# Patient Record
Sex: FEMALE | Race: WHITE | HISPANIC OR LATINO | Employment: FULL TIME | URBAN - METROPOLITAN AREA
[De-identification: names, ages, dates, MRNs, and addresses within clinical notes are randomized per-mention and may not be internally consistent; named-entity substitution may affect disease eponyms.]

---

## 2017-01-05 DIAGNOSIS — I10 HTN (HYPERTENSION): ICD-10-CM

## 2017-01-06 RX ORDER — ATENOLOL 50 MG/1
TABLET ORAL
Qty: 60 TABLET | Refills: 0 | Status: SHIPPED | OUTPATIENT
Start: 2017-01-06 | End: 2017-03-07 | Stop reason: SDUPTHER

## 2017-01-11 RX ORDER — AZITHROMYCIN 250 MG/1
TABLET, FILM COATED ORAL
Qty: 6 TABLET | Refills: 0 | Status: SHIPPED | OUTPATIENT
Start: 2017-01-11 | End: 2017-01-16

## 2017-01-19 ENCOUNTER — HOSPITAL ENCOUNTER (OUTPATIENT)
Dept: RADIOLOGY | Facility: HOSPITAL | Age: 42
Discharge: HOME | End: 2017-01-19
Attending: INTERNAL MEDICINE | Admitting: INTERNAL MEDICINE
Payer: COMMERCIAL

## 2017-01-19 VITALS
WEIGHT: 230 LBS | SYSTOLIC BLOOD PRESSURE: 134 MMHG | HEIGHT: 65 IN | DIASTOLIC BLOOD PRESSURE: 76 MMHG | HEART RATE: 92 BPM | RESPIRATION RATE: 96 BRPM | BODY MASS INDEX: 38.32 KG/M2

## 2017-01-19 DIAGNOSIS — I87.2 VENOUS INSUFFICIENCY OF LOWER EXTREMITY, UNSPECIFIED LATERALITY: ICD-10-CM

## 2017-01-19 PROCEDURE — 76942 ECHO GUIDE FOR BIOPSY: CPT | Mod: TC

## 2017-01-19 PROCEDURE — 76942 ECHO GUIDE FOR BIOPSY: CPT | Mod: 26,XE,, | Performed by: RADIOLOGY

## 2017-01-19 PROCEDURE — 36479 ENDOVENOUS LASER VEIN ADDON: CPT

## 2017-01-19 PROCEDURE — 25000003 PHARM REV CODE 250: Performed by: INTERNAL MEDICINE

## 2017-01-19 PROCEDURE — 36478 ENDOVENOUS LASER 1ST VEIN: CPT | Mod: ,,, | Performed by: RADIOLOGY

## 2017-01-19 PROCEDURE — 36475 ENDOVENOUS RF 1ST VEIN: CPT

## 2017-01-19 RX ORDER — LIDOCAINE HYDROCHLORIDE AND EPINEPHRINE 10; 10 MG/ML; UG/ML
40 INJECTION, SOLUTION INFILTRATION; PERINEURAL ONCE
Status: COMPLETED | OUTPATIENT
Start: 2017-01-19 | End: 2017-01-19

## 2017-01-19 RX ORDER — LIDOCAINE HYDROCHLORIDE 10 MG/ML
5 INJECTION INFILTRATION; PERINEURAL ONCE
Status: COMPLETED | OUTPATIENT
Start: 2017-01-19 | End: 2017-01-19

## 2017-01-19 RX ORDER — INDOMETHACIN 25 MG/1
30 CAPSULE ORAL ONCE
Status: COMPLETED | OUTPATIENT
Start: 2017-01-19 | End: 2017-01-19

## 2017-01-19 RX ORDER — DIAZEPAM 5 MG/1
10 TABLET ORAL ONCE
Status: COMPLETED | OUTPATIENT
Start: 2017-01-19 | End: 2017-01-19

## 2017-01-19 RX ADMIN — DIAZEPAM 10 MG: 5 TABLET ORAL at 01:01

## 2017-01-19 RX ADMIN — LIDOCAINE HYDROCHLORIDE,EPINEPHRINE BITARTRATE 40 ML: 10; .01 INJECTION, SOLUTION INFILTRATION; PERINEURAL at 01:01

## 2017-01-19 RX ADMIN — SODIUM BICARBONATE 30 MEQ: 84 INJECTION, SOLUTION INTRAVENOUS at 01:01

## 2017-01-19 RX ADMIN — LIDOCAINE HYDROCHLORIDE,EPINEPHRINE BITARTRATE: 10; .01 INJECTION, SOLUTION INFILTRATION; PERINEURAL at 01:01

## 2017-01-19 RX ADMIN — LIDOCAINE HYDROCHLORIDE 5 ML: 10 INJECTION, SOLUTION INFILTRATION; PERINEURAL at 01:01

## 2017-01-19 NOTE — IP AVS SNAPSHOT
[facility]  [address]  [place] [address]  Phone: [phone]           Patient Discharge Instructions     Our goal is to set you up for success. This packet includes information on your condition, medications, and your home care. It will help you to care for yourself so you don't get sicker and need to go back to the hospital.     Please ask your nurse if you have any questions.        There are many details to remember when preparing to leave the hospital. Here is what you will need to do:    1. Take your medicine. If you are prescribed medications, review your Medication List in the following pages. You may have new medications to  at the pharmacy and others that you'll need to stop taking. Review the instructions for how and when to take your medications. Talk with your doctor or nurses if you are unsure of what to do.     2. Go to your follow-up appointments. Specific follow-up information is listed in the following pages. Your may be contacted by a transition nurse or clinical provider about future appointments. Be sure we have all of the phone numbers to reach you, if needed. Please contact your provider's office if you are unable to make an appointment.     3. Watch for warning signs. Your doctor or nurse will give you detailed warning signs to watch for and when to call for assistance. These instructions may also include educational information about your condition. If you experience any of warning signs to your health, call your doctor.               [facility]  Unless otherwise directed by your provider, please contact [facility], our nurse care line that is available for 24/7 assistance.     [phone] (toll-free)    Registered nurses in the [facility] provide clinical advisement, health education, appointment booking, and other advisory services.                    ** Verify the list of medication(s) below is accurate and up to date. Carry this  with you in case of emergency. If your medications have changed, please notify your healthcare provider.             Medication List      Notice     Cannot display patient medications because the patient has not yet been checked in.               Please bring to all follow up appointments:    1. A copy of your discharge instructions.  2. All medicines you are currently taking in their original bottles.  3. Identification and insurance card.    Please arrive 15 minutes ahead of scheduled appointment time.    Please call 24 hours in advance if you must reschedule your appointment and/or time.        Your Scheduled Appointments     Jan 19, 2017 11:00 AM CST   Us Biopsy with [facility] US2, [facility] RADIOLOGIST1   [facility]    [address]  [place] [id number] [phone]            Feb 10, 2017  8:30 AM CST   Follow Up/Office Visit with Kelsey Hung OD   [facility] ([facility] )    [address]  [place] [address] [phone]            Feb 21, 2017 10:00 AM CST   Annual Checkup/Physical with Ivana Cotton MD   [facility] ([facility])    [address]  [place] [id number] [phone]            Mar 13, 2017 10:00 AM CDT   Follow Up/Office Visit with Aishwarya Elizondo MD   [place] [facility]    [address]  [place] [address] [phone]            Mar 14, 2017  8:40 AM CDT   Annual Checkup/Physical with Ivana Cotton MD   [facility] ([facility])    [address]  [place] [id number] [phone]              Your Future Surgeries/Procedures     Jan 19, 2017   Surgery with Neri Molina MD   [facility] [place]    [address]  [place] [address]   [phone]                  Admission Information     Date & Time Provider Department CSN    1/19/2017  11:00 AM Neri Molina MD [facility] [id number]      Care Providers     Provider Role Specialty Primary office phone    Neri Molina MD Attending Provider Cardiology [phone]      Recent Lab Values        5/13/2015                           9:00 AM           A1C 5.8                       Allergies as of 1/19/2017        Reactions    Lortab [Hydrocodone-acetaminophen] Itching, Rash    Other Anaphylaxis    mushrooms    Diflucan  [Fluconazole]     Other reaction(s): Hives    Iodine And Iodide Containing Products Hives    Iv iodine only    Mushroom Combination No.1     Other reaction(s): Bronchial Constriction      Advance Directives     An advance directive is a document which, in the event you are no longer able to make decisions for yourself, tells your healthcare team what kind of treatment you do or do not want to receive, or who you would like to make those decisions for you.  If you do not currently have an advance directive, [facility] encourages you to create one.  For more information call:  [phone]), [phone] ([phone]),  or log on to [email or web address].        Language Assistance Services     ATTENTION: Language assistance services are available, free of charge. Please call [phone].      ATENCIÓN: Si habla español, tiene a cohn disposición servicios gratuitos de asistencia lingüística. Llame al [phone].     CHÚ Ý: N?u b?n nói Ti?ng Vi?t, có các d?ch v? h? tr? ngôn ng? mi?n phí dành cho b?n. G?i s? [phone].         [facility] complies with applicable Federal civil rights laws and does not discriminate on the basis of race, color, national origin, age, disability, or sex.

## 2017-01-19 NOTE — IP AVS SNAPSHOT
[facility]  [address]  [place] [address]  Phone: [phone]           I have received a copy of my After Visit Summary and discharge instructions from [facility].    INSTRUCTIONS RECEIVED AND UNDERSTOOD BY:                     Patient/Patient Representative: ________________________________________________________________     Date/Time: ________________________________________________________________                     Instructions Given By: ________________________________________________________________     Date/Time: ________________________________________________________________

## 2017-02-08 ENCOUNTER — TELEPHONE (OUTPATIENT)
Dept: OPHTHALMOLOGY | Facility: CLINIC | Age: 42
End: 2017-02-08

## 2017-02-08 NOTE — TELEPHONE ENCOUNTER
Called pt regarding appt scheduled on 2/10/17 for annual exam, left message annual exam is not until April 2017 around the 7th.  Give office a call or R/S on [facility].

## 2017-02-17 DIAGNOSIS — I10 HTN (HYPERTENSION): ICD-10-CM

## 2017-02-17 RX ORDER — TRIAMTERENE AND HYDROCHLOROTHIAZIDE 37.5; 25 MG/1; MG/1
CAPSULE ORAL
Qty: 90 CAPSULE | Refills: 0 | Status: SHIPPED | OUTPATIENT
Start: 2017-02-17 | End: 2017-05-26 | Stop reason: SDUPTHER

## 2017-02-18 ENCOUNTER — PATIENT MESSAGE (OUTPATIENT)
Dept: GYNECOLOGIC ONCOLOGY | Facility: CLINIC | Age: 42
End: 2017-02-18

## 2017-02-20 ENCOUNTER — TELEPHONE (OUTPATIENT)
Dept: OPHTHALMOLOGY | Facility: CLINIC | Age: 42
End: 2017-02-20

## 2017-02-20 RX ORDER — ATENOLOL 50 MG/1
TABLET ORAL
Qty: 60 TABLET | Refills: 0 | OUTPATIENT
Start: 2017-02-20

## 2017-02-20 NOTE — TELEPHONE ENCOUNTER
Called pt regarding message sent to come in earlier than 4/3/17.  If she need to come in earlier give office a call @ [phone] understand it will be medical copay this is before annual exam. P

## 2017-02-27 ENCOUNTER — DOCUMENTATION ONLY (OUTPATIENT)
Dept: PSYCHIATRY | Facility: CLINIC | Age: 42
End: 2017-02-27

## 2017-03-06 ENCOUNTER — TELEPHONE (OUTPATIENT)
Dept: INTERNAL MEDICINE | Facility: CLINIC | Age: 42
End: 2017-03-06

## 2017-03-06 ENCOUNTER — DOCUMENTATION ONLY (OUTPATIENT)
Dept: INTERNAL MEDICINE | Facility: CLINIC | Age: 42
End: 2017-03-06

## 2017-03-06 DIAGNOSIS — R73.03 PRE-DIABETES: ICD-10-CM

## 2017-03-06 DIAGNOSIS — E55.9 MILD VITAMIN D DEFICIENCY: ICD-10-CM

## 2017-03-06 DIAGNOSIS — I10 ESSENTIAL HYPERTENSION: ICD-10-CM

## 2017-03-06 DIAGNOSIS — Z85.41 HISTORY OF CERVICAL CANCER: ICD-10-CM

## 2017-03-06 DIAGNOSIS — E89.40 SURGICAL MENOPAUSE: ICD-10-CM

## 2017-03-06 DIAGNOSIS — E03.9 ACQUIRED HYPOTHYROIDISM: Primary | ICD-10-CM

## 2017-03-06 DIAGNOSIS — G43.C0 PERIODIC HEADACHE SYNDROME WITHOUT STATUS MIGRAINOSUS, NOT INTRACTABLE: ICD-10-CM

## 2017-03-06 NOTE — PROGRESS NOTES
Pre-Visit Review & Summary:    43 y/o female with history of HTN, Obesity, Migraine H/As, Hypothyroidism, GERD, Allergic Rhinitis, Anxiety and Deprerssion, S/P EVLT for Chronic Venous Insufficiency, and S/P Hysterectomy for Stage 1B Cervical Cancer 5/2013 has a scheduled appt 3/14/17 for a physical exam.    She was last seen by me 5/13/15. Since that visit, she saw Dr. Matias (GYN Oncology) 7/15/16 for follow up. CA-125 was normal.     She had Left EVLT 1/191/7 for venous claudication without complications.      PMH:  1. HTN           Atenolol 50 mg BID           Dyazide 37.5-25 mg/day    2. Migraine H/A           Atenolol 50 mg BID    3. Hypothyroidism           Synthroid 50 mcg/day    4. Vit D Deficiency            Cholecalciferol 1,000 units daily    5. Anxiety & Depression.              Follow[facility] by Ms. Abreu, [facility]             Xanax 0.25 mg prn             Cymbalta 30 mg/day    6. Obesity             (BMI-36)    7. Hx of GERD    8. Chronic Venous Insufficiency and Varicose Veins              Followed in Vascular Surgery               S/P Right EVLT 5/2014              S/P Left EVLT 1/19/17    9. Stage 1B Cervical CA             S/P Total Hysterectomy 5/2013             Followed by Dr. Matias - last visit - 7/2016    10. Surgical Menopause              Estrace 0.5     11. Allergic Rhinitis & Food Allergies             Followed by Dr. Finley (Allergy)             Astelin Nasal Spray, Xyzal 5 mg, Epipen    12. Pre-DM        MEDS:        Albuterol Inhaler prn        Astelin Nasal Spray        Atenolol 50 mg BID        Benadryl prn        Cholecalciferol 1,000 units daily        Cymbalta 30 mg/day        Dyazide 37.5/25 mg/day        EPIPEN prn        Estrace 0.5 mg/day        Imitrex        Levothyroxine 50 mcg/day        Xanax 0.25 mg prn        Xyzal 5 mg/day      ALLERGIES:         IV Contrast         Diflucan         Amlodipine         Hydrocodone- Acetaminophen         Mushrooms    Surgical Hx:        Robotic  Radical Hysterectomy, BSO, Pelvic LN 5/2013        Tonsillectomy        Turbinectomy        Laparotomy Exploration for Endometriosis        Lipoma Excision        Right and Left EVLT      Social Hx:      Smoking Hx: Non-Smoker      ETOH: 1-2/week      Exercise:      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      Home Environment:       Family Hx:  (+) HTN - Mother  (+) DM - Father  (+) CVA - GM  (+) Cervical CA - Mother  (+) Hypothyroidism - Mother  (+) Migraine H/As - Mother      Preventive Health Screening & Recent Lab Results:   CBC (5/2015): normal   CMP (5/2015): normal   TSH (3/2014): 1.609   Lipids (5/2015): Chol-211, TG-79, HDL-74, LDL-121   Hgb A1c (5/2015): 5.8              Vit D (5/2015): 21   GYN Exam (7/2016)      Eye Exam - 4/2016   Mammogram (9/2015): negative   Immunizations     Influenza - Fall 2016    Tdp -                           Pneumovax (3/2014)   Other:                      Hepatitis Panel (3/2014): negative                      CA-125 (7/2016) - 9                      MRI Right foot (4/2015): Stress Fx R-2nd Metatarsal    IMP:  1. Physical Exam  2. HTN  3. Obesity  4. Pre-DM  5. Hypothyroidism  6. Migraine H/As  7. Vit D Deficiency  8. Hx of Stage 1B Cervical CA - S/P Robotic Radical Hysterectomy, BSO, Pelvic LN 2013  9. Surgical Menopause  10. Chronic Venous Insufficiency and Varicose Veins  11. Depression & Anxiety    Plan:  1. Mammogram, Tdp, Follow up with Dr. Matias

## 2017-03-07 DIAGNOSIS — I10 HTN (HYPERTENSION): ICD-10-CM

## 2017-03-07 DIAGNOSIS — F41.9 ANXIETY: ICD-10-CM

## 2017-03-07 DIAGNOSIS — F32.A DEPRESSION: ICD-10-CM

## 2017-03-07 RX ORDER — ATENOLOL 50 MG/1
50 TABLET ORAL 2 TIMES DAILY
Qty: 60 TABLET | Refills: 0 | Status: SHIPPED | OUTPATIENT
Start: 2017-03-07 | End: 2017-04-03 | Stop reason: SDUPTHER

## 2017-03-07 RX ORDER — DULOXETIN HYDROCHLORIDE 30 MG/1
30 CAPSULE, DELAYED RELEASE ORAL DAILY
Qty: 90 CAPSULE | Refills: 0 | Status: SHIPPED | OUTPATIENT
Start: 2017-03-07 | End: 2017-05-31 | Stop reason: SDUPTHER

## 2017-03-14 ENCOUNTER — OFFICE VISIT (OUTPATIENT)
Dept: INTERNAL MEDICINE | Facility: CLINIC | Age: 42
End: 2017-03-14
Payer: COMMERCIAL

## 2017-03-14 VITALS
DIASTOLIC BLOOD PRESSURE: 90 MMHG | BODY MASS INDEX: 37.57 KG/M2 | HEIGHT: 65 IN | SYSTOLIC BLOOD PRESSURE: 110 MMHG | WEIGHT: 225.5 LBS | HEART RATE: 73 BPM | OXYGEN SATURATION: 97 % | TEMPERATURE: 99 F

## 2017-03-14 DIAGNOSIS — A08.4 VIRAL GASTROENTERITIS: ICD-10-CM

## 2017-03-14 DIAGNOSIS — J06.9 VIRAL UPPER RESPIRATORY TRACT INFECTION: Primary | ICD-10-CM

## 2017-03-14 LAB
FLUAV AG SPEC QL IA: NEGATIVE
FLUBV AG SPEC QL IA: NEGATIVE
SPECIMEN SOURCE: NORMAL

## 2017-03-14 PROCEDURE — 99999 PR PBB SHADOW E&M-EST. PATIENT-LVL III: CPT | Mod: PBBFAC,,, | Performed by: INTERNAL MEDICINE

## 2017-03-14 PROCEDURE — 87400 INFLUENZA A/B EACH AG IA: CPT | Mod: 59

## 2017-03-14 PROCEDURE — 99214 OFFICE O/P EST MOD 30 MIN: CPT | Mod: S$GLB,,, | Performed by: INTERNAL MEDICINE

## 2017-03-14 RX ORDER — ONDANSETRON 8 MG/1
8 TABLET, ORALLY DISINTEGRATING ORAL EVERY 12 HOURS PRN
Qty: 6 TABLET | Refills: 0 | Status: SHIPPED | OUTPATIENT
Start: 2017-03-14 | End: 2017-05-31 | Stop reason: SDUPTHER

## 2017-03-14 NOTE — MR AVS SNAPSHOT
[facility]  [address]  [place] [address]  Phone: [phone]  Fax: [phone]                  Emily Max   3/14/2017 8:40 AM   Office Visit    Description:  Female : 1975   Provider:  Ivana Cotton MD   Department:  [facility]           Reason for Visit     Fever     Generalized Body Aches     Nausea     Emesis     Hoarse           Diagnoses this Visit        Comments    Viral upper respiratory tract infection    -  Primary     Viral gastroenteritis                To Do List           Future Appointments        Provider Department Dept Phone    4/3/2017 7:30 AM Kory Wakefield OD [facility] [phone]    4/3/2017 8:00 AM JOSLYN Perry On license of [facility] [phone]    2017 7:30 AM LAB, APPOINTMENT [facility] [phone]    2017 8:40 AM Ivana Cotton MD [facility] [phone]      Goals (5 Years of Data)     None      Follow-Up and Disposition     Return in about 2 months (around 2017) for annual physical with lab.    Follow-up and Disposition History       These Medications        Disp Refills Start End    ondansetron (ZOFRAN-ODT) 8 MG TbDL 6 tablet 0 3/14/2017     Take 1 tablet (8 mg total) by mouth every 12 (twelve) hours as needed. - Oral    Pharmacy: [facility]/pharmacy #[id number] - [place] - [address] Ph #: [phone]         [facility]     [facility] Nurse Care Line -  Assistance  Registered nurses in the [facility] provide clinical advisement, health education, appointment booking, and other advisory services.  Call for this free service at [phone].             Medications           Message regarding Medications     Verify the changes and/or additions to your medication regime listed below are the same as discussed with your clinician today.  If any of these changes or additions are  incorrect, please notify your healthcare provider.        STOP taking these medications     diphenhydrAMINE (BENADRYL) 25 mg capsule Take 2 tablets 1 hour before ct scan.    neomycin-polymyxin-hydrocortisone (CORTISPORIN) 3.5-10,000-1 mg/mL-unit/mL-% otic suspension PLACE 4 DROPS IN AFFECTED EAR(S) 3 TIMES A DAY FOR 7 DAYS    sulfamethoxazole-trimethoprim 800-160mg (BACTRIM DS) 800-160 mg Tab TAKE 1 TABLET BY MOUTH EVERY 12 HOURS FOR 10 DAYS           Verify that the below list of medications is an accurate representation of the medications you are currently taking.  If none reported, the list may be blank. If incorrect, please contact your healthcare provider. Carry this list with you in case of emergency.           Current Medications     atenolol (TENORMIN) 50 MG tablet Take 1 tablet (50 mg total) by mouth 2 (two) times daily.    duloxetine (CYMBALTA) 30 MG capsule Take 1 capsule (30 mg total) by mouth once daily.    epinephrine (EPIPEN) 0.3 mg/0.3 mL (1:1,000) AtIn Inject 0.3 mLs (0.3 mg total) into the muscle once.    estradiol (ESTRACE) 0.5 MG tablet TAKE 1 TABLET (0.5 MG TOTAL) BY MOUTH ONCE DAILY.    levocetirizine (XYZAL) 5 MG tablet Take 1 tablet (5 mg total) by mouth once daily.    levothyroxine (SYNTHROID) 50 MCG tablet Take 1 tablet (50 mcg total) by mouth once daily.    triamterene-hydrochlorothiazide 37.5-25 mg (DYAZIDE) 37.5-25 mg per capsule TAKE 1 CAPSULE BY MOUTH ONCE DAILY.    vitamin D (VITAMIN D3) 1000 units Tab Take 1 tablet (1,000 Units total) by mouth once daily.    albuterol 90 mcg/actuation inhaler Inhale 2 puffs into the lungs every 4 (four) hours as needed for Wheezing.    alprazolam (XANAX) 0.25 MG tablet Take 1 tablet (0.25 mg total) by mouth nightly as needed for Anxiety.    azelastine (ASTELIN) 137 mcg nasal spray 1-2 sprays (137-274 mcg total) by Nasal route 2 (two) times daily as needed for Rhinitis.    ondansetron (ZOFRAN-ODT) 8 MG TbDL Take 1 tablet (8 mg total) by mouth every 12  "(twelve) hours as needed.    promethazine-dextromethorphan (PROMETHAZINE-DM) 6.25-15 mg/5 mL Syrp TAKE 1 TEASPOON BY MOUTH EVERY 4 TO 6 HOURS AS NEEDED FOR COUGH ,NAUSEA OR DIZZINESS    valacyclovir (VALTREX) 1000 MG tablet Take 1 tablet (1,000 mg total) by mouth every 12 (twelve) hours.           Clinical Reference Information           Your Vitals Were     BP Pulse Temp Height Weight SpO2    110/90 (BP Location: Left arm, Patient Position: Sitting, BP Method: Manual) 73 98.6 °F (37 °C) (Oral) 5' 5" (1.651 m) 102.3 kg (225 lb 8.5 oz) 97%    BMI                37.53 kg/m2          Blood Pressure          Most Recent Value    BP  (!)  110/90      Allergies as of 3/14/2017     Lortab [Hydrocodone-acetaminophen]    Other    Diflucan  [Fluconazole]    Iodine And Iodide Containing Products    Mushroom Combination No.1      Immunizations Administered on Date of Encounter - 3/14/2017     None      Orders Placed During Today's Visit      Normal Orders This Visit    Influenza antigen Nasopharyngeal Swab       Language Assistance Services     ATTENTION: Language assistance services are available, free of charge. Please call [phone].      ATENCIÓN: Si habla español, tiene a cohn disposición servicios gratuitos de asistencia lingüística. Llame al [phone].     BUCKY Ý: N?u b?n nói Ti?ng Vi?t, có các d?ch v? h? tr? ngôn ng? mi?n phí dành cho b?n. G?i s? [phone].         [facility] complies with applicable Federal civil rights laws and does not discriminate on the basis of race, color, national origin, age, disability, or sex.        "

## 2017-03-14 NOTE — PROGRESS NOTES
Subjective:       Patient ID: Emily Max is a 42 y.o. female.    Chief Complaint: Fever; Generalized Body Aches; Nausea; Emesis; and Hoarse    HPI Comments: 41 y/o female with history of HTN, Obesity, Migraine H/As, Hypothyroidism, GERD, Allergic Rhinitis, Anxiety and Deprerssion, S/P EVLT for Chronic Venous Insufficiency, and S/P Hysterectomy for Stage 1B Cervical Cancer 5/2013 comes in for an urgent care visit complaining of 24 hour hx of myalgia, arthralgia, low grade fever to 99 degrees, chills, nausea, vomiting, rhinorrhea, nasal congestion, dry non-productive cough, and post nasal drip. Nausea only yesterday and ate some soup. Awoke 2 AM with vomiting of soup she ad eaten. 2 more episodes of vomiting of clear liquid this morning. She denies epigastric pain or chest pain but has noted diffuse lower abdominal cramping. No diarrhea or blood in stool. Last BM was 36 hours ago and normal. She has been taking Advil and Tylenol. She notes her son was sick last week with a viral gastroenteritis lasting 24 hours.    She was last seen by me 5/13/15. Since that visit, she saw Dr. Matias (GYN Oncology) 7/15/16 for follow up. CA-125 was normal.     She had Left EVLT 1/191/7 for venous claudication without complications.      PMH:  1. HTN           Atenolol 50 mg BID           Dyazide 37.5-25 mg/day    2. Migraine H/A           Atenolol 50 mg BID    3. Hypothyroidism           Synthroid 50 mcg/day    4. Vit D Deficiency            Cholecalciferol 1,000 units daily    5. Anxiety & Depression.              Followed by Ms. Lois LCSW             Xanax 0.25 mg prn             Cymbalta 30 mg/day    6. Obesity             (BMI-36)    7. Hx of GERD    8. Chronic Venous Insufficiency and Varicose Veins              Followed in Vascular Surgery               S/P Right EVLT 5/2014              S/P Left EVLT 1/19/17    9. Stage 1B Cervical CA             S/P Total Hysterectomy 5/2013             Followed by   Florencio - last visit - 7/2016    10. Surgical Menopause              Estrace 0.5     11. Allergic Rhinitis & Food Allergies             Followed by Dr. Finley (Allergy)             Astelin Nasal Spray, Xyzal 5 mg, Epipen    12. Pre-DM        MEDS:        Atenolol 50 mg BID        Cholecalciferol 1,000 units daily        Cymbalta 30 mg/day        Dyazide 37.5/25 mg/day        EPIPEN prn        Estrace 0.5 mg/day        Imitrex prn        Levothyroxine 50 mcg/day        Xyzal 5 mg/day      ALLERGIES:         IV Contrast         Diflucan         Amlodipine         Hydrocodone- Acetaminophen         Mushrooms    Surgical Hx:        Robotic Radical Hysterectomy, BSO, Pelvic LN 5/2013        Tonsillectomy        Turbinectomy        Laparotomy Exploration for Endometriosis        Lipoma Excision        Right and Left EVLT      Social Hx:      Smoking Hx: Non-Smoker      ETOH: 1-2/week      Exercise:      Work Hx: [facility] [employer] [facility]      Home Environment:       Family Hx:  (+) HTN - Mother  (+) DM - Father  (+) CVA - GM  (+) Cervical CA - Mother  (+) Hypothyroidism - Mother  (+) Migraine H/As - Mother      Preventive Health Screening & Recent Lab Results:   CBC (5/2015): normal   CMP (5/2015): normal   TSH (3/2014): 1.609   Lipids (5/2015): Chol-211, TG-79, HDL-74, LDL-121   Hgb A1c (5/2015): 5.8              Vit D (5/2015): 21   GYN Exam (7/2016)      Eye Exam - 4/2016   Mammogram (9/2015): negative   Immunizations     Influenza - Fall 2016    Tdp -                           Pneumovax (3/2014)   Other:                      Hepatitis Panel (3/2014): negative                      CA-125 (7/2016) - 9                      MRI Right foot (4/2015): Stress Fx R-2nd Metatarsal        Fever    Associated symptoms include abdominal pain (diffuse abdominal cramping), congestion, coughing (dry non-productive cough), ear pain (left ear discomfort), nausea, a sore throat and vomiting. Pertinent negatives include no chest pain,  diarrhea, headaches, rash, urinary pain or wheezing.   Nausea   Associated symptoms include abdominal pain (diffuse abdominal cramping), arthralgias, chills, congestion, coughing (dry non-productive cough), a fever, myalgias, nausea, a sore throat and vomiting. Pertinent negatives include no chest pain, headaches or rash.   Emesis    Associated symptoms include abdominal pain (diffuse abdominal cramping), arthralgias, chills, coughing (dry non-productive cough), a fever and myalgias. Pertinent negatives include no chest pain, diarrhea or headaches.     Review of Systems   Constitutional: Positive for chills and fever.   HENT: Positive for congestion, ear pain (left ear discomfort), postnasal drip, rhinorrhea, sinus pressure, sneezing and sore throat. Negative for trouble swallowing.    Respiratory: Positive for cough (dry non-productive cough). Negative for chest tightness, shortness of breath and wheezing.    Cardiovascular: Negative for chest pain.   Gastrointestinal: Positive for abdominal pain (diffuse abdominal cramping), nausea and vomiting. Negative for blood in stool, constipation and diarrhea.        Last BM 36hours ago and normal   Genitourinary: Negative for dysuria and hematuria.   Musculoskeletal: Positive for arthralgias and myalgias.   Skin: Negative for rash.   Neurological: Negative for headaches.   Hematological: Negative for adenopathy.       Objective:      Physical Exam   Constitutional: She is oriented to person, place, and time. She appears well-developed and well-nourished. No distress.   HENT:   Head: Normocephalic.   Right Ear: Tympanic membrane and external ear normal.   Left Ear: Tympanic membrane and external ear normal.   Nose: Mucosal edema present. Right sinus exhibits no maxillary sinus tenderness and no frontal sinus tenderness. Left sinus exhibits no maxillary sinus tenderness and no frontal sinus tenderness.   Eyes: Conjunctivae are normal. No scleral icterus.   Neck: Neck supple.  No JVD present.   Cardiovascular: Normal rate, regular rhythm and normal heart sounds.  Exam reveals no gallop.    No murmur heard.  Pulmonary/Chest: Effort normal. No respiratory distress. She has no wheezes. She has no rales.   Abdominal: Soft. Bowel sounds are normal. She exhibits no distension and no mass. There is no tenderness.   Musculoskeletal: She exhibits no edema.   Lymphadenopathy:     She has no cervical adenopathy.   Neurological: She is alert and oriented to person, place, and time.   Skin: Skin is warm and dry. She is not diaphoretic.   Psychiatric: She has a normal mood and affect.       Assessment:   1. Viral Illness with Gastroenteritis and URI  2. HTN  3. Obesity  4. Pre-DM  5. Hypothyroidism  6. Migraine H/As  7. Vit D Deficiency  8. Hx of Stage 1B Cervical CA - S/P Robotic Radical Hysterectomy, BSO, Pelvic LN 2013  9. Surgical Menopause  10. Chronic Venous Insufficiency and Varicose Veins  11. Depression & Anxiety    Plan:   1. Zofran, Ryan Diet, NP Swab sent for Influenza  2. RTC prn or if sx worsen. Reschedule for AAnnual PE with Lab.

## 2017-03-15 ENCOUNTER — TELEPHONE (OUTPATIENT)
Dept: INTERNAL MEDICINE | Facility: CLINIC | Age: 42
End: 2017-03-15

## 2017-03-15 ENCOUNTER — PATIENT MESSAGE (OUTPATIENT)
Dept: INTERNAL MEDICINE | Facility: CLINIC | Age: 42
End: 2017-03-15

## 2017-03-15 NOTE — TELEPHONE ENCOUNTER
----- Message from Leana Collins sent at 3/15/2017  3:57 PM CDT -----  Contact: self/[phone]  Pt called in regards to still not feeling better.        Please advise

## 2017-03-15 NOTE — TELEPHONE ENCOUNTER
Attempted to call patient. No answer.  Please call patient and tell her the following:     If sx are improving, (which she noted in her e-mail that she is eating, no more vomiting and having BMs), then she should continue to treat symptomatically.     However, if she is continuing with abdominal complaints then she needs to return for blood work (CBC, CMP, Lipase) and possible abdominal US to check gallbladder.    If only sx remaining are just upper respiratory, then she can continue to treat symptomatically for now. If she is not improved in 10 days, then she should come in. If respiratory sx worsen or she develops recurrent fever, chest pain, then she should also come in.

## 2017-03-16 ENCOUNTER — TELEPHONE (OUTPATIENT)
Dept: INTERNAL MEDICINE | Facility: CLINIC | Age: 42
End: 2017-03-16

## 2017-03-16 NOTE — TELEPHONE ENCOUNTER
----- Message from Zulema Bradshaw sent at 3/16/2017 10:41 AM CDT -----  Contact: self: [phone]  Pt called and would like an updated work excuse note that includes today.    Pt can be reached at [phone]    Thank you

## 2017-03-16 NOTE — TELEPHONE ENCOUNTER
Spoke to patient and states that she saw Dr. Cotton this week----she was given a note to return to work for today but the patient is still not feeling better and would like another note to extend until today and she will go to work tomorrow.    Pls sign return to work note for patient and fax to [phone]----thanks

## 2017-04-03 ENCOUNTER — OFFICE VISIT (OUTPATIENT)
Dept: PSYCHIATRY | Facility: CLINIC | Age: 42
End: 2017-04-03
Payer: COMMERCIAL

## 2017-04-03 ENCOUNTER — OFFICE VISIT (OUTPATIENT)
Dept: OPTOMETRY | Facility: CLINIC | Age: 42
End: 2017-04-03
Payer: COMMERCIAL

## 2017-04-03 DIAGNOSIS — H40.033 ANATOMICAL NARROW ANGLE, BILATERAL: ICD-10-CM

## 2017-04-03 DIAGNOSIS — E03.9 HYPOTHYROID: ICD-10-CM

## 2017-04-03 DIAGNOSIS — H52.4 PRESBYOPIA: Primary | ICD-10-CM

## 2017-04-03 DIAGNOSIS — F41.9 ANXIETY: Primary | ICD-10-CM

## 2017-04-03 DIAGNOSIS — I10 HTN (HYPERTENSION): ICD-10-CM

## 2017-04-03 PROCEDURE — 99999 PR PBB SHADOW E&M-EST. PATIENT-LVL II: CPT | Mod: PBBFAC,,, | Performed by: OPTOMETRIST

## 2017-04-03 PROCEDURE — 92015 DETERMINE REFRACTIVE STATE: CPT | Mod: S$GLB,,, | Performed by: OPTOMETRIST

## 2017-04-03 PROCEDURE — 90834 PSYTX W PT 45 MINUTES: CPT | Mod: S$GLB,,, | Performed by: SOCIAL WORKER

## 2017-04-03 PROCEDURE — 99999 PR PBB SHADOW E&M-EST. PATIENT-LVL I: CPT | Mod: PBBFAC,,, | Performed by: SOCIAL WORKER

## 2017-04-03 PROCEDURE — 92020 GONIOSCOPY: CPT | Mod: S$GLB,,, | Performed by: OPTOMETRIST

## 2017-04-03 PROCEDURE — 92014 COMPRE OPH EXAM EST PT 1/>: CPT | Mod: S$GLB,,, | Performed by: OPTOMETRIST

## 2017-04-03 RX ORDER — ATENOLOL 50 MG/1
TABLET ORAL
Qty: 60 TABLET | Refills: 5 | Status: SHIPPED | OUTPATIENT
Start: 2017-04-03 | End: 2017-05-31 | Stop reason: SDUPTHER

## 2017-04-03 RX ORDER — LEVOTHYROXINE SODIUM 50 UG/1
50 TABLET ORAL DAILY
Qty: 90 TABLET | Refills: 0 | Status: SHIPPED | OUTPATIENT
Start: 2017-04-03 | End: 2017-07-08 | Stop reason: SDUPTHER

## 2017-04-03 NOTE — PROGRESS NOTES
HPI     Blur ou at near x mos, no assoc pain or red, constant, no relief over   time.  Has contacts never seemed right, doesn't wear often. Has glasses for   reading. Distance and near blurred.Didn't bring contacts.Headaches   occasionally history of migraines.       Last edited by Francois Hussein, OD on 4/3/2017  9:28 AM.     ROS     Negative for: Constitutional, Gastrointestinal, Neurological, Skin,   Genitourinary, Musculoskeletal, HENT, Endocrine, Cardiovascular, Eyes,   Respiratory, Psychiatric, Allergic/Imm, Heme/Lymph    Last edited by Francois Hussein, OD on 4/3/2017  9:05 AM. (History)        Assessment /Plan     For exam results, see Encounter Report.    Presbyopia    Anatomical narrow angle, bilateral      1. Spec Rx given. Different lens options discussed with patient. RTC 1 year full exam.  2. IOP fine, open with gonio, strong fam history of glaucoma, Cont with yearly eye exams.

## 2017-04-03 NOTE — PROGRESS NOTES
"Individual Psychotherapy (PhD/LCSW)    4/3/2017    Site:  [facility]         Therapeutic Intervention: Met with patient.  Outpatient - Insight oriented psychotherapy 45 min - CPT code 38156    Chief complaint/reason for encounter: depression and anxiety     Interval history and content of current session: Pt comes in to explore possible ADD diagnosis, is aware of high level of distractibility, some impulsivity, racing thought. She is on Cymbalta 30 mg. , has SE at higher dose, finds this more beneficial than several SSRIs and Wellbutrin which she has tried in the past. Agree we will do evaluation for ADD at next session. Pt has developed some coping skills for managing distractibility, is aware she tends to "overload her plate". She has made progress at work in dealing with personal issues which distracted her, and others have noticed her improvement.     Treatment plan:  · Target symptoms: depression, anxiety   · Why chosen therapy is appropriate versus another modality: relevant to diagnosis  · Outcome monitoring methods: self-report  · Therapeutic intervention type: insight oriented psychotherapy    Risk parameters:  Patient reports no suicidal ideation  Patient reports no homicidal ideation  Patient reports no self-injurious behavior  Patient reports no violent behavior    Verbal deficits: None    Patient's response to intervention:  The patient's response to intervention is motivated.    Progress toward goals and other mental status changes:  The patient's progress toward goals is fair .    Diagnosis:   Anxiety, r/o ADD    Plan:  individual psychotherapy    Return to clinic: as needed  "

## 2017-04-10 ENCOUNTER — OFFICE VISIT (OUTPATIENT)
Dept: SPORTS MEDICINE | Facility: CLINIC | Age: 42
End: 2017-04-10
Payer: COMMERCIAL

## 2017-04-10 VITALS — HEIGHT: 66 IN | WEIGHT: 220 LBS | BODY MASS INDEX: 35.36 KG/M2 | TEMPERATURE: 99 F

## 2017-04-10 DIAGNOSIS — G89.29 CHRONIC PAIN OF RIGHT KNEE: Primary | ICD-10-CM

## 2017-04-10 DIAGNOSIS — M25.561 CHRONIC PAIN OF RIGHT KNEE: Primary | ICD-10-CM

## 2017-04-10 PROCEDURE — 99214 OFFICE O/P EST MOD 30 MIN: CPT | Mod: 25,S$GLB,, | Performed by: FAMILY MEDICINE

## 2017-04-10 PROCEDURE — 99999 PR PBB SHADOW E&M-EST. PATIENT-LVL III: CPT | Mod: PBBFAC,,, | Performed by: FAMILY MEDICINE

## 2017-04-10 PROCEDURE — 20611 DRAIN/INJ JOINT/BURSA W/US: CPT | Mod: RT,S$GLB,, | Performed by: FAMILY MEDICINE

## 2017-04-10 RX ORDER — TRIAMCINOLONE ACETONIDE 40 MG/ML
40 INJECTION, SUSPENSION INTRA-ARTICULAR; INTRAMUSCULAR
Status: DISCONTINUED | OUTPATIENT
Start: 2017-04-10 | End: 2017-04-10 | Stop reason: HOSPADM

## 2017-04-10 RX ADMIN — TRIAMCINOLONE ACETONIDE 40 MG: 40 INJECTION, SUSPENSION INTRA-ARTICULAR; INTRAMUSCULAR at 05:04

## 2017-04-10 NOTE — PROCEDURES
Large Joint Aspiration/Injection  Date/Time: 4/10/2017 5:31 PM  Performed by: LILIANA PRAJAPATI  Authorized by: LILIANA PRAJAPATI     Consent Done?:  Yes (Verbal)  Indications:  Pain  Procedure site marked: Yes    Timeout: Prior to procedure the correct patient, procedure, and site was verified      Location:  Knee  Site:  R knee  Prep: Patient was prepped and draped in usual sterile fashion    Ultrasonic Guidance for needle placement: Yes  Images are saved and documented.  Needle size:  18 G  Approach:  Lateral  Medications:  40 mg triamcinolone acetonide 40 mg/mL  Patient tolerance:  Patient tolerated the procedure well with no immediate complications    Additional Comments: Description of ultrasound utilization for needle guidance:   Ultrasound guidance used for needle localization.  Images saved and stored for documentation.  The knee joint was visualized.  Dynamic visualization of the 20g x 1.5  needle was continuous throughout the procedure.

## 2017-04-10 NOTE — MR AVS SNAPSHOT
[facility]  [address]  [facility] [address]  Phone: [phone]                  Emily Max   4/10/2017 5:00 PM   Appointment    Description:  Female : 1975   Provider:  Jesus Reid MD   Department:  [facility]                To Do List           Future Appointments        Provider Department Dept Phone    4/10/2017 5:00 PM Jesus Reid MD [facility] [phone]    2017 7:30 AM APPOINTMENT LAB, [facility] [phone]    2017 8:40 AM Ivana Cotton MD [facility] - Internal Medicine [phone]    2017 8:30 AM Dylan Matias MD [facility] - GYN Oncology [phone]      Goals (5 Years of Data)     None      [facility]     [facility] Nurse Care Line -  Assistance  Unless otherwise directed by your provider, please contact [facility], our nurse care line that is available for  assistance.     Registered nurses in the [facility] provide: appointment scheduling, clinical advisement, health education, and other advisory services.  Call: [phone] (toll free)               Medications           Message regarding Medications     Verify the changes and/or additions to your medication regime listed below are the same as discussed with your clinician today.  If any of these changes or additions are incorrect, please notify your healthcare provider.             Verify that the below list of medications is an accurate representation of the medications you are currently taking.  If none reported, the list may be blank. If incorrect, please contact your healthcare provider. Carry this list with you in case of emergency.           Current Medications     albuterol 90 mcg/actuation inhaler Inhale 2 puffs into the lungs every 4 (four) hours as needed for Wheezing.    alprazolam (XANAX) 0.25 MG tablet Take 1 tablet (0.25 mg total) by  mouth nightly as needed for Anxiety.    atenolol (TENORMIN) 50 MG tablet TAKE 1 TABLET (50 MG TOTAL) BY MOUTH 2 (TWO) TIMES DAILY.    azelastine (ASTELIN) 137 mcg nasal spray 1-2 sprays (137-274 mcg total) by Nasal route 2 (two) times daily as needed for Rhinitis.    duloxetine (CYMBALTA) 30 MG capsule Take 1 capsule (30 mg total) by mouth once daily.    epinephrine (EPIPEN) 0.3 mg/0.3 mL (1:1,000) AtIn Inject 0.3 mLs (0.3 mg total) into the muscle once.    estradiol (ESTRACE) 0.5 MG tablet TAKE 1 TABLET (0.5 MG TOTAL) BY MOUTH ONCE DAILY.    levocetirizine (XYZAL) 5 MG tablet Take 1 tablet (5 mg total) by mouth once daily.    levothyroxine (SYNTHROID) 50 MCG tablet Take 1 tablet (50 mcg total) by mouth once daily.    ondansetron (ZOFRAN-ODT) 8 MG TbDL Take 1 tablet (8 mg total) by mouth every 12 (twelve) hours as needed.    promethazine-dextromethorphan (PROMETHAZINE-DM) 6.25-15 mg/5 mL Syrp TAKE 1 TEASPOON BY MOUTH EVERY 4 TO 6 HOURS AS NEEDED FOR COUGH ,NAUSEA OR DIZZINESS    triamterene-hydrochlorothiazide 37.5-25 mg (DYAZIDE) 37.5-25 mg per capsule TAKE 1 CAPSULE BY MOUTH ONCE DAILY.    valacyclovir (VALTREX) 1000 MG tablet Take 1 tablet (1,000 mg total) by mouth every 12 (twelve) hours.    vitamin D (VITAMIN D3) 1000 units Tab Take 1 tablet (1,000 Units total) by mouth once daily.           Clinical Reference Information           Allergies as of 4/10/2017     Lortab [Hydrocodone-acetaminophen]    Other    Diflucan  [Fluconazole]    Iodine And Iodide Containing Products    Mushroom Combination No.1      Immunizations Administered on Date of Encounter - 4/10/2017     None      Language Assistance Services     ATTENTION: Language assistance services are available, free of charge. Please call [phone].      ATENCIÓN: Si habla helder, tiene a cohn disposición servicios gratuitos de asistencia lingüística. Llame al [phone].     BUCKY Ý: N?u b?n nói Ti?ng Vi?t, có các d?ch v? h? tr? ngôn ng? mi?n mark boyd  brittany alexandra?nTim WARE?i s? [phone].         [facility] complies with applicable Federal civil rights laws and does not discriminate on the basis of race, color, national origin, age, disability, or sex.

## 2017-04-10 NOTE — PROGRESS NOTES
Emily Max, a 42 y.o. female, presents today for evaluation of her RIGHT knee.      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 Live ([facility])   Progression: worsening discomfort since late March 2017  Quality:    Mild to moderate pain    Sharp pain at times    Ache   Radiation: none  Severity: per nursing documentation  Timing: intermittent w/ use  Trauma: none known    Review of systems (ROS):  A 10+ review of systems was performed with pertinent positives and negatives noted above in the history of present illness. Other systems were negative unless otherwise specified.    PHYSICAL EXAMINATION  General:  The patient is alert and oriented x 3. Mood is pleasant. Observation of ears, eyes and nose reveal no gross abnormalities. HEENT: NCAT, sclera anicteric.   Lungs: Respirations are equal and unlabored.  Gait is coordinated. Patient can toe walk and heel walk without difficulty.    RIGHT KNEE EXAMINATION    Observation/Inspection  Gait:   Antalgic   Alignment:  Neutral   Scars:   None   Muscle atrophy: Mild  Effusion:  None   Warmth:  None   Discoloration:   none     Tenderness / Crepitus (T / C):         T / C      T / C  Patella   - / -   Lateral joint line   - / -     Peripatellar medial  -  Medial joint line    + / -  Peripatellar lateral -  Medial plica   - / -  Patellar tendon -   Popliteal fossa   - / -  Quad tendon   -   Gastrocnemius   -  Prepatellar Bursa - / -   Quadricep   -  Tibial tubercle  -  Thigh/hamstring  -  Pes anserine/HS -  Fibula    -  ITB   - / -  Tibia     -  Tib/fib joint  - / -  LCL    -    MFC   - / -    MCL: Proximal  -    LFC   - / -   Distal    -          ROM: (* = pain)  PASSIVE   ACTIVE    Left :   5 / 0 / 145*   5 / 0 / 145*     Right :    5 / 0 / 145*   5 / 0 / 145*    Patellofemoral examination:  See above noted areas of tenderness.   Patella position    Subluxation / dislocation: Centered        Sup. / Inf;   Normal   Crepitus (PF):    Absent   Patellar Mobility:       Medial-lateral:   Normal    Superior-inferior:  Normal    Inferior tilt   Normal    Patellar tendon:  Normal   Lateral tilt:    Normal   J-sign:     None   Patellofemoral grind:   No pain       Meniscal Signs:     Pain on terminal extension:  +*  Pain on terminal flexion:  +*  Hipolitos maneuver:  +*  Squat     NT    Ligament Examination:  ACL / Lachman:  WNL  PCL-Post.  drawer: normal 0 to 2mm  MCL- Valgus:  normal 0 to 2mm  LCL- Varus:    normal 0 to 2mm  Pivot shift:  guarding   Dial Test:   difference c/w other side   At 30° flexion: normal (< 5°)    At 90° flexion: normal (< 5°)   Reverse Pivot Shift:   normal (Equal)     Strength: (* = with pain) Painful Side  Quadriceps   5/5  Hamstrin/5    Extremity Neuro-vascular Examination:   Sensation:  Grossly intact to light touch all dermatomal regions.   Motor Function:  Fully intact motor function at hip, knee, foot and ankle    DTRs;  quadriceps and  achilles 2+.  No clonus and downgoing Babinski.    Vascular status:  DP and PT pulses 2+, brisk capillary refill, symmetric.     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, catie ant compartment, right      No evidence of neurologic pathology  No evidence of vascular pathology     Imaging studies reviewed:   X-ray knee, bilateral 16.11     Plan:   We discussed the importance of appropriate diet, weight, and regular exercise including quadriceps strengthening      We discussed options including:  #1 watchful waiting  #2 re start physical therapy aimed at:   quadriceps  strengthening   gait training   #3 injection therapy:   CSI iaknee     Right, effective 80%, repeat     Left    VSI    Orthobiologics   #4 MRI for further evaluation      The patient chooses #3 csi iaknee right     Pain management: handout given  Bracing:   Physical therapy: hgPT, handout given, prior as above  Activity (e.g. 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

## 2017-04-21 ENCOUNTER — TELEPHONE (OUTPATIENT)
Dept: CARDIOLOGY | Facility: HOSPITAL | Age: 42
End: 2017-04-21

## 2017-04-21 DIAGNOSIS — I87.2 VENOUS INSUFFICIENCY OF RIGHT LOWER EXTREMITY: Primary | ICD-10-CM

## 2017-05-02 ENCOUNTER — TELEPHONE (OUTPATIENT)
Dept: GYNECOLOGIC ONCOLOGY | Facility: CLINIC | Age: 42
End: 2017-05-02

## 2017-05-02 ENCOUNTER — PATIENT MESSAGE (OUTPATIENT)
Dept: GYNECOLOGIC ONCOLOGY | Facility: CLINIC | Age: 42
End: 2017-05-02

## 2017-05-02 DIAGNOSIS — C53.9 MALIGNANT NEOPLASM OF CERVIX, UNSPECIFIED SITE: ICD-10-CM

## 2017-05-02 DIAGNOSIS — Z12.31 VISIT FOR SCREENING MAMMOGRAM: Primary | ICD-10-CM

## 2017-05-04 ENCOUNTER — PATIENT MESSAGE (OUTPATIENT)
Dept: PSYCHIATRY | Facility: CLINIC | Age: 42
End: 2017-05-04

## 2017-05-10 ENCOUNTER — OFFICE VISIT (OUTPATIENT)
Dept: PSYCHIATRY | Facility: CLINIC | Age: 42
End: 2017-05-10
Payer: COMMERCIAL

## 2017-05-10 ENCOUNTER — TELEPHONE (OUTPATIENT)
Dept: INTERNAL MEDICINE | Facility: CLINIC | Age: 42
End: 2017-05-10

## 2017-05-10 ENCOUNTER — PATIENT MESSAGE (OUTPATIENT)
Dept: INTERNAL MEDICINE | Facility: CLINIC | Age: 42
End: 2017-05-10

## 2017-05-10 DIAGNOSIS — F41.9 ANXIETY: Primary | ICD-10-CM

## 2017-05-10 DIAGNOSIS — F98.8 ADD (ATTENTION DEFICIT DISORDER): ICD-10-CM

## 2017-05-10 DIAGNOSIS — F33.42 MAJOR DEPRESSION, RECURRENT, FULL REMISSION: ICD-10-CM

## 2017-05-10 PROCEDURE — 90834 PSYTX W PT 45 MINUTES: CPT | Mod: S$GLB,,, | Performed by: SOCIAL WORKER

## 2017-05-10 NOTE — TELEPHONE ENCOUNTER
----- Message from Caty Ramos sent at 5/10/2017  9:02 AM CDT -----  Contact: Self/[phone]  Pt called in regards to needing to talk to the nurse needing to know why her appointment was change without speaking with her. Please call and advise.             Thank you

## 2017-05-10 NOTE — PROGRESS NOTES
"Individual Psychotherapy (PhD/LCSW)    5/10/2017    Site:  [facility]         Therapeutic Intervention: Met with patient.  Outpatient - Insight oriented psychotherapy 45 min - CPT code 36712    Chief complaint/reason for encounter: depression, anxiety and distractibility     Interval history and content of current session: Pt stable, trying to differentiate Sx of anxiety vs ADD. Do DIVA questionnaire with pt and she clearly qualifies for diagnosis, with score of 6/9 for ADD, Sx present currently and also in childhood. She scores 5/9 for hyperactivity, just below diagnostic criterion of 6/9, and does not see herself as hyperactive. Her positives in the hyperactive area relate to her thoughts, interrupting others, talking excessively, rather than motor Sx. Pt reports rapid racing thoughts she is unable to control, making it hard for her to express what she knows, to pay attention to someone speaking, or to focus on what she is reading. Pt is able to focus when interest level is high, which helps her at work, and which is typical of ADD. This pattern goes back to childhood, where she did well in things that interested her but was highly distractible. Pt also notes high level of anxiety that leads to "freeze". Mother and mother's side of family have strong hx of anxiety, depression, and some alcoholism.    Pt reports that Wellbutrin helped notably with her anxiety but did not help her depression. Cymbalta has done very well for her with depression but does not help the anxiety/racing thoughts. SSRIs were less helpful.   She will make an appointment to see a psychiatrist to consider best medication regimen for her to treat depression, anxiety and distractibility. She will also discuss with Dr. Cotton at next appointment.   Treatment plan:  · Target symptoms: depression, anxiety   · Why chosen therapy is appropriate versus another modality: relevant to diagnosis  · Outcome monitoring methods: self-report  · Therapeutic " intervention type: insight oriented psychotherapy    Risk parameters:  Patient reports no suicidal ideation  Patient reports no homicidal ideation  Patient reports no self-injurious behavior  Patient reports no violent behavior    Verbal deficits: None    Patient's response to intervention:  The patient's response to intervention is motivated.    Progress toward goals and other mental status changes:  The patient's progress toward goals is fair .    Diagnosis:   Anxiety, major depression, recurrent, full remission,  ADD    Plan:  individual psychotherapy    Return to clinic: as needed

## 2017-05-10 NOTE — TELEPHONE ENCOUNTER
----- Message from Zoila Torres sent at 5/10/2017  3:35 PM CDT -----  Contact: Patient, phone [phone]  As per Dr. Cotton's email the patient is calling back to reschedule her appointment to next week.  Please give her a call.      Thanks!

## 2017-05-11 ENCOUNTER — PATIENT MESSAGE (OUTPATIENT)
Dept: INTERNAL MEDICINE | Facility: CLINIC | Age: 42
End: 2017-05-11

## 2017-05-13 ENCOUNTER — HOSPITAL ENCOUNTER (OUTPATIENT)
Dept: RADIOLOGY | Facility: HOSPITAL | Age: 42
Discharge: HOME | End: 2017-05-13
Attending: OBSTETRICS & GYNECOLOGY
Payer: COMMERCIAL

## 2017-05-13 DIAGNOSIS — Z12.31 VISIT FOR SCREENING MAMMOGRAM: ICD-10-CM

## 2017-05-13 PROCEDURE — 77063 BREAST TOMOSYNTHESIS BI: CPT | Mod: 26,,, | Performed by: RADIOLOGY

## 2017-05-13 PROCEDURE — 77067 SCR MAMMO BI INCL CAD: CPT | Mod: TC

## 2017-05-13 PROCEDURE — 77067 SCR MAMMO BI INCL CAD: CPT | Mod: 26,,, | Performed by: RADIOLOGY

## 2017-05-15 ENCOUNTER — DOCUMENTATION ONLY (OUTPATIENT)
Dept: INTERNAL MEDICINE | Facility: CLINIC | Age: 42
End: 2017-05-15

## 2017-05-15 NOTE — PROGRESS NOTES
Pre-Visit Review & Summary:    43 y/o female with history of HTN, Obesity, Migraine H/As, Hypothyroidism, GERD, Allergic Rhinitis, Anxiety, Deprerssion, S/P EVLT for Chronic Venous Insufficiency, and S/P Hysterectomy for Stage 1B Cervical Cancer 5/2013 has a scheduled appt 5/18/17 for her Annual PE.    Since her last visit, 3/14/17, she was seen by MsTim Mabel in Psychology for follow up of her Anxiety and Depression and for evaluation of possible ADD. She notes Cymbalta helps with Depression but not anxiety. In past, Wellbutrin helped with Anxiety. She has not responded well with SSRIs. Ms. Monroe has referred her to Psychiatry and she has an appt scheduled for 9/26/17, earliest available. Psychologist and patient are  requesting she begin Wellbutrin.       PMH:  1. HTN           Atenolol 50 mg BID           Dyazide 37.5-25 mg/day    2. Migraine H/A           Atenolol 50 mg BID    3. Hypothyroidism           Synthroid 50 mcg/day    4. Vit D Deficiency            Cholecalciferol 1,000 units daily    5. Anxiety & Depression.              [facility]             Xanax 0.25 mg prn             Cymbalta 30 mg/day    6. Obesity             (BMI-37)    7. Hx of GERD    8. Chronic Venous Insufficiency and Varicose Veins              Followed in Vascular Surgery               S/P Right EVLT 5/2014              S/P Left EVLT 1/19/17    9. Stage 1B Cervical CA             S/P Total Hysterectomy 5/2013             Followed by Dr. Matias - last visit - 7/2016    10. Surgical Menopause              Estrace 0.5     11. Allergic Rhinitis & Food Allergies             Followed by Dr. Finley (Allergy)             Astelin Nasal Spray, Xyzal 5 mg, Epipen    12. Pre-DM        MEDS:        Atenolol 50 mg BID        Cholecalciferol 1,000 units daily        Cymbalta 30 mg/day        Dyazide 37.5/25 mg/day        EPIPEN prn        Estrace 0.5 mg/day        Imitrex prn        Levothyroxine 50 mcg/day        Xyzal 5  mg/day      ALLERGIES:         IV Contrast         Diflucan         Amlodipine         Hydrocodone- Acetaminophen         Mushrooms    Surgical Hx:        Robotic Radical Hysterectomy, BSO, Pelvic LN 5/2013        Tonsillectomy        Turbinectomy        Laparotomy Exploration for Endometriosis        Lipoma Excision        Right and Left EVLT      Social Hx:      Smoking Hx: Non-Smoker      ETOH: 1-2/week      Exercise:      Work Hx: [employer] [facility]      Home Environment:       Family Hx:  (+) HTN - Mother  (+) DM - Father  (+) CVA - GM  (+) Cervical CA - Mother  (+) Hypothyroidism - Mother  (+) Migraine H/As - Mother      Preventive Health Screening & Recent Lab Results:              Annual PE:   CBC (5/2015): normal   CMP (5/2015): normal   TSH (5/2015): 2.560   Lipids (5/2015): Chol-211, TG-79, HDL-74, LDL-121   Hgb A1c (5/2015): 5.8              Vit D (5/2015): 21   GYN Exam (7/2016) - Dr. Matais      Eye Exam - 4/2016   Mammogram (9/2015): negative   Immunizations     Influenza - Fall 2016    Tdp -                           Pneumovax (3/2014)   Other:                      Hepatitis Panel (3/2014): negative                      CA-125 (7/2016) - 9                      MRI Right foot (4/2015): Stress Fx R-2nd Metatarsal    IMP:  1. Anxiety & Depression  2. HTN  3. Obesity  4. Pre-DM  5. Hypothyroidism  6. Migraine H/As  7. Vit D Deficiency  8. Hx of Stage 1B Cervical CA - S/P Robotic Radical Hysterectomy, BSO, Pelvic LN 2013  9. Surgical Menopause  10. Chronic Venous Insufficiency and Varicose Veins

## 2017-05-17 ENCOUNTER — PATIENT MESSAGE (OUTPATIENT)
Dept: GYNECOLOGIC ONCOLOGY | Facility: CLINIC | Age: 42
End: 2017-05-17

## 2017-05-17 ENCOUNTER — LAB VISIT (OUTPATIENT)
Dept: LAB | Facility: HOSPITAL | Age: 42
End: 2017-05-17
Attending: INTERNAL MEDICINE
Payer: COMMERCIAL

## 2017-05-17 DIAGNOSIS — I10 ESSENTIAL HYPERTENSION: ICD-10-CM

## 2017-05-17 DIAGNOSIS — R73.03 PRE-DIABETES: ICD-10-CM

## 2017-05-17 DIAGNOSIS — E03.9 ACQUIRED HYPOTHYROIDISM: ICD-10-CM

## 2017-05-17 DIAGNOSIS — E55.9 MILD VITAMIN D DEFICIENCY: ICD-10-CM

## 2017-05-17 DIAGNOSIS — C53.9 MALIGNANT NEOPLASM OF CERVIX, UNSPECIFIED SITE: ICD-10-CM

## 2017-05-17 LAB
25(OH)D3+25(OH)D2 SERPL-MCNC: 50 NG/ML
ALBUMIN SERPL BCP-MCNC: 3.7 G/DL
ALP SERPL-CCNC: 70 U/L
ALT SERPL W/O P-5'-P-CCNC: 18 U/L
ANION GAP SERPL CALC-SCNC: 10 MMOL/L
AST SERPL-CCNC: 15 U/L
BASOPHILS # BLD AUTO: 0.04 K/UL
BASOPHILS NFR BLD: 0.7 %
BILIRUB SERPL-MCNC: 0.7 MG/DL
BUN SERPL-MCNC: 18 MG/DL
CALCIUM SERPL-MCNC: 9.9 MG/DL
CANCER AG125 SERPL-ACNC: 9 U/ML
CHLORIDE SERPL-SCNC: 102 MMOL/L
CHOLEST/HDLC SERPL: 3 {RATIO}
CO2 SERPL-SCNC: 27 MMOL/L
CREAT SERPL-MCNC: 1 MG/DL
DIFFERENTIAL METHOD: ABNORMAL
EOSINOPHIL # BLD AUTO: 0.2 K/UL
EOSINOPHIL NFR BLD: 3.4 %
ERYTHROCYTE [DISTWIDTH] IN BLOOD BY AUTOMATED COUNT: 12.9 %
EST. GFR  (AFRICAN AMERICAN): >60 ML/MIN/1.73 M^2
EST. GFR  (NON AFRICAN AMERICAN): >60 ML/MIN/1.73 M^2
ESTIMATED AVG GLUCOSE: 123 MG/DL
GLUCOSE SERPL-MCNC: 109 MG/DL
HBA1C MFR BLD HPLC: 5.9 %
HCT VFR BLD AUTO: 42.3 %
HDL/CHOLESTEROL RATIO: 33.2 %
HDLC SERPL-MCNC: 214 MG/DL
HDLC SERPL-MCNC: 71 MG/DL
HGB BLD-MCNC: 14.9 G/DL
LDLC SERPL CALC-MCNC: 127.4 MG/DL
LYMPHOCYTES # BLD AUTO: 1.9 K/UL
LYMPHOCYTES NFR BLD: 34.1 %
MCH RBC QN AUTO: 30.3 PG
MCHC RBC AUTO-ENTMCNC: 35.2 %
MCV RBC AUTO: 86 FL
MONOCYTES # BLD AUTO: 1 K/UL
MONOCYTES NFR BLD: 17.9 %
NEUTROPHILS # BLD AUTO: 2.4 K/UL
NEUTROPHILS NFR BLD: 43.7 %
NONHDLC SERPL-MCNC: 143 MG/DL
PLATELET # BLD AUTO: 244 K/UL
PMV BLD AUTO: 8.8 FL
POTASSIUM SERPL-SCNC: 4 MMOL/L
PROT SERPL-MCNC: 7.8 G/DL
RBC # BLD AUTO: 4.91 M/UL
SODIUM SERPL-SCNC: 139 MMOL/L
TRIGL SERPL-MCNC: 78 MG/DL
TSH SERPL DL<=0.005 MIU/L-ACNC: 1.9 UIU/ML
WBC # BLD AUTO: 5.58 K/UL

## 2017-05-17 PROCEDURE — 86304 IMMUNOASSAY TUMOR CA 125: CPT

## 2017-05-17 PROCEDURE — 84443 ASSAY THYROID STIM HORMONE: CPT

## 2017-05-17 PROCEDURE — 85025 COMPLETE CBC W/AUTO DIFF WBC: CPT

## 2017-05-17 PROCEDURE — 83036 HEMOGLOBIN GLYCOSYLATED A1C: CPT

## 2017-05-17 PROCEDURE — 80061 LIPID PANEL: CPT

## 2017-05-17 PROCEDURE — 82306 VITAMIN D 25 HYDROXY: CPT

## 2017-05-17 PROCEDURE — 80053 COMPREHEN METABOLIC PANEL: CPT

## 2017-05-17 PROCEDURE — 36415 COLL VENOUS BLD VENIPUNCTURE: CPT

## 2017-05-22 ENCOUNTER — PATIENT MESSAGE (OUTPATIENT)
Dept: SPORTS MEDICINE | Facility: CLINIC | Age: 42
End: 2017-05-22

## 2017-05-24 ENCOUNTER — OFFICE VISIT (OUTPATIENT)
Dept: SPORTS MEDICINE | Facility: CLINIC | Age: 42
End: 2017-05-24
Payer: OTHER MISCELLANEOUS

## 2017-05-24 VITALS — BODY MASS INDEX: 35.36 KG/M2 | HEIGHT: 66 IN | WEIGHT: 220 LBS

## 2017-05-24 DIAGNOSIS — M17.11 PRIMARY OSTEOARTHRITIS OF RIGHT KNEE: Primary | ICD-10-CM

## 2017-05-24 PROCEDURE — 99214 OFFICE O/P EST MOD 30 MIN: CPT | Mod: 25,S$GLB,, | Performed by: FAMILY MEDICINE

## 2017-05-24 PROCEDURE — 20611 DRAIN/INJ JOINT/BURSA W/US: CPT | Mod: RT,S$GLB,, | Performed by: FAMILY MEDICINE

## 2017-05-24 PROCEDURE — 99999 PR PBB SHADOW E&M-EST. PATIENT-LVL III: CPT | Mod: PBBFAC,,, | Performed by: FAMILY MEDICINE

## 2017-05-24 RX ORDER — TRIAMCINOLONE ACETONIDE 40 MG/ML
40 INJECTION, SUSPENSION INTRA-ARTICULAR; INTRAMUSCULAR
Status: DISCONTINUED | OUTPATIENT
Start: 2017-05-24 | End: 2017-05-24 | Stop reason: HOSPADM

## 2017-05-24 RX ADMIN — TRIAMCINOLONE ACETONIDE 40 MG: 40 INJECTION, SUSPENSION INTRA-ARTICULAR; INTRAMUSCULAR at 04:05

## 2017-05-24 NOTE — PROGRESS NOTES
Emily Max, a 42 y.o. female, presents today for evaluation of her RIGHT knee.      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 Live ([facility])   Progression: worsening discomfort   Quality:    Mild to moderate pain    Sharp pain at times    Ache   Radiation: none  Severity: per nursing documentation  Timing: intermittent w/ use  Trauma: none known    Review of systems (ROS):  A 10+ review of systems was performed with pertinent positives and negatives noted above in the history of present illness. Other systems were negative unless otherwise specified.    PHYSICAL EXAMINATION  General:  The patient is alert and oriented x 3. Mood is pleasant. Observation of ears, eyes and nose reveal no gross abnormalities. HEENT: NCAT, sclera anicteric.   Lungs: Respirations are equal and unlabored.  Gait is coordinated. Patient can toe walk and heel walk without difficulty.    RIGHT KNEE EXAMINATION    Observation/Inspection  Gait:   Antalgic   Alignment:  Neutral   Scars:   None   Muscle atrophy: Mild  Effusion:  None   Warmth:  None   Discoloration:   none     Tenderness / Crepitus (T / C):         T / C      T / C  Patella   - / -   Lateral joint line   - / -     Peripatellar medial  -  Medial joint line    + / -  Peripatellar lateral -  Medial plica   - / -  Patellar tendon -   Popliteal fossa   - / -  Quad tendon   -   Gastrocnemius   -  Prepatellar Bursa - / -   Quadricep   -  Tibial tubercle  -  Thigh/hamstring  -  Pes anserine/HS -  Fibula    -  ITB   - / -  Tibia     -  Tib/fib joint  - / -  LCL    -    MFC   - / -    MCL: Proximal  -    LFC   - / -   Distal    -          ROM: (* = pain)  PASSIVE   ACTIVE    Left :   5 / 0 / 145*   5 / 0 / 145*     Right :    5 / 0 / 145*   5 / 0 / 145*    Patellofemoral examination:  See above noted areas of tenderness.   Patella position    Subluxation / dislocation: Centered        Sup. / Inf;   Normal   Crepitus (PF):    Absent   Patellar Mobility:       Medial-lateral:   Normal    Superior-inferior:  Normal    Inferior tilt   Normal    Patellar tendon:  Normal   Lateral tilt:    Normal   J-sign:     None   Patellofemoral grind:   No pain       Meniscal Signs:     Pain on terminal extension:  +*  Pain on terminal flexion:  +*  Hipolitos maneuver:  +*  Squat     NT    Ligament Examination:  ACL / Lachman:  WNL  PCL-Post.  drawer: normal 0 to 2mm  MCL- Valgus:  normal 0 to 2mm  LCL- Varus:    normal 0 to 2mm  Pivot shift:  guarding   Dial Test:   difference c/w other side   At 30° flexion: normal (< 5°)    At 90° flexion: normal (< 5°)   Reverse Pivot Shift:   normal (Equal)     Strength: (* = with pain) Painful Side  Quadriceps   5/5  Hamstrin/5    Extremity Neuro-vascular Examination:   Sensation:  Grossly intact to light touch all dermatomal regions.   Motor Function:  Fully intact motor function at hip, knee, foot and ankle    DTRs;  quadriceps and  achilles 2+.  No clonus and downgoing Babinski.    Vascular status:  DP and PT pulses 2+, brisk capillary refill, symmetric.     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    [We  discussed the importance of appropriate diet, weight, and regular exercise including quadriceps strengthening      We discussed options including:  #1 watchful waiting  #2 re start physical therapy aimed at:   Core stability   RoM knee   Strengthening quadriceps   gait training   #3 injection therapy:   CSI iaknee     Right, effective 80%, repeat    VSI iaknee    Right,    Orthobiologics   #4 MRI for further evaluation      The patient chooses #3 csi iaknee right]     Pain management: handout given  Bracing:   Physical therapy: hgPT, handout given, prior as above  Activity (e.g. 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  Review MRI knee right  A/e csi iaknee  Ineffective-->MRI knee   -->vsi iaknee right  Should symptoms worsen or fail to resolve, consider:  Revisiting the above options

## 2017-05-24 NOTE — PROCEDURES
"Large Joint Aspiration/Injection  Date/Time: 5/24/2017 4:34 PM  Performed by: LILIANA PRAJAPATI  Authorized by: LILIANA PRAJAPATI     Consent Done?:  Yes (Verbal)  Indications:  Pain  Procedure site marked: Yes    Timeout: Prior to procedure the correct patient, procedure, and site was verified      Location:  Knee  Site:  R knee  Prep: Patient was prepped and draped in usual sterile fashion    Ultrasonic Guidance for needle placement: Yes  Images are saved and documented.  Needle size:  18 G  Approach:  Lateral  Medications:  40 mg triamcinolone acetonide 40 mg/mL  Patient tolerance:  Patient tolerated the procedure well with no immediate complications    Additional Comments: Description of ultrasound utilization for needle guidance:   Ultrasound guidance used for needle localization.  Images saved and stored for documentation.  The knee joint was visualized.  Dynamic visualization of the 20g x 1.5"  needle was continuous throughout the procedure.      "

## 2017-05-26 ENCOUNTER — TELEPHONE (OUTPATIENT)
Dept: CARDIOLOGY | Facility: CLINIC | Age: 42
End: 2017-05-26

## 2017-05-26 DIAGNOSIS — I87.2 VENOUS INSUFFICIENCY OF RIGHT LOWER EXTREMITY: Primary | ICD-10-CM

## 2017-05-26 DIAGNOSIS — I87.8 VENOUS INTERMITTENT CLAUDICATION: ICD-10-CM

## 2017-05-29 RX ORDER — TRIAMTERENE AND HYDROCHLOROTHIAZIDE 37.5; 25 MG/1; MG/1
CAPSULE ORAL
Qty: 90 CAPSULE | Refills: 0 | Status: SHIPPED | OUTPATIENT
Start: 2017-05-29 | End: 2017-08-25 | Stop reason: SDUPTHER

## 2017-05-31 ENCOUNTER — TELEPHONE (OUTPATIENT)
Dept: CARDIOLOGY | Facility: HOSPITAL | Age: 42
End: 2017-05-31

## 2017-05-31 DIAGNOSIS — F32.A DEPRESSION: ICD-10-CM

## 2017-05-31 DIAGNOSIS — A08.4 VIRAL GASTROENTERITIS: ICD-10-CM

## 2017-05-31 DIAGNOSIS — F41.9 ANXIETY: ICD-10-CM

## 2017-05-31 DIAGNOSIS — I10 HTN (HYPERTENSION): ICD-10-CM

## 2017-05-31 RX ORDER — ONDANSETRON 8 MG/1
8 TABLET, ORALLY DISINTEGRATING ORAL EVERY 12 HOURS PRN
Qty: 6 TABLET | Refills: 0 | Status: ON HOLD | OUTPATIENT
Start: 2017-05-31 | End: 2017-08-24 | Stop reason: HOSPADM

## 2017-05-31 RX ORDER — ATENOLOL 50 MG/1
TABLET ORAL
Qty: 180 TABLET | Refills: 0 | Status: SHIPPED | OUTPATIENT
Start: 2017-05-31 | End: 2017-09-05 | Stop reason: SDUPTHER

## 2017-05-31 RX ORDER — DULOXETIN HYDROCHLORIDE 30 MG/1
30 CAPSULE, DELAYED RELEASE ORAL DAILY
Qty: 90 CAPSULE | Refills: 0 | Status: SHIPPED | OUTPATIENT
Start: 2017-05-31 | End: 2017-08-25 | Stop reason: SDUPTHER

## 2017-05-31 NOTE — ADDENDUM NOTE
Encounter addended by: Tea Goldsmith MA on: 5/31/2017 11:57 AM<BR>    Actions taken:  activity accessed

## 2017-05-31 NOTE — TELEPHONE ENCOUNTER
----- Message from Tea Goldsmith MA sent at 5/3/2017  2:19 PM CDT -----  I pended phlebectomy orders ......im not sure if we could use these orders since its under cardiology?

## 2017-06-01 ENCOUNTER — OFFICE VISIT (OUTPATIENT)
Dept: SPORTS MEDICINE | Facility: CLINIC | Age: 42
End: 2017-06-01
Payer: COMMERCIAL

## 2017-06-01 VITALS — TEMPERATURE: 99 F | HEIGHT: 66 IN | WEIGHT: 220 LBS | BODY MASS INDEX: 35.36 KG/M2

## 2017-06-01 DIAGNOSIS — M17.11 PRIMARY OSTEOARTHRITIS OF RIGHT KNEE: Primary | ICD-10-CM

## 2017-06-01 PROCEDURE — 99999 PR PBB SHADOW E&M-EST. PATIENT-LVL III: CPT | Mod: PBBFAC,,, | Performed by: FAMILY MEDICINE

## 2017-06-01 PROCEDURE — 20611 DRAIN/INJ JOINT/BURSA W/US: CPT | Mod: RT,S$GLB,, | Performed by: FAMILY MEDICINE

## 2017-06-01 PROCEDURE — 99214 OFFICE O/P EST MOD 30 MIN: CPT | Mod: 25,S$GLB,, | Performed by: FAMILY MEDICINE

## 2017-06-01 RX ORDER — MELOXICAM 15 MG/1
15 TABLET ORAL DAILY
Qty: 15 TABLET | Refills: 0 | Status: SHIPPED | OUTPATIENT
Start: 2017-06-01 | End: 2017-08-01

## 2017-06-01 RX ORDER — TRIAMCINOLONE ACETONIDE 40 MG/ML
40 INJECTION, SUSPENSION INTRA-ARTICULAR; INTRAMUSCULAR
Status: DISCONTINUED | OUTPATIENT
Start: 2017-06-01 | End: 2017-06-01 | Stop reason: HOSPADM

## 2017-06-01 RX ADMIN — TRIAMCINOLONE ACETONIDE 40 MG: 40 INJECTION, SUSPENSION INTRA-ARTICULAR; INTRAMUSCULAR at 07:06

## 2017-06-01 NOTE — PROGRESS NOTES
Emily Max, a 42 y.o. female, presents today for evaluation of her RIGHT knee.      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 Live ([facility])   Progression: worsening discomfort   Quality:    Mild to moderate pain    Sharp pain at times    Ache   Radiation: none  Severity: per nursing documentation  Timing: intermittent w/ use  Trauma: none known    Review of systems (ROS):  A 10+ review of systems was performed with pertinent positives and negatives noted above in the history of present illness. Other systems were negative unless otherwise specified.    PHYSICAL EXAMINATION  General:  The patient is alert and oriented x 3. Mood is pleasant. Observation of ears, eyes and nose reveal no gross abnormalities. HEENT: NCAT, sclera anicteric.   Lungs: Respirations are equal and unlabored.  Gait is coordinated. Patient can toe walk and heel walk without difficulty.    RIGHT KNEE EXAMINATION    Observation/Inspection  Gait:   Antalgic   Alignment:  Neutral   Scars:   None   Muscle atrophy: Mild  Effusion:  None   Warmth:  None   Discoloration:   none     Tenderness / Crepitus (T / C):         T / C      T / C  Patella   - / -   Lateral joint line   - / -     Peripatellar medial  -  Medial joint line    + / -  Peripatellar lateral -  Medial plica   - / -  Patellar tendon -   Popliteal fossa   - / -  Quad tendon   -   Gastrocnemius   -  Prepatellar Bursa - / -   Quadricep   -  Tibial tubercle  -  Thigh/hamstring  -  Pes anserine/HS -  Fibula    -  ITB   - / -  Tibia      -  Tib/fib joint  - / -  LCL    -    MFC   - / -   MCL: Proximal  -    LFC   - / -   Distal    -          ROM: (* = pain)  PASSIVE   ACTIVE    Left :   5 / 0 / 145*   5 / 0 / 145*     Right :    5 / 0 / 145*   5 / 0 / 145*    Patellofemoral examination:  See above noted areas of tenderness.   Patella position    Subluxation / dislocation: Centered        Sup. / Inf;   Normal   Crepitus (PF):    Absent   Patellar Mobility:       Medial-lateral:   Normal    Superior-inferior:  Normal    Inferior tilt   Normal    Patellar tendon:  Normal   Lateral tilt:    Normal   J-sign:     None   Patellofemoral grind:   No pain       Meniscal Signs:     Pain on terminal extension:  +*  Pain on terminal flexion:  +*  Hipolitos maneuver:  +*  Squat     NT    Ligament Examination:  ACL / Lachman:  WNL  PCL-Post.  drawer: normal 0 to 2mm  MCL- Valgus:  normal 0 to 2mm  LCL- Varus:    normal 0 to 2mm  Pivot shift:  guarding   Dial Test:   difference c/w other side   At 30° flexion: normal (< 5°)    At 90° flexion: normal (< 5°)   Reverse Pivot Shift:   normal (Equal)     Strength: (* = with pain) Painful Side  Quadriceps   5/5  Hamstrin/5    Extremity Neuro-vascular Examination:   Sensation:  Grossly intact to light touch all dermatomal regions.   Motor Function:  Fully intact motor function at hip, knee, foot and ankle    DTRs;  quadriceps and  achilles 2+.  No clonus and downgoing Babinski.    Vascular status:  DP and PT pulses 2+, brisk capillary refill, symmetric.     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, catie ant compartment, right    W/ mechanical knee pain    No evidence of neurologic pathology  No evidence of vascular pathology     Imaging studies reviewed:   X-ray knee, bilateral 16.11     Plan:   We discussed the importance of appropriate diet, weight, and regular exercise including quadriceps strengthening      We discussed options  including:  #1 watchful waiting  #2 re start physical therapy aimed at:   Core stability   RoM knee   Strengthening quadriceps   gait training   #3 injection therapy:   CSI iaknee     Right, effective 0%, repeat    VSI iaknee    Right,    Orthobiologics   #4 MRI for further evaluation       The patient chooses #3 csi iaknee right      Pain management: handout given, #3 = meloxicam x 14 d  Bracing:   Physical therapy: hgPT, handout given, prior as above  Activity (e.g. 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    Yes-->orthopedic surgeon for knee surgery   No-->begin VSI series (order placed)    PA questions-->ask ETaylor  Should symptoms worsen or fail to resolve, consider:  Revisiting the above options

## 2017-06-01 NOTE — PROCEDURES
"Large Joint Aspiration/Injection  Date/Time: 6/1/2017 7:53 AM  Performed by: LILIANA PRAJAPATI  Authorized by: LILIANA PRAJAPATI     Consent Done?:  Yes (Verbal)  Indications:  Pain  Procedure site marked: Yes    Timeout: Prior to procedure the correct patient, procedure, and site was verified      Location:  Knee  Site:  R knee  Prep: Patient was prepped and draped in usual sterile fashion    Ultrasonic Guidance for needle placement: Yes  Images are saved and documented.  Needle size:  18 G  Approach:  Lateral  Medications:  40 mg triamcinolone acetonide 40 mg/mL  Patient tolerance:  Patient tolerated the procedure well with no immediate complications    Additional Comments: Description of ultrasound utilization for needle guidance:   Ultrasound guidance used for needle localization.  Images saved and stored for documentation.  The knee joint was visualized.  Dynamic visualization of the 20g x 1.5"  needle was continuous throughout the procedure.        "

## 2017-06-06 ENCOUNTER — PATIENT MESSAGE (OUTPATIENT)
Dept: INTERNAL MEDICINE | Facility: CLINIC | Age: 42
End: 2017-06-06

## 2017-06-07 ENCOUNTER — PATIENT MESSAGE (OUTPATIENT)
Dept: INTERNAL MEDICINE | Facility: CLINIC | Age: 42
End: 2017-06-07

## 2017-06-08 ENCOUNTER — PATIENT MESSAGE (OUTPATIENT)
Dept: SPORTS MEDICINE | Facility: CLINIC | Age: 42
End: 2017-06-08

## 2017-06-08 ENCOUNTER — TELEPHONE (OUTPATIENT)
Dept: CARDIOLOGY | Facility: CLINIC | Age: 42
End: 2017-06-08

## 2017-06-14 ENCOUNTER — PATIENT MESSAGE (OUTPATIENT)
Dept: SPORTS MEDICINE | Facility: CLINIC | Age: 42
End: 2017-06-14

## 2017-06-19 ENCOUNTER — DOCUMENTATION ONLY (OUTPATIENT)
Dept: INTERNAL MEDICINE | Facility: CLINIC | Age: 42
End: 2017-06-19

## 2017-06-19 NOTE — PROGRESS NOTES
Pre-Visit Review & Summary:    41 y/o female with history of HTN, Obesity, Migraine H/As, Hypothyroidism, GERD, Allergic Rhinitis, Anxiety, Deprerssion, S/P EVLT for Chronic Venous Insufficiency, and S/P Hysterectomy for Stage 1B Cervical Cancer 5/2013 has a scheduled appt 7/5/17 for her Annual PE.    Since her last visit, 3/14/17, she was seen by Ms. Janessamita in Psychology for follow up of her Anxiety and Depression and for evaluation of possible ADD. She notes Cymbalta helps with Depression but not anxiety. In past, Wellbutrin helped with Anxiety. She has not responded well with SSRIs. Ms. Monroe has referred her to Psychiatry and she has an appt scheduled for 9/26/17, earliest available. Psychologist and patient are  requesting she begin Wellbutrin.     She has been followed by Sports Medicine for right knee pain. She had steroid injection right knee 6/1/17 with no improvement. She is scheduled for a MRI Knee 6/21/17.      PMH:  1. HTN           Atenolol 50 mg BID           Dyazide 37.5-25 mg/day    2. Migraine H/A           Atenolol 50 mg BID    3. Hypothyroidism           Synthroid 50 mcg/day    4. Vit D Deficiency            Cholecalciferol 1,000 units daily    5. Anxiety & Depression.              Followed by Ms. Abreu, MARKW             Xanax 0.25 mg prn             Cymbalta 30 mg/day    6. Obesity             (BMI-37)    7. Hx of GERD    8. Chronic Venous Insufficiency and Varicose Veins              Followed in Vascular Surgery               S/P Right EVLT 5/2014              S/P Left EVLT 1/19/17    9. Stage 1B Cervical CA             S/P Total Hysterectomy 5/2013             Followed by Dr. Matias - last visit - 7/2016    10. Surgical Menopause              Estrace 0.5     11. Allergic Rhinitis & Food Allergies             Followed by Dr. Finley (Allergy)             Astelin Nasal Spray, Xyzal 5 mg, Epipen    12. Pre-DM        MEDS:        Atenolol 50 mg BID        Cholecalciferol 1,000 units daily         Cymbalta 30 mg/day        Dyazide 37.5/25 mg/day        EPIPEN prn        Estrace 0.5 mg/day        Imitrex prn        Levothyroxine 50 mcg/day        Xyzal 5 mg/day      ALLERGIES:         IV Contrast         Diflucan         Amlodipine         Hydrocodone- Acetaminophen         Mushrooms    Surgical Hx:        Robotic Radical Hysterectomy, BSO, Pelvic LN 5/2013        Tonsillectomy        Turbinectomy        Laparotomy Exploration for Endometriosis        Lipoma Excision        Right and Left EVLT      Social Hx:      Smoking Hx: Non-Smoker      ETOH: 1-2/week      Exercise:      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 Matias      Eye Exam - 4/2016   Mammogram (9/2015): negative   Immunizations     Influenza - Fall 2016    Tdp -                           Pneumovax (3/2014)   Other:                      Hepatitis Panel (3/2014): negative                      CA-125 (5/2017) - 9                      MRI Right foot (4/2015): Stress Fx R-2nd Metatarsal                      MRI Right Knee (6/21/17):    IMP:  1. Anxiety & Depression  2. HTN  3. Obesity  4. Pre-DM  5. Hypothyroidism  6. Migraine H/As  7. Vit D Deficiency  8. Hx of Stage 1B Cervical CA - S/P Robotic Radical Hysterectomy, BSO, Pelvic LN 2013  9. Surgical Menopause  10. Chronic Venous Insufficiency and Varicose Veins  11. Annual PE

## 2017-06-21 ENCOUNTER — HOSPITAL ENCOUNTER (OUTPATIENT)
Dept: RADIOLOGY | Facility: OTHER | Age: 42
Discharge: HOME | End: 2017-06-21
Attending: FAMILY MEDICINE
Payer: COMMERCIAL

## 2017-06-21 DIAGNOSIS — M17.11 PRIMARY OSTEOARTHRITIS OF RIGHT KNEE: ICD-10-CM

## 2017-06-21 PROCEDURE — 73721 MRI JNT OF LWR EXTRE W/O DYE: CPT | Mod: TC,RT

## 2017-06-21 PROCEDURE — 73721 MRI JNT OF LWR EXTRE W/O DYE: CPT | Mod: 26,RT,, | Performed by: RADIOLOGY

## 2017-07-05 ENCOUNTER — PATIENT MESSAGE (OUTPATIENT)
Dept: INTERNAL MEDICINE | Facility: CLINIC | Age: 42
End: 2017-07-05

## 2017-07-05 RX ORDER — MELOXICAM 15 MG/1
15 TABLET ORAL DAILY
Qty: 15 TABLET | Refills: 0 | OUTPATIENT
Start: 2017-07-05

## 2017-07-08 DIAGNOSIS — E03.9 HYPOTHYROID: ICD-10-CM

## 2017-07-10 ENCOUNTER — OFFICE VISIT (OUTPATIENT)
Dept: SPORTS MEDICINE | Facility: CLINIC | Age: 42
End: 2017-07-10
Payer: COMMERCIAL

## 2017-07-10 VITALS — BODY MASS INDEX: 35.36 KG/M2 | TEMPERATURE: 99 F | WEIGHT: 220 LBS | HEIGHT: 66 IN

## 2017-07-10 DIAGNOSIS — S83.241D OTHER TEAR OF MEDIAL MENISCUS OF RIGHT KNEE AS CURRENT INJURY, SUBSEQUENT ENCOUNTER: Primary | ICD-10-CM

## 2017-07-10 PROCEDURE — 99214 OFFICE O/P EST MOD 30 MIN: CPT | Mod: S$GLB,,, | Performed by: FAMILY MEDICINE

## 2017-07-10 PROCEDURE — 99999 PR PBB SHADOW E&M-EST. PATIENT-LVL III: CPT | Mod: PBBFAC,,, | Performed by: FAMILY MEDICINE

## 2017-07-10 RX ORDER — LEVOTHYROXINE SODIUM 50 UG/1
50 TABLET ORAL DAILY
Qty: 90 TABLET | Refills: 0 | Status: SHIPPED | OUTPATIENT
Start: 2017-07-10 | End: 2017-09-05 | Stop reason: SDUPTHER

## 2017-07-10 NOTE — PROGRESS NOTES
Emily Max, a 42 y.o. female, presents today for evaluation of her RIGHT knee.      HISTORY OF PRESENT ILLNESS   Location: anterior knee, right  Onset: insidious, September 2016  Palliative:    Relative rest   Oral analgesics (NSAIDs, tramadol)   Ice/heat    Weight loss   CSI, [place], 11/08/16, 80% improvement    HgPT, moderate compliance    CSI, iaKnee, 04/10/17, good%   CSI, iaKnee, 05/24/17, 0% improvement    CSI, iaKnee 06/01/17, modest% improvement   Provocative:     ADLs    Rotational/torquing movements    Wearing 30-40lb lead vests (in cath lab)   Prolonged ambulation  Prior:    R foot injury - followed by Dr. Live ([facility])   Progression: worsening discomfort   Quality:    Mild to moderate pain    Sharp pain at times    Ache   Radiation: none  Severity: per nursing documentation  Timing: intermittent w/ use  Trauma: none known    Review of systems (ROS):  A 10+ review of systems was performed with pertinent positives and negatives noted above in the history of present illness. Other systems were negative unless otherwise specified.    PHYSICAL EXAMINATION  General:  The patient is alert and oriented x 3. Mood is pleasant. Observation of ears, eyes and nose reveal no gross abnormalities. HEENT: NCAT, sclera anicteric.   Lungs: Respirations are equal and unlabored.  Gait is coordinated. Patient can toe walk and heel walk without difficulty.    RIGHT KNEE EXAMINATION    Observation/Inspection  Gait:   Antalgic   Alignment:  Neutral   Scars:   None   Muscle atrophy: Mild  Effusion:  None   Warmth:  None   Discoloration:   none     Tenderness / Crepitus (T / C):         T / C      T / C  Patella   - / -   Lateral joint line   - / -     Peripatellar medial  -  Medial joint line    + / -  Peripatellar lateral -  Medial plica   - / -  Patellar tendon -   Popliteal fossa   - / -  Quad tendon   -   Gastrocnemius   -  Prepatellar Bursa - / -   Quadricep   -  Tibial tubercle  -  Thigh/hamstring  -  Pes  anserine/HS -  Fibula    -  ITB   - / -  Tibia     -  Tib/fib joint  - / -  LCL    -    MFC   - / -   MCL: Proximal  -    LFC   - / -   Distal    -          ROM: (* = pain)  PASSIVE   ACTIVE    Left :   5 / 0 / 145*   5 / 0 / 145*     Right :    5 / 0 / 145*   5 / 0 / 145*    Patellofemoral examination:  See above noted areas of tenderness.   Patella position    Subluxation / dislocation: Centered        Sup. / Inf;   Normal   Crepitus (PF):    Absent   Patellar Mobility:       Medial-lateral:   Normal    Superior-inferior:  Normal    Inferior tilt   Normal    Patellar tendon:  Normal   Lateral tilt:    Normal   J-sign:     None   Patellofemoral grind:   No pain       Meniscal Signs:     Pain on terminal extension:  +*  Pain on terminal flexion:  +*  Hipolitos maneuver:  +*  Squat     NT    Ligament Examination:  ACL / Lachman:  WNL  PCL-Post.  drawer: normal 0 to 2mm  MCL- Valgus:  normal 0 to 2mm  LCL- Varus:    normal 0 to 2mm  Pivot shift:  guarding   Dial Test:   difference c/w other side   At 30° flexion: normal (< 5°)    At 90° flexion: normal (< 5°)   Reverse Pivot Shift:   normal (Equal)     Strength: (* = with pain) Painful Side  Quadriceps   5/5  Hamstrin/5    Extremity Neuro-vascular Examination:   Sensation:  Grossly intact to light touch all dermatomal regions.   Motor Function:  Fully intact motor function at hip, knee, foot and ankle    DTRs;  quadriceps and  achilles 2+.  No clonus and downgoing Babinski.    Vascular status:  DP and PT pulses 2+, brisk capillary refill, symmetric.     Other Findings:      ASSESSMENT & PLAN  Assessment:   #1 radial tear of body of medial meniscus, right     No evidence of neurologic pathology  No evidence of vascular pathology     Imaging studies reviewed:   X-ray knee, bilateral 16.11  MRI knee, right 17.06     Plan:   We discussed the importance of appropriate diet, weight, and regular exercise including quadriceps strengthening      We discussed  options including:  #1 watchful waiting  #2 re start physical therapy aimed at:   Core stability   RoM knee   Strengthening quadriceps   gait training   #3 injection therapy:   CSI iaknee     Right, effective modest%, repeat    VSI iaknee    Right,    Orthobiologics   #4 consultation re: knee arthroscopy       The patient chooses #4     Pain management: handout given, #3 = meloxicam (prior)  Bracing:   Physical therapy: hgPT, handout given, prior as above  Activity (e.g. 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

## 2017-07-11 ENCOUNTER — TELEPHONE (OUTPATIENT)
Dept: CARDIOLOGY | Facility: CLINIC | Age: 42
End: 2017-07-11

## 2017-07-11 DIAGNOSIS — I87.2 VENOUS INSUFFICIENCY OF RIGHT LOWER EXTREMITY: Primary | ICD-10-CM

## 2017-07-11 NOTE — TELEPHONE ENCOUNTER
"----- Message from Cathie Burns sent at 7/11/2017 12:42 PM CDT -----  Contact: Outmasood Adler- Stella  Good afternoon,    Stella would like a call back regarding an order for the above pt. Stella stated the order currently reads "phlebectomy" and in order for the pt to be schedule it needs to be changed to an IR order. The way its currently put in the pt cannot be scheduled.    Stella can be reached at [phone].    Thank you!  "

## 2017-07-17 ENCOUNTER — OFFICE VISIT (OUTPATIENT)
Dept: SPORTS MEDICINE | Facility: CLINIC | Age: 42
End: 2017-07-17
Payer: COMMERCIAL

## 2017-07-17 VITALS
DIASTOLIC BLOOD PRESSURE: 84 MMHG | HEIGHT: 65 IN | HEART RATE: 98 BPM | BODY MASS INDEX: 36.65 KG/M2 | WEIGHT: 220 LBS | SYSTOLIC BLOOD PRESSURE: 132 MMHG

## 2017-07-17 DIAGNOSIS — S83.231A COMPLEX TEAR OF MEDIAL MENISCUS OF RIGHT KNEE AS CURRENT INJURY, INITIAL ENCOUNTER: Primary | ICD-10-CM

## 2017-07-17 PROCEDURE — 99999 PR PBB SHADOW E&M-EST. PATIENT-LVL IV: CPT | Mod: PBBFAC,,, | Performed by: ORTHOPAEDIC SURGERY

## 2017-07-17 PROCEDURE — 99203 OFFICE O/P NEW LOW 30 MIN: CPT | Mod: S$GLB,,, | Performed by: ORTHOPAEDIC SURGERY

## 2017-07-17 NOTE — PROGRESS NOTES
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  Pt reports 1 year of right knee pain.  No specific traumatic event but may have twister her knee while wearing heavy lead around 1 year ago.  Pain is located medially.  Has had injections and NSAIDS with diminishing effects.      She reports that the pain is worse with deep squats.  It also bothers her at night.    Is affecting ADLs.     + mechanical symptoms, no instability    Review of Systems   Constitution: Negative. Negative for chills, fever and night sweats.   HENT: Negative for congestion and headaches.    Eyes: Negative for blurred vision, left vision loss and right vision loss.   Cardiovascular: Negative for chest pain and syncope.   Respiratory: Negative for cough and shortness of breath.    Endocrine: Negative for polydipsia, polyphagia and polyuria.   Hematologic/Lymphatic: Negative for bleeding problem. Does not bruise/bleed easily.   Skin: Negative for dry skin, itching and rash.   Musculoskeletal: Negative for falls and muscle weakness.   Gastrointestinal: Negative for abdominal pain and bowel incontinence.   Genitourinary: Negative for bladder incontinence and nocturia.   Neurological: Negative for disturbances in coordination, loss of balance and seizures.   Psychiatric/Behavioral: Negative for depression. The patient does not have insomnia.    Allergic/Immunologic: Negative for hives and persistent infections.     PAST MEDICAL HISTORY:   Past Medical History:   Diagnosis Date    Abnormal Pap smear     Cervical cancer March 2013    FIGO IB1 AdenoCA Robotic radical hsyt,bso and bplnd    Depressive disorder, not elsewhere classified     Endometriosis of uterus     Foot fracture, right     Headache(784.0)     Hypertension     Hypothyroidism     Migraine headache     Urinary tract infection     Visit for screening mammogram 12/16/2015     Well woman exam with routine gynecological exam 12/16/2015     PAST SURGICAL HISTORY:   Past Surgical History:   Procedure Laterality Date    HYSTERECTOMY  May 2013    Robotic radical hyst, bso, blplnd    LIPOMA RESECTION      OOPHORECTOMY      PELVIC AND PARA-AORTIC LYMPH NODE DISSECTION      PELVIC LAPAROSCOPY      endometriosis    TX REMOVAL OF OVARY/TUBE(S)      SINUS SURGERY      x2    TONSILLECTOMY       FAMILY HISTORY:   Family History   Problem Relation Age of Onset    Colon cancer Other     Migraines Mother     Cancer Mother      endometrial cancer     Hypertension Mother     Hypothyroidism Mother     Heart disease Mother     Hyperlipidemia Mother     Migraines Maternal Grandmother     Aneurysm Maternal Grandmother      intracranial aneurysm    Stroke Maternal Grandmother     Cancer Maternal Aunt     Cancer Paternal Grandfather      lymphoma non hodgkins     Diabetes Father     Skin cancer      Ovarian cancer Neg Hx     Uterine cancer Neg Hx     Breast cancer Neg Hx      SOCIAL HISTORY:   Social History     Social History    Marital status: Single     Spouse name: N/A    Number of children: 1    Years of education: 23     Occupational History    RN [employer]     Social History Main Topics    Smoking status: Never Smoker    Smokeless tobacco: Not on file    Alcohol use Not on file    Drug use: Unknown    Sexual activity: Not on file     Other Topics Concern    Are You Pregnant Or Think You May Be? No    Breast-Feeding No     Social History Narrative    No narrative on file       MEDICATIONS:   Current Outpatient Prescriptions:     alprazolam (XANAX) 0.25 MG tablet, Take 1 tablet (0.25 mg total) by mouth nightly as needed for Anxiety., Disp: 30 tablet, Rfl: 0    atenolol (TENORMIN) 50 MG tablet, TAKE 1 TABLET (50 MG TOTAL) BY MOUTH 2 (TWO) TIMES DAILY., Disp: 180 tablet, Rfl: 0    azelastine (ASTELIN) 137 mcg nasal spray, 1-2 sprays (137-274 mcg total)  by Nasal route 2 (two) times daily as needed for Rhinitis., Disp: 30 mL, Rfl: 5    duloxetine (CYMBALTA) 30 MG capsule, TAKE 1 CAPSULE (30 MG TOTAL) BY MOUTH ONCE DAILY., Disp: 90 capsule, Rfl: 0    estradiol (ESTRACE) 0.5 MG tablet, TAKE 1 TABLET (0.5 MG TOTAL) BY MOUTH ONCE DAILY., Disp: 30 tablet, Rfl: 11    levocetirizine (XYZAL) 5 MG tablet, Take 1 tablet (5 mg total) by mouth once daily., Disp: 90 tablet, Rfl: 4    levothyroxine (SYNTHROID) 50 MCG tablet, TAKE 1 TABLET (50 MCG TOTAL) BY MOUTH ONCE DAILY., Disp: 90 tablet, Rfl: 0    meloxicam (MOBIC) 15 MG tablet, Take 1 tablet (15 mg total) by mouth once daily., Disp: 15 tablet, Rfl: 0    ondansetron (ZOFRAN-ODT) 8 MG TbDL, TAKE 1 TABLET (8 MG TOTAL) BY MOUTH EVERY 12 (TWELVE) HOURS AS NEEDED., Disp: 6 tablet, Rfl: 0    promethazine-dextromethorphan (PROMETHAZINE-DM) 6.25-15 mg/5 mL Syrp, TAKE 1 TEASPOON BY MOUTH EVERY 4 TO 6 HOURS AS NEEDED FOR COUGH ,NAUSEA OR DIZZINESS, Disp: , Rfl: 0    triamterene-hydrochlorothiazide 37.5-25 mg (DYAZIDE) 37.5-25 mg per capsule, TAKE 1 CAPSULE BY MOUTH ONCE DAILY., Disp: 90 capsule, Rfl: 0    valacyclovir (VALTREX) 1000 MG tablet, Take 1 tablet (1,000 mg total) by mouth every 12 (twelve) hours., Disp: 4 tablet, Rfl: 0    vitamin D (VITAMIN D3) 1000 units Tab, Take 1 tablet (1,000 Units total) by mouth once daily., Disp: 30 tablet, Rfl: prn    albuterol 90 mcg/actuation inhaler, Inhale 2 puffs into the lungs every 4 (four) hours as needed for Wheezing., Disp: 6.7 g, Rfl: 3    epinephrine (EPIPEN) 0.3 mg/0.3 mL (1:1,000) AtIn, Inject 0.3 mLs (0.3 mg total) into the muscle once., Disp: 2 each, Rfl: 2    Current Facility-Administered Medications:     lidocaine-EPINEPHrine 1%-1:100,000 30 mL, lidocaine HCL 10 mg/ml (1%) 20 mL, sodium bicarbonate 8.4 % 10 mL in sodium chloride 0.9% 500 mL solution, , MISCELLANEOUS, Once, Kristen Edge MD  ALLERGIES:   Review of patient's allergies indicates:   Allergen Reactions  "   Lortab [hydrocodone-acetaminophen] Itching and Rash    Other Anaphylaxis     mushrooms    Diflucan  [fluconazole]      Other reaction(s): Hives    Iodine and iodide containing products Hives     Iv iodine only    Mushroom combination no.1      Other reaction(s): Bronchial Constriction       VITAL SIGNS: /84   Pulse 98   Ht 5' 5" (1.651 m)   Wt 99.8 kg (220 lb)   BMI 36.61 kg/m²        PHYSICAL EXAMINATION    General:  The patient is alert and oriented x 3.  Mood is pleasant.  Observation of ears, eyes and nose reveal no gross abnormalities.  HEENT: NCAT, sclera nonicteric  Lungs: Respirations are equal and unlabored.      Right KNEE EXAMINATION     OBSERVATION / INSPECTION   Gait:   Nonantalgic   Alignment:  Neutral   Scars:   None   Muscle atrophy: Mild  Effusion:  None   Warmth:  None   Discoloration:   none     TENDERNESS / CREPITUS (T / C):          T / C      T / C   Patella   - / -   Lateral joint line   - / -   Peripatellar medial  -  Medial joint line    + / -   Peripatellar lateral -  Medial plica   - / -   Patellar tendon -   Popliteal fossa  - / -   Quad tendon   -   Gastrocnemius   -   Prepatellar Bursa - / -   Quadricep   -   Tibial tubercle  -  Thigh/hamstring  -   Pes anserine/HS -  Fibula    -   ITB   - / -  Tibia     -   Tib/fib joint  - / -  LCL    -     MFC   - / -   MCL: Proximal  -    LFC   - / -    Distal   -          ROM: (* = pain)  PASSIVE   ACTIVE    Left :   5 / 0 / 145   5 / 0 / 145     Right :    5 / 0 / 145   5 / 0 / 145    Patellofemoral examination:  See above noted areas of tenderness.   Patella position    Subluxation / dislocation: Centered           Sup. / Inf;   Normal   Crepitus (PF):    Absent   Patellar Mobility:       Medial-lateral:   Normal    Superior-inferior:  Normal    Inferior tilt   Normal    Patellar tendon:  Normal   Lateral tilt:    Normal   J-sign:     None   Patellofemoral grind:   No pain       MENISCAL SIGNS:     Pain on terminal " extension:  +  Pain on terminal flexion:  +  Hipolitos maneuver:  + for pain, medial  Squat     NP    LIGAMENT EXAMINATION:  ACL / Lachman:  normal (-1 to 2mm)    PCL-Post.  drawer: normal 0 to 2mm  MCL- Valgus:  normal 0 to 2mm  LCL- Varus:  normal 0 to 2mm  Pivot shift: normal (Equal)   Dial Test: difference c/w other side   At 30° flexion: normal (< 5°)    At 90° flexion: normal (< 5°)   Reverse Pivot Shift:   normal (Equal)     STRENGTH: (* = with pain) PAINFUL SIDE   Quadricep   5/5   Hamstrin/5    EXTREMITY NEURO-VASCULAR EXAMINATION:   Sensation:  Grossly intact to light touch all dermatomal regions.   Motor Function:  Fully intact motor function at hip, knee, foot and ankle    DTRs;  quadriceps and  achilles 2+.  No clonus and downgoing Babinski.    Vascular status:  DP and PT pulses 2+, brisk capillary refill, symmetric.     Other Findings:          Xrays:  reviewed personally by me (standing AP/flexion, lateral, sunrise) show: no evidence of arthritis or fracture or dislocation     MRI reviewed personally by me:  Shows Right knee medial meniscal tear, chondromalacia. Small anterior MFC cartilage defect     ASSESSMENT:    Right Knee Meniscus tear.      she would benefit from knee arthroscopy, possible plica excision, possible chondroplasty with possible meniscectomy given the above.     PLAN: We have discussed the surgery and recovery of arthroscopic knee surgery. she understands that there may be limited weightbearing up to several weeks after surgery depending on procedures that are performed at the time of surgery.    The spectrum of treatment options were discussed with the patient, including nonoperative and operative options.  After thorough discussion, the patient has elected to undergo surgical treatment to include:  right   a. Knee arthroscopic medial meniscectomy  possible meniscus repair   b. Knee arthroscopic possible plica excision   c. Knee arthroscopic possible chondroplasty   d.  Knee arthroscopic-assisted Bio-D arthrocentesis    The details of the surgical procedure were explained, including the location of probable incisions and a description of likely hardware and/or grafts to be used.  The patient understands the likely convalescence after surgery.  Also, we have thoroughly discussed the risks, benefits and alternatives to surgery, including, but not limited to, the risk of infection, joint stiffness, blood clot (including DVT and/or pulmonary embolus), neurologic and vascular injury.  It was explained that, if tissue has been repaired or reconstructed, there is a chance of failure, which may require further management.            She will have right superficial vein overlying knee taken care of first and will contact us to schedule surgery afterwards.        Working on weight loss, has lost 0 lbs so far with a goal of 20lbs more by the end of 2017.  Goal of 200lbs by end of the year

## 2017-07-19 ENCOUNTER — TELEPHONE (OUTPATIENT)
Dept: ORTHOPEDICS | Facility: OTHER | Age: 42
End: 2017-07-19

## 2017-07-19 NOTE — TELEPHONE ENCOUNTER
----- Message from Anne Ireland sent at 7/19/2017 11:34 AM CDT -----  Contact: [email or web address]  Patient called regarding her appointment

## 2017-07-23 ENCOUNTER — PATIENT MESSAGE (OUTPATIENT)
Dept: SPORTS MEDICINE | Facility: CLINIC | Age: 42
End: 2017-07-23

## 2017-07-24 ENCOUNTER — OFFICE VISIT (OUTPATIENT)
Dept: SPORTS MEDICINE | Facility: CLINIC | Age: 42
End: 2017-07-24
Payer: COMMERCIAL

## 2017-07-24 VITALS
WEIGHT: 220 LBS | BODY MASS INDEX: 36.65 KG/M2 | SYSTOLIC BLOOD PRESSURE: 121 MMHG | DIASTOLIC BLOOD PRESSURE: 81 MMHG | HEART RATE: 84 BPM | HEIGHT: 65 IN

## 2017-07-24 DIAGNOSIS — M25.561 ACUTE PAIN OF RIGHT KNEE: Primary | ICD-10-CM

## 2017-07-24 PROCEDURE — 99214 OFFICE O/P EST MOD 30 MIN: CPT | Mod: S$GLB,,, | Performed by: ORTHOPAEDIC SURGERY

## 2017-07-24 PROCEDURE — 99999 PR PBB SHADOW E&M-EST. PATIENT-LVL IV: CPT | Mod: PBBFAC,,, | Performed by: ORTHOPAEDIC SURGERY

## 2017-07-24 NOTE — LETTER
Patient: Emily Max   YOB: 1975   Clinic Number: [id number]   Today's Date: July 24, 2017        Certificate to Return to Work     Emily Streeter was seen by Kelly Zaman MD on 7/24/2017.    Emily Streeter can return to work intermittently until surgery date of 8/24/17 on R knee with light duty. Intermittent FMLA paperwork will follow.    Specific restrictions: light duty    If you have any questions or concerns, please feel free to contact the office at [phone].    Thank you.    MD Leora Osuna          Signature: __________________________________________________

## 2017-07-24 NOTE — PROGRESS NOTES
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  Pt reports 1 year of right knee pain.  No specific traumatic event but may have twister her knee while wearing heavy lead around 1 year ago.  Pain is located medially.  Has had injections and NSAIDS with diminishing effects.      She reports that the pain is worse with deep squats.  It also bothers her at night.    Is affecting ADLs.     + mechanical symptoms, no instability      Seen for follow-up today.  Increased pain after working on Friday.  Scheduled for vein stripping on thursday    Review of Systems   Constitution: Negative. Negative for chills, fever and night sweats.   HENT: Negative for congestion and headaches.    Eyes: Negative for blurred vision, left vision loss and right vision loss.   Cardiovascular: Negative for chest pain and syncope.   Respiratory: Negative for cough and shortness of breath.    Endocrine: Negative for polydipsia, polyphagia and polyuria.   Hematologic/Lymphatic: Negative for bleeding problem. Does not bruise/bleed easily.   Skin: Negative for dry skin, itching and rash.   Musculoskeletal: Negative for falls and muscle weakness.   Gastrointestinal: Negative for abdominal pain and bowel incontinence.   Genitourinary: Negative for bladder incontinence and nocturia.   Neurological: Negative for disturbances in coordination, loss of balance and seizures.   Psychiatric/Behavioral: Negative for depression. The patient does not have insomnia.    Allergic/Immunologic: Negative for hives and persistent infections.     PAST MEDICAL HISTORY:   Past Medical History:   Diagnosis Date    Abnormal Pap smear     Cervical cancer March 2013    FIGO IB1 AdenoCA Robotic radical hsyt,bso and bplnd    Depressive disorder, not elsewhere classified     Endometriosis of uterus     Foot fracture, right     Headache(784.0)     Hypertension      Hypothyroidism     Migraine headache     Urinary tract infection     Visit for screening mammogram 12/16/2015    Well woman exam with routine gynecological exam 12/16/2015     PAST SURGICAL HISTORY:   Past Surgical History:   Procedure Laterality Date    HYSTERECTOMY  May 2013    Robotic radical hyst, bso, blplnd    LIPOMA RESECTION      OOPHORECTOMY      PELVIC AND PARA-AORTIC LYMPH NODE DISSECTION      PELVIC LAPAROSCOPY      endometriosis    SD REMOVAL OF OVARY/TUBE(S)      SINUS SURGERY      x2    TONSILLECTOMY       FAMILY HISTORY:   Family History   Problem Relation Age of Onset    Colon cancer Other     Migraines Mother     Cancer Mother      endometrial cancer     Hypertension Mother     Hypothyroidism Mother     Heart disease Mother     Hyperlipidemia Mother     Migraines Maternal Grandmother     Aneurysm Maternal Grandmother      intracranial aneurysm    Stroke Maternal Grandmother     Cancer Maternal Aunt     Cancer Paternal Grandfather      lymphoma non hodgkins     Diabetes Father     Skin cancer      Ovarian cancer Neg Hx     Uterine cancer Neg Hx     Breast cancer Neg Hx      SOCIAL HISTORY:   Social History     Social History    Marital status: Single     Spouse name: N/A    Number of children: 1    Years of education: 23     Occupational History    RN [employer]     Social History Main Topics    Smoking status: Never Smoker    Smokeless tobacco: Not on file    Alcohol use Not on file    Drug use: Unknown    Sexual activity: Not on file     Other Topics Concern    Are You Pregnant Or Think You May Be? No    Breast-Feeding No     Social History Narrative    No narrative on file       MEDICATIONS:   Current Outpatient Prescriptions:     alprazolam (XANAX) 0.25 MG tablet, Take 1 tablet (0.25 mg total) by mouth nightly as needed for Anxiety., Disp: 30 tablet, Rfl: 0    atenolol (TENORMIN) 50 MG tablet, TAKE 1 TABLET (50 MG TOTAL) BY MOUTH 2 (TWO)  TIMES DAILY., Disp: 180 tablet, Rfl: 0    azelastine (ASTELIN) 137 mcg nasal spray, 1-2 sprays (137-274 mcg total) by Nasal route 2 (two) times daily as needed for Rhinitis., Disp: 30 mL, Rfl: 5    duloxetine (CYMBALTA) 30 MG capsule, TAKE 1 CAPSULE (30 MG TOTAL) BY MOUTH ONCE DAILY., Disp: 90 capsule, Rfl: 0    estradiol (ESTRACE) 0.5 MG tablet, TAKE 1 TABLET (0.5 MG TOTAL) BY MOUTH ONCE DAILY., Disp: 30 tablet, Rfl: 11    levocetirizine (XYZAL) 5 MG tablet, Take 1 tablet (5 mg total) by mouth once daily., Disp: 90 tablet, Rfl: 4    levothyroxine (SYNTHROID) 50 MCG tablet, TAKE 1 TABLET (50 MCG TOTAL) BY MOUTH ONCE DAILY., Disp: 90 tablet, Rfl: 0    meloxicam (MOBIC) 15 MG tablet, Take 1 tablet (15 mg total) by mouth once daily., Disp: 15 tablet, Rfl: 0    ondansetron (ZOFRAN-ODT) 8 MG TbDL, TAKE 1 TABLET (8 MG TOTAL) BY MOUTH EVERY 12 (TWELVE) HOURS AS NEEDED., Disp: 6 tablet, Rfl: 0    promethazine-dextromethorphan (PROMETHAZINE-DM) 6.25-15 mg/5 mL Syrp, TAKE 1 TEASPOON BY MOUTH EVERY 4 TO 6 HOURS AS NEEDED FOR COUGH ,NAUSEA OR DIZZINESS, Disp: , Rfl: 0    triamterene-hydrochlorothiazide 37.5-25 mg (DYAZIDE) 37.5-25 mg per capsule, TAKE 1 CAPSULE BY MOUTH ONCE DAILY., Disp: 90 capsule, Rfl: 0    valacyclovir (VALTREX) 1000 MG tablet, Take 1 tablet (1,000 mg total) by mouth every 12 (twelve) hours., Disp: 4 tablet, Rfl: 0    vitamin D (VITAMIN D3) 1000 units Tab, Take 1 tablet (1,000 Units total) by mouth once daily., Disp: 30 tablet, Rfl: prn    albuterol 90 mcg/actuation inhaler, Inhale 2 puffs into the lungs every 4 (four) hours as needed for Wheezing., Disp: 6.7 g, Rfl: 3    epinephrine (EPIPEN) 0.3 mg/0.3 mL (1:1,000) AtIn, Inject 0.3 mLs (0.3 mg total) into the muscle once., Disp: 2 each, Rfl: 2    Current Facility-Administered Medications:     lidocaine-EPINEPHrine 1%-1:100,000 30 mL, lidocaine HCL 10 mg/ml (1%) 20 mL, sodium bicarbonate 8.4 % 10 mL in sodium chloride 0.9% 500 mL solution, ,  "MISCELLANEOUS, Once, Kristen Edge MD  ALLERGIES:   Review of patient's allergies indicates:   Allergen Reactions    Lortab [hydrocodone-acetaminophen] Itching and Rash    Other Anaphylaxis     mushrooms    Diflucan  [fluconazole]      Other reaction(s): Hives    Iodine and iodide containing products Hives     Iv iodine only    Mushroom combination no.1      Other reaction(s): Bronchial Constriction       VITAL SIGNS: /81   Pulse 84   Ht 5' 5" (1.651 m)   Wt 99.8 kg (220 lb)   BMI 36.61 kg/m²        PHYSICAL EXAMINATION    General:  The patient is alert and oriented x 3.  Mood is pleasant.  Observation of ears, eyes and nose reveal no gross abnormalities.  HEENT: NCAT, sclera nonicteric  Lungs: Respirations are equal and unlabored.      Right KNEE EXAMINATION     OBSERVATION / INSPECTION   Gait:   Nonantalgic   Alignment:  Neutral   Scars:   None   Muscle atrophy: Mild  Effusion:  None   Warmth:  None   Discoloration:   none     TENDERNESS / CREPITUS (T / C):          T / C      T / C   Patella   - / -   Lateral joint line   - / -   Peripatellar medial  -  Medial joint line    + / -   Peripatellar lateral -  Medial plica   - / -   Patellar tendon -   Popliteal fossa  - / -   Quad tendon   -   Gastrocnemius   -   Prepatellar Bursa - / -   Quadricep   -   Tibial tubercle  -  Thigh/hamstring  -   Pes anserine/HS -  Fibula    -   ITB   - / -  Tibia     -   Tib/fib joint  - / -  LCL    -     MFC   - / -   MCL: Proximal  -    LFC   - / -    Distal   -          ROM: (* = pain)  PASSIVE   ACTIVE    Left :   5 / 0 / 145   5 / 0 / 145     Right :    5 / 0 / 145   5 / 0 / 145    Patellofemoral examination:  See above noted areas of tenderness.   Patella position    Subluxation / dislocation: Centered           Sup. / Inf;   Normal   Crepitus (PF):    Absent   Patellar Mobility:       Medial-lateral:   Normal    Superior-inferior:  Normal    Inferior tilt   Normal    Patellar tendon:  Normal   Lateral " tilt:    Normal   J-sign:     None   Patellofemoral grind:   No pain       MENISCAL SIGNS:     Pain on terminal extension:  +  Pain on terminal flexion:  +  Hipolitos maneuver:  + for pain, medial  Squat     NP    LIGAMENT EXAMINATION:  ACL / Lachman:  normal (-1 to 2mm)    PCL-Post.  drawer: normal 0 to 2mm  MCL- Valgus:  normal 0 to 2mm  LCL- Varus:  normal 0 to 2mm  Pivot shift: normal (Equal)   Dial Test: difference c/w other side   At 30° flexion: normal (< 5°)    At 90° flexion: normal (< 5°)   Reverse Pivot Shift:   normal (Equal)     STRENGTH: (* = with pain) PAINFUL SIDE   Quadricep   5/5   Hamstrin/5    EXTREMITY NEURO-VASCULAR EXAMINATION:   Sensation:  Grossly intact to light touch all dermatomal regions.   Motor Function:  Fully intact motor function at hip, knee, foot and ankle    DTRs;  quadriceps and  achilles 2+.  No clonus and downgoing Babinski.    Vascular status:  DP and PT pulses 2+, brisk capillary refill, symmetric.     Other Findings:          Xrays:  reviewed personally by me (standing AP/flexion, lateral, sunrise) show: no evidence of arthritis or fracture or dislocation     MRI reviewed personally by me:  Shows Right knee medial meniscal tear, chondromalacia. Small anterior MFC cartilage defect     ASSESSMENT:    Right Knee Meniscus tear.      she would benefit from knee arthroscopy, possible plica excision, possible chondroplasty with possible meniscectomy given the above.     PLAN: We have discussed the surgery and recovery of arthroscopic knee surgery. she understands that there may be limited weightbearing up to several weeks after surgery depending on procedures that are performed at the time of surgery.    The spectrum of treatment options were discussed with the patient, including nonoperative and operative options.  After thorough discussion, the patient has elected to undergo surgical treatment to include:  right   a. Knee arthroscopic medial meniscectomy  possible  meniscus repair   b. Knee arthroscopic possible plica excision   c. Knee arthroscopic possible chondroplasty   d. Knee arthroscopic-assisted Bio-D arthrocentesis    The details of the surgical procedure were explained, including the location of probable incisions and a description of likely hardware and/or grafts to be used.  The patient understands the likely convalescence after surgery.  Also, we have thoroughly discussed the risks, benefits and alternatives to surgery, including, but not limited to, the risk of infection, joint stiffness, blood clot (including DVT and/or pulmonary embolus), neurologic and vascular injury.  It was explained that, if tissue has been repaired or reconstructed, there is a chance of failure, which may require further management.          Working on weight loss, has lost 0 lbs so far with a goal of 20lbs more by the end of 2017.  Goal of 200lbs by end of the year        Will have right superficial vein stripped on Friday with arthroscopy scheduled 2-3 weeks afterwards.    For acute pain; recommend aleve BID.  No brace necessary.

## 2017-07-25 DIAGNOSIS — M22.41 CHONDROMALACIA OF PATELLA, RIGHT: ICD-10-CM

## 2017-07-25 DIAGNOSIS — S83.241A TEAR OF MEDIAL MENISCUS OF RIGHT KNEE, CURRENT, UNSPECIFIED TEAR TYPE, INITIAL ENCOUNTER: Primary | ICD-10-CM

## 2017-07-25 DIAGNOSIS — M67.51 PLICA SYNDROME, RIGHT: ICD-10-CM

## 2017-07-27 ENCOUNTER — PATIENT MESSAGE (OUTPATIENT)
Dept: INTERNAL MEDICINE | Facility: CLINIC | Age: 42
End: 2017-07-27

## 2017-07-27 ENCOUNTER — HOSPITAL ENCOUNTER (OUTPATIENT)
Dept: INTERVENTIONAL RADIOLOGY/VASCULAR | Facility: HOSPITAL | Age: 42
Discharge: HOME | End: 2017-07-27
Attending: INTERNAL MEDICINE | Admitting: INTERNAL MEDICINE
Payer: COMMERCIAL

## 2017-07-27 ENCOUNTER — HOSPITAL ENCOUNTER (OUTPATIENT)
Facility: HOSPITAL | Age: 42
Discharge: HOME | End: 2017-07-27
Attending: INTERNAL MEDICINE | Admitting: INTERNAL MEDICINE
Payer: COMMERCIAL

## 2017-07-27 VITALS — DIASTOLIC BLOOD PRESSURE: 93 MMHG | SYSTOLIC BLOOD PRESSURE: 168 MMHG | OXYGEN SATURATION: 96 % | HEART RATE: 79 BPM

## 2017-07-27 DIAGNOSIS — I87.2 VENOUS INSUFFICIENCY OF RIGHT LOWER EXTREMITY: ICD-10-CM

## 2017-07-27 PROCEDURE — 25000003 PHARM REV CODE 250: Performed by: INTERNAL MEDICINE

## 2017-07-27 PROCEDURE — 37765 STAB PHLEB VEINS XTR 10-20: CPT | Mod: RT

## 2017-07-27 PROCEDURE — 37765 STAB PHLEB VEINS XTR 10-20: CPT | Mod: ,,, | Performed by: INTERNAL MEDICINE

## 2017-07-27 RX ORDER — LIDOCAINE HYDROCHLORIDE 10 MG/ML
20 INJECTION INFILTRATION; PERINEURAL ONCE
Status: DISCONTINUED | OUTPATIENT
Start: 2017-07-27 | End: 2017-07-27

## 2017-07-27 RX ORDER — CEPHALEXIN 500 MG/1
500 CAPSULE ORAL EVERY 12 HOURS
Qty: 10 CAPSULE | Refills: 0 | Status: SHIPPED | OUTPATIENT
Start: 2017-07-27 | End: 2017-07-27

## 2017-07-27 RX ORDER — INDOMETHACIN 25 MG/1
30 CAPSULE ORAL ONCE
Status: COMPLETED | OUTPATIENT
Start: 2017-07-27 | End: 2017-07-27

## 2017-07-27 RX ORDER — DIAZEPAM 5 MG/1
10 TABLET ORAL ONCE
Status: COMPLETED | OUTPATIENT
Start: 2017-07-27 | End: 2017-07-27

## 2017-07-27 RX ORDER — LIDOCAINE HYDROCHLORIDE AND EPINEPHRINE 10; 10 MG/ML; UG/ML
60 INJECTION, SOLUTION INFILTRATION; PERINEURAL ONCE
Status: COMPLETED | OUTPATIENT
Start: 2017-07-27 | End: 2017-07-27

## 2017-07-27 RX ORDER — TRAMADOL HYDROCHLORIDE 50 MG/1
50 TABLET ORAL EVERY 6 HOURS PRN
Qty: 24 TABLET | Refills: 0 | Status: SHIPPED | OUTPATIENT
Start: 2017-07-27 | End: 2017-08-06

## 2017-07-27 RX ORDER — CEPHALEXIN 500 MG/1
500 CAPSULE ORAL EVERY 12 HOURS
Qty: 10 CAPSULE | Refills: 0 | Status: SHIPPED | OUTPATIENT
Start: 2017-07-27 | End: 2017-08-14 | Stop reason: CLARIF

## 2017-07-27 RX ORDER — LIDOCAINE HYDROCHLORIDE 10 MG/ML
10 INJECTION INFILTRATION; PERINEURAL ONCE
Status: COMPLETED | OUTPATIENT
Start: 2017-07-27 | End: 2017-07-27

## 2017-07-27 RX ADMIN — LIDOCAINE HYDROCHLORIDE,EPINEPHRINE BITARTRATE 60 ML: 10; .01 INJECTION, SOLUTION INFILTRATION; PERINEURAL at 09:07

## 2017-07-27 RX ADMIN — LIDOCAINE HYDROCHLORIDE 10 ML: 10 INJECTION, SOLUTION INFILTRATION; PERINEURAL at 09:07

## 2017-07-27 RX ADMIN — DIAZEPAM 10 MG: 5 TABLET ORAL at 09:07

## 2017-07-27 RX ADMIN — SODIUM BICARBONATE 30 MEQ: 84 INJECTION, SOLUTION INTRAVENOUS at 09:07

## 2017-07-27 NOTE — PROCEDURES
: Neri Molina MD         Procedure: phlebectomy with more than 10 stabs incisions for venous insufficiency refractory to conservative measures including 20-30 mmHg compression stockings, exercise, and leg elevation.        Consent:consent was signed and placed in the chart after extensively discussing the risks and benefits of the procedure. Patient agreed to proceed.            Procedure:        Patient was brought back in the ultrasound suite. The leg was draped and prepped in a sterile fashion. Ultrasound was used to map the accessory veins. 200 cc of Tumescent ( 1% lidocaine with epinephrine and NaHCO3) was injected in the subcutaneous tissue surrounding the accessory veins. Multiple stabs, 16, were performed with removal of incompetently dilated vein segments.         Patient tolerated the procedure well.        The treated leg was wrapped with an ace wraps and gauze        Removal of dressing in 24 hours        Antibiotics given for 5 days        Neri Molina MD

## 2017-07-27 NOTE — OP NOTE
Pre-procedure Diagnoses:   Venous insufficiency of right lower extremity [I87.2]   Venous intermittent claudication [I87.8]   Edema leg [R60.0]   Post-procedure Diagnoses:   Venous insufficiency of right lower extremity [I87.2]   Venous intermittent claudication [I87.8]   Edema leg [R60.0]   Procedures:    ASC PHLEBECTOMY OF VARICOSE VEINS [[id number]]         []Hide copied text        : Neri Molina MD         Procedure: phlebectomy with more than 10 stabs incisions for venous insufficiency refractory to conservative measures including 20-30 mmHg compression stockings, exercise, and leg elevation.        Consent:consent was signed and placed in the chart after extensively discussing the risks and benefits of the procedure. Patient agreed to proceed.            Procedure:        Patient was brought back in the ultrasound suite. The leg was draped and prepped in a sterile fashion. Ultrasound was used to map the accessory veins. 200 cc of Tumescent ( 1% lidocaine with epinephrine and NaHCO3) was injected in the subcutaneous tissue surrounding the accessory veins. Multiple stabs, 16, were performed with removal of incompetently dilated vein segments.         Patient tolerated the procedure well.        The treated leg was wrapped with an ace wraps and gauze        Removal of dressing in 24 hours        Antibiotics given for 5 days        Neri Molina MD

## 2017-07-27 NOTE — NURSING
Patient on table AAOx3 instructions and post instructions reviewed and consent obtained by MD and all questions answered. Vitals stable and continuing to monitor. Rep present for procedure as well.

## 2017-07-27 NOTE — PROGRESS NOTES
Procedure completed. Pt tolerated well. C/o of mild discomfort to site but denies need for pain med. Site wrapped. D/c instructions reviewed w pt. Paper copy of instructions provided to patient. Denies questions. Pt walked to Radiology waiting area, where she left w father. NAD

## 2017-07-27 NOTE — H&P
Subjective:   Patient ID:  Emily Max is a 41 y.o. female who presents for evauation of Varicose Veins; Chronic Venous Insufficiency; s/p R GSV EVLT; and venous claudication        HPI:         She is here to day for evaluation and treatment of venous insufficiency complicated with venous claudication, edema, pain, and skin discoloration despite stockings, leg elevation, and R GSV EVLT in 5/2014. No previous history of DVTs.               Patient Active Problem List     Diagnosis Date Noted    Venous insufficiency of both lower extremities 12/14/2016          S/p R GSV EVLT-Dr. 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       Mild vitamin D deficiency 02/28/2013    Anxiety 02/28/2013    Rhinitis, chronic 08/15/2012    Hypothyroidism 08/15/2012    Hypertension 08/15/2012               Right Arm BP - Sitting: (P) 128/88  Left Arm BP - Sitting: (P) 126/80           LABS           Lab Results   Component Value Date     HGBA1C 5.8 05/13/2015         Lipid panel        Lab Results   Component Value Date     CHOL 211 (H) 05/13/2015     CHOL 177 03/21/2014     CHOL 174 05/04/2012            Lab Results   Component Value Date     HDL 74 05/13/2015     HDL 61 03/21/2014     HDL 65 05/04/2012            Lab Results   Component Value Date     LDLCALC 121.2 05/13/2015     LDLCALC 101.4 03/21/2014     LDLCALC 91.0 05/04/2012            Lab Results   Component Value Date     TRIG 79 05/13/2015     TRIG 73 03/21/2014     TRIG 90 05/04/2012            Lab Results   Component Value Date     CHOLHDL 35.1 05/13/2015      [facility]     CHOLHDL 37.4 05/04/2012               Review of Systems   Constitution: Negative for diaphoresis, weakness, night sweats, weight gain and weight loss.   HENT: Negative for congestion.    Eyes: Positive for vision loss in right eye. Negative for blurred vision, discharge and double vision.   Cardiovascular: Positive for claudication (venous claudication) and leg swelling. Negative for chest pain, cyanosis, dyspnea on exertion, irregular heartbeat, near-syncope, orthopnea, palpitations, paroxysmal nocturnal dyspnea and syncope.   Respiratory: Negative for cough, shortness of breath and wheezing.    Endocrine: Negative for cold intolerance, heat intolerance and polyphagia.   Hematologic/Lymphatic: Negative for adenopathy and bleeding problem. Does not bruise/bleed easily.   Skin: Negative for dry skin and nail changes.   Musculoskeletal: Negative for arthritis, back pain, falls, joint pain, myalgias and neck pain.   Gastrointestinal: Negative for bloating, abdominal pain, change in bowel habit and constipation.   Genitourinary: Negative for bladder incontinence, dysuria, flank pain, genital sores and missed menses.   Neurological: Negative for aphonia, brief paralysis, difficulty with concentration and dizziness.   Psychiatric/Behavioral: Negative for altered mental status and memory loss. The patient does not have insomnia.    Allergic/Immunologic: Negative for environmental allergies.         Objective:   Physical Exam   Constitutional: She is oriented to person, place, and time. She appears well-developed and well-nourished. She is not intubated.   HENT:   Head: Normocephalic and atraumatic.   Right Ear: External ear normal.   Left Ear: External ear normal.   Mouth/Throat: Oropharynx is clear and moist.   Eyes: Conjunctivae and EOM are normal. Pupils are equal, round, and reactive to light. Right eye exhibits no discharge. Left eye exhibits no discharge. No scleral icterus.   Neck:  Normal range of motion. Neck supple. Normal carotid pulses, no hepatojugular reflux and no JVD present. Carotid bruit is not present. No tracheal deviation present. No thyromegaly present.   Cardiovascular: Normal rate, regular rhythm, S1 normal and S2 normal.   No extrasystoles are present. PMI is not displaced.  Exam reveals no gallop, no S3, no distant heart sounds, no friction rub and no midsystolic click.    No murmur heard.  Pulses:       Carotid pulses are 2+ on the right side, and 2+ on the left side.       Radial pulses are 2+ on the right side, and 2+ on the left side.        Femoral pulses are 2+ on the right side, and 2+ on the left side.       Popliteal pulses are 2+ on the right side, and 2+ on the left side.        Dorsalis pedis pulses are 2+ on the right side, and 2+ on the left side.        Posterior tibial pulses are 2+ on the right side, and 2+ on the left side.   Pulmonary/Chest: Effort normal and breath sounds normal. No accessory muscle usage or stridor. No apnea, no tachypnea and no bradypnea. She is not intubated. No respiratory distress. She has no decreased breath sounds. She has no wheezes. She has no rales. She exhibits no tenderness and no bony tenderness.   Abdominal: She exhibits no distension, no pulsatile liver, no abdominal bruit, no ascites, no pulsatile midline mass and no mass. There is no tenderness. There is no rebound and no guarding.   Musculoskeletal: Normal range of motion. She exhibits no edema or tenderness.   Lymphadenopathy:     She has no cervical adenopathy.   Neurological: She is alert and oriented to person, place, and time. She has normal reflexes. No cranial nerve deficit. Coordination normal.   Skin: Skin is warm. No rash noted. No erythema. No pallor.   Varicosities noted on LE    Prominent vein on R knee and thigh area-tender and indurated   Psychiatric: She has a normal mood and affect. Her behavior is normal. Judgment and thought content normal.          Assessment:      1. Venous insufficiency    2. Varicose vein of leg    3. Morbid obesity due to excess calories    4. Venous insufficiency of both lower extremities          Plan:         Venous ultrasound to assess for reflux  Stockings 20-30 mmHg  Weight loss  Exercise           EVLT for GSV and LSV reflux  Sclerotherapy for accessory reflux disease              Continue with current medical plan and lifestyle changes.  Return sooner for concerns or questions. If symptoms persist go to the ED  I have reviewed all pertinent data on this patient                  I have reviewed the patient's medical history in detail and updated the computerized patient record.           Orders Placed This Encounter   Procedures    COMPRESSION STOCKINGS       Knee high       Order Specific Question:   Pressure amount:       Answer:   20-30 mmHg    COMPRESSION STOCKINGS       Thigh knee       Order Specific Question:   Pressure amount:       Answer:   20-30 mmHg    US Lower Extremity Veins Bilateral Insufficiency       Standing Status:   Future       Number of Occurrences:   1       Standing Expiration Date:   11/14/2017       Order Specific Question:   May the Radiologist modify the order per protocol to meet the clinical needs of the patient?       Answer:   Yes         Follow up as scheduled. Return sooner for concerns or questions  Phone review after ultrasound        EVLT on 1/19/2016              She expressed verbal understanding and agreed with the plan                Patient's Medications   New Prescriptions     No medications on file   Previous Medications     ALBUTEROL 90 MCG/ACTUATION INHALER    Inhale 2 puffs into the lungs every 4 (four) hours as needed for Wheezing.     ALPRAZOLAM (XANAX) 0.25 MG TABLET    Take 1 tablet (0.25 mg total) by mouth nightly as needed for Anxiety.     ATENOLOL (TENORMIN) 50 MG TABLET    Take 1 tablet (50 mg total) by mouth 2 (two) times daily.     AZELASTINE (ASTELIN) 137 MCG  NASAL SPRAY    1-2 sprays (137-274 mcg total) by Nasal route 2 (two) times daily as needed for Rhinitis.     DIPHENHYDRAMINE (BENADRYL) 25 MG CAPSULE    Take 2 tablets 1 hour before ct scan.     DULOXETINE (CYMBALTA) 30 MG CAPSULE    Take 1 capsule (30 mg total) by mouth once daily.     EPINEPHRINE (EPIPEN) 0.3 MG/0.3 ML (1:1,000) ATIN    Inject 0.3 mLs (0.3 mg total) into the muscle once.     ESTRADIOL (ESTRACE) 0.5 MG TABLET    TAKE 1 TABLET (0.5 MG TOTAL) BY MOUTH ONCE DAILY.     LEVOCETIRIZINE (XYZAL) 5 MG TABLET    Take 1 tablet (5 mg total) by mouth once daily.     LEVOTHYROXINE (SYNTHROID) 50 MCG TABLET    Take 1 tablet (50 mcg total) by mouth once daily.     NEOMYCIN-POLYMYXIN-HYDROCORTISONE (CORTISPORIN) 3.5-10,000-1 MG/ML-UNIT/ML-% OTIC SUSPENSION    PLACE 4 DROPS IN AFFECTED EAR(S) 3 TIMES A DAY FOR 7 DAYS     ONDANSETRON (ZOFRAN-ODT) 8 MG TBDL    Take 1 tablet (8 mg total) by mouth every 12 (twelve) hours as needed.     PROMETHAZINE-DEXTROMETHORPHAN (PROMETHAZINE-DM) 6.25-15 MG/5 ML SYRP    TAKE 1 TEASPOON BY MOUTH EVERY 4 TO 6 HOURS AS NEEDED FOR COUGH ,NAUSEA OR DIZZINESS     SULFAMETHOXAZOLE-TRIMETHOPRIM 800-160MG (BACTRIM DS) 800-160 MG TAB    TAKE 1 TABLET BY MOUTH EVERY 12 HOURS FOR 10 DAYS     TRIAMTERENE-HYDROCHLOROTHIAZIDE 37.5-25 MG (DYAZIDE) 37.5-25 MG PER CAPSULE    Take 1 capsule by mouth once daily.     VALACYCLOVIR (VALTREX) 1000 MG TABLET    Take 1 tablet (1,000 mg total) by mouth every 12 (twelve) hours.     VITAMIN D (VITAMIN D3) 1000 UNITS TAB    Take 1 tablet (1,000 Units total) by mouth once daily.   Modified Medications     No medications on file   Discontinued Medications     No medications on file             Addendum:        She is s/p L GSV with excellent result        She is here today of right leg accessory stabbing with phlebectomy   Consent was signed and placed in the chart        Sherry MILES

## 2017-08-01 ENCOUNTER — OFFICE VISIT (OUTPATIENT)
Dept: GYNECOLOGIC ONCOLOGY | Facility: CLINIC | Age: 42
End: 2017-08-01
Payer: COMMERCIAL

## 2017-08-01 VITALS
SYSTOLIC BLOOD PRESSURE: 133 MMHG | WEIGHT: 225.31 LBS | BODY MASS INDEX: 37.54 KG/M2 | HEART RATE: 93 BPM | DIASTOLIC BLOOD PRESSURE: 86 MMHG | HEIGHT: 65 IN

## 2017-08-01 DIAGNOSIS — Z85.41 HISTORY OF CERVICAL CANCER: Primary | ICD-10-CM

## 2017-08-01 DIAGNOSIS — Z12.31 SCREENING MAMMOGRAM, ENCOUNTER FOR: ICD-10-CM

## 2017-08-01 DIAGNOSIS — N95.1 MENOPAUSAL HOT FLUSHES: ICD-10-CM

## 2017-08-01 DIAGNOSIS — Z01.419 WELL WOMAN EXAM WITH ROUTINE GYNECOLOGICAL EXAM: ICD-10-CM

## 2017-08-01 PROCEDURE — 99999 PR PBB SHADOW E&M-EST. PATIENT-LVL III: CPT | Mod: PBBFAC,,, | Performed by: OBSTETRICS & GYNECOLOGY

## 2017-08-01 PROCEDURE — 88175 CYTOPATH C/V AUTO FLUID REDO: CPT

## 2017-08-01 PROCEDURE — 99396 PREV VISIT EST AGE 40-64: CPT | Mod: S$GLB,,, | Performed by: OBSTETRICS & GYNECOLOGY

## 2017-08-01 RX ORDER — ESTRADIOL 0.5 MG/1
TABLET ORAL
Qty: 30 TABLET | Refills: 11 | Status: SHIPPED | OUTPATIENT
Start: 2017-08-01 | End: 2017-09-05 | Stop reason: SDUPTHER

## 2017-08-09 ENCOUNTER — TELEPHONE (OUTPATIENT)
Dept: GYNECOLOGIC ONCOLOGY | Facility: CLINIC | Age: 42
End: 2017-08-09

## 2017-08-09 NOTE — TELEPHONE ENCOUNTER
----- Message from Dylan Matias MD sent at 8/8/2017  2:37 PM CDT -----  Please send normal pap letter. Repeat in 1 year.

## 2017-08-14 ENCOUNTER — ANESTHESIA EVENT (OUTPATIENT)
Dept: SURGERY | Facility: OTHER | Age: 42
End: 2017-08-14
Payer: COMMERCIAL

## 2017-08-14 ENCOUNTER — OFFICE VISIT (OUTPATIENT)
Dept: SPORTS MEDICINE | Facility: CLINIC | Age: 42
End: 2017-08-14
Payer: COMMERCIAL

## 2017-08-14 ENCOUNTER — HOSPITAL ENCOUNTER (OUTPATIENT)
Dept: PREADMISSION TESTING | Facility: OTHER | Age: 42
Discharge: HOME | End: 2017-08-14
Attending: ORTHOPAEDIC SURGERY
Payer: COMMERCIAL

## 2017-08-14 VITALS
HEART RATE: 95 BPM | SYSTOLIC BLOOD PRESSURE: 127 MMHG | WEIGHT: 225 LBS | DIASTOLIC BLOOD PRESSURE: 92 MMHG | HEIGHT: 65 IN | BODY MASS INDEX: 37.49 KG/M2

## 2017-08-14 VITALS
HEART RATE: 82 BPM | WEIGHT: 220 LBS | TEMPERATURE: 98 F | BODY MASS INDEX: 36.65 KG/M2 | RESPIRATION RATE: 16 BRPM | HEIGHT: 65 IN | OXYGEN SATURATION: 97 % | DIASTOLIC BLOOD PRESSURE: 89 MMHG | SYSTOLIC BLOOD PRESSURE: 139 MMHG

## 2017-08-14 DIAGNOSIS — M23.91 KNEE INTERNAL DERANGEMENT, RIGHT: Primary | ICD-10-CM

## 2017-08-14 PROCEDURE — 99999 PR PBB SHADOW E&M-EST. PATIENT-LVL V: CPT | Mod: PBBFAC,,, | Performed by: PHYSICIAN ASSISTANT

## 2017-08-14 PROCEDURE — 99499 UNLISTED E&M SERVICE: CPT | Mod: S$GLB,,, | Performed by: PHYSICIAN ASSISTANT

## 2017-08-14 RX ORDER — MIDAZOLAM HYDROCHLORIDE 5 MG/ML
5 INJECTION INTRAMUSCULAR; INTRAVENOUS
Status: ACTIVE | OUTPATIENT
Start: 2017-08-14 | End: 2017-08-15

## 2017-08-14 RX ORDER — ALBUTEROL SULFATE 0.83 MG/ML
2.5 SOLUTION RESPIRATORY (INHALATION)
Status: CANCELLED | OUTPATIENT
Start: 2017-08-14 | End: 2017-08-14

## 2017-08-14 RX ORDER — HYDROCODONE BITARTRATE AND ACETAMINOPHEN 5; 325 MG/1; MG/1
1 TABLET ORAL
Qty: 45 TABLET | Refills: 0 | Status: SHIPPED | OUTPATIENT
Start: 2017-08-14 | End: 2017-09-26

## 2017-08-14 RX ORDER — SODIUM CHLORIDE, SODIUM LACTATE, POTASSIUM CHLORIDE, CALCIUM CHLORIDE 600; 310; 30; 20 MG/100ML; MG/100ML; MG/100ML; MG/100ML
INJECTION, SOLUTION INTRAVENOUS CONTINUOUS
Status: CANCELLED | OUTPATIENT
Start: 2017-08-14

## 2017-08-14 RX ORDER — FLUTICASONE PROPIONATE 50 MCG
1 SPRAY, SUSPENSION (ML) NASAL DAILY PRN
COMMUNITY

## 2017-08-14 RX ORDER — PROMETHAZINE HYDROCHLORIDE 25 MG/1
25 TABLET ORAL EVERY 6 HOURS PRN
Qty: 16 TABLET | Refills: 0 | Status: SHIPPED | OUTPATIENT
Start: 2017-08-14 | End: 2021-06-07

## 2017-08-14 RX ORDER — FAMOTIDINE 20 MG/1
20 TABLET, FILM COATED ORAL
Status: CANCELLED | OUTPATIENT
Start: 2017-08-14 | End: 2017-08-14

## 2017-08-14 RX ORDER — TRAMADOL HYDROCHLORIDE 50 MG/1
50-100 TABLET ORAL EVERY 6 HOURS PRN
Qty: 60 TABLET | Refills: 0 | Status: SHIPPED | OUTPATIENT
Start: 2017-08-14 | End: 2018-05-15 | Stop reason: SDUPTHER

## 2017-08-14 NOTE — PRE-PROCEDURE INSTRUCTIONS
Reviewed 's knee arthroscopy discharge instructions and postoperative equipment  (Polar Ice and Crutch teaching), with verbalized understanding and returned demonstration per patient.

## 2017-08-14 NOTE — ANESTHESIA PREPROCEDURE EVALUATION
08/14/2017  Emily Max is a 42 y.o., female.    Anesthesia Evaluation    I have reviewed the Patient Summary Reports.    I have reviewed the Nursing Notes.   I have reviewed the Medications.     Review of Systems  Anesthesia Hx:  PONV Denies Family Hx of Anesthesia complications.  Personal Hx of Anesthesia complications, Post-Operative Nausea/Vomiting, with every anesthetic, despite treatment   Social:  Non-Smoker    Hematology/Oncology:  Hematology Normal      Oncology Comments: Hysterectomy for cervical cancer    Cardiovascular:   Hypertension, well controlled Phlebitis/varicosities both legs   Pulmonary:  Pulmonary Normal    Renal/:  Renal/ Normal     Hepatic/GI:  Hepatic/GI Normal    Musculoskeletal:  Musculoskeletal Normal    Endocrine:   Hypothyroidism        Physical Exam  General:  Well nourished    Airway/Jaw/Neck:  Airway Findings: Mouth Opening: Normal Tongue: Normal  General Airway Assessment: Adult  Mallampati: I  TM Distance: Normal, at least 6 cm         Dental:  Dental Findings: In tact, molar caps             Anesthesia Plan  Type of Anesthesia, risks & benefits discussed:  Anesthesia Type:  general  Patient's Preference:   Intra-op Monitoring Plan: standard ASA monitors  Intra-op Monitoring Plan Comments:   Post Op Pain Control Plan:   Post Op Pain Control Plan Comments:   Induction:   IV  Beta Blocker:         Informed Consent: Patient understands risks and agrees with Anesthesia plan.  Questions answered. Anesthesia consent signed with patient.  ASA Score: 2     Day of Surgery Review of History & Physical:    H&P update referred to the surgeon.         Ready For Surgery From Anesthesia Perspective.

## 2017-08-14 NOTE — DISCHARGE INSTRUCTIONS
PRE-ADMIT TESTING -  [phone]    [address]  [facility]        OUTPATIENT SURGERY UNIT - [phone]    Your surgery has been scheduled at [facility]. We are pleased to have the opportunity to serve you. For Further Information please call [phone].    On the day of surgery please report to the Information Desk on the 1st floor.    · CONTACT YOUR PHYSICIAN'S OFFICE THE DAY PRIOR TO YOUR SURGERY TO OBTAIN YOUR ARRIVAL TIME.     · The evening before surgery do not eat anything after 9 p.m. ( this includes hard candy, chewing gum and mints).  You may only have GATORADE, POWERADE AND WATER  from 9 p.m. until you leave your home.   DO NOT DRINK ANY LIQUIDS ON THE WAY TO THE HOSPITAL.      SPECIAL MEDICATION INSTRUCTIONS: TAKE medications checked off by the Anesthesiologist on your Medication List.    Angiogram Patients: Take medications as instructed by your physician, including aspirin.     Surgery Patients:    If you take ASPIRIN - Your PHYSICIAN/SURGEON will need to inform you IF/OR when you need to stop taking aspirin prior to your surgery.     Do Not take any medications containing IBUPROFEN.  Do Not Wear any make-up or dark nail polish   (especially eye make-up) to surgery. If you come to surgery with makeup on you will be required to remove the makeup or nail polish.    Do not shave your surgical area at least 5 days prior to your surgery. The surgical prep will be performed at the hospital according to Infection Control regulations.    Leave all valuables at home.   Do Not wear any jewelry or watches, including any metal in body piercings.  Contact Lens must be removed before surgery. Either do not wear the contact lens or bring a case and solution for storage.  Please bring a container for eyeglasses or dentures as required.  Bring any paperwork your physician has provided, such as consent forms,  history and physicals, doctor's orders, etc.   Bring comfortable clothes  that are loose fitting to wear upon discharge. Take into consideration the type of surgery being performed.  Maintain your diet as advised per your physician the day prior to surgery.      Adequate rest the night before surgery is advised.   Park in the Parking lot behind the hospital or in the [facility] across the street from the parking lot. Parking is complimentary.  If you will be discharged the same day as your procedure, please arrange for a responsible adult to drive you home or to accompany you if traveling by taxi.   YOU WILL NOT BE PERMITTED TO DRIVE OR TO LEAVE THE HOSPITAL ALONE AFTER SURGERY.   It is strongly recommended that you arrange for someone to remain with you for the first 24 hrs following your surgery.       Thank you for your cooperation.  The Staff of [facility].        Bathing Instructions                                                                 Please shower the evening before and morning of your procedure with    ANTIBACTERIAL SOAP. ( DIAL, etc )  Concentrate on the surgical area   for at least 3 minutes and rinse completely. Dry off as usual.   Do not use any deodorant, powder, body lotions, perfume, after shave or    cologne.

## 2017-08-14 NOTE — H&P
Emily Max  is here for a completion of her perioperative paperwork. she  Is scheduled to undergo     right                        a. Knee arthroscopic medial meniscectomy  possible meniscus repair                        b. Knee arthroscopic possible plica excision                        c. Knee arthroscopic possible chondroplasty                        d. Knee arthroscopic-assisted Bio-D arthrocentesis on 8/24/17.      She is a healthy individual and does not need clearance for this procedure.     Risks, indications and benefits of the surgical procedure were discussed with the patient. All questions with regard to surgery, rehab, expected return to functional activities, activities of daily living and recreational endeavors were answered to her satisfaction.    The patient verbalized that he/she does not have any additional clotting, bleeding, or blood disorders, other than what is list in her chart on today's review.     Then a brief history and physical exam were performed.      PHYSICAL EXAM:  GEN: A&Ox3, WD WN NAD  HEENT: WNL  CHEST: CTAB, no W/R/R  HEART: RRR, no M/R/G  ABD: Soft, NT ND, BS x4 QUADS  MS; See Epic  NEURO: CN II-XII intact       The surgical consent was then reviewed with the patient, who agreed with all the contents of the consent form and it was signed. she was then given the [facility] surgery packet to bring with her to [facility] for the anesthesia portion of her perioperative paperwork.   For all physicians except for Dr. Keen, we will email and possibly fax the consent forms and booking sheets to [facility] pre-admit.    The patient was given the opportunity to ask questions about the surgical plan and consent form, and once no other questions were asked, I proceeded with the pre-op appointment.    PHYSICAL THERAPY:  She was also instructed regarding physical therapy and will begin on POD1. She was given a copy of the original prescription to schedule. Another copy  of this prescription was also faxed to [facility].    POST OP CARE:instructions were reviewed including care of the wound and dressing after surgery and when she can shower.     PAIN MANAGEMENT: Emily Max was also given her pain management regime, which includes the TENS unit given to her by hector along with the education required for its use. She was also instructed regarding the Polar ice unit that will be in place after surgery and her postoperative pain medications.     PAIN MEDICATION:  Norco 5/325mg 1 po q 4-6 hours prn pain  Ultram 50 mg Take 1-2 p.o. q.6 hours p.r.n. breakthrough pain,   Phenergan 25 mg one p.o. q.6 hours p.r.n. nausea and vomiting.    DVT prophylaxis was discussed with the patient today including risk factors for developing DVTs and history of DVTs. The patient was asked if any specific recommendations were given from the doctor/s that did pre-operative surgical clearance. The patient was then given an education sheet about DVTs and PE with warning signs and symptoms of both and steps to take if they suspect either of these.    This along with the Modified Caprini risk assessment model for VTE in general surgical patients was used to determine the patient's DVT risk.     From: Ashwini MK, Gregorio DA, Felicia SM, et al. Prevention of VTE in nonorthopedic surgical patients: antithrombotic therapy and prevention of thrombosis, 9th ed: American College of Chest Physicians evidence-based clinical practical guidelines. Chest 2012; 141:e227S. Copyright © 2012. Reproduced with permission from the American College of Chest Physicians.    The below listed DVT prophylaxis regimen along with bilateral ALBERTO compression stockings will be used post-op. Length of treatment has been determined to be 10-42 days post-op by the above noted Caprini assessment model.     The patient was instructed to buy and take:  Aspirin 325mg BID x 6 weeks for DVT prophylaxis starting on the evening after  surgery.  Patient will also use bilateral TEDs on lower extremities, SCDs during surgery, and early ambulation post-op. If the patient was previously taking 81mg baby aspirin, they were told to not take it will using the above stated aspirin and to restart the 81mg aspirin after completion of the aspirin dose.     Patient was also told to buy over the counter Prilosec medication and take it once daily for GI protection as long as they are taking NSAIDs or Aspirin.     Published data in Gisel TRENT, et al. J Arthroplasty. Oct; 31(10):2237-40, 2016; showed that aspirin use as prophylaxis during revision total joint arthroplasty was more effective than warfarin in preventing symptomatic venous thromboembolic events and was associated with lower complications.  Patients in the study were also treated with intermittent pneumatic compression devices. Compression stockings would be our method of mechanical prophylaxis, which has been shown to be similar to pneumatic compression in the systematic review, Geovanni FLOWERS et al. Sayra Surg. Feb; 239(2): 162-171, 2004.     Results showed a significantly higher incidence of symptomatic venous thromboembolic events among patients in the warfarin group vs. the aspirin group (1.75% vs. 0.56%). Researchers also noted a bleeding event rate of 1.5% among patients who received warfarin compared with a rate of 0.4% among patients who received aspirin.    Patient denies history of seizures.       The patient was told that narcotic pain medications may make them drowsy and instructions were given to not sign legal documents, drive or operate heavy machinery, cars, or equipment while under the influence of narcotic medications.     As there were no other questions to be asked, she was given my business card along with Kelly Zaman MD business card if she has any questions or concerns prior to surgery or in the postop period.

## 2017-08-17 ENCOUNTER — PATIENT MESSAGE (OUTPATIENT)
Dept: INTERNAL MEDICINE | Facility: CLINIC | Age: 42
End: 2017-08-17

## 2017-08-18 ENCOUNTER — TELEPHONE (OUTPATIENT)
Dept: INTERNAL MEDICINE | Facility: CLINIC | Age: 42
End: 2017-08-18

## 2017-08-20 ENCOUNTER — DOCUMENTATION ONLY (OUTPATIENT)
Dept: INTERNAL MEDICINE | Facility: CLINIC | Age: 42
End: 2017-08-20

## 2017-08-20 NOTE — PROGRESS NOTES
Pre-Visit Review & Summary:    43 y/o female with history of HTN, Obesity, Migraine H/As, Hypothyroidism, GERD, Allergic Rhinitis, Anxiety, Depression, S/P EVLT for Chronic Venous Insufficiency, and S/P Hysterectomy for Stage 1B Cervical Cancer 5/2013 has a scheduled appt 8/20/17 for a pre-op evaluation and annual PE.    She is scheduled for right knee Meniscectomy 8/24/17 with Dr. Zaman.    Since her last visit, 3/14/17, she was seen by Ms. Monroe in Psychology for follow up of her Anxiety and Depression and for evaluation of possible ADD. She notes Cymbalta helps with Depression but not anxiety. In past, Wellbutrin helped with Anxiety. She has not responded well with SSRIs. Ms. Monroe has referred her to Psychiatry and she has an appt scheduled for 9/26/17, earliest available. Psychologist and patient are  requesting she begin Wellbutrin.       PMH:  1. HTN           Atenolol 50 mg BID           Dyazide 37.5-25 mg/day    2. Migraine H/A           Atenolol 50 mg BID    3. Hypothyroidism           Synthroid 50 mcg/day    4. Vit D Deficiency            Cholecalciferol 1,000 units daily    5. Anxiety & Depression.              Followed by Ms. Abreu, MARKW             Xanax 0.25 mg prn             Cymbalta 30 mg/day    6. Obesity             (BMI-37)    7. Hx of GERD    8. Chronic Venous Insufficiency and Varicose Veins              Followed in Vascular Surgery               S/P Right EVLT 5/2014              S/P Left EVLT 1/19/17    9. Stage 1B Cervical CA             S/P Total Hysterectomy 5/2013             Followed by Dr. Matias - last visit - 7/2016    10. Surgical Menopause              Estrace 0.5     11. Allergic Rhinitis & Food Allergies             Followed by Dr. Finley (Allergy)             Astelin Nasal Spray, Xyzal 5 mg, Epipen    12. Pre-DM        MEDS:        Atenolol 50 mg BID        Cholecalciferol 1,000 units daily        Cymbalta 30 mg/day        Dyazide 37.5/25 mg/day        EPIPEN prn         Estrace 0.5 mg/day        Imitrex prn        Levothyroxine 50 mcg/day        Xyzal 5 mg/day      ALLERGIES:         IV Contrast         Diflucan         Amlodipine         Hydrocodone- Acetaminophen         Mushrooms    Surgical Hx:        Robotic Radical Hysterectomy, BSO, Pelvic LN 5/2013        Tonsillectomy        Turbinectomy        Laparotomy Exploration for Endometriosis        Lipoma Excision        Right and Left EVLT      Social Hx:      Smoking Hx: Non-Smoker      ETOH: 1-2/week      Exercise:      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 Matias      Eye Exam - 4/2016   Mammogram (9/2015): negative   Immunizations     Influenza - Fall 2016    Tdp -                           Pneumovax (3/2014)   Other:                      Hepatitis Panel (3/2014): negative                      CA-125 (5/2017) - 9                      MRI Right foot (4/2015): Stress Fx R-2nd Metatarsal                      MRI Right Knee (6/21/17): Medial meniscus Tear    IMP:  1. Anxiety & Depression  2. HTN  3. Obesity  4. Pre-DM  5. Hypothyroidism  6. Migraine H/As  7. Vit D Deficiency  8. Hx of Stage 1B Cervical CA - S/P Robotic Radical Hysterectomy, BSO, Pelvic LN 2013  9. Surgical Menopause  10. Chronic Venous Insufficiency and Varicose Veins  11. Pre-op and Annual PE

## 2017-08-21 NOTE — PROGRESS NOTES
Subjective:       Patient ID: Emily Max is a 42 y.o. female.    Chief Complaint: No chief complaint on file.    41 y/o female with history of HTN, Obesity, Migraine H/As, Hypothyroidism, GERD, Allergic Rhinitis, Anxiety, Depression, S/P EVLT for Chronic Venous Insufficiency, and S/P Hysterectomy for Stage 1B Cervical Cancer 5/2013 comes in for a pre-op evaluation and Annual PE.    She is scheduled for Right Knee Meniscectomy 8/24/17 with Dr. Zaman.    She forgot to take her BP medications this morning until 45 minutes before her visit. BP has been well controlled at home. BP on repeat is 130/88.    Since her last visit, 3/14/17, she was seen by Ms. Monroe in Psychology for follow up of her Anxiety and Depression and for evaluation of possible ADD. She notes Cymbalta helps with Depression but not anxiety. In past, Wellbutrin helped with Anxiety. She has not responded well with SSRIs. Ms. Monroe has referred her to Psychiatry and she has an appt scheduled for 9/26/17, earliest available. Psychologist and patient are  requesting she begin Wellbutrin.       PMH:  1. HTN           Atenolol 50 mg BID           Dyazide 37.5-25 mg/day    2. Migraine H/A           Atenolol 50 mg BID    3. Hypothyroidism           Synthroid 50 mcg/day    4. Vit D Deficiency            Cholecalciferol 1,000 units daily    5. Anxiety & Depression.              Followed by Ms. Abreu, DONA             Cymbalta 30 mg/day    6. Obesity             (BMI-38)    7. Hx of GERD    8. Chronic Venous Insufficiency and Varicose Veins              Followed in Vascular Surgery               S/P Right EVLT 5/2014              S/P Left EVLT 1/19/17    9. Stage 1B Cervical CA             S/P Total Hysterectomy 5/2013             Followed by Dr. Matias - last visit - 7/2016    10. Surgical Menopause              Estrace 0.5     11. Allergic Rhinitis & Food Allergies             Followed by Dr. Finley (Allergy)             Astelin Nasal  Spray, Xyzal 5 mg, Epipen    12. Pre-DM        MEDS:        Atenolol 50 mg BID        Cholecalciferol 1,000 units daily        Cymbalta 30 mg/day        Dyazide 37.5/25 mg/day        EPIPEN prn        Estrace 0.5 mg/day        Flonase daily        Imitrex prn        Levothyroxine 50 mcg/day        Xyzal 5 mg/day      ALLERGIES:         IV Contrast         Diflucan         Amlodipine         Hydrocodone- Acetaminophen         Mushrooms    Surgical Hx:        Robotic Radical Hysterectomy, BSO, Pelvic LN 5/2013        Tonsillectomy        Turbinectomy        Laparotomy Exploration for Endometriosis        Lipoma Excision        Right and Left EVLT      Social Hx:      Smoking Hx: Non-Smoker      ETOH: 1-2/week      Exercise:      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 Matias      Eye Exam - 4/2016   Mammogram (5/2017): negative   Immunizations     Influenza - Fall 2016    Tdp -                           Pneumovax (3/2014)   Other:                      Hepatitis Panel (3/2014): negative                      CA-125 (5/2017) - 9                      MRI Right foot (4/2015): Stress Fx R-2nd Metatarsal                      MRI Right Knee (6/21/17): Medial meniscus Tear            Review of Systems   HENT: Negative.    Respiratory: Negative for chest tightness and shortness of breath.    Cardiovascular: Negative for chest pain and leg swelling.   Gastrointestinal: Negative for abdominal pain and nausea.   Genitourinary: Negative.     Musculoskeletal: Positive for arthralgias (right knee pain).   Neurological: Negative for dizziness, syncope and light-headedness.       Objective:      Physical Exam   Constitutional: She is oriented to person, place, and time. She appears well-developed and well-nourished.   HENT:   Head: Normocephalic.   Eyes: Conjunctivae are normal.   Neck: No thyromegaly present.   Cardiovascular: Normal rate and regular rhythm.  Exam reveals no gallop.    No murmur heard.  Pulmonary/Chest: Effort normal and breath sounds normal. She has no wheezes. She has no rales.   Abdominal: Soft. There is no tenderness.   Musculoskeletal: She exhibits no edema.   Lymphadenopathy:     She has no cervical adenopathy.   Neurological: She is alert and oriented to person, place, and time.   Skin: Skin is warm and dry.   Psychiatric: She has a normal mood and affect.       Assessment:   1. Anxiety & Depression  2. HTN  3. Obesity  4. Pre-DM  5. Hypothyroidism  6. Migraine H/As  7. Vit D Deficiency  8. Hx of Stage 1B Cervical CA - S/P Robotic Radical Hysterectomy, BSO, Pelvic LN 2013  9. Surgical Menopause  10. Chronic Venous Insufficiency and Varicose Veins  11. Pre-op and Annual PE    Plan:   1. Patient at low risk for low risk surgery.  2. Follow up with Psychiatry 9/26/17, Tdp - patient will call post op to schedule  3. Discussed to begin Weight loss post surgery and possible referral to Bariatric Medicne.  4. RTC in 1 yr for Annual PE

## 2017-08-22 ENCOUNTER — LAB VISIT (OUTPATIENT)
Dept: LAB | Facility: HOSPITAL | Age: 42
End: 2017-08-22
Attending: INTERNAL MEDICINE
Payer: COMMERCIAL

## 2017-08-22 ENCOUNTER — OFFICE VISIT (OUTPATIENT)
Dept: INTERNAL MEDICINE | Facility: CLINIC | Age: 42
End: 2017-08-22
Payer: COMMERCIAL

## 2017-08-22 VITALS
DIASTOLIC BLOOD PRESSURE: 88 MMHG | HEART RATE: 106 BPM | HEIGHT: 65 IN | WEIGHT: 228.63 LBS | BODY MASS INDEX: 38.09 KG/M2 | OXYGEN SATURATION: 97 % | SYSTOLIC BLOOD PRESSURE: 130 MMHG

## 2017-08-22 DIAGNOSIS — I10 ESSENTIAL HYPERTENSION: ICD-10-CM

## 2017-08-22 DIAGNOSIS — I87.2 VENOUS INSUFFICIENCY OF BOTH LOWER EXTREMITIES: ICD-10-CM

## 2017-08-22 DIAGNOSIS — G43.C0 PERIODIC HEADACHE SYNDROME WITHOUT STATUS MIGRAINOSUS, NOT INTRACTABLE: ICD-10-CM

## 2017-08-22 DIAGNOSIS — E66.01 MORBID OBESITY, UNSPECIFIED OBESITY TYPE: ICD-10-CM

## 2017-08-22 DIAGNOSIS — E03.9 ACQUIRED HYPOTHYROIDISM: ICD-10-CM

## 2017-08-22 DIAGNOSIS — Z01.818 PRE-OP EXAM: Primary | ICD-10-CM

## 2017-08-22 DIAGNOSIS — I83.90 VARICOSE VEIN OF LEG: ICD-10-CM

## 2017-08-22 DIAGNOSIS — R73.03 PRE-DIABETES: ICD-10-CM

## 2017-08-22 DIAGNOSIS — E55.9 MILD VITAMIN D DEFICIENCY: ICD-10-CM

## 2017-08-22 DIAGNOSIS — Z01.818 PRE-OP EXAM: ICD-10-CM

## 2017-08-22 DIAGNOSIS — F41.9 ANXIETY: ICD-10-CM

## 2017-08-22 DIAGNOSIS — Z85.41 HISTORY OF CERVICAL CANCER: ICD-10-CM

## 2017-08-22 LAB
ANION GAP SERPL CALC-SCNC: 10 MMOL/L
BASOPHILS # BLD AUTO: 0.05 K/UL
BASOPHILS NFR BLD: 0.6 %
BUN SERPL-MCNC: 16 MG/DL
CALCIUM SERPL-MCNC: 10.2 MG/DL
CHLORIDE SERPL-SCNC: 104 MMOL/L
CO2 SERPL-SCNC: 29 MMOL/L
CREAT SERPL-MCNC: 0.9 MG/DL
DIFFERENTIAL METHOD: ABNORMAL
EOSINOPHIL # BLD AUTO: 0.3 K/UL
EOSINOPHIL NFR BLD: 3.3 %
ERYTHROCYTE [DISTWIDTH] IN BLOOD BY AUTOMATED COUNT: 12.6 %
EST. GFR  (AFRICAN AMERICAN): >60 ML/MIN/1.73 M^2
EST. GFR  (NON AFRICAN AMERICAN): >60 ML/MIN/1.73 M^2
GLUCOSE SERPL-MCNC: 132 MG/DL
HCT VFR BLD AUTO: 42.5 %
HGB BLD-MCNC: 14.7 G/DL
LYMPHOCYTES # BLD AUTO: 2.3 K/UL
LYMPHOCYTES NFR BLD: 29.1 %
MCH RBC QN AUTO: 30.9 PG
MCHC RBC AUTO-ENTMCNC: 34.6 G/DL
MCV RBC AUTO: 90 FL
MONOCYTES # BLD AUTO: 0.8 K/UL
MONOCYTES NFR BLD: 10.7 %
NEUTROPHILS # BLD AUTO: 4.4 K/UL
NEUTROPHILS NFR BLD: 56 %
PLATELET # BLD AUTO: 250 K/UL
PMV BLD AUTO: 8.8 FL
POTASSIUM SERPL-SCNC: 4.4 MMOL/L
RBC # BLD AUTO: 4.75 M/UL
SODIUM SERPL-SCNC: 143 MMOL/L
WBC # BLD AUTO: 7.77 K/UL

## 2017-08-22 PROCEDURE — 85025 COMPLETE CBC W/AUTO DIFF WBC: CPT

## 2017-08-22 PROCEDURE — 36415 COLL VENOUS BLD VENIPUNCTURE: CPT

## 2017-08-22 PROCEDURE — 3079F DIAST BP 80-89 MM HG: CPT | Mod: S$GLB,,, | Performed by: INTERNAL MEDICINE

## 2017-08-22 PROCEDURE — 99214 OFFICE O/P EST MOD 30 MIN: CPT | Mod: S$GLB,,, | Performed by: INTERNAL MEDICINE

## 2017-08-22 PROCEDURE — 3075F SYST BP GE 130 - 139MM HG: CPT | Mod: S$GLB,,, | Performed by: INTERNAL MEDICINE

## 2017-08-22 PROCEDURE — 99999 PR PBB SHADOW E&M-EST. PATIENT-LVL III: CPT | Mod: PBBFAC,,, | Performed by: INTERNAL MEDICINE

## 2017-08-22 PROCEDURE — 3008F BODY MASS INDEX DOCD: CPT | Mod: S$GLB,,, | Performed by: INTERNAL MEDICINE

## 2017-08-22 PROCEDURE — 80048 BASIC METABOLIC PNL TOTAL CA: CPT

## 2017-08-24 ENCOUNTER — ANESTHESIA (OUTPATIENT)
Dept: SURGERY | Facility: OTHER | Age: 42
End: 2017-08-24
Payer: COMMERCIAL

## 2017-08-24 ENCOUNTER — HOSPITAL ENCOUNTER (OUTPATIENT)
Facility: OTHER | Age: 42
Discharge: HOME | End: 2017-08-24
Attending: ORTHOPAEDIC SURGERY | Admitting: ORTHOPAEDIC SURGERY
Payer: COMMERCIAL

## 2017-08-24 VITALS
DIASTOLIC BLOOD PRESSURE: 80 MMHG | TEMPERATURE: 98 F | SYSTOLIC BLOOD PRESSURE: 122 MMHG | OXYGEN SATURATION: 96 % | HEIGHT: 65 IN | BODY MASS INDEX: 36.65 KG/M2 | HEART RATE: 70 BPM | WEIGHT: 220 LBS | RESPIRATION RATE: 18 BRPM

## 2017-08-24 DIAGNOSIS — M23.91 KNEE INTERNAL DERANGEMENT, RIGHT: ICD-10-CM

## 2017-08-24 PROBLEM — M23.90 KNEE INTERNAL DERANGEMENT: Status: ACTIVE | Noted: 2017-08-24

## 2017-08-24 PROCEDURE — 71000016 HC POSTOP RECOV ADDL HR: Performed by: ORTHOPAEDIC SURGERY

## 2017-08-24 PROCEDURE — 25000003 PHARM REV CODE 250: Performed by: ANESTHESIOLOGY

## 2017-08-24 PROCEDURE — 25000242 PHARM REV CODE 250 ALT 637 W/ HCPCS: Performed by: ANESTHESIOLOGY

## 2017-08-24 PROCEDURE — 94640 AIRWAY INHALATION TREATMENT: CPT

## 2017-08-24 PROCEDURE — 36000710: Performed by: ORTHOPAEDIC SURGERY

## 2017-08-24 PROCEDURE — 63600175 PHARM REV CODE 636 W HCPCS: Performed by: NURSE ANESTHETIST, CERTIFIED REGISTERED

## 2017-08-24 PROCEDURE — 63600175 PHARM REV CODE 636 W HCPCS: Performed by: PHYSICIAN ASSISTANT

## 2017-08-24 PROCEDURE — 37000009 HC ANESTHESIA EA ADD 15 MINS: Performed by: ORTHOPAEDIC SURGERY

## 2017-08-24 PROCEDURE — 37000008 HC ANESTHESIA 1ST 15 MINUTES: Performed by: ORTHOPAEDIC SURGERY

## 2017-08-24 PROCEDURE — 25000003 PHARM REV CODE 250: Performed by: NURSE ANESTHETIST, CERTIFIED REGISTERED

## 2017-08-24 PROCEDURE — 63600175 PHARM REV CODE 636 W HCPCS: Performed by: ANESTHESIOLOGY

## 2017-08-24 PROCEDURE — 27201423 OPTIME MED/SURG SUP & DEVICES STERILE SUPPLY: Performed by: ORTHOPAEDIC SURGERY

## 2017-08-24 PROCEDURE — 29881 ARTHRS KNE SRG MNISECTMY M/L: CPT | Mod: RT,,, | Performed by: ORTHOPAEDIC SURGERY

## 2017-08-24 PROCEDURE — V2790 AMNIOTIC MEMBRANE: HCPCS | Performed by: ORTHOPAEDIC SURGERY

## 2017-08-24 PROCEDURE — 25000003 PHARM REV CODE 250: Performed by: ORTHOPAEDIC SURGERY

## 2017-08-24 PROCEDURE — 71000033 HC RECOVERY, INTIAL HOUR: Performed by: ORTHOPAEDIC SURGERY

## 2017-08-24 PROCEDURE — 71000015 HC POSTOP RECOV 1ST HR: Performed by: ORTHOPAEDIC SURGERY

## 2017-08-24 PROCEDURE — 63600175 PHARM REV CODE 636 W HCPCS: Performed by: ORTHOPAEDIC SURGERY

## 2017-08-24 PROCEDURE — 36000711: Performed by: ORTHOPAEDIC SURGERY

## 2017-08-24 DEVICE — TISSUE MATRIX BIOD RESTORE LG: Type: IMPLANTABLE DEVICE | Site: KNEE | Status: FUNCTIONAL

## 2017-08-24 RX ORDER — SODIUM CHLORIDE 0.9 % (FLUSH) 0.9 %
3 SYRINGE (ML) INJECTION
Status: DISCONTINUED | OUTPATIENT
Start: 2017-08-24 | End: 2017-08-24 | Stop reason: HOSPADM

## 2017-08-24 RX ORDER — ALBUTEROL SULFATE 0.83 MG/ML
2.5 SOLUTION RESPIRATORY (INHALATION)
Status: COMPLETED | OUTPATIENT
Start: 2017-08-24 | End: 2017-08-24

## 2017-08-24 RX ORDER — PROPOFOL 10 MG/ML
VIAL (ML) INTRAVENOUS
Status: DISCONTINUED | OUTPATIENT
Start: 2017-08-24 | End: 2017-08-24

## 2017-08-24 RX ORDER — SODIUM CHLORIDE, SODIUM LACTATE, POTASSIUM CHLORIDE, CALCIUM CHLORIDE 600; 310; 30; 20 MG/100ML; MG/100ML; MG/100ML; MG/100ML
INJECTION, SOLUTION INTRAVENOUS CONTINUOUS
Status: DISCONTINUED | OUTPATIENT
Start: 2017-08-24 | End: 2017-08-24 | Stop reason: HOSPADM

## 2017-08-24 RX ORDER — FAMOTIDINE 20 MG/1
20 TABLET, FILM COATED ORAL
Status: COMPLETED | OUTPATIENT
Start: 2017-08-24 | End: 2017-08-24

## 2017-08-24 RX ORDER — KETAMINE HYDROCHLORIDE 50 MG/ML
INJECTION, SOLUTION INTRAMUSCULAR; INTRAVENOUS
Status: DISCONTINUED | OUTPATIENT
Start: 2017-08-24 | End: 2017-08-24 | Stop reason: HOSPADM

## 2017-08-24 RX ORDER — KETOROLAC TROMETHAMINE 30 MG/ML
INJECTION, SOLUTION INTRAMUSCULAR; INTRAVENOUS
Status: DISCONTINUED | OUTPATIENT
Start: 2017-08-24 | End: 2017-08-24

## 2017-08-24 RX ORDER — KETOROLAC TROMETHAMINE 30 MG/ML
INJECTION, SOLUTION INTRAMUSCULAR; INTRAVENOUS
Status: DISCONTINUED | OUTPATIENT
Start: 2017-08-24 | End: 2017-08-24 | Stop reason: HOSPADM

## 2017-08-24 RX ORDER — SCOLOPAMINE TRANSDERMAL SYSTEM 1 MG/1
PATCH, EXTENDED RELEASE TRANSDERMAL
Status: DISCONTINUED | OUTPATIENT
Start: 2017-08-24 | End: 2017-08-24

## 2017-08-24 RX ORDER — EPINEPHRINE 1 MG/ML
INJECTION, SOLUTION INTRACARDIAC; INTRAMUSCULAR; INTRAVENOUS; SUBCUTANEOUS
Status: DISCONTINUED | OUTPATIENT
Start: 2017-08-24 | End: 2017-08-24 | Stop reason: HOSPADM

## 2017-08-24 RX ORDER — ROPIVACAINE HYDROCHLORIDE 5 MG/ML
INJECTION, SOLUTION EPIDURAL; INFILTRATION; PERINEURAL
Status: DISCONTINUED | OUTPATIENT
Start: 2017-08-24 | End: 2017-08-24 | Stop reason: HOSPADM

## 2017-08-24 RX ORDER — MIDAZOLAM HYDROCHLORIDE 1 MG/ML
INJECTION INTRAMUSCULAR; INTRAVENOUS
Status: DISCONTINUED | OUTPATIENT
Start: 2017-08-24 | End: 2017-08-24

## 2017-08-24 RX ORDER — DEXAMETHASONE SODIUM PHOSPHATE 4 MG/ML
INJECTION, SOLUTION INTRA-ARTICULAR; INTRALESIONAL; INTRAMUSCULAR; INTRAVENOUS; SOFT TISSUE
Status: DISCONTINUED | OUTPATIENT
Start: 2017-08-24 | End: 2017-08-24

## 2017-08-24 RX ORDER — OXYCODONE HYDROCHLORIDE 5 MG/1
5 TABLET ORAL ONCE AS NEEDED
Status: COMPLETED | OUTPATIENT
Start: 2017-08-24 | End: 2017-08-24

## 2017-08-24 RX ORDER — FENTANYL CITRATE 50 UG/ML
25 INJECTION, SOLUTION INTRAMUSCULAR; INTRAVENOUS EVERY 5 MIN PRN
Status: COMPLETED | OUTPATIENT
Start: 2017-08-24 | End: 2017-08-24

## 2017-08-24 RX ORDER — OXYCODONE HYDROCHLORIDE 5 MG/1
5 TABLET ORAL
Status: DISCONTINUED | OUTPATIENT
Start: 2017-08-24 | End: 2017-08-24 | Stop reason: HOSPADM

## 2017-08-24 RX ORDER — PROMETHAZINE HYDROCHLORIDE 25 MG/1
25 TABLET ORAL EVERY 6 HOURS PRN
Status: DISCONTINUED | OUTPATIENT
Start: 2017-08-24 | End: 2017-08-24 | Stop reason: HOSPADM

## 2017-08-24 RX ORDER — PROPOFOL 10 MG/ML
VIAL (ML) INTRAVENOUS CONTINUOUS PRN
Status: DISCONTINUED | OUTPATIENT
Start: 2017-08-24 | End: 2017-08-24

## 2017-08-24 RX ORDER — LIDOCAINE HCL/PF 100 MG/5ML
SYRINGE (ML) INTRAVENOUS
Status: DISCONTINUED | OUTPATIENT
Start: 2017-08-24 | End: 2017-08-24

## 2017-08-24 RX ORDER — FENTANYL CITRATE 50 UG/ML
INJECTION, SOLUTION INTRAMUSCULAR; INTRAVENOUS
Status: DISCONTINUED | OUTPATIENT
Start: 2017-08-24 | End: 2017-08-24

## 2017-08-24 RX ORDER — DIPHENHYDRAMINE HYDROCHLORIDE 50 MG/ML
50 INJECTION INTRAMUSCULAR; INTRAVENOUS
Status: SHIPPED | OUTPATIENT
Start: 2017-08-24

## 2017-08-24 RX ORDER — HYDROMORPHONE HYDROCHLORIDE 2 MG/ML
0.4 INJECTION, SOLUTION INTRAMUSCULAR; INTRAVENOUS; SUBCUTANEOUS EVERY 5 MIN PRN
Status: DISCONTINUED | OUTPATIENT
Start: 2017-08-24 | End: 2017-08-24 | Stop reason: HOSPADM

## 2017-08-24 RX ORDER — OXYCODONE HYDROCHLORIDE 5 MG/1
10 TABLET ORAL EVERY 4 HOURS PRN
Status: DISCONTINUED | OUTPATIENT
Start: 2017-08-24 | End: 2017-08-24 | Stop reason: HOSPADM

## 2017-08-24 RX ORDER — CEFAZOLIN SODIUM 2 G/50ML
2 SOLUTION INTRAVENOUS
Status: COMPLETED | OUTPATIENT
Start: 2017-08-24 | End: 2017-08-24

## 2017-08-24 RX ORDER — MEPERIDINE HYDROCHLORIDE 50 MG/ML
12.5 INJECTION INTRAMUSCULAR; INTRAVENOUS; SUBCUTANEOUS ONCE AS NEEDED
Status: DISCONTINUED | OUTPATIENT
Start: 2017-08-24 | End: 2017-08-24 | Stop reason: HOSPADM

## 2017-08-24 RX ORDER — ONDANSETRON 2 MG/ML
4 INJECTION INTRAMUSCULAR; INTRAVENOUS EVERY 12 HOURS PRN
Status: DISCONTINUED | OUTPATIENT
Start: 2017-08-24 | End: 2017-08-24 | Stop reason: HOSPADM

## 2017-08-24 RX ORDER — ONDANSETRON 2 MG/ML
INJECTION INTRAMUSCULAR; INTRAVENOUS
Status: DISCONTINUED | OUTPATIENT
Start: 2017-08-24 | End: 2017-08-24

## 2017-08-24 RX ADMIN — FENTANYL CITRATE 25 MCG: 50 INJECTION INTRAMUSCULAR; INTRAVENOUS at 08:08

## 2017-08-24 RX ADMIN — MIDAZOLAM HYDROCHLORIDE 2 MG: 1 INJECTION, SOLUTION INTRAMUSCULAR; INTRAVENOUS at 07:08

## 2017-08-24 RX ADMIN — HYDROMORPHONE HYDROCHLORIDE 0.4 MG: 2 INJECTION INTRAMUSCULAR; INTRAVENOUS; SUBCUTANEOUS at 09:08

## 2017-08-24 RX ADMIN — PROPOFOL 50 MG: 10 INJECTION, EMULSION INTRAVENOUS at 07:08

## 2017-08-24 RX ADMIN — PROPOFOL 30 MG: 10 INJECTION, EMULSION INTRAVENOUS at 07:08

## 2017-08-24 RX ADMIN — OXYCODONE HYDROCHLORIDE 5 MG: 5 TABLET ORAL at 08:08

## 2017-08-24 RX ADMIN — ONDANSETRON 4 MG: 2 INJECTION INTRAMUSCULAR; INTRAVENOUS at 07:08

## 2017-08-24 RX ADMIN — ONDANSETRON 4 MG: 2 INJECTION INTRAMUSCULAR; INTRAVENOUS at 10:08

## 2017-08-24 RX ADMIN — PROPOFOL 200 MG: 10 INJECTION, EMULSION INTRAVENOUS at 07:08

## 2017-08-24 RX ADMIN — FENTANYL CITRATE 50 MCG: 50 INJECTION, SOLUTION INTRAMUSCULAR; INTRAVENOUS at 07:08

## 2017-08-24 RX ADMIN — OXYCODONE HYDROCHLORIDE 5 MG: 5 TABLET ORAL at 10:08

## 2017-08-24 RX ADMIN — LIDOCAINE HYDROCHLORIDE 100 MG: 20 INJECTION, SOLUTION INTRAVENOUS at 07:08

## 2017-08-24 RX ADMIN — FAMOTIDINE 20 MG: 20 TABLET, FILM COATED ORAL at 06:08

## 2017-08-24 RX ADMIN — CARBOXYMETHYLCELLULOSE SODIUM 2 DROP: 2.5 SOLUTION/ DROPS OPHTHALMIC at 07:08

## 2017-08-24 RX ADMIN — KETOROLAC TROMETHAMINE 30 MG: 30 INJECTION, SOLUTION INTRAMUSCULAR; INTRAVENOUS at 08:08

## 2017-08-24 RX ADMIN — ALBUTEROL SULFATE 2.5 MG: 2.5 SOLUTION RESPIRATORY (INHALATION) at 05:08

## 2017-08-24 RX ADMIN — PROMETHAZINE HYDROCHLORIDE 6.25 MG: 25 INJECTION INTRAMUSCULAR; INTRAVENOUS at 10:08

## 2017-08-24 RX ADMIN — SCOPALAMINE 1 PATCH: 1 PATCH, EXTENDED RELEASE TRANSDERMAL at 06:08

## 2017-08-24 RX ADMIN — DEXAMETHASONE SODIUM PHOSPHATE 8 MG: 4 INJECTION, SOLUTION INTRAMUSCULAR; INTRAVENOUS at 07:08

## 2017-08-24 RX ADMIN — SODIUM CHLORIDE, SODIUM LACTATE, POTASSIUM CHLORIDE, AND CALCIUM CHLORIDE: 600; 310; 30; 20 INJECTION, SOLUTION INTRAVENOUS at 06:08

## 2017-08-24 RX ADMIN — CEFAZOLIN SODIUM 2 G: 2 SOLUTION INTRAVENOUS at 07:08

## 2017-08-24 RX ADMIN — PROPOFOL 120 MCG/KG/MIN: 10 INJECTION, EMULSION INTRAVENOUS at 07:08

## 2017-08-24 NOTE — OR NURSING
Pt states pain 4-5 and tolerable. BNo change from previous assessment. Prepared for transfer to acu.

## 2017-08-24 NOTE — OR NURSING
Rounded on patient postoperatively at bedside.  Patient states she has no questions on 's knee arthroscopy discharge instructions and postoperative equipment.

## 2017-08-24 NOTE — OP NOTE
DATE OF PROCEDURE: 08/24/2017    SURGEON:  Kelly Zaman M.D    ASSISTANT:  DUNCAN Rojas MD, PGY-6  ASSISTANT: SMA Carmelina      PREOPERATIVE DIAGNOSES:   right  1. knee medial meniscus tear   2. knee plica.   3. knee possible chondromalacia  4. knee synovitis  5. Knee loose body    POSTOPERATIVE DIAGNOSES:   right  1. knee medial meniscus tear   2. knee plica.   3. knee chondromalacia  4. knee synovitis.   5. Knee loose body    PROCEDURE PERFORMED:   right  1. knee arthroscopic chondroplasty (CPT 63692)  2. knee arthroscopic medial (CPT 11477) meniscectomy   3. knee arthroscopic partial synovectomy/debridement (CPT 89840).   4. knee arthroscopic plica excision(CPT 75800).    5. Knee arthroscopic-assisted arthrocentesis of viable structural human allograft tissue      matrix (BioDRestore) (CPT 34133)   6. Knee arthroscopic loose body removal (CPT 45515)    ANESTHESIA: General with local 0.5% ropivicaine 30ml (5mg/ml), 60 mg ketamine, 60mg toradol (2ml toradol (30mg/ml))    BLOOD LOSS: Minimal.     DRAINS: None.     TOURNIQUET TIME: None.     COMPLICATIONS: None.     CONDITION ON TRANSFER: The patient was extubated and moved to the recovery room in stable condition, with compartments soft and capillary refill less than a   second in all digits.     BRIEF INDICATION OF MEDICAL NECESSITY: The patient is a 42 y.o. year-old female who has history and physical examination findings consistent with the above. Nonoperative versus operative options were discussed. The risks and benefits were discussed with the patient. The patient acknowledged understanding and wished to proceed with operative intervention. Informed consent was obtained prior to the procedure. Details of the surgical procedure were explained, including incisions and probable rehabilitation course. The patient understands the likely length of convalescence after surgery; and we have explained the risks, benefits, and alternatives of surgery. Reasonable  expectations and potential complications were discussed and acknowledged, including but not limited to infection, bleeding, blood clots, (DVT and/or PE), nerve injury, retear, instability, continued pain and stiffness. It was also explained that there was a chance of failure which may require further management. The patient agreed and understood and wished to proceed.     EXAMINATION UNDER ANESTHESIA of the OPERATIVE right KNEE: ROM 0-130 degrees, negative Lachman, negative pivot shift, stable to varus-valgus stress testing, negative effusion.     EXAMINATION UNDER ANESTHESIA of the NON-OPERATED left KNEE: ROM 0-130 degrees, negative Lachman, negative pivot shift, stable to varus-valgus stress testing, negative effusion.     PROCEDURE IN DETAIL: The correct operative site was marked by the operative surgeon.  The patient was then taken to the operating room and placed supine on the operating room table. General anesthesia was administered by the anesthesia team. All pressure points were carefully padded and checked. Preoperative antibiotics were administered. A well-padded tourniquet was placed high on the operative thigh. Examination was begun with the above findings. The non-operative leg was then placed a well-padded well-leg lopez, in a comfortable position. The operative leg was placed in an arthroscopic leg lopez at the level of the tourniquet. The operative left leg was prepped and draped in the usual sterile fashion. After prepping and draping, the appropriate landmarks were noted on the skin.  2cc skin and sub-cutaneous tissue was infilttrated with local anesthetic mixture superolaterally at needle insufflation site. The knee was insufflated supero-laterally with saline. 9cc skin wheal and sub-cutaneous tissue and fat pad was infilttrated with local anesthetic mixture at both portal sites; mid-lateral followed by infero-medial portals were created, and a systematic examination of the joint revealed the  following:    There was no evidence of any suprapatellar pouch adhesions or compartmentalization.  There was no evidence of any loose bodies in the medial or lateral gutters.     In the patellofemoral compartment, there was chondral damage to:  Patella 10 x 10 mm grade 2  Trochlea 10 x 10 mm grade 2  Chondroplasty was performed using arthroscopic shaver.    There was chondral damage to:  Medial femoral condyle 10 x 15 mm grade 4  Medial tibial plateau 15 x 10 mm grade 2  Chondroplasty was performed using arthroscopic shaver.    There was chondral damage to:  Lateral tibial plateau 15 x 10 mm grade 2  Chondroplasty was performed using arthroscopic shaver.    In the medial compartment there was no evidence of meniscal instability.   Posterior horn medial meniscus tear,  complex was debrided with arthroscopic shaver and biter.  50% was debrided over an area of 2 cm.  Root and hoop fibers remained intact.    Attention was then turned to the notch. The ACL and PCL were probed carefully and found to be stable.     There was a hypertrophic infrapatellar plica.  This was debrided using arthroscopic biter and shaver.    In the lateral compartment there was no evidence meniscal or chondral damage or meniscal instability.     Loose bodies measuring 13 x 5 x 7 mm and 10 x 4 x 2 mm  mm were removed from medial compartment using arthroscopic grasper.    Synovitis was debrided in the knee as needed to the areas of concern in medial and lateral compartments.      Viable structural human allograft tissue matrix (BioDRestore) was injected into joint under direct arthroscopic visualization after irrigation and extravasation, as described below, was completed.    The knee and incisions were then copiously irrigated and fluid was extravasated using suction.  The arthroscopic portal incisions were closed using inverted 4-0 Monocryl suture.  5cc skin and sub-cutaneous tissue and fat pad was infilttrated with local anesthetic mixture at  both portal sites.  Steri-Strips were placed with Mastisol. Sterile TENS unit pads were placed which were medically necessary for pain relief. Wounds were dressed with Xeroform, 4x4s, and cast padding. ALBERTO hose was placed on the operative leg to match that of the ALBERTO hose placed preoperatively on the non-operative leg. Iceman was secured.  The patient was extubated and moved to the recovery room in stable condition with compartments soft and capillary refill less than a second in all digits.     POSTOPERATIVE PLAN: We will be following the arthroscopic partial meniscectomy guidelines with emphasis on patellar mobility.  This was discussed this with the patient's family after surgery.

## 2017-08-24 NOTE — OR NURSING
Reviewed  WBAT crutch teaching with verbalized understanding and  return demonstration per patient.  Fitted for crutches and hand  adjusted.  Height of crutches set on   5.5 ft    and the height of the hand  placed on the   2nd hole from the top of the crutch..

## 2017-08-24 NOTE — TRANSFER OF CARE
"Anesthesia Transfer of Care Note    Patient: Emily Max    Procedure(s) Performed: Procedure(s) (LRB):  CHONDROPLASTY-KNEE-ARTHROSCOPIC (Right)  INJECTION-STEROID-ARTHROSCOPIC ASSISTED BIO D INJECTION (Right)  ARTHROSCOPY-KNEE-MENISECTOMY-MEDIAL; REMOVAL LOOSE BODIES, PARTIAL SYNOVECTOMY/DEBRIDEMENT; PLICA EXCISION (Right)    Patient location: PACU    Anesthesia Type: general    Transport from OR: Transported from OR on room air with adequate spontaneous ventilation    Post pain: adequate analgesia    Post assessment: no apparent anesthetic complications and tolerated procedure well    Post vital signs: stable    Level of consciousness: sedated    Nausea/Vomiting: no nausea/vomiting    Complications: none    Transfer of care protocol was followed      Last vitals:   Visit Vitals  /75 (BP Location: Right arm, Patient Position: Lying)   Pulse 89   Temp 36.4 °C (97.5 °F) (Oral)   Resp 16   Ht 5' 5" (1.651 m)   Wt 99.8 kg (220 lb)   SpO2 95%   Breastfeeding? No   BMI 36.61 kg/m²     "

## 2017-08-24 NOTE — ANESTHESIA POSTPROCEDURE EVALUATION
"Anesthesia Post Evaluation    Patient: Emily Max    Procedure(s) Performed: Procedure(s) (LRB):  CHONDROPLASTY-KNEE-ARTHROSCOPIC (Right)  INJECTION-STEROID-ARTHROSCOPIC ASSISTED BIO D INJECTION (Right)  ARTHROSCOPY-KNEE-MENISECTOMY-MEDIAL; REMOVAL LOOSE BODIES, PARTIAL SYNOVECTOMY/DEBRIDEMENT; PLICA EXCISION (Right)    Final Anesthesia Type: general  Patient location during evaluation: PACU  Patient participation: Yes- Able to Participate  Level of consciousness: awake and alert  Post-procedure vital signs: reviewed and stable  Pain management: adequate  Airway patency: patent  PONV status at discharge: No PONV  Anesthetic complications: no      Cardiovascular status: blood pressure returned to baseline and stable  Respiratory status: unassisted, spontaneous ventilation and room air  Hydration status: euvolemic  Follow-up not needed.        Visit Vitals  /76   Pulse 64   Temp 36.7 °C (98 °F) (Oral)   Resp 18   Ht 5' 5" (1.651 m)   Wt 99.8 kg (220 lb)   SpO2 96%   Breastfeeding? No   BMI 36.61 kg/m²       Pain/Kye Score: Pain Assessment Performed: Yes (8/24/2017  9:30 AM)  Presence of Pain: complains of pain/discomfort (8/24/2017  9:30 AM)  Pain Rating Prior to Med Admin: 6 (8/24/2017 10:07 AM)  Kye Score: 10 (8/24/2017  9:30 AM)      "

## 2017-08-24 NOTE — H&P (VIEW-ONLY)
Emily Max  is here for a completion of her perioperative paperwork. she  Is scheduled to undergo     right                        a. Knee arthroscopic medial meniscectomy  possible meniscus repair                        b. Knee arthroscopic possible plica excision                        c. Knee arthroscopic possible chondroplasty                        d. Knee arthroscopic-assisted Bio-D arthrocentesis on 8/24/17.      She is a healthy individual and does not need clearance for this procedure.     Risks, indications and benefits of the surgical procedure were discussed with the patient. All questions with regard to surgery, rehab, expected return to functional activities, activities of daily living and recreational endeavors were answered to her satisfaction.    The patient verbalized that he/she does not have any additional clotting, bleeding, or blood disorders, other than what is list in her chart on today's review.     Then a brief history and physical exam were performed.      PHYSICAL EXAM:  GEN: A&Ox3, WD WN NAD  HEENT: WNL  CHEST: CTAB, no W/R/R  HEART: RRR, no M/R/G  ABD: Soft, NT ND, BS x4 QUADS  MS; See Epic  NEURO: CN II-XII intact       The surgical consent was then reviewed with the patient, who agreed with all the contents of the consent form and it was signed. she was then given the [facility] surgery packet to bring with her to [facility] for the anesthesia portion of her perioperative paperwork.   For all physicians except for Dr. Keen, we will email and possibly fax the consent forms and booking sheets to [facility] pre-admit.    The patient was given the opportunity to ask questions about the surgical plan and consent form, and once no other questions were asked, I proceeded with the pre-op appointment.    PHYSICAL THERAPY:  She was also instructed regarding physical therapy and will begin on POD1. She was given a copy of the original prescription to schedule. Another copy  of this prescription was also faxed to [facility].    POST OP CARE:instructions were reviewed including care of the wound and dressing after surgery and when she can shower.     PAIN MANAGEMENT: Emily Max was also given her pain management regime, which includes the TENS unit given to her by hector along with the education required for its use. She was also instructed regarding the Polar ice unit that will be in place after surgery and her postoperative pain medications.     PAIN MEDICATION:  Norco 5/325mg 1 po q 4-6 hours prn pain  Ultram 50 mg Take 1-2 p.o. q.6 hours p.r.n. breakthrough pain,   Phenergan 25 mg one p.o. q.6 hours p.r.n. nausea and vomiting.    DVT prophylaxis was discussed with the patient today including risk factors for developing DVTs and history of DVTs. The patient was asked if any specific recommendations were given from the doctor/s that did pre-operative surgical clearance. The patient was then given an education sheet about DVTs and PE with warning signs and symptoms of both and steps to take if they suspect either of these.    This along with the Modified Caprini risk assessment model for VTE in general surgical patients was used to determine the patient's DVT risk.     From: Ashwini MK, Gregorio DA, Felicia SM, et al. Prevention of VTE in nonorthopedic surgical patients: antithrombotic therapy and prevention of thrombosis, 9th ed: American College of Chest Physicians evidence-based clinical practical guidelines. Chest 2012; 141:e227S. Copyright © 2012. Reproduced with permission from the American College of Chest Physicians.    The below listed DVT prophylaxis regimen along with bilateral ALBERTO compression stockings will be used post-op. Length of treatment has been determined to be 10-42 days post-op by the above noted Caprini assessment model.     The patient was instructed to buy and take:  Aspirin 325mg BID x 6 weeks for DVT prophylaxis starting on the evening after  surgery.  Patient will also use bilateral TEDs on lower extremities, SCDs during surgery, and early ambulation post-op. If the patient was previously taking 81mg baby aspirin, they were told to not take it will using the above stated aspirin and to restart the 81mg aspirin after completion of the aspirin dose.     Patient was also told to buy over the counter Prilosec medication and take it once daily for GI protection as long as they are taking NSAIDs or Aspirin.     Published data in Gisel TRENT, et al. J Arthroplasty. Oct; 31(10):2237-40, 2016; showed that aspirin use as prophylaxis during revision total joint arthroplasty was more effective than warfarin in preventing symptomatic venous thromboembolic events and was associated with lower complications.  Patients in the study were also treated with intermittent pneumatic compression devices. Compression stockings would be our method of mechanical prophylaxis, which has been shown to be similar to pneumatic compression in the systematic review, Geovanni FLOWERS et al. Sayra Surg. Feb; 239(2): 162-171, 2004.     Results showed a significantly higher incidence of symptomatic venous thromboembolic events among patients in the warfarin group vs. the aspirin group (1.75% vs. 0.56%). Researchers also noted a bleeding event rate of 1.5% among patients who received warfarin compared with a rate of 0.4% among patients who received aspirin.    Patient denies history of seizures.       The patient was told that narcotic pain medications may make them drowsy and instructions were given to not sign legal documents, drive or operate heavy machinery, cars, or equipment while under the influence of narcotic medications.     As there were no other questions to be asked, she was given my business card along with Kelly Zaman MD business card if she has any questions or concerns prior to surgery or in the postop period.

## 2017-08-24 NOTE — BRIEF OP NOTE
[facility]  Brief Operative Note     SUMMARY     Surgery Date: 8/24/2017     Surgeon(s) and Role:     * Kelly Zaman MD - Primary    Assisting Surgeon: Dr. 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]     * Tear of medial meniscus of right knee, current, unspecified tear type, initial encounter [S83.241A]    Procedure(s) (LRB):  MENISCECTOMY medial AND/OR lateral (Right)  REPAIR-MENISCUS medial AND/OR lateral (Right)  CHONDROPLASTY-KNEE (Right)  INJECTION-STEROID-ARTHROSCOPIC ASSISTED BIO D INJECTION (Right)  ARTHROSCOPY-KNEE-MENISECTOMY (Right)    Anesthesia: General    Description of the findings of the procedure: right knee arthroscopy    Findings/Key Components: right knee arthroscopy    Estimated Blood Loss: minimal         Specimens:   Specimen (12h ago through future)    None          Discharge Note    SUMMARY     Admit Date: 8/24/2017    Discharge Date and Time:  08/24/2017 7:52 AM    Hospital Course (synopsis of major diagnoses, care, treatment, and services provided during the course of the hospital stay): Patient underwent outpatient knee surgery and was transferred to PACU in stable condition.  In PACU, patient received appropriate post-operative care and discharged home with plans for physical therapy and follow-up with the operative surgeon.    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  Qty: 30 tablet, Refills: 0    Associated Diagnoses: Anxiety      atenolol (TENORMIN) 50 MG tablet TAKE 1 TABLET (50 MG TOTAL) BY MOUTH 2 (TWO) TIMES DAILY.  Qty: 180 tablet, Refills: 0    Associated Diagnoses: HTN (hypertension)      azelastine (ASTELIN) 137 mcg nasal spray 1-2 sprays (137-274 mcg total) by Nasal route 2 (two) times daily as needed for Rhinitis.  Qty: 30 mL, Refills: 5      duloxetine (CYMBALTA) 30 MG capsule TAKE 1 CAPSULE (30 MG TOTAL) BY MOUTH ONCE DAILY.  Qty: 90 capsule, Refills: 0    Associated Diagnoses: Anxiety; Depression      epinephrine (EPIPEN) 0.3 mg/0.3 mL (1:1,000) AtIn Inject 0.3 mLs (0.3 mg total) into the muscle once.  Qty: 2 each, Refills: 2      estradiol (ESTRACE) 0.5 MG tablet TAKE 1 TABLET (0.5 MG TOTAL) BY MOUTH ONCE DAILY.  Qty: 30 tablet, Refills: 11    Associated Diagnoses: Menopausal hot flushes      fluticasone (FLONASE) 50 mcg/actuation nasal spray 1 spray by Each Nare route daily as needed for Rhinitis.      levocetirizine (XYZAL) 5 MG tablet Take 1 tablet (5 mg total) by mouth once daily.  Qty: 90 tablet, Refills: 4      levothyroxine (SYNTHROID) 50 MCG tablet TAKE 1 TABLET (50 MCG TOTAL) BY MOUTH ONCE DAILY.  Qty: 90 tablet, Refills: 0    Associated Diagnoses: Hypothyroid      triamterene-hydrochlorothiazide 37.5-25 mg (DYAZIDE) 37.5-25 mg per capsule TAKE 1 CAPSULE BY MOUTH ONCE DAILY.  Qty: 90 capsule, Refills: 0      valacyclovir (VALTREX) 1000 MG tablet Take 1 tablet (1,000 mg total) by mouth every 12 (twelve) hours.  Qty: 4 tablet, Refills: 0      vitamin D (VITAMIN D3) 1000 units Tab Take 1 tablet (1,000 Units total) by mouth once daily.  Qty: 30 tablet, Refills: prn    Associated Diagnoses: Well woman exam  "with routine gynecological exam      albuterol 90 mcg/actuation inhaler Inhale 2 puffs into the lungs every 4 (four) hours as needed for Wheezing.  Qty: 6.7 g, Refills: 3      hydrocodone-acetaminophen 5-325mg (NORCO) 5-325 mg per tablet Take 1 tablet by mouth every 4 to 6 hours as needed for Pain.  Qty: 45 tablet, Refills: 0    Associated Diagnoses: Knee internal derangement, right      promethazine (PHENERGAN) 25 MG tablet Take 1 tablet (25 mg total) by mouth every 6 (six) hours as needed for Nausea.  Qty: 16 tablet, Refills: 0    Associated Diagnoses: Knee internal derangement, right      tramadol (ULTRAM) 50 mg tablet Take 1-2 tablets ( mg total) by mouth every 6 (six) hours as needed (surgical pain).  Qty: 60 tablet, Refills: 0    Associated Diagnoses: Knee internal derangement, right         STOP taking these medications       ondansetron (ZOFRAN-ODT) 8 MG TbDL Comments:   Reason for Stopping:         promethazine-dextromethorphan (PROMETHAZINE-DM) 6.25-15 mg/5 mL Syrp Comments:   Reason for Stopping:               Discharge Procedure Orders  CRUTCHES FOR HOME USE   Order Comments: Provide if needed   Order Specific Question Answer Comments   Type: Axillary    Height: 5' 5" (1.651 m)    Weight: 99.8 kg (220 lb)    Does patient have medical equipment at home? none    Length of need (1-99 months): 24      Diet general     Call MD for:  temperature >100.4     Call MD for:  persistent nausea and vomiting     Call MD for:  severe uncontrolled pain     Call MD for:  difficulty breathing, headache or visual disturbances     Call MD for:  redness, tenderness, or signs of infection (pain, swelling, redness, odor or green/yellow discharge around incision site)     Call MD for:  hives     Call MD for:  persistent dizziness or light-headedness     No driving, operating heavy equipment or signing legal documents while taking pain medication     Weight bearing as tolerated     Ice to affected area     Shower on day " dressing removed (No bath)     Remove dressing in 72 hours

## 2017-08-24 NOTE — DISCHARGE INSTRUCTIONS
Discharge Instructions: Using Crutches (Weight-Bearing)  Your healthcare provider has prescribed crutches for you. A healthy leg can support your body weight, but when you have an injured leg or foot, you need to keep weight off it. Once you are told that you can put some weight on your leg, use a weight-bearing method of walking as the leg heals. Depending on your arm strength and balance, you can either step to or step through. Practice will help you learn to step through so that you can cover more ground with each step.      Before you use crutches  Be prepared with the following tips:  · Remove throw rugs, electrical cords, and anything else that may cause you to fall.  · Arrange your household to keep the items you need handy. Keep everything else out of the way.  · Find a backpack, satniago pack, or apron, or use pockets to carry things. This will help you keep your hands free.  Standing with crutches  Use the balanced standing (tripod) position when you start or end a movement. Also use it whenever you're standing for a length of time.  · Move your crutches in front of you about 12 inches.  · Find your balance.  · Be sure not to rest your armpits on the pads.  Walking with crutches  Follow these tips:  · Start in a balanced standing (tripod) position.  · Step forward with your affected foot.  · Land lightly between your crutches.  · Squeeze the pads against the sides of your chest.  · Support your weight with your hands and your affected leg.  · Press down on the handgrips.  Step to  This method includes the following:  · Lift your unaffected foot and step to the crutches.  · Land on your unaffected foot, between your crutches.  · Keep the knee slightly bent.  · Reach forward and out with the crutches to begin the next step.  Step through  This method includes the following:  · Lift the unaffected foot.  · Step forward through the crutches.  · Land on the unaffected foot, with the heel slightly in front  of the toe of the other foot.  · Keep the knee slightly bent.  · Reach forward and out with the crutches to begin the next step.  Follow-up  Make a follow-up appointment as directed by your healthcare provider.     When to call your healthcare provider  Call your healthcare provider right away if you have any of the following:  · Sudden or increased shortness of breath  · Sudden chest pain or localized chest pain with coughing  · Fever above 100.4°F (38.0°C)  · Increasing redness, tenderness, or swelling at theincision site or in the injured limb  · Drainage from the incision or injured limb  · Opening of the incision or injury  · Increasing pain, with or without activity   Date Last Reviewed: 8/1/2016 © 2000-2016 [employer]. [address] [place] [address], [place] [address]. All rights reserved. This information is not intended as a substitute for professional medical care. Always follow your healthcare professional's instructions.        Select on Hyperlink below to print updated instructions from [employer]  [employer]      Discharge Instructions: After Your Surgery  Youve just had surgery. During surgery you were given medicine called anesthesia to keep you relaxed and free of pain. After surgery you may have some pain or nausea. This is common. Here are some tips for feeling better and getting well after surgery.     Stay on schedule with your medication.   Going home  Your doctor or nurse will show you how to take care of yourself when you go home. He or she will also answer your questions. Have an adult family member or friend drive you home. For the first 24 hours after your surgery:  · Do not drive or use heavy equipment.  · Do not make important decisions or sign legal papers.  · Do not drink alcohol.  · Have someone stay with you, if needed. He or she can watch for problems and help keep you safe.  Be sure to go to all follow-up visits with your doctor. And rest after your surgery for as  long as your doctor tells you to.  Coping with pain  If you have pain after surgery, pain medicine will help you feel better. Take it as told, before pain becomes severe. Also, ask your doctor or pharmacist about other ways to control pain. This might be with heat, ice, or relaxation. And follow any other instructions your surgeon or nurse gives you.  Tips for taking pain medicine  To get the best relief possible, remember these points:  · Pain medicines can upset your stomach. Taking them with a little food may help.  · Most pain relievers taken by mouth need at least 20 to 30 minutes to start to work.  · Taking medicine on a schedule can help you remember to take it. Try to time your medicine so that you can take it before starting an activity. This might be before you get dressed, go for a walk, or sit down for dinner.  · Constipation is a common side effect of pain medicines. Call your doctor before taking any medicines such as laxatives or stool softeners to help ease constipation. Also ask if you should skip any foods. Drinking lots of fluids and eating foods such as fruits and vegetables that are high in fiber can also help. Remember, do not take laxatives unless your surgeon has prescribed them.  · Drinking alcohol and taking pain medicine can cause dizziness and slow your breathing. It can even be deadly. Do not drink alcohol while taking pain medicine.  · Pain medicine can make you react more slowly to things. Do not drive or run machinery while taking pain medicine.  Your health care provider may tell you to take acetaminophen to help ease your pain. Ask him or her how much you are supposed to take each day. Acetaminophen or other pain relievers may interact with your prescription medicines or other over-the-counter (OTC) drugs. Some prescription medicines have acetaminophen and other ingredients. Using both prescription and OTC acetaminophen for pain can cause you to overdose. Read the labels on your OTC  medicines with care. This will help you to clearly know the list of ingredients, how much to take, and any warnings. It may also help you not take too much acetaminophen. If you have questions or do not understand the information, ask your pharmacist or health care provider to explain it to you before you take the OTC medicine.  Managing nausea  Some people have an upset stomach after surgery. This is often because of anesthesia, pain, or pain medicine, or the stress of surgery. These tips will help you handle nausea and eat healthy foods as you get better. If you were on a special food plan before surgery, ask your doctor if you should follow it while you get better. These tips may help:  · Do not push yourself to eat. Your body will tell you when to eat and how much.  · Start off with clear liquids and soup. They are easier to digest.  · Next try semi-solid foods, such as mashed potatoes, applesauce, and gelatin, as you feel ready.  · Slowly move to solid foods. Dont eat fatty, rich, or spicy foods at first.  · Do not force yourself to have 3 large meals a day. Instead eat smaller amounts more often.  · Take pain medicines with a small amount of solid food, such as crackers or toast, to avoid nausea.     Call your surgeon if  · You still have pain an hour after taking medicine. The medicine may not be strong enough.  · You feel too sleepy, dizzy, or groggy. The medicine may be too strong.  · You have side effects like nausea, vomiting, or skin changes, such as rash, itching, or hives.       If you have obstructive sleep apnea  You were given anesthesia medicine during surgery to keep you comfortable and free of pain. After surgery, you may have more apnea spells because of this medicine and other medicines you were given. The spells may last longer than usual.   At home:  · Keep using the continuous positive airway pressure (CPAP) device when you sleep. Unless your health care provider tells you not to, use it  when you sleep, day or night. CPAP is a common device used to treat obstructive sleep apnea.  · Talk with your provider before taking any pain medicine, muscle relaxants, or sedatives. Your provider will tell you about the possible dangers of taking these medicines.  Date Last Reviewed: 10/16/2014  © 2016-3052 [employer]. [address] [place] [address]. All rights reserved. This information is not intended as a substitute for professional medical care. Always follow your healthcare professional's instructions.    Emily Max has met all discharge criteria from Phase II. Vital Signs are stable, ambulating  without difficulty.Pain is now under control and tolerable for the pt. Pain score 4/10 at this time.  Discharge instructions given, patient verbalized understanding. Discharged from facility via wheelchair in stable condition.   Emily Max has met all discharge criteria from Phase II. Vital Signs are stable, ambulating  without difficulty. Nausea has now subsided. Discharge instructions given, patient verbalized understanding. Discharged from facility via wheelchair in stable condition.   Emily Max has met all discharge criteria from Phase II. Vital Signs are stable, ambulating  without difficulty. Discharge instructions given, patient verbalized understanding. Discharged from facility via wheelchair in stable condition.

## 2017-08-25 ENCOUNTER — CLINICAL SUPPORT (OUTPATIENT)
Dept: REHABILITATION | Facility: HOSPITAL | Age: 42
End: 2017-08-25
Attending: PHYSICIAN ASSISTANT
Payer: COMMERCIAL

## 2017-08-25 DIAGNOSIS — F41.9 ANXIETY: ICD-10-CM

## 2017-08-25 DIAGNOSIS — F32.A DEPRESSION: ICD-10-CM

## 2017-08-25 DIAGNOSIS — M25.661 DECREASED ROM OF RIGHT KNEE: ICD-10-CM

## 2017-08-25 PROCEDURE — 97161 PT EVAL LOW COMPLEX 20 MIN: CPT | Mod: PO

## 2017-08-25 PROCEDURE — 97110 THERAPEUTIC EXERCISES: CPT | Mod: PO

## 2017-08-25 RX ORDER — TRIAMTERENE AND HYDROCHLOROTHIAZIDE 37.5; 25 MG/1; MG/1
CAPSULE ORAL
Qty: 90 CAPSULE | Refills: 0 | Status: SHIPPED | OUTPATIENT
Start: 2017-08-25 | End: 2017-09-05 | Stop reason: SDUPTHER

## 2017-08-25 RX ORDER — DULOXETIN HYDROCHLORIDE 30 MG/1
30 CAPSULE, DELAYED RELEASE ORAL DAILY
Qty: 90 CAPSULE | Refills: 0 | Status: SHIPPED | OUTPATIENT
Start: 2017-08-25 | End: 2017-09-05 | Stop reason: SDUPTHER

## 2017-08-25 NOTE — PROGRESS NOTES
OUTPATIENT PHYSICAL THERAPY   PATIENT EVALUATION        Name: Emily Max  Clinic Number: [id number]        Diagnosis:   Encounter Diagnosis   Name Primary?    Decreased ROM of right knee      Physician: Aleks Kwok III, *  Treatment Orders: PT Eval and Treat    Past Medical History:   Diagnosis Date    Abnormal Pap smear     Cervical cancer March 2013    FIGO IB1 AdenoCA Robotic radical hsyt,bso and bplnd    Depressive disorder, not elsewhere classified     Endometriosis of uterus     Foot fracture, right     Headache(784.0)     Hypertension     Hypothyroidism     Migraine headache     Urinary tract infection     Visit for screening mammogram 12/16/2015    Well woman exam with routine gynecological exam 12/16/2015     Current Outpatient Prescriptions   Medication Sig    albuterol 90 mcg/actuation inhaler Inhale 2 puffs into the lungs every 4 (four) hours as needed for Wheezing.    alprazolam (XANAX) 0.25 MG tablet Take 1 tablet (0.25 mg total) by mouth nightly as needed for Anxiety.    atenolol (TENORMIN) 50 MG tablet TAKE 1 TABLET (50 MG TOTAL) BY MOUTH 2 (TWO) TIMES DAILY.    azelastine (ASTELIN) 137 mcg nasal spray 1-2 sprays (137-274 mcg total) by Nasal route 2 (two) times daily as needed for Rhinitis.    duloxetine (CYMBALTA) 30 MG capsule TAKE 1 CAPSULE (30 MG TOTAL) BY MOUTH ONCE DAILY.    epinephrine (EPIPEN) 0.3 mg/0.3 mL (1:1,000) AtIn Inject 0.3 mLs (0.3 mg total) into the muscle once.    estradiol (ESTRACE) 0.5 MG tablet TAKE 1 TABLET (0.5 MG TOTAL) BY MOUTH ONCE DAILY.    fluticasone (FLONASE) 50 mcg/actuation nasal spray 1 spray by Each Nare route daily as needed for Rhinitis.    hydrocodone-acetaminophen 5-325mg (NORCO) 5-325 mg per tablet Take 1 tablet by mouth every 4 to 6 hours as needed for Pain.    levocetirizine (XYZAL) 5 MG tablet Take 1 tablet (5 mg total) by mouth once daily.    levothyroxine (SYNTHROID) 50 MCG tablet TAKE 1 TABLET (50 MCG TOTAL)  BY MOUTH ONCE DAILY.    promethazine (PHENERGAN) 25 MG tablet Take 1 tablet (25 mg total) by mouth every 6 (six) hours as needed for Nausea.    tramadol (ULTRAM) 50 mg tablet Take 1-2 tablets ( mg total) by mouth every 6 (six) hours as needed (surgical pain).    triamterene-hydrochlorothiazide 37.5-25 mg (DYAZIDE) 37.5-25 mg per capsule TAKE 1 CAPSULE BY MOUTH ONCE DAILY.    valacyclovir (VALTREX) 1000 MG tablet Take 1 tablet (1,000 mg total) by mouth every 12 (twelve) hours.    vitamin D (VITAMIN D3) 1000 units Tab Take 1 tablet (1,000 Units total) by mouth once daily.     Current Facility-Administered Medications   Medication    diphenhydrAMINE injection 50 mg     Review of patient's allergies indicates:   Allergen Reactions    Lortab [hydrocodone-acetaminophen] Itching and Rash     States can take - may have been formulation    Other Anaphylaxis     mushrooms    Diflucan  [fluconazole]      Other reaction(s): Hives    Iodine and iodide containing products Hives     Iv iodine only    Mushroom combination no.1      Other reaction(s): Bronchial Constriction         Precautions: WBAT    Evaluation Date: 8/25/17  Visit # authorized: 30  Authorization period: 12/31/17  Plan of care Expiration: 10/20/17      Subjective     Onset Date: 8/24/17  Prior Level of Function: mod I  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 She has three steps to enter at home with railing but it is single story.       History of Present Illness: Emily is a 42 y.o. female that presents to [facility] secondary to s/p Procedure(s) (LRB):  MENISCECTOMY medial AND/OR lateral (Right)  REPAIR-MENISCUS medial AND/OR lateral (Right)  CHONDROPLASTY-KNEE (Right)  INJECTION-STEROID-ARTHROSCOPIC ASSISTED BIO D INJECTION (Right)  ARTHROSCOPY-KNEE-MENISECTOMY (Right).       Emily states surgery went well according to plan without complication. She is current ambulating  WBAT with crutches. She states her MD found that she has severe arthritis and may one day need a replacement. She has been consistently using the ice machine and the TENS.      Pain: current 2/10, worst 6/10, best 2/10, Sharp, intermittent  Aggravating factors: walking, standing, straightening knee  Easing factors: resting, medication    Pts goals: get back to work ASAP      No cultural, environmental, or spiritual barriers identified to treatment or learning.    Objective     Observation: Pt arrived to therapy ambulating with axillary crutches, WBAT, with decreased knee extension in stance and no heel strike at initial stance    Incisions: Mild bloody drainage present on gauze pads. Dressing changed. Steri strips and xeroform in place.    Range of Motion:   Knee Right Active R passive   Flexion 90 95   Extension -6 0       Fair quad set performance on R as compared to L    Able to perform SLR independently approx 8 inches off of mat (increased discomfort reported)    Lower Extremity Strength  Right LE  Left LE    Knee extension: NT Knee extension: NT   Knee flexion: NT Knee flexion: NT   Hip flexion: 4/5 Hip flexion: 5/5   Hip extension:  NT Hip extension: NT   Hip abduction: 4-/5 Hip abduction: 4-/5   Hip adduction: 3+/5 Hip adduction 4/5   Ankle dorsiflexion: 4/5 Ankle dorsiflexion: 5/5   Ankle plantarflexion: NT Ankle plantarflexion: NT       Function:    - SLS R: NT  - SLS L: NT  - Squat: NT   - Sit <--> Stand:B UE assist, decreased WBing through R LE   - Bed Mobility: mod I      Joint Mobility: slight limitation with sup/inferior  Patellar mobility which improves with mobilization    Palpation: + TTP medial joint line and medial patella    Sensation: in tact    Edema:     Girth Measurement Joint line 10 cm above   Left 46.1 cm 54.1 cm   Right 50.1 cm 55.7 cm         PT Evaluation Completed? Yes  Discussed Plan of Care with patient: Yes    TREATMENT x 15 minutes    HEP provided: quad sets 30 x 5s, SLR 3 x 10,  gastroc stretch 3 x 30s, heel slides to 90 deg 15 x 5s, walking drill with crutches aimed at promoting improved knee extension and weight bearing as tolerated x 10  Instructed pt. Regarding: Proper technique with all exercises. Pt demo good understanding of the education provided. Emily demonstrated good return demonstration of activities.      Assessment     Emily is a 42 y.o. female referred to outpatient physical therapy s/p R knee chondroplasty and menisectomy. Demonstrates impairments including: limitations as described in the problem list. Pt prognosis is Good. Positive prognostic factors include motivation, age, activity level. Pt will benefit from skilled outpatient physical therapy to address the above stated deficits, provide pt/family education, and to maximize pt's level of independence.     Medical necessity is demonstrated by the following IMPAIRMENTS/PROBLEMS:  weakness, impaired endurance, impaired self care skills, impaired functional mobility, gait instability, impaired balance, decreased lower extremity function, pain, decreased ROM, impaired joint extensibility and impaired muscle length      History  Co-morbidities and personal factors that may impact the plan of care Examination  Body Structures and Functions, activity limitations and participation restrictions that may impact the plan of care Clinical Presentation   Decision Making/ Complexity Score   Co-morbidities:   hypertension, headache syndrome, hypothyroidism, hx of cervical cancer, anxiety, depression        Personal Factors:   no deficits Body Regions:   lower extremities    Body Systems:   gross symmetry  ROM  strength  gross coordinated movement  balance  gait  transfers    Activity limitations:     Mobility  lifting and carrying objects  walking  driving (bike, car, motorcycle)    Self care  washing oneself (bathing, drying, washing hands)  caring for body parts (brushing teeth, shaving, grooming)  dressing    Domestic  Life  shopping  cooking  doing house work (cleaning house, washing dishes, laundry)    Community and Social Life  community life  recreation and leisure    Participation Restrictions:   Limited with all weightbearing activities         stable and uncomplicated            high high low low         Anticipated Barriers for physical therapy: none      GOALS: Short Term Goals:  4 weeks    - Pt will increase knee extension AROM to 0 degrees in order to show improved functional mobility.  - Pt will increase knee flexion ROM by at least 20 degrees in order to show improved functional mobility.  - Pt will have normal patellar mobility in all planes in order to promote improved mechanics at the knee.  - Pt will be independent and consistent with issued HEP in order to show carryover between therapy sessions.    Long Term Goals: 8 weeks    - Pt will report decreased knee pain to 2/10 in order to increase tolerance for functional mobility.  - Pt will ambulate with normal mechanics on level/unlevel surfaces without deviations or LOB in order to return to PLOF.  - Pt will perform sit<>stand transfers with equal weightbearing B and no UE use in order to return to PLOF.  - Pt will be able to return to work full duty without limitations due to knee pain in order to resume daily activities.  - Pt will be independent with updated HEP to maintain gains following discharge with therapy.      Plan       Recommended Treatment Plan: Pt will be treated by physical therapy 1-2 times a week for 8 weeks for pt education, HEP, therapeutic exercises, neuromuscular re-education, manual therapy, gait training, balance/proprioceptive activities, modalities prn to achieve established goals.  Emily may at times be seen by a PTA as part of the Rehab Team.     Other Recommendations: gait training      Therapist: Ashley Holstein, PT    I CERTIFY THE NEED FOR THESE SERVICES FURNISHED UNDER THIS PLAN OF TREATMENT AND WHILE UNDER MY  CARE    Physician's comments: ________________________________________________________________________________________________________________________________________________      Physician's Name: ___________________________________

## 2017-08-25 NOTE — PLAN OF CARE
OUTPATIENT PHYSICAL THERAPY   PATIENT EVALUATION        Name: Emily Max  Clinic Number: [id number]        Diagnosis:   Encounter Diagnosis   Name Primary?    Decreased ROM of right knee      Physician: Aleks Kwok III, *  Treatment Orders: PT Eval and Treat    Past Medical History:   Diagnosis Date    Abnormal Pap smear     Cervical cancer March 2013    FIGO IB1 AdenoCA Robotic radical hsyt,bso and bplnd    Depressive disorder, not elsewhere classified     Endometriosis of uterus     Foot fracture, right     Headache(784.0)     Hypertension     Hypothyroidism     Migraine headache     Urinary tract infection     Visit for screening mammogram 12/16/2015    Well woman exam with routine gynecological exam 12/16/2015     Current Outpatient Prescriptions   Medication Sig    albuterol 90 mcg/actuation inhaler Inhale 2 puffs into the lungs every 4 (four) hours as needed for Wheezing.    alprazolam (XANAX) 0.25 MG tablet Take 1 tablet (0.25 mg total) by mouth nightly as needed for Anxiety.    atenolol (TENORMIN) 50 MG tablet TAKE 1 TABLET (50 MG TOTAL) BY MOUTH 2 (TWO) TIMES DAILY.    azelastine (ASTELIN) 137 mcg nasal spray 1-2 sprays (137-274 mcg total) by Nasal route 2 (two) times daily as needed for Rhinitis.    duloxetine (CYMBALTA) 30 MG capsule TAKE 1 CAPSULE (30 MG TOTAL) BY MOUTH ONCE DAILY.    epinephrine (EPIPEN) 0.3 mg/0.3 mL (1:1,000) AtIn Inject 0.3 mLs (0.3 mg total) into the muscle once.    estradiol (ESTRACE) 0.5 MG tablet TAKE 1 TABLET (0.5 MG TOTAL) BY MOUTH ONCE DAILY.    fluticasone (FLONASE) 50 mcg/actuation nasal spray 1 spray by Each Nare route daily as needed for Rhinitis.    hydrocodone-acetaminophen 5-325mg (NORCO) 5-325 mg per tablet Take 1 tablet by mouth every 4 to 6 hours as needed for Pain.    levocetirizine (XYZAL) 5 MG tablet Take 1 tablet (5 mg total) by mouth once daily.    levothyroxine (SYNTHROID) 50 MCG tablet TAKE 1 TABLET (50 MCG TOTAL)  BY MOUTH ONCE DAILY.    promethazine (PHENERGAN) 25 MG tablet Take 1 tablet (25 mg total) by mouth every 6 (six) hours as needed for Nausea.    tramadol (ULTRAM) 50 mg tablet Take 1-2 tablets ( mg total) by mouth every 6 (six) hours as needed (surgical pain).    triamterene-hydrochlorothiazide 37.5-25 mg (DYAZIDE) 37.5-25 mg per capsule TAKE 1 CAPSULE BY MOUTH ONCE DAILY.    valacyclovir (VALTREX) 1000 MG tablet Take 1 tablet (1,000 mg total) by mouth every 12 (twelve) hours.    vitamin D (VITAMIN D3) 1000 units Tab Take 1 tablet (1,000 Units total) by mouth once daily.     Current Facility-Administered Medications   Medication    diphenhydrAMINE injection 50 mg     Review of patient's allergies indicates:   Allergen Reactions    Lortab [hydrocodone-acetaminophen] Itching and Rash     States can take - may have been formulation    Other Anaphylaxis     mushrooms    Diflucan  [fluconazole]      Other reaction(s): Hives    Iodine and iodide containing products Hives     Iv iodine only    Mushroom combination no.1      Other reaction(s): Bronchial Constriction         Precautions: WBAT    Evaluation Date: 8/25/17  Visit # authorized: 30  Authorization period: 12/31/17  Plan of care Expiration: 10/20/17      Subjective     Onset Date: 8/24/17  Prior Level of Function: mod I  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 She has three steps to enter at home with railing but it is single story.       History of Present Illness: Emily is a 42 y.o. female that presents to [facility] secondary to s/p Procedure(s) (LRB):  MENISCECTOMY medial AND/OR lateral (Right)  REPAIR-MENISCUS medial AND/OR lateral (Right)  CHONDROPLASTY-KNEE (Right)  INJECTION-STEROID-ARTHROSCOPIC ASSISTED BIO D INJECTION (Right)  ARTHROSCOPY-KNEE-MENISECTOMY (Right).       Emily states surgery went well according to plan without complication. She is current ambulating  WBAT with crutches. She states her MD found that she has severe arthritis and may one day need a replacement. She has been consistently using the ice machine and the TENS.      Pain: current 2/10, worst 6/10, best 2/10, Sharp, intermittent  Aggravating factors: walking, standing, straightening knee  Easing factors: resting, medication    Pts goals: get back to work ASAP      No cultural, environmental, or spiritual barriers identified to treatment or learning.    Objective     Observation: Pt arrived to therapy ambulating with axillary crutches, WBAT, with decreased knee extension in stance and no heel strike at initial stance    Incisions: Mild bloody drainage present on gauze pads. Dressing changed. Steri strips and xeroform in place.    Range of Motion:   Knee Right Active R passive   Flexion 90 95   Extension -6 0       Fair quad set performance on R as compared to L    Able to perform SLR independently approx 8 inches off of mat (increased discomfort reported)    Lower Extremity Strength  Right LE  Left LE    Knee extension: NT Knee extension: NT   Knee flexion: NT Knee flexion: NT   Hip flexion: 4/5 Hip flexion: 5/5   Hip extension:  NT Hip extension: NT   Hip abduction: 4-/5 Hip abduction: 4-/5   Hip adduction: 3+/5 Hip adduction 4/5   Ankle dorsiflexion: 4/5 Ankle dorsiflexion: 5/5   Ankle plantarflexion: NT Ankle plantarflexion: NT       Function:    - SLS R: NT  - SLS L: NT  - Squat: NT   - Sit <--> Stand:B UE assist, decreased WBing through R LE   - Bed Mobility: mod I      Joint Mobility: slight limitation with sup/inferior  Patellar mobility which improves with mobilization    Palpation: + TTP medial joint line and medial patella    Sensation: in tact    Edema:     Girth Measurement Joint line 10 cm above   Left 46.1 cm 54.1 cm   Right 50.1 cm 55.7 cm         PT Evaluation Completed? Yes  Discussed Plan of Care with patient: Yes    TREATMENT x 15 minutes    HEP provided: quad sets 30 x 5s, SLR 3 x 10,  gastroc stretch 3 x 30s, heel slides to 90 deg 15 x 5s, walking drill with crutches aimed at promoting improved knee extension and weight bearing as tolerated x 10  Instructed pt. Regarding: Proper technique with all exercises. Pt demo good understanding of the education provided. Emily demonstrated good return demonstration of activities.      Assessment     Emily is a 42 y.o. female referred to outpatient physical therapy s/p R knee chondroplasty and menisectomy. Demonstrates impairments including: limitations as described in the problem list. Pt prognosis is Good. Positive prognostic factors include motivation, age, activity level. Pt will benefit from skilled outpatient physical therapy to address the above stated deficits, provide pt/family education, and to maximize pt's level of independence.     Medical necessity is demonstrated by the following IMPAIRMENTS/PROBLEMS:  weakness, impaired endurance, impaired self care skills, impaired functional mobility, gait instability, impaired balance, decreased lower extremity function, pain, decreased ROM, impaired joint extensibility and impaired muscle length      History  Co-morbidities and personal factors that may impact the plan of care Examination  Body Structures and Functions, activity limitations and participation restrictions that may impact the plan of care Clinical Presentation   Decision Making/ Complexity Score   Co-morbidities:   hypertension, headache syndrome, hypothyroidism, hx of cervical cancer, anxiety, depression        Personal Factors:   no deficits Body Regions:   lower extremities    Body Systems:   gross symmetry  ROM  strength  gross coordinated movement  balance  gait  transfers    Activity limitations:     Mobility  lifting and carrying objects  walking  driving (bike, car, motorcycle)    Self care  washing oneself (bathing, drying, washing hands)  caring for body parts (brushing teeth, shaving, grooming)  dressing    Domestic  Life  shopping  cooking  doing house work (cleaning house, washing dishes, laundry)    Community and Social Life  community life  recreation and leisure    Participation Restrictions:   Limited with all weightbearing activities         stable and uncomplicated            high high low low         Anticipated Barriers for physical therapy: none      GOALS: Short Term Goals:  4 weeks    - Pt will increase knee extension AROM to 0 degrees in order to show improved functional mobility.  - Pt will increase knee flexion ROM by at least 20 degrees in order to show improved functional mobility.  - Pt will have normal patellar mobility in all planes in order to promote improved mechanics at the knee.  - Pt will be independent and consistent with issued HEP in order to show carryover between therapy sessions.    Long Term Goals: 8 weeks    - Pt will report decreased knee pain to 2/10 in order to increase tolerance for functional mobility.  - Pt will ambulate with normal mechanics on level/unlevel surfaces without deviations or LOB in order to return to PLOF.  - Pt will perform sit<>stand transfers with equal weightbearing B and no UE use in order to return to PLOF.  - Pt will be able to return to work full duty without limitations due to knee pain in order to resume daily activities.  - Pt will be independent with updated HEP to maintain gains following discharge with therapy.      Plan       Recommended Treatment Plan: Pt will be treated by physical therapy 1-2 times a week for 8 weeks for pt education, HEP, therapeutic exercises, neuromuscular re-education, manual therapy, gait training, balance/proprioceptive activities, modalities prn to achieve established goals.  Emily may at times be seen by a PTA as part of the Rehab Team.     Other Recommendations: gait training      Therapist: Ashley Holstein, PT    I CERTIFY THE NEED FOR THESE SERVICES FURNISHED UNDER THIS PLAN OF TREATMENT AND WHILE UNDER MY  CARE    Physician's comments: ________________________________________________________________________________________________________________________________________________      Physician's Name: ___________________________________

## 2017-08-27 ENCOUNTER — NURSE TRIAGE (OUTPATIENT)
Dept: ADMINISTRATIVE | Facility: CLINIC | Age: 42
End: 2017-08-27

## 2017-08-28 ENCOUNTER — CLINICAL SUPPORT (OUTPATIENT)
Dept: REHABILITATION | Facility: HOSPITAL | Age: 42
End: 2017-08-28
Attending: PHYSICIAN ASSISTANT
Payer: COMMERCIAL

## 2017-08-28 DIAGNOSIS — M25.661 DECREASED ROM OF RIGHT KNEE: Primary | ICD-10-CM

## 2017-08-28 PROCEDURE — 97110 THERAPEUTIC EXERCISES: CPT | Mod: PO

## 2017-08-28 NOTE — PROGRESS NOTES
Name: Emily Max  Clinic Number: [id number]  Date of Treatment: 08/28/2017  Diagnosis:   Encounter Diagnosis   Name Primary?    Decreased ROM of right knee Yes       Physician: Aleks Kwok III, *    Evaluation Date: 8/25/17  Visit # authorized: 2 / 30  PTA Visit Number: 1   Authorization period: 12/31/17  Plan of care Expiration: 10/20/17    Time in: 10:00 am  Time Out: 10:40 am   Total Treatment Time: 40 minutes   Billable Time: 40 minutes       Subjective:    Emily Max reports feeling about the same.  Patient reports their pain to be 7/10 on a 0-10 scale with 0 being no pain and 10 being the worst pain imaginable.    Objective    Emily Max was instructed in and performed therapeutic exercises to develop strength, endurance, ROM, flexibility and posture for 35  minutes including:     Upright: x 7 minutes seat 9   Heel slides: x 5 minutes   Seated hamstring/calf stretch with strap: 3 x 30 seconds   Quad sets: 20 x 5 seconds   SLRs: 2 x 10 (active assistive to aid in preventing quad lag)  SL hip Abduction: 2 x 10       Gait training over bolsters with emphasis on proper gait mechanics. Cues to increase knee extension, and to prevent hip hike with knee flexion    Emily Max received the following manual therapy techniques: Joint mobilizations and Soft tissue Mobilization were applied to the: quads  for 5 minutes.       Written Home Exercises Provided: Reviewed current home exercise program consisting of above listed exercises.   Pt demo good understanding of the education provided. Emily Max demonstrated good return demonstration of activities.     Assessment:     Patient tolerated therapy session well. Patient with noted quad and hip musculature weakness. Patient with significant quad weakness and difficulty activating quad contraction.   Pt will continue to benefit from skilled PT intervention. Medical Necessity is demonstrated  by:  Continued inability to participate in vocational pursuits, Pain limits function of effected part for some activities, Unable to participate fully in daily activities, Requires skilled supervision to complete and progress HEP and Weakness.    Patient is making good progress towards established goals.    GOALS: Short Term Goals:  4 weeks     - Pt will increase knee extension AROM to 0 degrees in order to show improved functional mobility.  - Pt will increase knee flexion ROM by at least 20 degrees in order to show improved functional mobility.  - Pt will have normal patellar mobility in all planes in order to promote improved mechanics at the knee.  - Pt will be independent and consistent with issued HEP in order to show carryover between therapy sessions.     Long Term Goals: 8 weeks     - Pt will report decreased knee pain to 2/10 in order to increase tolerance for functional mobility.  - Pt will ambulate with normal mechanics on level/unlevel surfaces without deviations or LOB in order to return to PLOF.  - Pt will perform sit<>stand transfers with equal weightbearing B and no UE use in order to return to PLOF.  - Pt will be able to return to work full duty without limitations due to knee pain in order to resume daily activities.  - Pt will be independent with updated HEP to maintain gains following discharge with therapy.  New/Revised goals: None at this time.       Plan:  Continue with established Plan of Care towards PT goals.

## 2017-08-28 NOTE — TELEPHONE ENCOUNTER
Reason for Disposition   Wound doesn't sound infected    Answer Assessment - Initial Assessment Questions  No triage IAQ Patient calling states she inadvertently wet dressing while showering. Incision C/D/I steri strips in placed. Patient apply cleanser to incision advised to gently wipe away any trace of solution to skin. Incision edges intact no s/s infection. Patient states she was instructed to change dressing daily advised patient to leave incision STEPHANIE momentarily to allow moisten steri strips/skin to dry prior to redressing incision.  Patient understood/agreed to such; advised to call should symptoms devlops and to call when office open for direct medical advisement. Patient understood/agreed to such with no further questions.    Protocols used: ST WOUND INFECTION-A-

## 2017-08-31 ENCOUNTER — CLINICAL SUPPORT (OUTPATIENT)
Dept: REHABILITATION | Facility: HOSPITAL | Age: 42
End: 2017-08-31
Attending: PHYSICIAN ASSISTANT
Payer: COMMERCIAL

## 2017-08-31 DIAGNOSIS — M25.661 DECREASED ROM OF RIGHT KNEE: ICD-10-CM

## 2017-08-31 PROCEDURE — 97110 THERAPEUTIC EXERCISES: CPT | Mod: PO

## 2017-08-31 NOTE — PROGRESS NOTES
Name: Emily Max  Clinic Number: [id number]  Date of Treatment: 08/31/2017  Diagnosis:   Encounter Diagnosis   Name Primary?    Decreased ROM of right knee        Physician: Aleks Kwok III, *    Evaluation Date: 8/25/17  Visit # authorized: 3 / 30  PTA Visit Number:    Authorization period: 12/31/17  Plan of care Expiration: 10/20/17    Time in: 2:07  pm  Time Out: 03:07 pm  Total Treatment Time: 50 minutes   Billable Time: 50 minutes       Subjective:    Emily Max reports she had a rough night last night and she is unsure why. She also reports occasional feeling of shaking/vibration when her knee is really hurting.  Patient reports their pain to be 7/10 on a 0-10 scale with 0 being no pain and 10 being the worst pain imaginable.    Objective    Emily Max was instructed in and performed therapeutic exercises to develop strength, endurance, ROM, flexibility and posture for 45  minutes including:     Upright: x 7 minutes seat 9   Heel slides: x 5 minutes   Seated hamstring/calf stretch with strap: 3 x 30 seconds   Quad sets: 20 x 5 seconds   SLRs: 2 x 10 (active assistive to aid in preventing quad lag)  SL hip Abduction: 2 x 10   Prone hamstring curls 1# 2 x 10  1/4 squat 2 x 10  Gait training over bolsters with emphasis on proper gait mechanics. Cues to increase knee extension, and to prevent hip hike with knee flexion    Emily Max received the following manual therapy techniques: Joint mobilizations and Soft tissue Mobilization were applied to the: quads  for 5 minutes.       Written Home Exercises Provided: Reviewed current home exercise program consisting of above listed exercises.   Pt demo good understanding of the education provided. Emily Max demonstrated good return demonstration of activities.     Assessment:     Patient with improved quad activation noted today during all activities. Able to perform 5-6 SLRs before  extensor lag kicked in due to muscular fatigue. Tolerated addition of new activities well without adverse effects.  Pt will continue to benefit from skilled PT intervention. Medical Necessity is demonstrated by:  Continued inability to participate in vocational pursuits, Pain limits function of effected part for some activities, Unable to participate fully in daily activities, Requires skilled supervision to complete and progress HEP and Weakness.    Patient is making good progress towards established goals.    GOALS: Short Term Goals:  4 weeks     - Pt will increase knee extension AROM to 0 degrees in order to show improved functional mobility.  - Pt will increase knee flexion ROM by at least 20 degrees in order to show improved functional mobility.  - Pt will have normal patellar mobility in all planes in order to promote improved mechanics at the knee.  - Pt will be independent and consistent with issued HEP in order to show carryover between therapy sessions.     Long Term Goals: 8 weeks     - Pt will report decreased knee pain to 2/10 in order to increase tolerance for functional mobility.  - Pt will ambulate with normal mechanics on level/unlevel surfaces without deviations or LOB in order to return to PLOF.  - Pt will perform sit<>stand transfers with equal weightbearing B and no UE use in order to return to PLOF.  - Pt will be able to return to work full duty without limitations due to knee pain in order to resume daily activities.  - Pt will be independent with updated HEP to maintain gains following discharge with therapy.  New/Revised goals: None at this time.       Plan:  Continue with established Plan of Care towards PT goals.

## 2017-09-03 ENCOUNTER — PATIENT MESSAGE (OUTPATIENT)
Dept: INTERNAL MEDICINE | Facility: CLINIC | Age: 42
End: 2017-09-03

## 2017-09-05 ENCOUNTER — PATIENT MESSAGE (OUTPATIENT)
Dept: SPORTS MEDICINE | Facility: CLINIC | Age: 42
End: 2017-09-05

## 2017-09-05 ENCOUNTER — CLINICAL SUPPORT (OUTPATIENT)
Dept: REHABILITATION | Facility: HOSPITAL | Age: 42
End: 2017-09-05
Attending: PHYSICIAN ASSISTANT
Payer: COMMERCIAL

## 2017-09-05 ENCOUNTER — TELEPHONE (OUTPATIENT)
Dept: INTERNAL MEDICINE | Facility: CLINIC | Age: 42
End: 2017-09-05

## 2017-09-05 ENCOUNTER — TELEPHONE (OUTPATIENT)
Dept: SPORTS MEDICINE | Facility: CLINIC | Age: 42
End: 2017-09-05

## 2017-09-05 DIAGNOSIS — F41.9 ANXIETY: ICD-10-CM

## 2017-09-05 DIAGNOSIS — E03.9 ACQUIRED HYPOTHYROIDISM: ICD-10-CM

## 2017-09-05 DIAGNOSIS — F32.89 OTHER DEPRESSION: ICD-10-CM

## 2017-09-05 DIAGNOSIS — M25.661 DECREASED ROM OF RIGHT KNEE: Primary | ICD-10-CM

## 2017-09-05 DIAGNOSIS — I10 ESSENTIAL HYPERTENSION: ICD-10-CM

## 2017-09-05 DIAGNOSIS — E66.09 NON MORBID OBESITY DUE TO EXCESS CALORIES: Primary | ICD-10-CM

## 2017-09-05 DIAGNOSIS — N95.1 MENOPAUSAL HOT FLUSHES: ICD-10-CM

## 2017-09-05 PROCEDURE — 97110 THERAPEUTIC EXERCISES: CPT | Mod: PO

## 2017-09-05 RX ORDER — TRIAMTERENE AND HYDROCHLOROTHIAZIDE 37.5; 25 MG/1; MG/1
1 CAPSULE ORAL DAILY
Qty: 90 CAPSULE | Refills: 1 | Status: SHIPPED | OUTPATIENT
Start: 2017-09-05 | End: 2018-05-15 | Stop reason: SDUPTHER

## 2017-09-05 RX ORDER — LEVOTHYROXINE SODIUM 50 UG/1
50 TABLET ORAL DAILY
Qty: 90 TABLET | Refills: 1 | Status: SHIPPED | OUTPATIENT
Start: 2017-09-05 | End: 2018-05-15 | Stop reason: SDUPTHER

## 2017-09-05 RX ORDER — ESTRADIOL 0.5 MG/1
TABLET ORAL
Qty: 30 TABLET | Refills: 11 | Status: SHIPPED | OUTPATIENT
Start: 2017-09-05 | End: 2018-05-08

## 2017-09-05 RX ORDER — ATENOLOL 50 MG/1
TABLET ORAL
Qty: 180 TABLET | Refills: 1 | Status: SHIPPED | OUTPATIENT
Start: 2017-09-05 | End: 2018-05-15 | Stop reason: SDUPTHER

## 2017-09-05 RX ORDER — DULOXETIN HYDROCHLORIDE 30 MG/1
30 CAPSULE, DELAYED RELEASE ORAL DAILY
Qty: 90 CAPSULE | Refills: 1 | Status: SHIPPED | OUTPATIENT
Start: 2017-09-05 | End: 2017-11-17 | Stop reason: SDUPTHER

## 2017-09-05 NOTE — PROGRESS NOTES
Name: Emily Max  Clinic Number: [id number]  Date of Treatment: 09/05/2017  Diagnosis:   Encounter Diagnosis   Name Primary?    Decreased ROM of right knee Yes       Physician: Aleks Kwok III, *    Evaluation Date: 8/25/17  Visit # authorized: 4 / 30  PTA Visit Number:  1   Authorization period: 12/31/17  Plan of care Expiration: 10/20/17    Time in: 11:00 am  Time Out: 12:00 pm   Total Treatment Time: 60 minutes   Billable Time: 60 minutes       Subjective:    Emily Mxa reports her knee hurts but it's feeling better. Patient reports their pain to be 6- 7/10 on a 0-10 scale with 0 being no pain and 10 being the worst pain imaginable.    Objective    Emily Max was instructed in and performed therapeutic exercises to develop strength, endurance, ROM, flexibility and posture for 55  minutes including:     Upright: x 7 minutes seat 9   Heel slides: x 5 minutes   Seated hamstring/calf stretch with strap: 3 x 30 seconds   Quad sets: 20 x 5 seconds   SLRs: 2 x 10 (active assistive to aid in preventing quad lag)  SL hip Abduction: 2 x 10   Prone hamstring curls 1# 2 x 10  1/4 squat 2 x 10  Gait training over bolsters with emphasis on proper gait mechanics. Cues to increase knee extension, and to prevent hip hike with knee flexion  SL balance: 3 x 20 seconds   Side steps: 2 x 10     Emily Max received the following manual therapy techniques: Joint mobilizations and Soft tissue Mobilization were applied to the: quads  for 5 minutes.       Written Home Exercises Provided: Reviewed current home exercise program consisting of above listed exercises.   Pt demo good understanding of the education provided. Emily Max demonstrated good return demonstration of activities.     Assessment:     Patient with improved quad activation noted today during all activities as indicated by ability to perform SLRs without quad lag noted today. Tolerated  addition of new activities well without adverse effects.  Pt will continue to benefit from skilled PT intervention. Medical Necessity is demonstrated by:  Continued inability to participate in vocational pursuits, Pain limits function of effected part for some activities, Unable to participate fully in daily activities, Requires skilled supervision to complete and progress HEP and Weakness.    Patient is making good progress towards established goals.    GOALS: Short Term Goals:  4 weeks     - Pt will increase knee extension AROM to 0 degrees in order to show improved functional mobility.  - Pt will increase knee flexion ROM by at least 20 degrees in order to show improved functional mobility.  - Pt will have normal patellar mobility in all planes in order to promote improved mechanics at the knee.  - Pt will be independent and consistent with issued HEP in order to show carryover between therapy sessions.     Long Term Goals: 8 weeks     - Pt will report decreased knee pain to 2/10 in order to increase tolerance for functional mobility.  - Pt will ambulate with normal mechanics on level/unlevel surfaces without deviations or LOB in order to return to PLOF.  - Pt will perform sit<>stand transfers with equal weightbearing B and no UE use in order to return to PLOF.  - Pt will be able to return to work full duty without limitations due to knee pain in order to resume daily activities.  - Pt will be independent with updated HEP to maintain gains following discharge with therapy.  New/Revised goals: None at this time.       Plan:  Continue with established Plan of Care towards PT goals.

## 2017-09-11 ENCOUNTER — OFFICE VISIT (OUTPATIENT)
Dept: SPORTS MEDICINE | Facility: CLINIC | Age: 42
End: 2017-09-11
Payer: COMMERCIAL

## 2017-09-11 VITALS
WEIGHT: 220 LBS | DIASTOLIC BLOOD PRESSURE: 89 MMHG | SYSTOLIC BLOOD PRESSURE: 126 MMHG | HEIGHT: 65 IN | BODY MASS INDEX: 36.65 KG/M2 | HEART RATE: 83 BPM

## 2017-09-11 DIAGNOSIS — M23.91 KNEE INTERNAL DERANGEMENT, RIGHT: Primary | ICD-10-CM

## 2017-09-11 PROCEDURE — 99999 PR PBB SHADOW E&M-EST. PATIENT-LVL V: CPT | Mod: PBBFAC,,, | Performed by: ORTHOPAEDIC SURGERY

## 2017-09-11 PROCEDURE — 99024 POSTOP FOLLOW-UP VISIT: CPT | Mod: S$GLB,,, | Performed by: ORTHOPAEDIC SURGERY

## 2017-09-11 NOTE — PROGRESS NOTES
HISTORY OF PRESENT ILLNESS:   Pt is here today for first post-operative followup of her knee arthroscopy.  she is doing well.  We have reviewed her findings and discussed plan of care and future treatment options.      DATE OF PROCEDURE: 08/24/2017  PROCEDURE PERFORMED:   right  1. knee arthroscopic chondroplasty (CPT 17102)  2. knee arthroscopic medial (CPT 65673) meniscectomy   3. knee arthroscopic partial synovectomy/debridement (CPT 24372).   4. knee arthroscopic plica excision(CPT 65044).    5. Knee arthroscopic-assisted arthrocentesis of viable structural human allograft tissue      matrix ([employer]) (CPT 08345)              6. Knee arthroscopic loose body removal (CPT 31460)    In the patellofemoral compartment, there was chondral damage to:  Patella 10 x 10 mm grade 2  Trochlea 10 x 10 mm grade 2  Chondroplasty was performed using arthroscopic shaver.     There was chondral damage to:  Medial femoral condyle 10 x 15 mm grade 4  Medial tibial plateau 15 x 10 mm grade 2  Chondroplasty was performed using arthroscopic shaver.     There was chondral damage to:  Lateral tibial plateau 15 x 10 mm grade 2  Chondroplasty was performed using arthroscopic shaver.     Loose bodies measuring 13 x 5 x 7 mm and 10 x 4 x 2 mm  mm were removed from medial compartment using arthroscopic grasper.                                                                                  PHYSICAL EXAMINATION:     Incision sites healed well  No evidence of any erythema, infection or induration  Range of motion 0-120 degrees  Minimal effusion  2+ DP pulse  No swelling, no calf tenderness  - Jackie's sign  Negative medial joint line tendernes  Moderate quad atrophy                                                                                 ASSESSMENT:                                                                                                                                               1. Status post above, doing well.                                                                                                                                PLAN:                                                                                                                                                     1. Continue with PT  2. Emphasized quad function.  3. I have discussed return to activity in detail.  4. she will see us back in 6 weeks.                                      5. All questions were answered and she should contact us if she  has any questions or concerns in the interim.                 Goal is 145 lbs, 230 lbs now

## 2017-09-12 ENCOUNTER — CLINICAL SUPPORT (OUTPATIENT)
Dept: REHABILITATION | Facility: HOSPITAL | Age: 42
End: 2017-09-12
Attending: PHYSICIAN ASSISTANT
Payer: COMMERCIAL

## 2017-09-12 DIAGNOSIS — M25.661 DECREASED ROM OF RIGHT KNEE: Primary | ICD-10-CM

## 2017-09-12 PROCEDURE — 97110 THERAPEUTIC EXERCISES: CPT | Mod: PO

## 2017-09-12 NOTE — PROGRESS NOTES
Name: Emily Max  Clinic Number: [id number]  Date of Treatment: 09/12/2017  Diagnosis:   Encounter Diagnosis   Name Primary?    Decreased ROM of right knee Yes       Physician: Aleks Kwok III, *    Evaluation Date: 8/25/17  Visit # authorized: 5 / 30  PTA Visit Number:  2  Authorization period: 12/31/17  Plan of care Expiration: 10/20/17    Time in: 11:00 am  Time Out: 12:00 pm   Total Treatment Time: 60 minutes   Billable Time: 60 minutes       Subjective:    Emily Max reports her knee hurts but it's feeling better. Patient reports their pain to be 5/10 on a 0-10 scale with 0 being no pain and 10 being the worst pain imaginable. Patient reports being sick over the last week and was unable to complete home exercise program.     Objective    Emily Max was instructed in and performed therapeutic exercises to develop strength, endurance, ROM, flexibility and posture for 55  minutes including:     Upright: x 7 minutes seat 9   Heel slides: x 5 minutes   Seated hamstring/calf stretch with strap: 3 x 30 seconds   Quad sets: 20 x 5 seconds   SLRs: 2 x 10 (active assistive to aid in preventing quad lag)  SL hip Abduction: 2 x 10   Prone hamstring curls 1# 2 x 10  1/4 squat 2 x 10  Gait training over bolsters with emphasis on proper gait mechanics. Cues to increase knee extension, and to prevent hip hike with knee flexion - NOT performed   SL balance: 3 x 20 seconds   Side steps: 2 x 10   TKEs: 2 x 10 on machine  Single leg stance: 3 x 20 seconds   Backward walking on treadmill:      Emily Max received the following manual therapy techniques: Joint mobilizations and Soft tissue Mobilization were applied to the: quads  for 5 minutes.       Written Home Exercises Provided: Reviewed current home exercise program consisting of above listed exercises.   Pt demo good understanding of the education provided. Emily Max demonstrated good return  demonstration of activities.     Assessment:     Patient with improved quad activation noted today during all activities as indicated by ability to perform SLRs without quad lag noted today. Tolerated addition of new activities well without adverse effects.  Pt will continue to benefit from skilled PT intervention. Medical Necessity is demonstrated by:  Continued inability to participate in vocational pursuits, Pain limits function of effected part for some activities, Unable to participate fully in daily activities, Requires skilled supervision to complete and progress HEP and Weakness.    Patient is making good progress towards established goals.    GOALS: Short Term Goals:  4 weeks     - Pt will increase knee extension AROM to 0 degrees in order to show improved functional mobility.  - Pt will increase knee flexion ROM by at least 20 degrees in order to show improved functional mobility.  - Pt will have normal patellar mobility in all planes in order to promote improved mechanics at the knee.  - Pt will be independent and consistent with issued HEP in order to show carryover between therapy sessions.     Long Term Goals: 8 weeks     - Pt will report decreased knee pain to 2/10 in order to increase tolerance for functional mobility.  - Pt will ambulate with normal mechanics on level/unlevel surfaces without deviations or LOB in order to return to PLOF.  - Pt will perform sit<>stand transfers with equal weightbearing B and no UE use in order to return to PLOF.  - Pt will be able to return to work full duty without limitations due to knee pain in order to resume daily activities.  - Pt will be independent with updated HEP to maintain gains following discharge with therapy.  New/Revised goals: None at this time.       Plan:  Continue with established Plan of Care towards PT goals.

## 2017-09-14 ENCOUNTER — CLINICAL SUPPORT (OUTPATIENT)
Dept: REHABILITATION | Facility: HOSPITAL | Age: 42
End: 2017-09-14
Attending: PHYSICIAN ASSISTANT
Payer: COMMERCIAL

## 2017-09-14 DIAGNOSIS — M25.661 DECREASED ROM OF RIGHT KNEE: ICD-10-CM

## 2017-09-14 PROCEDURE — 97110 THERAPEUTIC EXERCISES: CPT | Mod: PO

## 2017-09-14 NOTE — PROGRESS NOTES
Name: Emily Max  Clinic Number: [id number]  Date of Treatment: 09/14/2017  Diagnosis:   Encounter Diagnosis   Name Primary?    Decreased ROM of right knee        Physician: Aleks Kwok III, *    Evaluation Date: 8/25/17  Visit # authorized: 6 / 30  PTA Visit Number:    Authorization period: 12/31/17  Plan of care Expiration: 10/20/17    Time in: 11:00 am  Time Out: 12:00 pm   Total Treatment Time: 60 minutes   Billable Time: 60 minutes       Subjective:    Emily Max reports she saw her MD who reported her knee was looking good following surgery. She continues to report medial knee pain with knee extension.    Objective    Emily Max was instructed in and performed therapeutic exercises to develop strength, endurance, ROM, flexibility and posture for 55  minutes including:     Upright: x 7 minutes seat 9   Heel slides: x 5 minutes   Seated hamstring/calf stretch with strap: 3 x 30 seconds   Quad sets: 20 x 5 seconds   SLRs: 2 x 10   SL hip Abduction: 2 x 10 1#  Standing hamstring curls 2# 2 x 10  1/4 squat 2 x 10 (emphasis on R leg upon rising)  Gait training over bolsters with emphasis on proper gait mechanics. Cues to increase knee extension, and to prevent hip hike with knee flexion - NOT performed   SL balance: 3 x 20 seconds   Side steps: 2 x 10 OTB  TKEs: 2 x 10 on machine  Single leg stance: 3 x 20 seconds   Backward walking on treadmill:  Farmer's carry 10# 100' x 4 laps  Step ups with contralateral hip flexion x 20 R    Emily Max received the following manual therapy techniques: Joint mobilizations and Soft tissue Mobilization were applied to the: quads  for 5 minutes.       Written Home Exercises Provided: Reviewed current home exercise program consisting of above listed exercises.   Pt demo good understanding of the education provided. Emily Max demonstrated good return demonstration of activities.     Assessment:      Patient continuing to do well with above activities. Increased weightbearing activities today in order to prepare pt for return to work. Quad fatigue noted at end of session today. Will continue to progress as tolerated.  Pt will continue to benefit from skilled PT intervention. Medical Necessity is demonstrated by:  Continued inability to participate in vocational pursuits, Pain limits function of effected part for some activities, Unable to participate fully in daily activities, Requires skilled supervision to complete and progress HEP and Weakness.    Patient is making good progress towards established goals.    GOALS: Short Term Goals:  4 weeks     - Pt will increase knee extension AROM to 0 degrees in order to show improved functional mobility.  - Pt will increase knee flexion ROM by at least 20 degrees in order to show improved functional mobility.  - Pt will have normal patellar mobility in all planes in order to promote improved mechanics at the knee.  - Pt will be independent and consistent with issued HEP in order to show carryover between therapy sessions.     Long Term Goals: 8 weeks     - Pt will report decreased knee pain to 2/10 in order to increase tolerance for functional mobility.  - Pt will ambulate with normal mechanics on level/unlevel surfaces without deviations or LOB in order to return to PLOF.  - Pt will perform sit<>stand transfers with equal weightbearing B and no UE use in order to return to PLOF.  - Pt will be able to return to work full duty without limitations due to knee pain in order to resume daily activities.  - Pt will be independent with updated HEP to maintain gains following discharge with therapy.  New/Revised goals: None at this time.       Plan:  Continue with established Plan of Care towards PT goals.

## 2017-09-20 ENCOUNTER — CLINICAL SUPPORT (OUTPATIENT)
Dept: REHABILITATION | Facility: HOSPITAL | Age: 42
End: 2017-09-20
Attending: PHYSICIAN ASSISTANT
Payer: COMMERCIAL

## 2017-09-20 DIAGNOSIS — M25.661 DECREASED ROM OF RIGHT KNEE: ICD-10-CM

## 2017-09-20 PROCEDURE — 97110 THERAPEUTIC EXERCISES: CPT | Mod: PO

## 2017-09-20 NOTE — PROGRESS NOTES
"Name: Emily Max  Clinic Number: [id number]  Date of Treatment: 09/20/2017  Diagnosis:   Encounter Diagnosis   Name Primary?    Decreased ROM of right knee        Physician: Aleks Kwok III, *    Evaluation Date: 8/25/17  Visit # authorized: 7 / 30  PTA Visit Number:    Authorization period: 12/31/17  Plan of care Expiration: 10/20/17    Time in: 3:00 pm  Time Out: 0355 pm  Total Treatment Time: 55 minutes   Billable Time: 55 minutes       Subjective:    Emily Max reports she is doing much better. She will be returning to work the week of Oct 2.    Objective    Flex: 125 deg  Ext: 0 deg    Emily Max was instructed in and performed therapeutic exercises to develop strength, endurance, ROM, flexibility and posture for 55  minutes including:     Upright: x 8 minutes seat 7, level 2   Seated hamstring/calf stretch with strap: 3 x 30 seconds   SLRs: 2 x 10 1#  SL hip Abduction: 2 x 10 2#  Standing hamstring curls 2# 2 x 10  1/4 squat 2 x 10 (emphasis on R leg upon rising)  Gait training over bolsters with emphasis on proper gait mechanics. Cues to increase knee extension, and to prevent hip hike with knee flexion - NOT performed   SL balance: with ball throw at tramp x 20 B  Side steps: 2 x 10 OTB  TKEs: 2 x 10 on machine 70#  Single leg stance: 3 x 20 seconds   Backward walking on treadmill  Farmer's carry 10#  Ea extremity 100' x 4 laps  Step ups with contralateral hip flexion x 20 R  Leg press 30# 2 x 10  Step downs 2" x 10    Not performed:  Quad sets: 20 x 5 seconds- NP  Heel slides: x 5 minutes     Emily Max received the following manual therapy techniques: Joint mobilizations and Soft tissue Mobilization were applied to the: quads  for 5 minutes. - Not performed      Written Home Exercises Provided: Reviewed current home exercise program consisting of above listed exercises.   Pt demo good understanding of the education provided. Emily" Jenn Max demonstrated good return demonstration of activities.     Assessment:     Slight increase in low back pain with squatting activity today. Pt educated on some gentle low back stretches to help ease the feeling of tight muscles. In addition, pt tolerated advancements in activities above without adverse effects. She required a rest break during step down activity due to decreased eccentric knee strength. Occasional reports of popping in the knee with TKE activity; however, it is not painful or consistent. Pt has met all STGs at this time, and she is making good progress towards long term therapy goals at this time.  Pt will continue to benefit from skilled PT intervention. Medical Necessity is demonstrated by:  Continued inability to participate in vocational pursuits, Pain limits function of effected part for some activities, Unable to participate fully in daily activities, Requires skilled supervision to complete and progress HEP and Weakness.    Patient is making good progress towards established goals.    GOALS: Short Term Goals:  4 weeks     - Pt will increase knee extension AROM to 0 degrees in order to show improved functional mobility. Met 9/20/17  - Pt will increase knee flexion ROM by at least 20 degrees in order to show improved functional mobility. Met 9/20/17  - Pt will have normal patellar mobility in all planes in order to promote improved mechanics at the knee. Met 9/20/17  - Pt will be independent and consistent with issued HEP in order to show carryover between therapy sessions. Met 9/20/17     Long Term Goals: 8 weeks     - Pt will report decreased knee pain to 2/10 in order to increase tolerance for functional mobility.  - Pt will ambulate with normal mechanics on level/unlevel surfaces without deviations or LOB in order to return to PLOF.  - Pt will perform sit<>stand transfers with equal weightbearing B and no UE use in order to return to PLOF.  - Pt will be able to return to work  full duty without limitations due to knee pain in order to resume daily activities.  - Pt will be independent with updated HEP to maintain gains following discharge with therapy.  New/Revised goals: None at this time.       Plan:  Continue with established Plan of Care towards PT goals.

## 2017-09-22 ENCOUNTER — CLINICAL SUPPORT (OUTPATIENT)
Dept: REHABILITATION | Facility: HOSPITAL | Age: 42
End: 2017-09-22
Attending: PHYSICIAN ASSISTANT
Payer: COMMERCIAL

## 2017-09-22 ENCOUNTER — PATIENT MESSAGE (OUTPATIENT)
Dept: INTERNAL MEDICINE | Facility: CLINIC | Age: 42
End: 2017-09-22

## 2017-09-22 DIAGNOSIS — M25.661 DECREASED ROM OF RIGHT KNEE: ICD-10-CM

## 2017-09-22 DIAGNOSIS — I10 HTN (HYPERTENSION): ICD-10-CM

## 2017-09-22 PROCEDURE — 97110 THERAPEUTIC EXERCISES: CPT | Mod: PO

## 2017-09-22 RX ORDER — ATENOLOL 50 MG/1
TABLET ORAL
Qty: 180 TABLET | Refills: 0 | Status: SHIPPED | OUTPATIENT
Start: 2017-09-22 | End: 2018-05-15 | Stop reason: SDUPTHER

## 2017-09-22 NOTE — PROGRESS NOTES
"Name: Emily Max  Clinic Number: [id number]  Date of Treatment: 09/22/2017  Diagnosis:   Encounter Diagnosis   Name Primary?    Decreased ROM of right knee        Physician: Aleks Kwok III, *    Evaluation Date: 8/25/17  Visit # authorized: 8 / 30  PTA Visit Number:    Authorization period: 12/31/17  Plan of care Expiration: 10/20/17    Time in: 9:15 am  Time Out: 10:04 am  Total Treatment Time: 45 minutes   Billable Time: 45 minutes       Subjective:    Emily Max reports she is doing much better. Patient reports she usually has more pain in the mornings.    Pain: 4/10 R knee    Objective    Flex: 125 deg  Ext: 0 deg    Emily Max was instructed in and performed therapeutic exercises to develop strength, endurance, ROM, flexibility and posture for 49 minutes including:     Upright: x 8 minutes seat 7, level 2   Seated hamstring/calf stretch with strap: 3 x 30 seconds   SLRs: 2 x 10 1#  SL hip Abduction: 2 x 10 2#  Standing hamstring curls 2# 2 x 10  1/4 squat 2 x 10 (emphasis on R leg upon rising)  Side steps: 2 x 10 OTB  TKEs: 2 x 10 on machine 70#  Single leg stance: 3 x 20 seconds   Backward walking on treadmill  Farmer's carry 10#  Ea extremity 100' x 4 laps  Leg press 30# 2 x 10  Step downs 2" x 6 * decreased reps secondary to pain      Not performed:  Gait training over bolsters with emphasis on proper gait mechanics. Cues to increase knee extension, and to prevent hip hike with knee flexion   SL balance: with ball throw at tramp x 20 B  Step ups with contralateral hip flexion x 20 R  Quad sets: 20 x 5 seconds- NP  Heel slides: x 5 minutes     Emily Max received the following manual therapy techniques: Joint mobilizations and Soft tissue Mobilization were applied to the: quads  for 5 minutes. - Not performed      Written Home Exercises Provided: Reviewed current home exercise program consisting of above listed exercises.   Pt demo good " "understanding of the education provided. Emily Max demonstrated good return demonstration of activities.     Assessment:     Patient reported an increase in medial knee soreness following treatment.  Patient reported the most discomfort with step down exercise today and could not complete the 10 reps secondary to " burning" and a " tearing feeling" in the medial knee.  Patient with one episode of sharp medial knee pain with side stepping exercise.  Will continue to progress as tolerated.  Pt will continue to benefit from skilled PT intervention. Medical Necessity is demonstrated by:  Continued inability to participate in vocational pursuits, Pain limits function of effected part for some activities, Unable to participate fully in daily activities, Requires skilled supervision to complete and progress HEP and Weakness.    Patient is making good progress towards established goals.    GOALS: Short Term Goals:  4 weeks     - Pt will increase knee extension AROM to 0 degrees in order to show improved functional mobility. Met 9/20/17  - Pt will increase knee flexion ROM by at least 20 degrees in order to show improved functional mobility. Met 9/20/17  - Pt will have normal patellar mobility in all planes in order to promote improved mechanics at the knee. Met 9/20/17  - Pt will be independent and consistent with issued HEP in order to show carryover between therapy sessions. Met 9/20/17     Long Term Goals: 8 weeks     - Pt will report decreased knee pain to 2/10 in order to increase tolerance for functional mobility.  - Pt will ambulate with normal mechanics on level/unlevel surfaces without deviations or LOB in order to return to PLOF.  - Pt will perform sit<>stand transfers with equal weightbearing B and no UE use in order to return to PLOF.  - Pt will be able to return to work full duty without limitations due to knee pain in order to resume daily activities.  - Pt will be independent with updated HEP " to maintain gains following discharge with therapy.  New/Revised goals: None at this time.       Plan:  Continue with established Plan of Care towards PT goals.

## 2017-09-26 ENCOUNTER — CLINICAL SUPPORT (OUTPATIENT)
Dept: REHABILITATION | Facility: HOSPITAL | Age: 42
End: 2017-09-26
Attending: PHYSICIAN ASSISTANT
Payer: COMMERCIAL

## 2017-09-26 ENCOUNTER — OFFICE VISIT (OUTPATIENT)
Dept: PSYCHIATRY | Facility: CLINIC | Age: 42
End: 2017-09-26
Payer: COMMERCIAL

## 2017-09-26 VITALS
HEIGHT: 65 IN | SYSTOLIC BLOOD PRESSURE: 129 MMHG | DIASTOLIC BLOOD PRESSURE: 79 MMHG | WEIGHT: 234 LBS | HEART RATE: 79 BPM | BODY MASS INDEX: 38.99 KG/M2

## 2017-09-26 DIAGNOSIS — R41.840 ATTENTION AND CONCENTRATION DEFICIT: ICD-10-CM

## 2017-09-26 DIAGNOSIS — F41.1 GAD (GENERALIZED ANXIETY DISORDER): ICD-10-CM

## 2017-09-26 DIAGNOSIS — F33.1 MODERATE EPISODE OF RECURRENT MAJOR DEPRESSIVE DISORDER: Primary | ICD-10-CM

## 2017-09-26 DIAGNOSIS — M25.661 DECREASED ROM OF RIGHT KNEE: Primary | ICD-10-CM

## 2017-09-26 PROCEDURE — 90792 PSYCH DIAG EVAL W/MED SRVCS: CPT | Mod: S$GLB,,, | Performed by: INTERNAL MEDICINE

## 2017-09-26 PROCEDURE — 97110 THERAPEUTIC EXERCISES: CPT | Mod: PO

## 2017-09-26 PROCEDURE — 99999 PR PBB SHADOW E&M-EST. PATIENT-LVL III: CPT | Mod: PBBFAC,,, | Performed by: INTERNAL MEDICINE

## 2017-09-26 PROCEDURE — 97140 MANUAL THERAPY 1/> REGIONS: CPT | Mod: PO

## 2017-09-26 RX ORDER — ALPRAZOLAM 0.5 MG/1
0.5 TABLET ORAL DAILY PRN
Qty: 30 TABLET | Refills: 0 | Status: SHIPPED | OUTPATIENT
Start: 2017-09-26 | End: 2018-05-15 | Stop reason: SDUPTHER

## 2017-09-26 RX ORDER — BUPROPION HYDROCHLORIDE 150 MG/1
150 TABLET ORAL DAILY
Qty: 30 TABLET | Refills: 3 | Status: SHIPPED | OUTPATIENT
Start: 2017-09-26 | End: 2017-11-06 | Stop reason: SDUPTHER

## 2017-09-26 NOTE — PROGRESS NOTES
"Name: Emily Max  Clinic Number: [id number]  Date of Treatment: 09/26/2017  Diagnosis:   Encounter Diagnosis   Name Primary?    Decreased ROM of right knee Yes       Physician: Aleks Kwok III, *    Evaluation Date: 8/25/17  Visit # authorized: 8 / 30  PTA Visit Number:    Authorization period: 12/31/17  Plan of care Expiration: 10/20/17    Time in: 9:15 am  Time Out: 10:04 am  Total Treatment Time: 45 minutes   Billable Time: 45 minutes       Subjective:    Emily Max reports she is doing much better. Patient reports she usually has more pain in the mornings.    Pain: 4/10 R knee    Objective    Flex: 125 deg  Ext: 0 deg    Emily Max was instructed in and performed therapeutic exercises to develop strength, endurance, ROM, flexibility and posture for 55 minutes including:     Upright: x 8 minutes seat 7, level 2   Seated hamstring/calf stretch with strap: 3 x 30 seconds   SLRs: 2 x 10 1#  SL hip Abduction: 2 x 10 2#  Standing hamstring curls 2# 2 x 10  1/4 squat 2 x 10 (emphasis on R leg upon rising)  Side steps: 2 x 10 OTB  TKEs: 2 x 10 on machine 70#  Single leg stance: 3 x 20 seconds   Backward walking on treadmill  Farmer's carry 25#  Ea extremity 100' x 4 laps  Leg press 30# 2 x 10  Step downs 2" x 6 * decreased reps secondary to pain  Sled pushed: x         Not performed:  Gait training over bolsters with emphasis on proper gait mechanics. Cues to increase knee extension, and to prevent hip hike with knee flexion   SL balance: with ball throw at tramp x 20 B  Step ups with contralateral hip flexion x 20 R  Quad sets: 20 x 5 seconds- NP  Heel slides: x 5 minutes     Emily Max received the following manual therapy techniques: Joint mobilizations and Soft tissue Mobilization were applied to the: quads  for 5 minutes. - Not performed      Written Home Exercises Provided: Reviewed current home exercise program consisting of above listed " "exercises.   Pt demo good understanding of the education provided. Emily Max demonstrated good return demonstration of activities.     Assessment:     Patient reported an increase in medial knee soreness following treatment.  Patient reported the most discomfort with step down exercise today and could not complete the 10 reps secondary to " burning" and a " tearing feeling" in the medial knee.  Patient with one episode of sharp medial knee pain with side stepping exercise.  Will continue to progress as tolerated.  Pt will continue to benefit from skilled PT intervention. Medical Necessity is demonstrated by:  Continued inability to participate in vocational pursuits, Pain limits function of effected part for some activities, Unable to participate fully in daily activities, Requires skilled supervision to complete and progress HEP and Weakness.    Patient is making good progress towards established goals.    GOALS: Short Term Goals:  4 weeks     - Pt will increase knee extension AROM to 0 degrees in order to show improved functional mobility. Met 9/20/17  - Pt will increase knee flexion ROM by at least 20 degrees in order to show improved functional mobility. Met 9/20/17  - Pt will have normal patellar mobility in all planes in order to promote improved mechanics at the knee. Met 9/20/17  - Pt will be independent and consistent with issued HEP in order to show carryover between therapy sessions. Met 9/20/17     Long Term Goals: 8 weeks     - Pt will report decreased knee pain to 2/10 in order to increase tolerance for functional mobility.  - Pt will ambulate with normal mechanics on level/unlevel surfaces without deviations or LOB in order to return to PLOF.  - Pt will perform sit<>stand transfers with equal weightbearing B and no UE use in order to return to PLOF.  - Pt will be able to return to work full duty without limitations due to knee pain in order to resume daily activities.  - Pt will be " independent with updated HEP to maintain gains following discharge with therapy.  New/Revised goals: None at this time.       Plan:  Continue with established Plan of Care towards PT goals.
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
## 2017-09-28 ENCOUNTER — INITIAL CONSULT (OUTPATIENT)
Dept: BARIATRICS | Facility: CLINIC | Age: 42
End: 2017-09-28
Payer: COMMERCIAL

## 2017-09-28 VITALS
BODY MASS INDEX: 38.67 KG/M2 | HEIGHT: 65 IN | SYSTOLIC BLOOD PRESSURE: 128 MMHG | WEIGHT: 232.13 LBS | HEART RATE: 82 BPM | DIASTOLIC BLOOD PRESSURE: 82 MMHG

## 2017-09-28 DIAGNOSIS — I10 ESSENTIAL HYPERTENSION: ICD-10-CM

## 2017-09-28 DIAGNOSIS — E66.01 SEVERE OBESITY (BMI 35.0-39.9) WITH COMORBIDITY: Primary | ICD-10-CM

## 2017-09-28 DIAGNOSIS — R73.03 PRE-DIABETES: ICD-10-CM

## 2017-09-28 DIAGNOSIS — I87.2 VENOUS INSUFFICIENCY OF BOTH LOWER EXTREMITIES: ICD-10-CM

## 2017-09-28 PROCEDURE — 99999 PR PBB SHADOW E&M-EST. PATIENT-LVL III: CPT | Mod: PBBFAC,,, | Performed by: INTERNAL MEDICINE

## 2017-09-28 PROCEDURE — 99244 OFF/OP CNSLTJ NEW/EST MOD 40: CPT | Mod: S$GLB,,, | Performed by: INTERNAL MEDICINE

## 2017-09-28 RX ORDER — DIETHYLPROPION HYDROCHLORIDE 75 MG/1
75 TABLET, EXTENDED RELEASE ORAL DAILY
Qty: 30 TABLET | Refills: 2 | Status: SHIPPED | OUTPATIENT
Start: 2017-09-28 | End: 2018-05-08

## 2017-09-28 NOTE — PATIENT INSTRUCTIONS
Patient warned of common side effects of diethylpropion including anxiety, insomnia, palpitations and increased blood pressure. It was also explained that it is for short-term usage along with diet and exercise, and that stopping the medication without making lifestyle changes will result in regain of weight. Patient states understanding.    Weight loss medications are controlled substances.  They require routine follow up. Prescription or pills that are lost or destroyed will not be replaced.         3 meals a day made up of the following:  Unlimited green vegetables, tomatoes, mushrooms, spaghetti squash, cauliflower, meat, poultry, seafood, eggs and hard cheeses.   Milk and plain yogurt  Dressings, seasonings, condiments, etc should have less than 2 g sugars.   beans or nuts can have 1 x a day.   1-2 servings of citrus fruits, berries, pineapple or melon a day (1/2 cup)  Avoid fried foods    No grains, rice, pasta, potatoes, bread, corn, peas, oatmeal, grits, tortillas, crackers, chips    No soda, sweet tea, juices or lemonade    Www.dietdoctor.[employer] for recipes.    Limit alcohol to 4 drinks per week.     Increase as tolerated      Patient counseled in strategies for long term weight loss and maintenance: Keeping a food diary, exercise for 1 hour a day and eating breakfast everyday.      Meal Ideas for Regular Bariatric Diet  *Recipes and products available at www.bariatriceating.com      Breakfast: (15-20g protein)    - Egg white omelet: 2 egg whites or ½ cup Egg Beaters. (Optional proteins: cheese, shrimp, black beans, chicken, sliced turkey) (Optional veggies: tomatoes, salsa, spinach, mushrooms, onions, green peppers, or small slice avocado)     - Egg and sausage: 1 egg or ¼ cup Egg Beaters (any variety), with 1 see or 2 links of Turkey sausage or Veggie breakfast sausage ([place] or [employer])    - Crust-less breakfast quiche: To make a glass pie dish, mix 4oz part skim Ricotta, 1 cup skim milk, and 2  eggs as your base. Add protein: shredded cheese, sliced lean ham or turkey, turkey victor/sausage. Add veggies: tomato, onion, green onion, mushroom, green pepper, spinach, etc.    - Yogurt parfait: Mix 1 - 6oz container Dannon Light N Fit vanilla yogurt, with ¼ cup Kashi Go Lean cereal    - Cottage cheese and fruit: ½ cup part-skim cottage cheese or ricotta cheese topped with fresh fruit or sugar free preserves     - Carolyne Buckner's Vanilla Egg custard* (add 2 Tbsp instant coffee granules to make Cappuccino Custard*)    - Hi-Protein café latte (skim milk, decaf coffee, 1 scoop protein powder). Optional to add Sugar free syrup or extract flavoring.    Lunch: (20-30g protein)    - ½ cup Black bean soup (Homemade or Progresso), with ¼ cup shredded low-fat cheese. Top with chopped tomato or fresh salsa.     - Lean deli turkey breast and low-fat sliced cheese, mustard or light vargas to moisten, rolled up together, or wrapped in a Aftab lettuce leaf    - Chicken salad made from dinner leftovers, moisten with low-fat salad dressing or light vargas. Also try leftover salmon, shrimp, tuna or boiled eggs. Serve ½ cup over dark green salad    - Fat-free canned refried beans, topped with ¼ cup shredded low-fat cheese. Top with chopped tomato or fresh salsa.     - Greek salad: Top mixed greens with 1-2oz grilled chicken, tomatoes, red onions, 2-3 kalamata olives, and sprinkle lightly with feta cheese. Spritz with Balsamic vinegar to taste.     - Crust-less lunch quiche: To make a glass pie dish, mix 4oz part skim Ricotta, 1 cup skim milk, and 2 eggs as your base. Add protein: shredded cheese, sliced lean ham or turkey, shrimp, chicken. Add veggies: tomato, onion, green onion, mushroom, green pepper, spinach, artichoke, broccoli, etc.    - Pizza bake: tomato sauce, low-fat shredded mozzarella and turkey pepperoni or [place]urchison victor. Add any veggies.    - Cucumber crab bites: Spread ¼ cup crab dip (lump crabmeat + light cream cheese  and green onions) over sliced cucumber.     - Chicken with light spinach and artichoke dip*: Puree in : 6oz cooked and drained spinach, 2 cloves garlic, 1 can cannelloni beans, ½ cup chopped green onions, 1 can drained artichoke hearts (not marinated in oil), lemon juice and basil. Mix in 2oz chopped up chicken.    Supper: (20-30g protein)    - Serve grilled fish over dark green salad tossed with low-fat dressing, served with grilled asparagus hickey     - Rotisserie chicken salad: served with sliced strawberries, walnuts, fat-free feta cheese crumbles and 1 tbsp Prices Own Light Raspberry [place] Vinaigrette    - Shrimp cocktail: Dip cold boiled shrimp in homemade low-sugar cocktail sauce (1/2 cup Alton One Carb ketchup, 2 tbsp horseradish, 1/4 tsp hot sauce, 1 tsp Worcestershire sauce, 1 tbsp freshly-squeezed lemon juice). Serve with dark green salad, walnuts, and crumbled blue cheese drizzled with olive oil and Balsamic vinegar    - Tuna Melt: Spread tuna salad onto 2 thick slices of tomato. Top with low-fat cheese and broil until cheese is melted. May also be made with chicken salad of shrimp salad. [place] with different types of cheeses.    - Homemade low-fat Chili using extra lean ground beef or ground turkey. Top with shredded cheese and salsa as desired. May add dollop fat-free sour cream if desired    - Dinner Omelet with shrimp or chicken and onion, green peppers and chives.    - No noodle lasagna: Use sliced zucchini or eggplant in place of noodles.  Layer with part skim ricotta cheese and low sugar meat sauce (use very lean ground beef or ground turkey).    - Mexican chicken bake: Bake chunks of chicken breast or thigh with taco seasoning, Pace brand enchilada sauce, green onions and low-fat cheese. Serve with ¼ cup black beans or fat free refried beans topped with chopped tomatoes or salsa.    - Bere frozen meatballs, simmered in Classico Marinara sauce. Different flavors of  salsa or spaghetti sauce create different dishes! Sprinkle with parmesan cheese. Serve with grilled or steamed veggies, or a dark green salad.    - Simmer boneless skinless chicken thigh chunks in Classico Marinara sauce or roasted salsa until tender with chopped onion, bell pepper, garlic, mushrooms, spinach, etc.     - Hamburger, without the bun, dressed the way you like. Served with grilled or steamed veggies.    - Eggplant parmesan: Bake slices of eggplant at 350 degrees for 15 minutes. Layer tomato sauce, sliced eggplant and low-fat mozzarella cheese in a baking dish and cover with foil. Bake 30-40 more minutes or until bubbly. Uncover and bake at 400 degrees for about 15 more minutes, or until top is slightly crisp.    - Fish tacos: grilled/baked white fish, wrapped in Aftab lettuce leaf, topped with salsa, shredded low-fat cheese, and light coleslaw.    Snacks: (100-200 calories; >5g protein)    - 1 low-fat cheese stick with 8 cherry tomatoes or 1 serving fresh fruit  - 4 thin slices fat-free turkey breast and 1 slice low-fat cheese  - 4 thin slices fat-free honey ham with wedge of melon  - 1/4 cup unsalted nuts with ½ cup fruit  - 6-oz container Dannon Light n Fit vanilla yogurt, topped with 1oz unsalted nuts         - apple, celery or baby carrots spread with 2 Tbsp natural peanut butter or almond butter   - apple slices with 1 oz slice low-fat cheese  - celery, cucumber, bell pepper or baby carrots dipped in ¼ cup hummus bean spread or light spinach and artichoke dip (*recipe in lunch section)  - 100 calorie bag microwave light popcorn with 3 tbsp grated parmesan cheese  - Gamal Links Beef Steak - 14g protein! (similar to beef jerky)  - 2 wedges Laughing Cow - Light Herb & Garlic Cheese with sliced cucumber or green bell pepper  - 1/2 cup low-fat cottage cheese with ¼ cup fruit or ¼ cup salsa  - RTD Protein drinks: Atkins, Low Carb Slim Fast, EAS light, Muscle Milk Light, etc.  - Homemade Protein  drinks: [facility] Soy95, Isopure, Nectar, UNJURY, Whey Gourmet, etc. Mix 1 scoop powder with 8oz skim/1% milk or light soymilk.  - Protein bars: Atkins, EAS, Pure Protein, Think Thin, Detour, etc. Must have 0-4 grams sugar - Read the label.    Takeout Options: No more than twice/week  Deli - Salads (no pasta or rice), meats, cheeses. Roasted chicken. Lox (salmon)    Mexican - Platters which don't include tortillas, chips, or rice. Go easy on the beans. Example: Fajitas without the tortillas. Ask the  not to bring chips to the table if they are too tempting.    Greek - Meat or fish and vegetable, but no bread or rice. Including hummus, baba ganoush, etc, is OK. Most sit-down Greek restaurants can provide you with cucumber slices for dipping instead of megan bread.    Fast Food (Avoid as much as possible) - Salads (no croutons and limit salad dressing to 2 tbsp), grilled chicken sandwich without the bun and ask for no vargas. Talias low fat chili or Taco Bell pintos and cheese.    BBQ - The meats are fine if you ask for sauces on the side, but most of the traditional side dishes are loaded with carbs. Javier slaw, baked beans and BBQ sauce are typically made with sugar.    Chinese - Nothing deep-fried, no rice or noodles. Many Chinese sauces have starch and sugar in them, so you'll have to use your judgement. If you find that these sauces trigger cravings, or cause Dumping, you can ask for the sauce to be made without sugar or just use soy sauce.        - For coffee lovers you can add protein powder or protein shakes to coffee.  Add 1 Vanilla Premier Protein Drink to a Shot of Cool Brew Coffee with or without some SF Torani Syrup.  There are some Protein Coffee drinks/mixes on the market, as listed below.    - JAVAPRO:  Protein Coffee, 20 grams of protein with 1 g of sugar.  From www.Phone Warrior.com or www.vitaminshoppe.com or www.Viyetisopurecompany.com.  (Caramel, Espresso, Nepalese Vanilla, Hazelnut, Latte, & Mocha flavors).   "Ready to drink at [employer] online and only Mocha flavor at [facility].  All flavors/products from [employer].            - Syntrax Nectar:  Hot or cold.  Also has "Grab n Go" individual packs.  www.ShowMe VIdeoke, wwwBlipify          - BuzzFit Protein Coffee:  Hot or cold.  10 g of protein with 0 g sugar.  From www.amazon.com        - Xiang Protein Coffee:  Iced Coffee, Jar or Single Packs.  20 grams of protein with 3 g of sugar. Mocha and Regular Flavor.  From www.Sayduck or wwwBlipify        "

## 2017-09-28 NOTE — LETTER
[facility]  [address]  [facility] [address]  Phone: [phone]  Fax: [phone] October 3, 2017      Ivana Cotton MD  [address]  [facility] [address]    Patient: Emily Max   MR Number: [id number]   YOB: 1975   Date of Visit: 9/28/2017     Dear Dr. Cotton:    Thank you for referring Emily Max to me for evaluation. Below are the relevant portions of my assessment and plan of care.    ASSESSMENT:  1. Severe obesity (BMI 35.0-39.9) with comorbidity    2. Pre-diabetes    3. Venous insufficiency of both lower extremities    4. Essential hypertension      PLAN:    1. Severe obesity (BMI 35.0-39.9) with comorbidity - Patient warned of common side effects of Diethylpropion including anxiety, insomnia, palpitations and increased blood pressure. It was also explained that it is for short-term usage along with diet and exercise, and that stopping the medication without making lifestyle changes will result in regain of weight. Patient states understanding.     Weight loss medications are controlled substances.  They require routine follow up. Prescription or pills that are lost or destroyed will not be replaced.     Www.[employer]lympix.[employer] for recipes.     Limit alcohol to 4 drinks per week.      Increase as tolerated    Patient counseled in strategies for long term weight loss and maintenance: Keeping a food diary, exercise for 1 hour a day and eating breakfast everyday.    The patient was given individualized diet, exercise, and follow-up instructions.       2. Pre-diabetes - Discussed decreasing the risks of diabetes with changes in diet, routine exercise and losing weight.  Could also consider Metformin.       3. Venous insufficiency of both lower extremities - Expect improvement in symptoms with weight loss.      4. Essential hypertension - The current medical regimen is effective;  continue present plan and medications.     If you have  questions, please do not hesitate to call me. I look forward to following Emily along with you.    Sincerely,      Naomie Mansfield MD   Medical Weight Loss   [facility]     LETICIA/katelyn

## 2017-09-29 ENCOUNTER — PATIENT MESSAGE (OUTPATIENT)
Dept: SPORTS MEDICINE | Facility: CLINIC | Age: 42
End: 2017-09-29

## 2017-09-29 ENCOUNTER — CLINICAL SUPPORT (OUTPATIENT)
Dept: REHABILITATION | Facility: HOSPITAL | Age: 42
End: 2017-09-29
Attending: PHYSICIAN ASSISTANT
Payer: COMMERCIAL

## 2017-09-29 DIAGNOSIS — M25.661 DECREASED ROM OF RIGHT KNEE: ICD-10-CM

## 2017-09-29 PROCEDURE — 97110 THERAPEUTIC EXERCISES: CPT | Mod: PO

## 2017-09-29 NOTE — PROGRESS NOTES
"Name: Emily Max  Clinic Number: [id number]  Date of Treatment: 09/29/2017  Diagnosis:   Encounter Diagnosis   Name Primary?    Decreased ROM of right knee        Physician: Aleks Kwok III, *    Evaluation Date: 8/25/17  Visit # authorized: 9 / 30  PTA Visit Number:    Authorization period: 12/31/17  Plan of care Expiration: 10/20/17    Time in: 0100  pm  Time Out: 0200 am  Total Treatment Time: 50 minutes   Billable Time: 50 minutes       Subjective:    Emily Max reports she is doing much better. Patient reports the manual therapy is helping with her medial knee pain.    Pain: 4/10 R knee    Objective    Flex: 125 deg  Ext: 0 deg    Emily Max was instructed in and performed therapeutic exercises to develop strength, endurance, ROM, flexibility and posture for 55 minutes including:     TM walking 5 minutes 1.6mph  Seated hamstring/calf stretch with strap: 3 x 30 seconds   SLRs: 2 x 10 2#  SL hip Abduction: 2 x 10 3#  Standing hamstring curls 3# 2 x 10  1/4 squat 2 x 10 (emphasis on R leg upon rising)  Side steps: 2 x 10 OTB  TKEs: 2 x 10 on machine 70#  Single leg stance: 3 x 20 seconds   Backward walking on treadmill  Farmer's carry 25#  Ea extremity 100' x 4 laps  Leg press 30# 2 x 10  Step downs 2" x 6 * NP due to pain  Sled pushed: x 4 laps with 25# added  Lunges x 10  Step ups with contralateral hip flexion x 20 R 8 inches  IASTM to medial knee and distal quad x 5 min  Patellar mobs all direction        Not performed:  Gait training over bolsters with emphasis on proper gait mechanics. Cues to increase knee extension, and to prevent hip hike with knee flexion   SL balance: with ball throw at tramp x 20   Quad sets: 20 x 5 seconds- NP  Heel slides: x 5 minutes     Emily Max received the following manual therapy techniques: Joint mobilizations and Soft tissue Mobilization were applied to the: quads  for 5 minutes. - Not performed      Written " Home Exercises Provided: Reviewed current home exercise program consisting of above listed exercises.   Pt demo good understanding of the education provided. Emily Aggarwalod demonstrated good return demonstration of activities.     Assessment:     Pt tolerating advancements in activities above without adverse effects. Tissue restrictions in distal quad and medial knee cap noted with IASTM. Slight reduced mobility continues to be noted with superior and inferior glides.   Pt will continue to benefit from skilled PT intervention. Medical Necessity is demonstrated by:  Continued inability to participate in vocational pursuits, Pain limits function of effected part for some activities, Unable to participate fully in daily activities, Requires skilled supervision to complete and progress HEP and Weakness.    Patient is making good progress towards established goals.    GOALS: Short Term Goals:  4 weeks     - Pt will increase knee extension AROM to 0 degrees in order to show improved functional mobility. Met 9/20/17  - Pt will increase knee flexion ROM by at least 20 degrees in order to show improved functional mobility. Met 9/20/17  - Pt will have normal patellar mobility in all planes in order to promote improved mechanics at the knee. Met 9/20/17  - Pt will be independent and consistent with issued HEP in order to show carryover between therapy sessions. Met 9/20/17     Long Term Goals: 8 weeks     - Pt will report decreased knee pain to 2/10 in order to increase tolerance for functional mobility.  - Pt will ambulate with normal mechanics on level/unlevel surfaces without deviations or LOB in order to return to PLOF.  - Pt will perform sit<>stand transfers with equal weightbearing B and no UE use in order to return to PLOF.  - Pt will be able to return to work full duty without limitations due to knee pain in order to resume daily activities.  - Pt will be independent with updated HEP to maintain gains  following discharge with therapy.  New/Revised goals: None at this time.       Plan:  Continue with established Plan of Care towards PT goals.

## 2017-10-03 ENCOUNTER — CLINICAL SUPPORT (OUTPATIENT)
Dept: REHABILITATION | Facility: HOSPITAL | Age: 42
End: 2017-10-03
Attending: PHYSICIAN ASSISTANT
Payer: COMMERCIAL

## 2017-10-03 DIAGNOSIS — M25.661 DECREASED ROM OF RIGHT KNEE: ICD-10-CM

## 2017-10-03 PROCEDURE — 97110 THERAPEUTIC EXERCISES: CPT | Mod: PO

## 2017-10-03 NOTE — PROGRESS NOTES
"Name: Emily Max  Clinic Number: [id number]  Date of Treatment: 10/03/2017  Diagnosis:   Encounter Diagnosis   Name Primary?    Decreased ROM of right knee        Physician: Aleks Kwok III, *    Evaluation Date: 8/25/17  Visit # authorized: 10 / 30  PTA Visit Number:  1  Authorization period: 12/31/17  Plan of care Expiration: 10/20/17    Time in: 3:00  pm  Time Out: 4:00 am  Total Treatment Time: 50 minutes   Billable Time: 50 minutes       Subjective:    Emily Max reports increased (R) knee/leg soreness due to increased time up/down on her feet as she returned to work for the first time since her surgery today.  She states that her leg feels heavy.      Pain: 4-5/10 R knee    Objective     Emily Max was instructed in and performed therapeutic exercises to develop strength, endurance, ROM, flexibility and posture for 55 minutes including:     TM walking 5 minutes 1.6mph--NP  Upright stationary bike x 8 minutes level 1.   Standing gastroc stretch on incline board 3 x 30 sec  Seated hamstring : 3 x 30 seconds   IASTM to medial knee and distal quad x 5 min  Patellar mobs all direction  Stick massage to distal quad x 2 minutes   SLRs: 2 x 10 2#  SL hip Abduction: 2 x 10 3#  Prone quad stretch with belt 3 x 30 sec  TKEs: 2 x 10 on machine 70#  Standing hamstring curls 3# 2 x 10  Side steps: 2 x 20 feet OTB    Ex's/activities not performed today   1/4 squat 2 x 10 (emphasis on R leg upon rising)--NP  Single leg stance: 3 x 20 seconds--NP   Backward walking on treadmill--NP  Atkins's carry 25#  Ea extremity 100' x 4 laps--NP  Leg press 30# 2 x 10--NP  Step downs 2" x 6 * NP due to pain  Sled pushed: x 4 laps with 25# added--NP  Lunges x 10--NP  Step ups with contralateral hip flexion x 20 R 8 inches--NP   Gait training over bolsters with emphasis on proper gait mechanics. Cues to increase knee extension, and to prevent hip hike with knee flexion   SL balance: with " ball throw at tramp x 20   Quad sets: 20 x 5 seconds- NP  Heel slides: x 5 minutes     Patient receives a cold pack applied to (R) knee x 10 minutes for pain/edema control.      Written Home Exercises Provided: Reviewed current home exercise program consisting of above listed exercises.   Pt demo good understanding of the education provided. Emily Max demonstrated good return demonstration of activities.     Assessment:   Patient with some limitations with overall ex/activity tolerance due to subjective report of increased (R)knee soreness after return to work today. Still some tissue restrictions in distal quad and medial knee which were improved with soft tissue work. She was able to perform the above ex's/activities with muscular fatigue and without increased symptoms but was moving slower due to residual soreness from work. Will continue to monitor and attempt to progress as tolerated.    Pt will continue to benefit from skilled PT intervention. Medical Necessity is demonstrated by:  Continued inability to participate in vocational pursuits, Pain limits function of effected part for some activities, Unable to participate fully in daily activities, Requires skilled supervision to complete and progress HEP and Weakness.    Patient is making good progress towards established goals.    GOALS: Short Term Goals:  4 weeks     - Pt will increase knee extension AROM to 0 degrees in order to show improved functional mobility. Met 9/20/17  - Pt will increase knee flexion ROM by at least 20 degrees in order to show improved functional mobility. Met 9/20/17  - Pt will have normal patellar mobility in all planes in order to promote improved mechanics at the knee. Met 9/20/17  - Pt will be independent and consistent with issued HEP in order to show carryover between therapy sessions. Met 9/20/17     Long Term Goals: 8 weeks     - Pt will report decreased knee pain to 2/10 in order to increase tolerance for  functional mobility.  - Pt will ambulate with normal mechanics on level/unlevel surfaces without deviations or LOB in order to return to PLOF.  - Pt will perform sit<>stand transfers with equal weightbearing B and no UE use in order to return to PLOF.  - Pt will be able to return to work full duty without limitations due to knee pain in order to resume daily activities.  - Pt will be independent with updated HEP to maintain gains following discharge with therapy.  New/Revised goals: None at this time.       Plan:  Continue with established Plan of Care towards PT goals.

## 2017-10-05 ENCOUNTER — CLINICAL SUPPORT (OUTPATIENT)
Dept: REHABILITATION | Facility: HOSPITAL | Age: 42
End: 2017-10-05
Attending: PHYSICIAN ASSISTANT
Payer: COMMERCIAL

## 2017-10-05 DIAGNOSIS — M25.661 DECREASED ROM OF RIGHT KNEE: ICD-10-CM

## 2017-10-05 PROCEDURE — 97110 THERAPEUTIC EXERCISES: CPT | Mod: PO

## 2017-10-05 NOTE — PROGRESS NOTES
"Name: Emily Max  Clinic Number: [id number]  Date of Treatment: 10/05/2017  Diagnosis:   Encounter Diagnosis   Name Primary?    Decreased ROM of right knee        Physician: Aleks Kwok III, *    Evaluation Date: 8/25/17  Visit # authorized: 11 / 30  PTA Visit Number:  1  Authorization period: 12/31/17  Plan of care Expiration: 10/20/17    Time in: 3:07  pm  Time Out: 4:00 am  Total Treatment Time: 53 minutes   Billable Time: 53 minutes       Subjective:    Emily Max reports increased (R) knee soreness due to her first week being back at work. She reports she has been managing her pain at work but it hurts at the end of the day.    Pain: 4-5/10 R knee    Objective     Emily Max was instructed in and performed therapeutic exercises to develop strength, endurance, ROM, flexibility and posture for 55 minutes including:     TM walking 5 minutes 1.6mph--NP  Upright stationary bike x 8 minutes level 1.   Standing gastroc stretch on incline board 3 x 30 sec  Seated hamstring : 3 x 30 seconds   IASTM to medial knee and distal quad x 5 min  Patellar mobs all direction  Stick massage to distal quad x 2 minutes   SLRs: 2 x 10 2#  SL hip Abduction: 2 x 10 3#  Prone quad stretch with belt 3 x 30 sec  TKEs: 2 x 10 on machine 70#  Standing hamstring curls 3# 2 x 10  Side steps: 2 x 20 feet OTB    Ex's/activities not performed today   1/4 squat 2 x 10 (emphasis on R leg upon rising)--NP  Single leg stance: 3 x 20 seconds--NP   Backward walking on treadmill--NP  Atkins's carry 25#  Ea extremity 100' x 4 laps--NP  Leg press 30# 2 x 10--NP  Step downs 2" x 6 * NP due to pain  Sled pushed: x 4 laps with 25# added--NP  Lunges x 10--NP  Step ups with contralateral hip flexion x 20 R 8 inches--NP   Gait training over bolsters with emphasis on proper gait mechanics. Cues to increase knee extension, and to prevent hip hike with knee flexion   SL balance: with ball throw at tramp x 20 "   Quad sets: 20 x 5 seconds- NP  Heel slides: x 5 minutes     Patient receives a cold pack applied to (R) knee x 10 minutes for pain/edema control.      Written Home Exercises Provided: Reviewed current home exercise program consisting of above listed exercises.   Pt demo good understanding of the education provided. Emily Max demonstrated good return demonstration of activities.     Assessment:   Continued with reduced activities today due to increased soreness and some swelling present following her first full week back at work. Pt educated on swelling mgmt including icing and elevating when able. Discussed possibility of dry needling the quads next week due to continued pain and muscular restrictions.   Pt will continue to benefit from skilled PT intervention. Medical Necessity is demonstrated by:  Continued inability to participate in vocational pursuits, Pain limits function of effected part for some activities, Unable to participate fully in daily activities, Requires skilled supervision to complete and progress HEP and Weakness.    Patient is making good progress towards established goals.    GOALS: Short Term Goals:  4 weeks     - Pt will increase knee extension AROM to 0 degrees in order to show improved functional mobility. Met 9/20/17  - Pt will increase knee flexion ROM by at least 20 degrees in order to show improved functional mobility. Met 9/20/17  - Pt will have normal patellar mobility in all planes in order to promote improved mechanics at the knee. Met 9/20/17  - Pt will be independent and consistent with issued HEP in order to show carryover between therapy sessions. Met 9/20/17     Long Term Goals: 8 weeks     - Pt will report decreased knee pain to 2/10 in order to increase tolerance for functional mobility.  - Pt will ambulate with normal mechanics on level/unlevel surfaces without deviations or LOB in order to return to PLOF.  - Pt will perform sit<>stand transfers with equal  weightbearing B and no UE use in order to return to PLOF.  - Pt will be able to return to work full duty without limitations due to knee pain in order to resume daily activities.  - Pt will be independent with updated HEP to maintain gains following discharge with therapy.  New/Revised goals: None at this time.       Plan:  Continue with established Plan of Care towards PT goals.

## 2017-10-09 ENCOUNTER — CLINICAL SUPPORT (OUTPATIENT)
Dept: REHABILITATION | Facility: HOSPITAL | Age: 42
End: 2017-10-09
Attending: PHYSICIAN ASSISTANT
Payer: COMMERCIAL

## 2017-10-09 DIAGNOSIS — M25.661 DECREASED ROM OF RIGHT KNEE: ICD-10-CM

## 2017-10-09 PROCEDURE — 97110 THERAPEUTIC EXERCISES: CPT | Mod: PO

## 2017-10-09 NOTE — PROGRESS NOTES
"Name: Emily Max  Clinic Number: [id number]  Date of Treatment: 10/09/2017  Diagnosis:   Encounter Diagnosis   Name Primary?    Decreased ROM of right knee        Physician: Aleks Kwok III, *    Evaluation Date: 8/25/17  Visit # authorized: 12 / 30  PTA Visit Number:    Authorization period: 12/31/17  Plan of care Expiration: 10/20/17    Time in: 05:10  pm  Time Out: 06:00 pm   Total Treatment Time: 50 minutes   Billable Time: 50 minutes       Subjective:    Emily Max reports a sharp pain with ambulation on her R medial knee and behind the patella. In sitting, no pain reported.    Pain: 7/10 R knee with ambulation    Objective     Emily Max was instructed in and performed therapeutic exercises to develop strength, endurance, ROM, flexibility and posture for 55 minutes including:     TM walking 5 minutes 1.6mph--NP  Upright stationary bike x 8 minutes level 1.   Standing gastroc stretch on incline board 3 x 30 sec  Seated hamstring : 3 x 30 seconds   IASTM to medial knee and distal quad x 5 min  Patellar mobs all direction  Stick massage to distal quad x 2 minutes   Tibiofemoral distraction  SLRs: 2 x 10 2#  SL hip Abduction: 2 x 10 3#  Prone quad stretch with belt 3 x 30 sec  TKEs: 2 x 10 on machine 70#  Standing hamstring curls 3# 2 x 10  Side steps: 2 x 20 feet OTB  Trial with kinesiotape in star pattern for unloading    Ex's/activities not performed today   1/4 squat 2 x 10 (emphasis on R leg upon rising)--NP  Single leg stance: 3 x 20 seconds--NP   Backward walking on treadmill--NP  Atkins's carry 25#  Ea extremity 100' x 4 laps--NP  Leg press 30# 2 x 10--NP  Step downs 2" x 6 * NP due to pain  Sled pushed: x 4 laps with 25# added--NP  Lunges x 10--NP  Step ups with contralateral hip flexion x 20 R 8 inches--NP   Gait training over bolsters with emphasis on proper gait mechanics. Cues to increase knee extension, and to prevent hip hike with knee flexion   SL " balance: with ball throw at tramp x 20   Quad sets: 20 x 5 seconds- NP  Heel slides: x 5 minutes     Patient receives a cold pack applied to (R) knee x 10 minutes for pain/edema control. - NP     Written Home Exercises Provided: Reviewed current home exercise program consisting of above listed exercises.   Pt demo good understanding of the education provided. Emily Max demonstrated good return demonstration of activities.     Assessment:   Pt still having increased sharp knee pain at this time limiting her participation in therapy exercises. Slight relief in pain reported with tibiofemoral distraction manual therapy. Trial with kinesiotape to medial knee for tissue unloading and to promote healing. Pt will continue to benefit from skilled PT intervention. Medical Necessity is demonstrated by:  Continued inability to participate in vocational pursuits, Pain limits function of effected part for some activities, Unable to participate fully in daily activities, Requires skilled supervision to complete and progress HEP and Weakness.    Patient is making good progress towards established goals.    GOALS: Short Term Goals:  4 weeks     - Pt will increase knee extension AROM to 0 degrees in order to show improved functional mobility. Met 9/20/17  - Pt will increase knee flexion ROM by at least 20 degrees in order to show improved functional mobility. Met 9/20/17  - Pt will have normal patellar mobility in all planes in order to promote improved mechanics at the knee. Met 9/20/17  - Pt will be independent and consistent with issued HEP in order to show carryover between therapy sessions. Met 9/20/17     Long Term Goals: 8 weeks     - Pt will report decreased knee pain to 2/10 in order to increase tolerance for functional mobility.  - Pt will ambulate with normal mechanics on level/unlevel surfaces without deviations or LOB in order to return to PLOF.  - Pt will perform sit<>stand transfers with equal  weightbearing B and no UE use in order to return to PLOF.  - Pt will be able to return to work full duty without limitations due to knee pain in order to resume daily activities.  - Pt will be independent with updated HEP to maintain gains following discharge with therapy.  New/Revised goals: None at this time.       Plan:  Continue with established Plan of Care towards PT goals.

## 2017-10-11 ENCOUNTER — OFFICE VISIT (OUTPATIENT)
Dept: DERMATOLOGY | Facility: CLINIC | Age: 42
End: 2017-10-11
Payer: COMMERCIAL

## 2017-10-11 DIAGNOSIS — L71.9 ROSACEA: ICD-10-CM

## 2017-10-11 DIAGNOSIS — L82.1 SK (SEBORRHEIC KERATOSIS): ICD-10-CM

## 2017-10-11 DIAGNOSIS — L81.4 LENTIGO: Primary | ICD-10-CM

## 2017-10-11 DIAGNOSIS — D22.9 NEVUS: ICD-10-CM

## 2017-10-11 PROCEDURE — 99999 PR PBB SHADOW E&M-EST. PATIENT-LVL II: CPT | Mod: PBBFAC,,, | Performed by: DERMATOLOGY

## 2017-10-11 PROCEDURE — 99214 OFFICE O/P EST MOD 30 MIN: CPT | Mod: S$GLB,,, | Performed by: DERMATOLOGY

## 2017-10-11 NOTE — PROGRESS NOTES
Dictation #1  MRN:[id number]  [facility]:[id number]    Subjective:       Patient ID:  Emily Max is a 42 y.o. female who presents for   Chief Complaint   Patient presents with    Skin Check     Pt presents for skin check.  Overdue.  Has hx of mole removed on ear at last appt. .  No problems with the scar and no pigment recurrence at this site. - mildly atypical per bx 12/2015  Wants recommendations on skin care for face.  C/o eyelid drooping.  Would like a cream for help with this.         Review of Systems   Constitutional: Negative for fever, chills, fatigue and malaise.   Skin: Positive for activity-related sunscreen use. Negative for daily sunscreen use.        Objective:    Physical Exam   Constitutional: She appears well-developed and well-nourished. No distress.   Neurological: She is alert and oriented to person, place, and time. She is not disoriented.   Psychiatric: She has a normal mood and affect.   Skin:   Areas Examined (abnormalities noted in diagram):   Scalp / Hair Palpated and Inspected  Head / Face Inspection Performed  Neck Inspection Performed  Chest / Axilla Inspection Performed  Abdomen Inspection Performed  Genitals / Buttocks / Groin Inspection Performed  Back Inspection Performed  RUE Inspected  LUE Inspection Performed  RLE Inspected  LLE Inspection Performed  Nails and Digits Inspection Performed                   Diagram Legend     Erythematous scaling macule/papule c/w actinic keratosis       Vascular papule c/w angioma      Pigmented verrucoid papule/plaque c/w seborrheic keratosis      Yellow umbilicated papule c/w sebaceous hyperplasia      Irregularly shaped tan macule c/w lentigo     1-2 mm smooth white papules consistent with Milia      Movable subcutaneous cyst with punctum c/w epidermal inclusion cyst      Subcutaneous movable cyst c/w pilar cyst      Firm pink to brown papule c/w dermatofibroma      Pedunculated fleshy papule(s) c/w skin tag(s)      Evenly pigmented macule  c/w junctional nevus     Mildly variegated pigmented, slightly irregular-bordered macule c/w mildly atypical nevus      Flesh colored to evenly pigmented papule c/w intradermal nevus       Pink pearly papule/plaque c/w basal cell carcinoma      Erythematous hyperkeratotic cursted plaque c/w SCC      Surgical scar with no sign of skin cancer recurrence      Open and closed comedones      Inflammatory papules and pustules      Verrucoid papule consistent consistent with wart     Erythematous eczematous patches and plaques     Dystrophic onycholytic nail with subungual debris c/w onychomycosis     Umbilicated papule    Erythematous-base heme-crusted tan verrucoid plaque consistent with inflamed seborrheic keratosis     Erythematous Silvery Scaling Plaque c/w Psoriasis     See annotation      Assessment / Plan:        Lentigo  This is a benign hyperpigmented sun induced lesion. Daily sun protection will reduce the number of new lesions. Treatment of these benign lesions are considered cosmetic.  The nature of sun-induced photo-aging and skin cancers is discussed.  Sun avoidance, protective clothing, and the use of 30-SPF sunscreens is advised. Observe closely for skin damage/changes, and call if such occurs.    Nevus  Discussed ABCDE's of nevi.  Monitor for new mole or moles that are becoming bigger, darker, irritated, or developing irregular borders. Brochure provided.    SK (seborrheic keratosis)  These are benign inherited growths without a malignant potential. Reassurance given to patient. No treatment is necessary.     Rosacea  Vit c lotion in am   Sunscreen qam   DEJ face and eye  -     azelaic acid (FINACEA) 15 % Foam; Apply thin film to face qhs  Dispense: 50 g; Refill: 6    Area of previous atypical nevus/nevi examined. Site well healed with no signs of recurrence.    Total body skin examination performed today including at least 12 points as noted in physical examination. No lesions suspicious for atypical  nevi noted.             Return in about 1 year (around 10/11/2018).

## 2017-10-18 ENCOUNTER — TELEPHONE (OUTPATIENT)
Dept: CARDIOLOGY | Facility: HOSPITAL | Age: 42
End: 2017-10-18

## 2017-10-18 DIAGNOSIS — I87.2 VENOUS INSUFFICIENCY OF RIGHT LOWER EXTREMITY: Primary | ICD-10-CM

## 2017-10-20 ENCOUNTER — TELEPHONE (OUTPATIENT)
Dept: SPORTS MEDICINE | Facility: CLINIC | Age: 42
End: 2017-10-20

## 2017-10-20 NOTE — TELEPHONE ENCOUNTER
I called and left the patient a voicemail asking her to return the call to reschedule her appointment. I informed her in the VM that I would be canceling the appointment as of today and encouraged her to call back to get back on the schedule

## 2017-10-24 ENCOUNTER — HOSPITAL ENCOUNTER (OUTPATIENT)
Dept: RADIOLOGY | Facility: HOSPITAL | Age: 42
Discharge: HOME | End: 2017-10-24
Attending: INTERNAL MEDICINE
Payer: COMMERCIAL

## 2017-10-24 DIAGNOSIS — I87.2 VENOUS INSUFFICIENCY OF RIGHT LOWER EXTREMITY: ICD-10-CM

## 2017-10-24 PROCEDURE — 93970 EXTREMITY STUDY: CPT | Mod: TC,RT

## 2017-10-24 PROCEDURE — 93970 EXTREMITY STUDY: CPT | Mod: 26,RT,, | Performed by: RADIOLOGY

## 2017-10-26 ENCOUNTER — CLINICAL SUPPORT (OUTPATIENT)
Dept: REHABILITATION | Facility: HOSPITAL | Age: 42
End: 2017-10-26
Attending: PHYSICIAN ASSISTANT
Payer: COMMERCIAL

## 2017-10-26 DIAGNOSIS — M25.661 DECREASED ROM OF RIGHT KNEE: ICD-10-CM

## 2017-10-26 PROCEDURE — 97110 THERAPEUTIC EXERCISES: CPT | Mod: PO

## 2017-10-26 PROCEDURE — 97033 APP MDLTY 1+IONTPHRSIS EA 15: CPT | Mod: PO

## 2017-10-26 NOTE — PROGRESS NOTES
Name: Emily Max  Clinic Number: [id number]  Date of Treatment: 10/26/2017  Diagnosis:   Encounter Diagnosis   Name Primary?    Decreased ROM of right knee        Physician: Aleks Kwok III, *    Evaluation Date: 8/25/17  Visit # authorized: 13 / 30  PTA Visit Number:    Authorization period: 12/31/17  Plan of care Expiration: 10/20/17    Time in: 0305  pm  Time Out: 06:00 pm   Total Treatment Time: 50 minutes   Billable Time: 50 minutes       Subjective:    Emily Max reports a continued sharp pain in medial joint line with knee extension and loading in stance. Pain is severe and limiting at most times. Increased L hip pain reported as she does not bear full weight through her R LE due to pain.    Pain: 7/10 R knee with ambulation    Objective     Emily Max was instructed in and performed therapeutic exercises to develop strength, endurance, ROM, flexibility and posture for 30 minutes including:     Upright stationary bike x 8 minutes level 1.   Standing gastroc stretch on incline board 3 x 30 sec  Seated hamstring : 3 x 30 seconds   IASTM to medial knee and distal quad x 5 min  Patellar mobs all direction  Stick massage to distal quad x 2 minutes   Tibiofemoral distraction  SLRs: 2 x 10 2#  SL hip Abduction: 2 x 10 3#  Prone quad stretch with belt 3 x 30 sec  Iontophoresis with 1.0 ml dexamethasone to medial joint line (4 hour release patch)      Discussed upcoming MD appt. PT going to contact MD about poor outcomes at this time.    Unable to tolerate:  Cybex hip machine abduction, flex, extension (unable to tolerate)  TKEs: 2 x 10 on machine 70#  Standing hamstring curls 3# 2 x 10  Side steps: 2 x 20 feet OTB        Ex's/activities not performed today   1/4 squat 2 x 10 (emphasis on R leg upon rising)--NP  Single leg stance: 3 x 20 seconds--NP   Backward walking on treadmill--NP  Atkins's carry 25#  Ea extremity 100' x 4 laps--NP  Leg press 30# 2 x 10--NP  Step  "downs 2" x 6 * NP due to pain  Sled pushed: x 4 laps with 25# added--NP  Lunges x 10--NP  Step ups with contralateral hip flexion x 20 R 8 inches--NP   Gait training over bolsters with emphasis on proper gait mechanics. Cues to increase knee extension, and to prevent hip hike with knee flexion   SL balance: with ball throw at tramp x 20   Quad sets: 20 x 5 seconds- NP  Heel slides: x 5 minutes     Patient receives a cold pack applied to (R) knee x 10 minutes for pain/edema control. - NP     Written Home Exercises Provided: Reviewed current home exercise program consisting of above listed exercises.   Pt demo good understanding of the education provided. Emily Barajas Max demonstrated good return demonstration of activities.     Assessment:   Pt unable to tolerate any transition from knee flexion to extension ROM without severe pain. In addition, she cannot bear weight through her R LE during full extension, and she has difficulty with the transition from swing through to initial contact and weightbearing. Pt reports shehas severe pain on a daily basis and is struggling at work due to pain. She has also began having L hip pain due to compensation patterns. PT is limited at this time as pt cannot tolerate weightbearing activities. She meets with her MD next week. PT will send in basket to the MD regarding poor outcome from surgery. Medical Necessity is demonstrated by:  Continued inability to participate in vocational pursuits, Pain limits function of effected part for some activities, Unable to participate fully in daily activities, Requires skilled supervision to complete and progress HEP and Weakness.    Patient is making good progress towards established goals.    GOALS: Short Term Goals:  4 weeks     - Pt will increase knee extension AROM to 0 degrees in order to show improved functional mobility. Met 9/20/17  - Pt will increase knee flexion ROM by at least 20 degrees in order to show improved functional " mobility. Met 9/20/17  - Pt will have normal patellar mobility in all planes in order to promote improved mechanics at the knee. Met 9/20/17  - Pt will be independent and consistent with issued HEP in order to show carryover between therapy sessions. Met 9/20/17     Long Term Goals: 8 weeks     - Pt will report decreased knee pain to 2/10 in order to increase tolerance for functional mobility.  - Pt will ambulate with normal mechanics on level/unlevel surfaces without deviations or LOB in order to return to PLOF.  - Pt will perform sit<>stand transfers with equal weightbearing B and no UE use in order to return to PLOF.  - Pt will be able to return to work full duty without limitations due to knee pain in order to resume daily activities.  - Pt will be independent with updated HEP to maintain gains following discharge with therapy.  New/Revised goals: None at this time.       Plan:  Continue with established Plan of Care towards PT goals.

## 2017-11-01 ENCOUNTER — OFFICE VISIT (OUTPATIENT)
Dept: SPORTS MEDICINE | Facility: CLINIC | Age: 42
End: 2017-11-01
Payer: COMMERCIAL

## 2017-11-01 VITALS
SYSTOLIC BLOOD PRESSURE: 124 MMHG | BODY MASS INDEX: 38.65 KG/M2 | HEIGHT: 65 IN | WEIGHT: 232 LBS | DIASTOLIC BLOOD PRESSURE: 88 MMHG | HEART RATE: 93 BPM

## 2017-11-01 DIAGNOSIS — M17.11 ARTHRITIS OF RIGHT KNEE: Primary | ICD-10-CM

## 2017-11-01 DIAGNOSIS — M17.10 PRIMARY LOCALIZED OSTEOARTHROSIS OF LOWER LEG, UNSPECIFIED LATERALITY: ICD-10-CM

## 2017-11-01 PROCEDURE — 99024 POSTOP FOLLOW-UP VISIT: CPT | Mod: S$GLB,,, | Performed by: ORTHOPAEDIC SURGERY

## 2017-11-01 PROCEDURE — 99999 PR PBB SHADOW E&M-EST. PATIENT-LVL III: CPT | Mod: PBBFAC,,, | Performed by: ORTHOPAEDIC SURGERY

## 2017-11-01 NOTE — PROGRESS NOTES
HISTORY OF PRESENT ILLNESS:   Pt is here today for operative followup of her knee arthroscopy.  she is doing well.  We have reviewed her findings and discussed plan of care and future treatment options.      Doing worse with pain and walking    DATE OF PROCEDURE: 08/24/2017  PROCEDURE PERFORMED:   right  1. knee arthroscopic chondroplasty (CPT 07580)  2. knee arthroscopic medial (CPT 05340) meniscectomy   3. knee arthroscopic partial synovectomy/debridement (CPT 28885).   4. knee arthroscopic plica excision(CPT 44083).    5. Knee arthroscopic-assisted arthrocentesis of viable structural human allograft tissue      matrix ([employer]) (CPT 93222)              6. Knee arthroscopic loose body removal (CPT 81474)    In the patellofemoral compartment, there was chondral damage to:  Patella 10 x 10 mm grade 2  Trochlea 10 x 10 mm grade 2  Chondroplasty was performed using arthroscopic shaver.     There was chondral damage to:  Medial femoral condyle 10 x 15 mm grade 4  Medial tibial plateau 15 x 10 mm grade 2  Chondroplasty was performed using arthroscopic shaver.     There was chondral damage to:  Lateral tibial plateau 15 x 10 mm grade 2  Chondroplasty was performed using arthroscopic shaver.     Loose bodies measuring 13 x 5 x 7 mm and 10 x 4 x 2 mm  mm were removed from medial compartment using arthroscopic grasper.                                                                                  PHYSICAL EXAMINATION:     Incision sites healed well  No evidence of any erythema, infection or induration  Range of motion 0-120 degrees  Minimal effusion  2+ DP pulse  No swelling, no calf tenderness  - Jackie's sign  Negative medial joint line tendernes  Moderate quad atrophy                                                                                 ASSESSMENT:                                                                                                                                               1. Status post  above                                                                                           PLAN:                                                                                                                                                     1. Continue with PT  2. Emphasized quad function.  3. I have discussed return to activity in detail.  4. she will see us back in 6 weeks.                                      5. All questions were answered and she should contact us if she  has any questions or concerns in the interim.                 Goal is 145 lbs, was 230 lbs, now 225 lbs in last month    OA right knee  synvisc series with Jean

## 2017-11-02 ENCOUNTER — PATIENT MESSAGE (OUTPATIENT)
Dept: SPORTS MEDICINE | Facility: CLINIC | Age: 42
End: 2017-11-02

## 2017-11-03 ENCOUNTER — TELEPHONE (OUTPATIENT)
Dept: SPORTS MEDICINE | Facility: CLINIC | Age: 42
End: 2017-11-03

## 2017-11-03 NOTE — TELEPHONE ENCOUNTER
Left a message for the patient that I did put intermittent leave on her FMLA. I put that she could leave for PT 2 x a week until 11/4. I told them to allow her 3 hrs a day. If she needs more intermittent leave or if she needs me to refill out the paperwork then she can have them fax new forms and we can refill them out for her.    She Roth MA  [facility]

## 2017-11-06 DIAGNOSIS — F33.1 MODERATE EPISODE OF RECURRENT MAJOR DEPRESSIVE DISORDER: ICD-10-CM

## 2017-11-06 DIAGNOSIS — F41.1 GAD (GENERALIZED ANXIETY DISORDER): ICD-10-CM

## 2017-11-06 RX ORDER — BUPROPION HYDROCHLORIDE 150 MG/1
150 TABLET ORAL DAILY
Qty: 90 TABLET | Refills: 0 | Status: SHIPPED | OUTPATIENT
Start: 2017-11-06 | End: 2017-11-17 | Stop reason: DRUGHIGH

## 2017-11-06 NOTE — TELEPHONE ENCOUNTER
This is a Dr. Cotton patient, I okayed her prescription one time.  She needs to establish with a new doctor, please contact her to let her know.  Thank you

## 2017-11-08 ENCOUNTER — TELEPHONE (OUTPATIENT)
Dept: SPORTS MEDICINE | Facility: CLINIC | Age: 42
End: 2017-11-08

## 2017-11-08 NOTE — TELEPHONE ENCOUNTER
----- Message from Dionte Rendon sent at 11/8/2017 12:04 PM CST -----  Contact: self  Pt is returning call regarding a voicemail she received about injections

## 2017-11-15 ENCOUNTER — PATIENT MESSAGE (OUTPATIENT)
Dept: SPORTS MEDICINE | Facility: CLINIC | Age: 42
End: 2017-11-15

## 2017-11-17 ENCOUNTER — OFFICE VISIT (OUTPATIENT)
Dept: PSYCHIATRY | Facility: CLINIC | Age: 42
End: 2017-11-17
Payer: COMMERCIAL

## 2017-11-17 VITALS
WEIGHT: 228 LBS | SYSTOLIC BLOOD PRESSURE: 129 MMHG | HEIGHT: 65 IN | HEART RATE: 101 BPM | DIASTOLIC BLOOD PRESSURE: 92 MMHG | BODY MASS INDEX: 37.99 KG/M2

## 2017-11-17 DIAGNOSIS — F41.1 GAD (GENERALIZED ANXIETY DISORDER): Primary | ICD-10-CM

## 2017-11-17 DIAGNOSIS — F98.8 ATTENTION DEFICIT DISORDER, UNSPECIFIED HYPERACTIVITY PRESENCE: ICD-10-CM

## 2017-11-17 DIAGNOSIS — F33.1 MODERATE EPISODE OF RECURRENT MAJOR DEPRESSIVE DISORDER: ICD-10-CM

## 2017-11-17 PROCEDURE — 90833 PSYTX W PT W E/M 30 MIN: CPT | Mod: S$GLB,,, | Performed by: NURSE PRACTITIONER

## 2017-11-17 PROCEDURE — 99999 PR PBB SHADOW E&M-EST. PATIENT-LVL II: CPT | Mod: PBBFAC,,, | Performed by: NURSE PRACTITIONER

## 2017-11-17 PROCEDURE — 99214 OFFICE O/P EST MOD 30 MIN: CPT | Mod: S$GLB,,, | Performed by: NURSE PRACTITIONER

## 2017-11-17 RX ORDER — BUPROPION HYDROCHLORIDE 150 MG/1
TABLET, EXTENDED RELEASE ORAL
Qty: 60 TABLET | Refills: 5 | Status: SHIPPED | OUTPATIENT
Start: 2017-11-17 | End: 2017-11-17

## 2017-11-17 RX ORDER — DULOXETIN HYDROCHLORIDE 30 MG/1
30 CAPSULE, DELAYED RELEASE ORAL 2 TIMES DAILY
Qty: 60 CAPSULE | Refills: 5 | Status: SHIPPED | OUTPATIENT
Start: 2017-11-17 | End: 2018-05-15 | Stop reason: SDUPTHER

## 2017-11-17 RX ORDER — BUPROPION HYDROCHLORIDE 150 MG/1
TABLET, EXTENDED RELEASE ORAL
Qty: 60 TABLET | Refills: 5 | Status: SHIPPED | OUTPATIENT
Start: 2017-11-17 | End: 2018-05-08

## 2017-11-17 NOTE — PROGRESS NOTES
There is an accessory vein that we can treat to alleviate the residual reflux noted      Otherwise good news      Thanks      ZN

## 2017-11-20 NOTE — PROGRESS NOTES
Outpatient Psychiatry Follow-Up Visit (MD/NP)    11/17/2017    Clinical Status of Patient:  Outpatient (Ambulatory)    Chief Complaint:  Emily Max is a 42 y.o. female who presents today for follow-up of depression and anxiety.  Met with patient. Her last visit was with Dr. Charlton on  9/26/17.  Chart reviewed.       Interval History and Content of Current Session:  Interim Events/Subjective Report/Content of Current Session:   Current Psychiatric Medications  · Wellbutrin  mg po daily  · Cymbalta 30 mg po BID  · Xanax 0.5 mg po daily PRN (uses rarely)    Pt reports symptoms of poor focus/inattention, insomnia, and cycles of depression.  Pt reports increased sweating which has been exacerbated in the past by medications.  Compliant with meds.  Pt was open to suggestion of switching Wellbutrin for XL to SR and trying increased dose. Insomnia may be due to Wellbutrin Xl.  Denies SI/HI.  Rare use of Xanax.        Psychotherapy:  · Target symptoms: depression, lack of focus, anxiety   · Why chosen therapy is appropriate versus another modality: relevant to diagnosis  · Outcome monitoring methods: self-report  · Therapeutic intervention type: insight oriented psychotherapy  · Topics discussed/themes: building skills sets for symptom management, symptom recognition  · The patient's response to the intervention is accepting. The patient's progress toward treatment goals is good.   · Duration of intervention: 20 minutes.    Review of Systems   · PSYCHIATRIC: Pertinant items are noted in the narrative.  · CONSTITUTIONAL: No weight gain or loss.   · MUSCULOSKELETAL: No pain or stiffness of the joints.  · NEUROLOGIC: No weakness, sensory changes, seizures, confusion, memory loss, tremor or other abnormal movements.  · EYES: No exophthalmos, jaundice or blindness.  · ENT: No dizziness, tinnitus or hearing loss.  · RESPIRATORY: No shortness of breath.  · CARDIOVASCULAR: No tachycardia or chest  "pain.  · GASTROINTESTINAL: No nausea, vomiting, pain, constipation or diarrhea.  · GENITOURINARY: No frequency, dysuria or sexual dysfunction.  · HEMATOLOGIC/LYMPHATIC: No excessive bleeding, prolonged or excessive bleeding after dental extraction/injury.  · ALLERGIC/IMMUNOLOGIC: No allergic response to materials, foods or animals at this time.    Past Medical, Family and Social History: The patient's past medical, family and social history have been reviewed and updated as appropriate within the electronic medical record - see encounter notes.    Compliance: yes    Side effects: None    Risk Parameters:  Patient reports no suicidal ideation  Patient reports no homicidal ideation  Patient reports no self-injurious behavior  Patient reports no violent behavior    Exam (detailed: at least 9 elements; comprehensive: all 15 elements)   Constitutional  Vitals:  Most recent vital signs, dated less than 90 days prior to this appointment, were reviewed.   Vitals:    11/17/17 0820   BP: (!) 129/92   Pulse: 101   Weight: 103.4 kg (228 lb)   Height: 5' 5" (1.651 m)        General:  unremarkable, age appropriate     Musculoskeletal  Muscle Strength/Tone:  no tremor, no tic   Gait & Station:  non-ataxic     Psychiatric  Speech:  no latency; no press   Mood & Affect:  anxious  congruent and appropriate   Thought Process:  normal and logical   Associations:  intact   Thought Content:  normal, no suicidality, no homicidality, delusions, or paranoia   Insight:  intact   Judgement: behavior is adequate to circumstances   Orientation:  grossly intact   Memory: intact for content of interview   Language: grossly intact   Attention Span & Concentration:  able to focus   Fund of Knowledge:  intact and appropriate to age and level of education     Assessment and Diagnosis   Status/Progress: Based on the examination today, the patient's problem(s) is/are adequately but not ideally controlled.  New problems have not been presented today.   " Co-morbidities and Lack of compliance are not complicating management of the primary condition.  There are no active rule-out diagnoses for this patient at this time.     General Impression:       ICD-10-CM ICD-9-CM   1. REGINA (generalized anxiety disorder) F41.1 300.02   2. Moderate episode of recurrent major depressive disorder F33.1 296.32   3. Attention deficit disorder, unspecified hyperactivity presence F98.8 314.00       Intervention/Counseling/Treatment Plan   · Medication Management: The risks and benefits of medication were discussed with the patient.   · Change to Wellbutrin  mg po daily x 5 days then increase to BID (morning and noon)  · Continue Cymbalta 30 mg po BID  · Pt has sufficient Xanax.  Continue as prescribed.  · Follow up with Dr. Charlton in January      Return to Clinic: as scheduled

## 2017-11-21 ENCOUNTER — PATIENT MESSAGE (OUTPATIENT)
Dept: SPORTS MEDICINE | Facility: CLINIC | Age: 42
End: 2017-11-21

## 2017-11-22 ENCOUNTER — CLINICAL SUPPORT (OUTPATIENT)
Dept: REHABILITATION | Facility: HOSPITAL | Age: 42
End: 2017-11-22
Attending: PHYSICIAN ASSISTANT
Payer: COMMERCIAL

## 2017-11-22 DIAGNOSIS — M25.661 DECREASED ROM OF RIGHT KNEE: Primary | ICD-10-CM

## 2017-11-22 PROCEDURE — 97110 THERAPEUTIC EXERCISES: CPT | Mod: PO

## 2017-11-22 NOTE — PROGRESS NOTES
"Name: Emily Max  Clinic Number: [id number]  Date of Treatment: 11/22/2017  Diagnosis:   Encounter Diagnosis   Name Primary?    Decreased ROM of right knee Yes       Physician: Aleks Kwok III, *    Evaluation Date: 8/25/17  Visit # authorized: 14 / 30  PTA Visit Number:  1  Authorization period: 12/31/17  Plan of care Expiration: 10/20/17    Time in: 07:00 am  Time Out: 08:00 am   Total Treatment Time: 60 minutes   Billable Time: 60 minutes       Subjective:    Emily Max reports missing the last few weeks of therapy due to work. Rates pain 4 out of 10. Reports min compliance with HEP due to busy work schedule. Saw MD, and doctor states her pain is from serve arthritis,    Pain: 4 / 10 R knee with ambulation    Objective     Emily Max was instructed in and performed therapeutic exercises to develop strength, endurance, ROM, flexibility and posture for 30 minutes including:     Upright stationary bike x 8 minutes level 1.   Standing gastroc stretch on incline board 3 x 30 sec  Seated hamstring : 3 x 30 seconds   IASTM to medial knee and distal quad x 5 min  Patellar mobs all direction  Stick massage to distal quad x 2 minutes   Tibiofemoral distraction  SLRs: 2 x 10 2#  SL hip Abduction: 2 x 10 3#  Prone quad stretch with belt 3 x 30 sec  Iontophoresis with 1.0 ml dexamethasone to medial joint line (4 hour release patch)      Unable to tolerate:  Cybex hip machine abduction, flex, extension (unable to tolerate)  TKEs: 2 x 10 on machine 70#  Standing hamstring curls 3# 2 x 10  Side steps: 2 x 20 feet OTB        Ex's/activities not performed today   1/4 squat 2 x 10 (emphasis on R leg upon rising)--NP  Single leg stance: 3 x 20 seconds--NP   Backward walking on treadmill--NP  Atkins's carry 25#  Ea extremity 100' x 4 laps--NP  Leg press 30# 2 x 10--NP  Step downs 2" x 6 * NP due to pain  Sled pushed: x 4 laps with 25# added--NP  Lunges x 10--NP  Step ups with " contralateral hip flexion x 20 R 8 inches--NP   Gait training over bolsters with emphasis on proper gait mechanics. Cues to increase knee extension, and to prevent hip hike with knee flexion   SL balance: with ball throw at tramp x 20   Quad sets: 20 x 5 seconds- NP  Heel slides: x 5 minutes     Patient receives a cold pack applied to (R) knee x 10 minutes for pain/edema control. - NP     Written Home Exercises Provided: Reviewed current home exercise program consisting of above listed exercises.Given additional print out for home use.    Pt demo good understanding of the education provided. Emily Max demonstrated good return demonstration of activities.     Assessment:   Patient with fair tolerance for therapy session. Remains limited with progression due to continued knee pain.  Medical Necessity is demonstrated by:  Continued inability to participate in vocational pursuits, Pain limits function of effected part for some activities, Unable to participate fully in daily activities, Requires skilled supervision to complete and progress HEP and Weakness.    Patient is making good progress towards established goals.    GOALS: Short Term Goals:  4 weeks     - Pt will increase knee extension AROM to 0 degrees in order to show improved functional mobility. Met 9/20/17  - Pt will increase knee flexion ROM by at least 20 degrees in order to show improved functional mobility. Met 9/20/17  - Pt will have normal patellar mobility in all planes in order to promote improved mechanics at the knee. Met 9/20/17  - Pt will be independent and consistent with issued HEP in order to show carryover between therapy sessions. Met 9/20/17     Long Term Goals: 8 weeks     - Pt will report decreased knee pain to 2/10 in order to increase tolerance for functional mobility.  - Pt will ambulate with normal mechanics on level/unlevel surfaces without deviations or LOB in order to return to PLOF.  - Pt will perform sit<>stand  transfers with equal weightbearing B and no UE use in order to return to PLOF.  - Pt will be able to return to work full duty without limitations due to knee pain in order to resume daily activities.  - Pt will be independent with updated HEP to maintain gains following discharge with therapy.  New/Revised goals: None at this time.       Plan:  Continue with established Plan of Care towards PT goals.

## 2017-11-27 ENCOUNTER — PATIENT MESSAGE (OUTPATIENT)
Dept: SPORTS MEDICINE | Facility: CLINIC | Age: 42
End: 2017-11-27

## 2017-11-27 RX ORDER — LEVOCETIRIZINE DIHYDROCHLORIDE 5 MG/1
TABLET, FILM COATED ORAL
Qty: 90 TABLET | Refills: 0 | Status: SHIPPED | OUTPATIENT
Start: 2017-11-27 | End: 2018-02-28 | Stop reason: SDUPTHER

## 2017-12-01 ENCOUNTER — OFFICE VISIT (OUTPATIENT)
Dept: SPORTS MEDICINE | Facility: CLINIC | Age: 42
End: 2017-12-01
Payer: COMMERCIAL

## 2017-12-01 ENCOUNTER — CLINICAL SUPPORT (OUTPATIENT)
Dept: REHABILITATION | Facility: HOSPITAL | Age: 42
End: 2017-12-01
Attending: PHYSICIAN ASSISTANT
Payer: COMMERCIAL

## 2017-12-01 VITALS
HEART RATE: 88 BPM | WEIGHT: 228 LBS | DIASTOLIC BLOOD PRESSURE: 80 MMHG | HEIGHT: 65 IN | SYSTOLIC BLOOD PRESSURE: 130 MMHG | BODY MASS INDEX: 37.99 KG/M2

## 2017-12-01 DIAGNOSIS — M25.661 DECREASED ROM OF RIGHT KNEE: ICD-10-CM

## 2017-12-01 DIAGNOSIS — G89.29 CHRONIC PAIN OF RIGHT KNEE: ICD-10-CM

## 2017-12-01 DIAGNOSIS — M17.11 PRIMARY OSTEOARTHRITIS OF RIGHT KNEE: Primary | ICD-10-CM

## 2017-12-01 DIAGNOSIS — M25.561 CHRONIC PAIN OF RIGHT KNEE: ICD-10-CM

## 2017-12-01 PROCEDURE — 97110 THERAPEUTIC EXERCISES: CPT | Mod: PO

## 2017-12-01 PROCEDURE — 97140 MANUAL THERAPY 1/> REGIONS: CPT | Mod: PO

## 2017-12-01 PROCEDURE — 99499 UNLISTED E&M SERVICE: CPT | Mod: S$GLB,,, | Performed by: PHYSICIAN ASSISTANT

## 2017-12-01 PROCEDURE — 20610 DRAIN/INJ JOINT/BURSA W/O US: CPT | Mod: RT,S$GLB,, | Performed by: PHYSICIAN ASSISTANT

## 2017-12-01 PROCEDURE — 99999 PR PBB SHADOW E&M-EST. PATIENT-LVL IV: CPT | Mod: PBBFAC,,, | Performed by: PHYSICIAN ASSISTANT

## 2017-12-01 NOTE — PROGRESS NOTES
Name: Emily Max  Clinic Number: [id number]  Date of Treatment: 12/01/2017  Diagnosis:   Encounter Diagnosis   Name Primary?    Decreased ROM of right knee        Physician: Aleks Kwok III, *    Evaluation Date: 8/25/17  Visit # authorized: 15 / 30  PTA Visit Number:    Authorization period: 12/31/17  Plan of care Expiration: 1/26/17    Time in: 07:20 am  Time Out: 08:00 am   Total Treatment Time: 40 minutes   Billable Time: 40 minutes       Subjective:    Emily Max reports her knee is staying status quo. She is having her first synvisc injection today.    Pain: 4 / 10 R knee with ambulation    Objective    Knee flexion ROM:    Lower Extremity Strength  Right LE  Left LE    Knee extension: 4/5 Knee extension: 5/5   Knee flexion: 4+/5 Knee flexion: 5/5   Hip flexion: 4/5 Hip flexion: 5/5   Hip extension:  NT Hip extension: NT   Hip abduction: 4/5 Hip abduction: 4/5   Hip adduction: 3+/5 Hip adduction 4/5   Ankle dorsiflexion: 4/5 Ankle dorsiflexion: 5/5   Ankle plantarflexion: NT Ankle plantarflexion: NT       Function:    - SLS R: 10 seconds  - SLS L: 20 seconds  - Squat: NT   - Sit <--> Stand: B UE assist, improved weightbearing between extremities  - Bed Mobility: mod I    Palpation: + TTP medial joint line and medial patella     Emily Max was instructed in and performed therapeutic exercises to develop strength, endurance, ROM, flexibility and posture for 25 minutes including:     Upright stationary bike x 5 minutes level 1.   Standing gastroc stretch on incline board 3 x 30 sec  Seated hamstring : 3 x 30 seconds   SLRs: 3 x 10 2#  SL hip Abduction: 3 x 10 2#  Prone quad stretch with belt 3 x 30 sec  Shuttle 3 bands 2 x 10      Emily received the following manual therapy techniques: Joint mobilizations, Manual traction and Soft tissue Mobilization were applied to the: R quad for 15 minutes.  IASTM to medial knee and distal quad x 5 min  Patellar mobs all  "direction  Stick massage to distal quad  Tibiofemoral distraction  Patient provided written and verbal consent to receive functional dry needling at today's visit (see consent form scanned into chart). FDN performed to R quad and R medial gastroc. FDN performed to reduce pain and muscle tension, promote blood flow, and improve ROM and function x 5 minutes. Pt tolerated tx well without adverse effects. She was educated on what to expect following the procedure and she verbalized understanding.    Ex's/activities not performed today   1/4 squat 2 x 10 (emphasis on R leg upon rising)--NP  Single leg stance: 3 x 20 seconds--NP   Backward walking on treadmill--NP  Atkins's carry 25#  Ea extremity 100' x 4 laps--NP  Leg press 30# 2 x 10--NP  Step downs 2" x 6 * NP due to pain  Sled pushed: x 4 laps with 25# added--NP  Lunges x 10--NP  Step ups with contralateral hip flexion x 20 R 8 inches--NP   Gait training over bolsters with emphasis on proper gait mechanics. Cues to increase knee extension, and to prevent hip hike with knee flexion   SL balance: with ball throw at tramp x 20   Quad sets: 20 x 5 seconds- NP  Heel slides: x 5 minutes     Patient receives a cold pack applied to (R) knee x 10 minutes for pain/edema control. NOT TODAY     Written Home Exercises Provided: Reviewed current home exercise program consisting of above listed exercises.Given additional print out for home use.    Pt demo good understanding of the education provided. Emily Max demonstrated good return demonstration of activities.     Assessment:     Pt has been limited with progress due to increased medial knee pain due to severe OA in the medial knee. Pt responded well to functional dry needling today with good twitch response in her R medial quad and gastroc head. Session shortened due to pt's 20 minute late arrival. Able to perform activities above, however, without an increase in pain. Pt will be getting her Synvisc injections " today, and we will assess response at next visit. Plan on increasing exercise intensity if possible in order to promote improved quad and hip strength.   Patient is making good progress towards established goals.    GOALS: Short Term Goals:  4 weeks     - Pt will increase knee extension AROM to 0 degrees in order to show improved functional mobility. Met 9/20/17  - Pt will increase knee flexion ROM by at least 20 degrees in order to show improved functional mobility. Met 9/20/17  - Pt will have normal patellar mobility in all planes in order to promote improved mechanics at the knee. Met 9/20/17  - Pt will be independent and consistent with issued HEP in order to show carryover between therapy sessions. Met 9/20/17     Long Term Goals: 8 weeks     - Pt will report decreased knee pain to 2/10 in order to increase tolerance for functional mobility. Not met 12/1/17  - Pt will ambulate with normal mechanics on level/unlevel surfaces without deviations or LOB in order to return to PLOF. In progress 12/1/17  - Pt will perform sit<>stand transfers with equal weightbearing B and no UE use in order to return to PLOF. Met 12/1/17  - Pt will be able to return to work full duty without limitations due to knee pain in order to resume daily activities. In progress 12/1/17  - Pt will be independent with updated HEP to maintain gains following discharge with therapy. In progress 12/1/17      New/Revised goals: None at this time.       Plan:  Continue with established Plan of Care towards PT goals for an additional 8 weeks 1-2 times per week per MD recommendation for continued flexibility and strengthening

## 2017-12-01 NOTE — PLAN OF CARE
Name: Emily Max  Clinic Number: [id number]  Date of Treatment: 12/01/2017  Diagnosis:   Encounter Diagnosis   Name Primary?    Decreased ROM of right knee        Physician: Aleks Kwok III, *    Evaluation Date: 8/25/17  Visit # authorized: 15 / 30  PTA Visit Number:    Authorization period: 12/31/17  Plan of care Expiration: 1/26/17    Time in: 07:20 am  Time Out: 08:00 am   Total Treatment Time: 40 minutes   Billable Time: 40 minutes       Subjective:    Emily Max reports her knee is staying status quo. She is having her first synvisc injection today.    Pain: 4 / 10 R knee with ambulation    Objective    Knee flexion ROM:    Lower Extremity Strength  Right LE  Left LE    Knee extension: 4/5 Knee extension: 5/5   Knee flexion: 4+/5 Knee flexion: 5/5   Hip flexion: 4/5 Hip flexion: 5/5   Hip extension:  NT Hip extension: NT   Hip abduction: 4/5 Hip abduction: 4/5   Hip adduction: 3+/5 Hip adduction 4/5   Ankle dorsiflexion: 4/5 Ankle dorsiflexion: 5/5   Ankle plantarflexion: NT Ankle plantarflexion: NT       Function:    - SLS R: 10 seconds  - SLS L: 20 seconds  - Squat: NT   - Sit <--> Stand: B UE assist, improved weightbearing between extremities  - Bed Mobility: mod I    Palpation: + TTP medial joint line and medial patella     Emily Max was instructed in and performed therapeutic exercises to develop strength, endurance, ROM, flexibility and posture for 25 minutes including:     Upright stationary bike x 5 minutes level 1.   Standing gastroc stretch on incline board 3 x 30 sec  Seated hamstring : 3 x 30 seconds   SLRs: 3 x 10 2#  SL hip Abduction: 3 x 10 2#  Prone quad stretch with belt 3 x 30 sec  Shuttle 3 bands 2 x 10      Emily received the following manual therapy techniques: Joint mobilizations, Manual traction and Soft tissue Mobilization were applied to the: R quad for 15 minutes.  IASTM to medial knee and distal quad x 5 min  Patellar mobs all  "direction  Stick massage to distal quad  Tibiofemoral distraction  Patient provided written and verbal consent to receive functional dry needling at today's visit (see consent form scanned into chart). FDN performed to R quad and R medial gastroc. FDN performed to reduce pain and muscle tension, promote blood flow, and improve ROM and function x 5 minutes. Pt tolerated tx well without adverse effects. She was educated on what to expect following the procedure and she verbalized understanding.    Ex's/activities not performed today   1/4 squat 2 x 10 (emphasis on R leg upon rising)--NP  Single leg stance: 3 x 20 seconds--NP   Backward walking on treadmill--NP  Atkins's carry 25#  Ea extremity 100' x 4 laps--NP  Leg press 30# 2 x 10--NP  Step downs 2" x 6 * NP due to pain  Sled pushed: x 4 laps with 25# added--NP  Lunges x 10--NP  Step ups with contralateral hip flexion x 20 R 8 inches--NP   Gait training over bolsters with emphasis on proper gait mechanics. Cues to increase knee extension, and to prevent hip hike with knee flexion   SL balance: with ball throw at tramp x 20   Quad sets: 20 x 5 seconds- NP  Heel slides: x 5 minutes     Patient receives a cold pack applied to (R) knee x 10 minutes for pain/edema control. NOT TODAY     Written Home Exercises Provided: Reviewed current home exercise program consisting of above listed exercises.Given additional print out for home use.    Pt demo good understanding of the education provided. Emily Max demonstrated good return demonstration of activities.     Assessment:     Pt has been limited with progress due to increased medial knee pain due to severe OA in the medial knee. Pt responded well to functional dry needling today with good twitch response in her R medial quad and gastroc head. Session shortened due to pt's 20 minute late arrival. Able to perform activities above, however, without an increase in pain. Pt will be getting her Synvisc injections " today, and we will assess response at next visit. Plan on increasing exercise intensity if possible in order to promote improved quad and hip strength.   Patient is making good progress towards established goals.    GOALS: Short Term Goals:  4 weeks     - Pt will increase knee extension AROM to 0 degrees in order to show improved functional mobility. Met 9/20/17  - Pt will increase knee flexion ROM by at least 20 degrees in order to show improved functional mobility. Met 9/20/17  - Pt will have normal patellar mobility in all planes in order to promote improved mechanics at the knee. Met 9/20/17  - Pt will be independent and consistent with issued HEP in order to show carryover between therapy sessions. Met 9/20/17     Long Term Goals: 8 weeks     - Pt will report decreased knee pain to 2/10 in order to increase tolerance for functional mobility. Not met 12/1/17  - Pt will ambulate with normal mechanics on level/unlevel surfaces without deviations or LOB in order to return to PLOF. In progress 12/1/17  - Pt will perform sit<>stand transfers with equal weightbearing B and no UE use in order to return to PLOF. Met 12/1/17  - Pt will be able to return to work full duty without limitations due to knee pain in order to resume daily activities. In progress 12/1/17  - Pt will be independent with updated HEP to maintain gains following discharge with therapy. In progress 12/1/17      New/Revised goals: None at this time.       Plan:  Continue with established Plan of Care towards PT goals for an additional 8 weeks 1-2 times per week per MD recommendation for continued flexibility and strengthening

## 2017-12-02 NOTE — PROGRESS NOTES
Patient is here for follow up of knee arthritis. Pt is requesting synvisc injection #1.  [facility] reviewed per encounter record. Has failed other conservative modalities including NSAIDS, activity modification, weight loss.    She has not received synvisc injections in the past.       PHYSICAL EXAMINATION:     General: The patient is alert and oriented x 3. Mood is pleasant.   Observation of ears, eyes and nose reveals no gross abnormalities. No   labored breathing observed.     No signs of infection or adverse reaction to knee.    PROCEDURE NOTE:   right KNEE INJECTION  After time out was performed, including verification of patient ID, procedure, site and side, availability of information and equipment, review of safety issues, and agreement with consent, the procedure site was marked and the patient was prepped aseptically.    The right knee was prepped with betadine and injected under sterile conditions from an anterolateral approach with patient supine and then 2cc of Synvisc was injected into right knee joint . Patient tolerated the injection well. The patient had no adverse reactions to the medication.      RTC 1 week for 2nd injection.  All questions were answered, pt will contact us for questions or concerns in the interim.

## 2017-12-04 ENCOUNTER — PATIENT MESSAGE (OUTPATIENT)
Dept: SPORTS MEDICINE | Facility: CLINIC | Age: 42
End: 2017-12-04

## 2017-12-08 ENCOUNTER — OFFICE VISIT (OUTPATIENT)
Dept: SPORTS MEDICINE | Facility: CLINIC | Age: 42
End: 2017-12-08
Payer: COMMERCIAL

## 2017-12-08 VITALS
HEART RATE: 76 BPM | HEIGHT: 65 IN | SYSTOLIC BLOOD PRESSURE: 123 MMHG | DIASTOLIC BLOOD PRESSURE: 83 MMHG | WEIGHT: 225 LBS | BODY MASS INDEX: 37.49 KG/M2

## 2017-12-08 DIAGNOSIS — M17.11 PRIMARY OSTEOARTHRITIS OF RIGHT KNEE: Primary | ICD-10-CM

## 2017-12-08 PROCEDURE — 99499 UNLISTED E&M SERVICE: CPT | Mod: S$GLB,,, | Performed by: PHYSICIAN ASSISTANT

## 2017-12-08 PROCEDURE — 99999 PR PBB SHADOW E&M-EST. PATIENT-LVL IV: CPT | Mod: PBBFAC,,, | Performed by: PHYSICIAN ASSISTANT

## 2017-12-08 PROCEDURE — 20610 DRAIN/INJ JOINT/BURSA W/O US: CPT | Mod: RT,S$GLB,, | Performed by: PHYSICIAN ASSISTANT

## 2017-12-08 NOTE — PROGRESS NOTES
Patient is here for follow up of right knee arthritis. Pt is requesting synvisc injection #2.  [facility] Has failed other conservative modalities including NSAIDS, activity modification, weight loss.    The prior shot was tolerated well.    PHYSICAL EXAMINATION:     General: The patient is alert and oriented x 3. Mood is pleasant.   Observation of ears, eyes and nose reveals no gross abnormalities. No   labored breathing observed.     No signs of infection or adverse reaction to knee.    PROCEDURE NOTE:  After viscosupplementation info and post-injection info was given, the patient was prepped with betadine and injected under sterile conditions from a superolateral approach and then 2cc Synvisc was injected. Patient tolerated the injection well.    RTC 1 week for 3rd injection.  All questions were answered, pt will contact us for questions or concerns in the interim.    Injection Procedure  A time out was performed, including verification of patient ID, procedure, site and side, availability of information and equipment, review of safety issues, and agreement with consent, the procedure site was marked.    After time out was performed, the patient was prepped aseptically with povidone-iodine swabsticks. A diagnostic and therapeutic injection of 2cc Synvisc (2nd) was given under sterile technique using a 22g x 1.5 needle from the Superolateral  aspect of the right Knee Joint in the supine position.      Emily Max had no adverse reactions to the medication. Pain decreased. She was instructed to apply ice to the joint for 20 minutes and avoid strenuous activities for 24-36 hours following the injection. She was warned of possible blood sugar and/or blood pressure changes during that time. Following that time, she can resume regular activities.    She was reminded to call the clinic immediately for any adverse side effects as explained in clinic today.'

## 2017-12-13 ENCOUNTER — CLINICAL SUPPORT (OUTPATIENT)
Dept: REHABILITATION | Facility: HOSPITAL | Age: 42
End: 2017-12-13
Attending: PHYSICIAN ASSISTANT
Payer: COMMERCIAL

## 2017-12-13 DIAGNOSIS — M25.661 DECREASED ROM OF RIGHT KNEE: ICD-10-CM

## 2017-12-13 PROCEDURE — 97110 THERAPEUTIC EXERCISES: CPT | Mod: PO

## 2017-12-13 PROCEDURE — 97140 MANUAL THERAPY 1/> REGIONS: CPT | Mod: PO

## 2017-12-13 NOTE — PROGRESS NOTES
"Name: Emily Max  Clinic Number: [id number]  Date of Treatment: 12/13/2017  Diagnosis:   Encounter Diagnosis   Name Primary?    Decreased ROM of right knee        Physician: Aleks Kwok III, *    Evaluation Date: 8/25/17  Visit # authorized: 16 / 30  PTA Visit Number:    Authorization period: 12/31/17  Plan of care Expiration: 1/26/17    Time in: 0400 pm  Time Out: 0445 pm   Total Treatment Time: 45 minutes   Billable Time: 45 minutes       Subjective:    Emily Max reports she is having increased pain following her second synvisc injection.    Pain: 4 / 10 R knee with ambulation    Objective     Emily Max was instructed in and performed therapeutic exercises to develop strength, endurance, ROM, flexibility and posture for 25 minutes including:     Upright stationary bike x 5 minutes level 1.   Standing gastroc stretch on incline board 3 x 30 sec  Seated hamstring : 3 x 30 seconds   SLRs: 3 x 10 2#  SL hip Abduction: 3 x 10 2#  Prone quad stretch with belt 3 x 30 sec  Shuttle 3 bands 2 x 10      Emily received the following manual therapy techniques: Joint mobilizations, Manual traction and Soft tissue Mobilization were applied to the: R quad for 20 minutes.  IASTM to medial knee and distal quad   Patellar mobs all direction  Stick massage to distal quad  Tibiofemoral distraction      Ex's/activities not performed today   1/4 squat 2 x 10 (emphasis on R leg upon rising)--NP  Single leg stance: 3 x 20 seconds--NP   Backward walking on treadmill--NP  Atkins's carry 25#  Ea extremity 100' x 4 laps--NP  Leg press 30# 2 x 10--NP  Step downs 2" x 6 * NP due to pain  Sled pushed: x 4 laps with 25# added--NP  Lunges x 10--NP  Step ups with contralateral hip flexion x 20 R 8 inches--NP   Gait training over bolsters with emphasis on proper gait mechanics. Cues to increase knee extension, and to prevent hip hike with knee flexion   SL balance: with ball throw at tramp x " 20   Quad sets: 20 x 5 seconds- NP  Heel slides: x 5 minutes     Patient receives a cold pack applied to (R) knee x 10 minutes for pain/edema control. NOT TODAY     Written Home Exercises Provided: Reviewed current home exercise program consisting of above listed exercises.Given additional print out for home use.    Pt demo good understanding of the education provided. Emily Max demonstrated good return demonstration of activities.     Assessment:     Pt remains limited with land therapy due to medial knee pain which she reports her MD attributes to severe OA. 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 She reports she would like to do a few sessions of aquatic therapy before she leaves, and she plans on joining a gym with a pool in order to maintain a good exercise program.   Patient is making good progress towards established goals.    GOALS: Short Term Goals:  4 weeks     - Pt will increase knee extension AROM to 0 degrees in order to show improved functional mobility. Met 9/20/17  - Pt will increase knee flexion ROM by at least 20 degrees in order to show improved functional mobility. Met 9/20/17  - Pt will have normal patellar mobility in all planes in order to promote improved mechanics at the knee. Met 9/20/17  - Pt will be independent and consistent with issued HEP in order to show carryover between therapy sessions. Met 9/20/17     Long Term Goals: 8 weeks     - Pt will report decreased knee pain to 2/10 in order to increase tolerance for functional mobility. Not met 12/1/17  - Pt will ambulate with normal mechanics on level/unlevel surfaces without deviations or LOB in order to return to PLOF. In progress 12/1/17  - Pt will perform sit<>stand transfers with equal weightbearing B and no UE use in order to return to PLOF. Met 12/1/17  - Pt will be able to  return to work full duty without limitations due to knee pain in order to resume daily activities. In progress 12/1/17  - Pt will be independent with updated HEP to maintain gains following discharge with therapy. In progress 12/1/17      New/Revised goals: None at this time.       Plan:  Continue with established Plan of Care towards PT goals for an additional 8 weeks 1-2 times per week per MD recommendation for continued flexibility and strengthening

## 2017-12-15 ENCOUNTER — OFFICE VISIT (OUTPATIENT)
Dept: SPORTS MEDICINE | Facility: CLINIC | Age: 42
End: 2017-12-15
Payer: COMMERCIAL

## 2017-12-15 VITALS
HEART RATE: 97 BPM | BODY MASS INDEX: 37.49 KG/M2 | SYSTOLIC BLOOD PRESSURE: 129 MMHG | DIASTOLIC BLOOD PRESSURE: 94 MMHG | WEIGHT: 225 LBS | HEIGHT: 65 IN

## 2017-12-15 DIAGNOSIS — M17.11 PRIMARY OSTEOARTHRITIS OF RIGHT KNEE: Primary | ICD-10-CM

## 2017-12-15 DIAGNOSIS — M25.561 CHRONIC PAIN OF RIGHT KNEE: ICD-10-CM

## 2017-12-15 DIAGNOSIS — G89.29 CHRONIC PAIN OF RIGHT KNEE: ICD-10-CM

## 2017-12-15 PROCEDURE — 99999 PR PBB SHADOW E&M-EST. PATIENT-LVL IV: CPT | Mod: PBBFAC,,, | Performed by: PHYSICIAN ASSISTANT

## 2017-12-15 PROCEDURE — 20610 DRAIN/INJ JOINT/BURSA W/O US: CPT | Mod: RT,S$GLB,, | Performed by: PHYSICIAN ASSISTANT

## 2017-12-15 PROCEDURE — 99499 UNLISTED E&M SERVICE: CPT | Mod: S$GLB,,, | Performed by: PHYSICIAN ASSISTANT

## 2017-12-15 RX ORDER — MELOXICAM 15 MG/1
TABLET ORAL
Qty: 10 TABLET | Refills: 0 | Status: SHIPPED | OUTPATIENT
Start: 2017-12-15 | End: 2018-05-08

## 2017-12-15 NOTE — PROGRESS NOTES
Patient is here for follow up of knee arthritis. Pt is requesting synvisc injection #3.  PMFH reviewed per encounter record. Has failed other conservative modalities including NSAIDS, activity modification, weight loss.    She has received no relief with prior synvisc injections.     The prior shot was tolerated well.    PHYSICAL EXAMINATION:     General: The patient is alert and oriented x 3. Mood is pleasant.   Observation of ears, eyes and nose reveals no gross abnormalities. No   labored breathing observed.     No signs of infection or adverse reaction to knee.    PROCEDURE NOTE:   right KNEE INJECTION  After time out was performed, including verification of patient ID, procedure, site and side, availability of information and equipment, review of safety issues, and agreement with consent, the procedure site was marked and the patient was prepped aseptically.    The right knee was prepped with betadine and injected under sterile conditions from an anterolateral approach with patient supine and then 2cc of Synvisc was injected into right knee joint. Patient tolerated the injection well. The patient had no adverse reactions to the medication.      RTC prn knee pain in 2 months with Dr. Junie Crouch prn knee pain x 10 days.   RTC in 2 weeks for steroid injection to knee if still having pain.  All questions were answered, pt will contact us for questions or concerns in the interim.

## 2017-12-21 ENCOUNTER — CLINICAL SUPPORT (OUTPATIENT)
Dept: REHABILITATION | Facility: HOSPITAL | Age: 42
End: 2017-12-21
Attending: PHYSICIAN ASSISTANT
Payer: COMMERCIAL

## 2017-12-21 DIAGNOSIS — M25.661 DECREASED ROM OF RIGHT KNEE: Primary | ICD-10-CM

## 2017-12-21 PROCEDURE — 97113 AQUATIC THERAPY/EXERCISES: CPT

## 2017-12-21 NOTE — PROGRESS NOTES
"                                                                                                          Aquatic Progress Note      Total treatment time: 60    Time In: 4:00  Time Out: 5:00    Subjective  Emily states "that her knee pn is 4.5/10 today      Objective    Treatment: Emily was instructed in and performed therapeutic exercises to develop strength, endurance, ROM, flexibility, balance, posture and core stabilization for 60 minutes. Patient performed therapeutic exercises consisting of warm up laps without resistance, PROM/Stretching, LE strengthening, balance exercises, isometrics, Endurance, bike, march, step-ups and cool down.    Warm-up Laps 3 x each  FWD,BWD,Side    Stretches 3x 20 sec  HSS  Quad  GSS    LE exs 20x each  Mini Squat with QS  Heel Raise with GS  Hip flex/ext  Hip ABD/ADD  HS Curl  Step-ups  LAQ    Endurance 3 min ea  Bicycle  Marching     Patient was not issued HEP for pool.      Assessment  Patient's tolerance to treatment was good. Emily is a 42 y.o. female referred to outpatient physical therapy s/p R knee chondroplasty and menisectomy. Demonstrates impairments including: limitations as described in the problem list. Pt prognosis is Good.  Pt will benefit from skilled outpatient physical therapy to address the above stated deficits, provide pt/family education, and to maximize pt's level of independence.   Patient will continue to benefit from skilled PT intervention.    Emily is making good progress towards established goals.      Plan  Continue 2x per week.    "

## 2018-03-01 RX ORDER — LEVOCETIRIZINE DIHYDROCHLORIDE 5 MG/1
TABLET, FILM COATED ORAL
Qty: 90 TABLET | Refills: 0 | Status: SHIPPED | OUTPATIENT
Start: 2018-03-01 | End: 2018-05-15 | Stop reason: SDUPTHER

## 2018-05-08 ENCOUNTER — OFFICE VISIT (OUTPATIENT)
Dept: GYNECOLOGIC ONCOLOGY | Facility: CLINIC | Age: 43
End: 2018-05-08
Payer: COMMERCIAL

## 2018-05-08 VITALS
WEIGHT: 221.81 LBS | HEIGHT: 65 IN | BODY MASS INDEX: 36.96 KG/M2 | DIASTOLIC BLOOD PRESSURE: 81 MMHG | HEART RATE: 74 BPM | SYSTOLIC BLOOD PRESSURE: 122 MMHG

## 2018-05-08 DIAGNOSIS — N95.2 VAGINAL ATROPHY: ICD-10-CM

## 2018-05-08 DIAGNOSIS — Z85.41 HISTORY OF CERVICAL CANCER: Primary | ICD-10-CM

## 2018-05-08 DIAGNOSIS — Z12.31 SCREENING MAMMOGRAM, ENCOUNTER FOR: ICD-10-CM

## 2018-05-08 PROCEDURE — 99214 OFFICE O/P EST MOD 30 MIN: CPT | Mod: S$GLB,,, | Performed by: OBSTETRICS & GYNECOLOGY

## 2018-05-08 PROCEDURE — 99999 PR PBB SHADOW E&M-EST. PATIENT-LVL IV: CPT | Mod: PBBFAC,,, | Performed by: OBSTETRICS & GYNECOLOGY

## 2018-05-08 RX ORDER — ESTRADIOL 10 UG/1
10 INSERT VAGINAL
Qty: 24 TABLET | Refills: 3 | Status: SHIPPED | OUTPATIENT
Start: 2018-05-10 | End: 2018-05-15

## 2018-05-08 RX ORDER — TRIAMTERENE/HYDROCHLOROTHIAZID 37.5-25 MG
1 TABLET ORAL DAILY
Refills: 0 | COMMUNITY
Start: 2018-02-18 | End: 2018-05-08 | Stop reason: SDUPTHER

## 2018-05-08 NOTE — PROGRESS NOTES
"Subjective:       Patient ID: Emily Max is a 43 y.o. female.    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 She denies vaginal bleeding.     She weaned herself off estrogen.         Mammogram : May 13 2017: normal.    Pap: Aug 1, 2017: normal.      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 She required no postoperative therapy.    Review of Systems   Constitutional: Negative for chills, fatigue and fever.   Respiratory: Positive for shortness of breath (with walking. ). Negative for cough and wheezing.    Cardiovascular: Negative for chest pain, palpitations and leg swelling.   Gastrointestinal: Negative for abdominal pain, constipation, diarrhea, nausea and vomiting.   Genitourinary: Negative for difficulty urinating, dysuria, frequency, genital sores, hematuria, urgency, vaginal bleeding, vaginal discharge and vaginal pain.   Neurological: Negative for weakness.   Hematological: Negative for adenopathy. Does not bruise/bleed easily.   Psychiatric/Behavioral: The patient is not nervous/anxious.        Objective:   /81   Pulse 74   Ht 5' 5" (1.651 m)   Wt 100.6 kg (221 lb 12.5 oz)   BMI 36.91 kg/m²      Physical Exam   Constitutional: She is oriented to person, place, and time. She appears well-developed and well-nourished.   HENT:   Head: Normocephalic and atraumatic.   Eyes: No scleral icterus.   Neck: Neck supple. No tracheal deviation present. No thyroid mass and no thyromegaly present.   Cardiovascular: Normal rate and regular rhythm.    Pulmonary/Chest: Effort normal and breath sounds normal. She has no wheezes.   Abdominal: Soft. She exhibits no distension and no mass. There is no " hepatosplenomegaly. There is no tenderness. There is no rebound and no guarding.   Genitourinary:   Genitourinary Comments: Bimanual exam:  Vulva: no lesions. Normal appearance  Urethra: Normal size and location. No lesions  Bladder: No masses or tenderness.  Vagina: atrophic mucosa. No lesion  Cervix: absent.   Uterus: absent.  Adnexa: no masses.  Rectovaginal: Not performed     Musculoskeletal: She exhibits no edema or tenderness.   Lymphadenopathy:     She has no cervical adenopathy.     She has no axillary adenopathy.        Right: No inguinal and no supraclavicular adenopathy present.        Left: No inguinal and no supraclavicular adenopathy present.   Neurological: She is alert and oriented to person, place, and time.   Skin: Skin is warm and dry.   Psychiatric: She has a normal mood and affect. Her behavior is normal. Judgment and thought content normal.       Assessment:       1. History of cervical cancer    2. Screening mammogram, encounter for    3. Vaginal atrophy        Plan:   History of cervical cancer   KAVIN    RTC in 3 months for WWE and pap.     Screening mammogram, encounter for  -     Mammo Digital Screening Bilat with CAD; Future; Expected date: 05/14/2018    Vaginal atrophy  -     estradiol 10 mcg Tab; Place 1 tablet (10 mcg total) vaginally twice a week.  Dispense: 24 tablet; Refill: 3

## 2018-05-10 ENCOUNTER — TELEPHONE (OUTPATIENT)
Dept: SPORTS MEDICINE | Facility: CLINIC | Age: 43
End: 2018-05-10

## 2018-05-10 ENCOUNTER — PATIENT MESSAGE (OUTPATIENT)
Dept: SPORTS MEDICINE | Facility: CLINIC | Age: 43
End: 2018-05-10

## 2018-05-10 NOTE — TELEPHONE ENCOUNTER
Unable to leave voicemail.     ==    Sent pt [employer] message explaining that she would be best served by Dr. Haley or Dr. Patel. Contact information provided.

## 2018-05-15 ENCOUNTER — HOSPITAL ENCOUNTER (OUTPATIENT)
Dept: RADIOLOGY | Facility: HOSPITAL | Age: 43
Discharge: HOME | End: 2018-05-15
Attending: INTERNAL MEDICINE
Payer: COMMERCIAL

## 2018-05-15 ENCOUNTER — PATIENT MESSAGE (OUTPATIENT)
Dept: INTERNAL MEDICINE | Facility: CLINIC | Age: 43
End: 2018-05-15

## 2018-05-15 ENCOUNTER — OFFICE VISIT (OUTPATIENT)
Dept: INTERNAL MEDICINE | Facility: CLINIC | Age: 43
End: 2018-05-15
Payer: COMMERCIAL

## 2018-05-15 VITALS
SYSTOLIC BLOOD PRESSURE: 107 MMHG | HEART RATE: 78 BPM | OXYGEN SATURATION: 92 % | BODY MASS INDEX: 37.15 KG/M2 | DIASTOLIC BLOOD PRESSURE: 90 MMHG | HEIGHT: 65 IN | WEIGHT: 223 LBS

## 2018-05-15 DIAGNOSIS — R73.03 PRE-DIABETES: ICD-10-CM

## 2018-05-15 DIAGNOSIS — F41.1 GAD (GENERALIZED ANXIETY DISORDER): ICD-10-CM

## 2018-05-15 DIAGNOSIS — R06.09 DYSPNEA ON EXERTION: Primary | ICD-10-CM

## 2018-05-15 DIAGNOSIS — M25.641 STIFFNESS OF RIGHT HAND JOINT: ICD-10-CM

## 2018-05-15 DIAGNOSIS — F41.9 ANXIETY: ICD-10-CM

## 2018-05-15 DIAGNOSIS — Z85.41 HISTORY OF CERVICAL CANCER: ICD-10-CM

## 2018-05-15 DIAGNOSIS — M65.312 TRIGGER THUMB OF LEFT HAND: ICD-10-CM

## 2018-05-15 DIAGNOSIS — F33.1 MODERATE EPISODE OF RECURRENT MAJOR DEPRESSIVE DISORDER: ICD-10-CM

## 2018-05-15 DIAGNOSIS — Z91.09 ENVIRONMENTAL ALLERGIES: ICD-10-CM

## 2018-05-15 DIAGNOSIS — Z00.00 HEALTH CARE MAINTENANCE: Primary | ICD-10-CM

## 2018-05-15 DIAGNOSIS — M23.91 KNEE INTERNAL DERANGEMENT, RIGHT: ICD-10-CM

## 2018-05-15 DIAGNOSIS — M25.641 STIFFNESS OF FINGER JOINT OF RIGHT HAND: ICD-10-CM

## 2018-05-15 DIAGNOSIS — E03.9 ACQUIRED HYPOTHYROIDISM: ICD-10-CM

## 2018-05-15 DIAGNOSIS — E55.9 MILD VITAMIN D DEFICIENCY: ICD-10-CM

## 2018-05-15 DIAGNOSIS — E66.01 CLASS 2 SEVERE OBESITY DUE TO EXCESS CALORIES WITH SERIOUS COMORBIDITY AND BODY MASS INDEX (BMI) OF 37.0 TO 37.9 IN ADULT: ICD-10-CM

## 2018-05-15 DIAGNOSIS — Z91.018 FOOD ALLERGY: ICD-10-CM

## 2018-05-15 DIAGNOSIS — I10 ESSENTIAL HYPERTENSION: ICD-10-CM

## 2018-05-15 PROCEDURE — 99396 PREV VISIT EST AGE 40-64: CPT | Mod: S$GLB,,, | Performed by: INTERNAL MEDICINE

## 2018-05-15 PROCEDURE — 73130 X-RAY EXAM OF HAND: CPT | Mod: 50,TC

## 2018-05-15 PROCEDURE — 73130 X-RAY EXAM OF HAND: CPT | Mod: 26,50,, | Performed by: RADIOLOGY

## 2018-05-15 PROCEDURE — 99999 PR PBB SHADOW E&M-EST. PATIENT-LVL IV: CPT | Mod: PBBFAC,,, | Performed by: INTERNAL MEDICINE

## 2018-05-15 RX ORDER — TRIAMTERENE AND HYDROCHLOROTHIAZIDE 37.5; 25 MG/1; MG/1
1 CAPSULE ORAL DAILY
Qty: 90 CAPSULE | Refills: 3 | Status: SHIPPED | OUTPATIENT
Start: 2018-05-15 | End: 2019-03-18 | Stop reason: SDUPTHER

## 2018-05-15 RX ORDER — DULOXETIN HYDROCHLORIDE 30 MG/1
30 CAPSULE, DELAYED RELEASE ORAL 2 TIMES DAILY
Qty: 90 CAPSULE | Refills: 3 | Status: SHIPPED | OUTPATIENT
Start: 2018-05-15 | End: 2019-02-12 | Stop reason: SDUPTHER

## 2018-05-15 RX ORDER — ATENOLOL 50 MG/1
TABLET ORAL
Qty: 180 TABLET | Refills: 3 | Status: SHIPPED | OUTPATIENT
Start: 2018-05-15 | End: 2019-03-18 | Stop reason: SDUPTHER

## 2018-05-15 RX ORDER — EPINEPHRINE 0.3 MG/.3ML
1 INJECTION SUBCUTANEOUS ONCE
Qty: 2 EACH | Refills: 2 | Status: SHIPPED | OUTPATIENT
Start: 2018-05-15 | End: 2022-08-29 | Stop reason: SDUPTHER

## 2018-05-15 RX ORDER — LEVOTHYROXINE SODIUM 50 UG/1
50 TABLET ORAL DAILY
Qty: 90 TABLET | Refills: 3 | Status: SHIPPED | OUTPATIENT
Start: 2018-05-15 | End: 2019-03-18 | Stop reason: SDUPTHER

## 2018-05-15 RX ORDER — LEVOCETIRIZINE DIHYDROCHLORIDE 5 MG/1
5 TABLET, FILM COATED ORAL DAILY
Qty: 90 TABLET | Refills: 3 | Status: SHIPPED | OUTPATIENT
Start: 2018-05-15 | End: 2019-11-13 | Stop reason: SDUPTHER

## 2018-05-15 RX ORDER — ALPRAZOLAM 0.5 MG/1
0.5 TABLET ORAL DAILY PRN
Qty: 30 TABLET | Refills: 0 | Status: SHIPPED | OUTPATIENT
Start: 2018-05-15 | End: 2019-11-13 | Stop reason: SDUPTHER

## 2018-05-15 RX ORDER — TRAMADOL HYDROCHLORIDE 50 MG/1
50-100 TABLET ORAL EVERY 8 HOURS PRN
Qty: 30 TABLET | Refills: 0 | Status: SHIPPED | OUTPATIENT
Start: 2018-05-15 | End: 2021-06-07

## 2018-05-15 NOTE — PROGRESS NOTES
Subjective:       Patient ID: Emily Max is a 43 y.o. female.    Chief Complaint: Establish Care (establishing care as a new patient.); Medication Refill (pt needs a new Rx's. ); and Hand Pain (pt complains of CAROL feet/hand pain, more so the L, lots of stiffness.)    HPI   Emily Max is a 43 y.o. female here to establish care and have yearly preventative healthcare visit. She is transferring from Dr. Cotton who retired.     She has a history of HTN, Obesity, Migraine H/As, Hypothyroidism, GERD, Allergic Rhinitis, Anxiety, Depression, S/P EVLT for Chronic Venous Insufficiency, and S/P Hysterectomy for Stage 1B Cervical Cancer 5/2013.     Stiffness in hands and feet. 3 mo ago left hand then right hand now bilateral feet. Worse in am. Associated pain. Both arms feel weak, trouble lifting.     Family hx of RA.    Vision disturbance - far sighted?    Working on eliminating meat from diet.     Off estrogen 3 mo ago.    Had evlt for heaviness with varicose veins.     Feet swelling.    Was in [place] for last 3 months.     Travel nurse, cath lab.     Left trigger thumb.   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  -----------------------  PMH:  1. HTN           Atenolol 50 mg BID           Dyazide 37.5-25 mg/day     2. Migraine H/A           Atenolol 50 mg BID     3. Hypothyroidism           Synthroid 50 mcg/day     4. Vit D Deficiency            Cholecalciferol 1,000 units daily     5. Anxiety & Depression.              Followed by Ms. Lois LCSW and Dr. Elif Charlton/Penny             Cymbalta 30 mg/day   wellbutrin?    Rare use of Xanax 0.5mg daily prn    6. Obesity             (BMI-38)     7. Hx of GERD     8. Chronic Venous Insufficiency and Varicose Veins              Followed in Vascular Surgery               S/P Right EVLT 5/2014              S/P Left EVLT 1/19/17     9. Stage 1B Cervical CA             S/P Total Hysterectomy 5/2013             Followed  "by Dr. Matias - last visit - 7/2016     10. Surgical Menopause              Estrace 0.5      11. Allergic Rhinitis & Food Allergies             Followed by Dr. Finley (Allergy)             Astelin Nasal Spray, Xyzal 5 mg, Epipen     12. Pre-DM  Review of Systems   Constitutional: Negative for fever.   HENT: Negative.    Eyes: Negative.    Respiratory: Negative for shortness of breath.    Cardiovascular: Negative for chest pain and leg swelling.   Gastrointestinal: Negative for abdominal pain, diarrhea, nausea and vomiting.   Genitourinary: Negative.    Musculoskeletal: Negative for arthralgias.        Hand stiffness - worse 3 min in am but persists throughout the day, trigger thumb worse on left   Skin: Negative for rash.   Psychiatric/Behavioral: Negative.        Objective:   BP (!) 107/90 (BP Location: Left arm, Patient Position: Sitting, BP Method: Large (Manual))   Pulse 78   Ht 5' 5" (1.651 m)   Wt 101.2 kg (223 lb)   SpO2 (!) 92%   BMI 37.11 kg/m²      Physical Exam   Constitutional: She is oriented to person, place, and time. She appears well-developed and well-nourished. No distress.   HENT:   Head: Normocephalic and atraumatic.   Cardiovascular: Normal rate and regular rhythm.    Pulmonary/Chest: Effort normal. No respiratory distress. She has no wheezes. She has no rales.   Musculoskeletal:   ttp IP left thumb - possible mild swelling, no DIP or PIP nodules appreciated, intact upper extremity and finger strength   Neurological: She is alert and oriented to person, place, and time.   Skin: Skin is warm and dry. She is not diaphoretic.   Psychiatric: She has a normal mood and affect. Her behavior is normal.       Assessment:       1. Health care maintenance    2. Essential hypertension    3. Anxiety    4. Moderate episode of recurrent major depressive disorder    5. History of cervical cancer    6. Acquired hypothyroidism    7. Mild vitamin D deficiency    8. Class 2 severe obesity due to excess calories " with serious comorbidity and body mass index (BMI) of 37.0 to 37.9 in adult    9. Pre-diabetes    10. Stiffness of right hand joint    11. Stiffness of finger joint of right hand    12. Trigger thumb of left hand    13. REGINA (generalized anxiety disorder)    14. Knee internal derangement, right    15. Food allergy    16. Environmental allergies        Plan:       Emily was seen today for establish care, medication refill and hand pain.    Diagnoses and all orders for this visit:    Health care maintenance  -     CBC auto differential; Future  -     Comprehensive metabolic panel; Future  -     Hemoglobin A1c; Future  -     Lipid panel; Future  -     TSH; Future  -     Vitamin D; Future    Essential hypertension  -     atenolol (TENORMIN) 50 MG tablet; TAKE 1 TABLET (50 MG TOTAL) BY MOUTH 2 (TWO) TIMES DAILY.  -     triamterene-hydrochlorothiazide 37.5-25 mg (DYAZIDE) 37.5-25 mg per capsule; Take 1 capsule by mouth once daily.    Anxiety  Moderate episode of recurrent major depressive disorder  REGINA (generalized anxiety disorder)  -     ALPRAZolam (XANAX) 0.5 MG tablet; Take 1 tablet (0.5 mg total) by mouth daily as needed for Anxiety. #30 - generally lasts 6 mo  -     DULoxetine (CYMBALTA) 30 MG capsule; Take 1 capsule (30 mg total) by mouth 2 (two) times daily.    History of cervical cancer  Post hysterectomy    Acquired hypothyroidism  -     TSH; Future  -     levothyroxine (SYNTHROID) 50 MCG tablet; Take 1 tablet (50 mcg total) by mouth once daily.    Mild vitamin D deficiency  -     Vitamin D; Future    Class 2 severe obesity due to excess calories with serious comorbidity and body mass index (BMI) of 37.0 to 37.9 in adult   Encourage weight loss    Pre-diabetes  -     Hemoglobin A1c; Future    Stiffness of right hand joint  -     Rheumatoid factor; Future  -     Sedimentation rate, manual; Future  -     Ambulatory referral to Orthopedics  -     X-Ray Hand 3 View Bilateral; Future    Stiffness of finger joint  of bilateral hands. Has family hx of RA, seems less likely given length of am stiffness but could be early RA.   -     Rheumatoid factor; Future  -     Sedimentation rate, manual; Future  -     Ambulatory referral to Orthopedics  -     X-Ray Hand 3 View Bilateral; Future    Trigger thumb of left hand  -     X-Ray Hand 3 View Bilateral; Future    Knee internal derangement, right  -     traMADol (ULTRAM) 50 mg tablet; Take 1-2 tablets ( mg total) by mouth every 8 (eight) hours as needed for Pain. #30, to use sparingly    Food allergy  -     EPINEPHrine (EPIPEN) 0.3 mg/0.3 mL AtIn; Inject 0.3 mLs (0.3 mg total) into the muscle once.    Environmental allergies  -     levocetirizine (XYZAL) 5 MG tablet; Take 1 tablet (5 mg total) by mouth once daily.        did tdap for work, will send me record

## 2018-05-16 ENCOUNTER — OFFICE VISIT (OUTPATIENT)
Dept: ORTHOPEDICS | Facility: CLINIC | Age: 43
End: 2018-05-16
Payer: COMMERCIAL

## 2018-05-16 VITALS — WEIGHT: 223 LBS | BODY MASS INDEX: 37.15 KG/M2 | HEIGHT: 65 IN

## 2018-05-16 DIAGNOSIS — M79.641 BILATERAL HAND PAIN: Primary | ICD-10-CM

## 2018-05-16 DIAGNOSIS — M25.532 BILATERAL WRIST PAIN: ICD-10-CM

## 2018-05-16 DIAGNOSIS — M65.312 TRIGGER FINGER OF LEFT THUMB: ICD-10-CM

## 2018-05-16 DIAGNOSIS — M79.642 BILATERAL HAND PAIN: Primary | ICD-10-CM

## 2018-05-16 DIAGNOSIS — M25.531 BILATERAL WRIST PAIN: ICD-10-CM

## 2018-05-16 PROCEDURE — 99999 PR PBB SHADOW E&M-EST. PATIENT-LVL II: CPT | Mod: PBBFAC,,, | Performed by: ORTHOPAEDIC SURGERY

## 2018-05-16 PROCEDURE — 20550 NJX 1 TENDON SHEATH/LIGAMENT: CPT | Mod: FA,S$GLB,, | Performed by: ORTHOPAEDIC SURGERY

## 2018-05-16 PROCEDURE — 99213 OFFICE O/P EST LOW 20 MIN: CPT | Mod: 25,S$GLB,, | Performed by: ORTHOPAEDIC SURGERY

## 2018-05-16 NOTE — PROGRESS NOTES
Emily Max, 43 years old, complaining of locking, catching and triggering   of the left thumb for about three months' time.  Sharp pain, 2/10 on good days,   8/10 on bad days, bothersome in the morning.    PHYSICAL EXAMINATION:  Today shows she does have an elicitable trigger.  No   signs of infection or instability.  Skin intact.  Compartments are soft.    X-rays look good.    ASSESSMENT:  Left trigger thumb.    PLAN:  Kenalog injection into the flexure sheath of the left thumb.  We   discussed with her the possibility of trigger finger release.  We will see her   back here as needed.      PBB/HN  dd: 05/16/2018 12:24:45 (CDT)  td: 05/17/2018 00:41:51 (CDT)  Doc ID   #[id number]  Job ID #[id number]    CC:     Further History  Aching pain  Worse with activity  Relieved with rest  No other associated symptoms  No other radiation    Further Exam  Alert and oriented  Pleasant  Contralateral limb has appropriate range of motion for age and condition  Contralateral limb has appropriate strength for age and condition  Contralateral limb has appropriate stability  for age and condition  No adenopathy  Pulses are appropriate for current condition  Skin is intact        Chief Complaint    Chief Complaint   Patient presents with    Left Hand - Pain, Numbness    Right Hand - Pain, Numbness    Right Wrist - Pain, Numbness    Left Wrist - Pain, Numbness       HPI  Emily Max is a 43 y.o.  female who presents with       Past Medical History  Past Medical History:   Diagnosis Date    Abnormal Pap smear     Cervical cancer March 2013    FIGO IB1 AdenoCA Robotic radical hsyt,bso and bplnd    Depressive disorder, not elsewhere classified     Endometriosis of uterus     Foot fracture, right     Headache(784.0)     Hypertension     Hypothyroidism     Migraine headache     Urinary tract infection     Vaginal atrophy 5/8/2018    Visit for screening mammogram 12/16/2015    Well woman exam with routine  gynecological exam 12/16/2015       Past Surgical History  Past Surgical History:   Procedure Laterality Date    HYSTERECTOMY  May 2013    Robotic radical hyst, bso, blplnd    KNEE SURGERY  08/24/2017    right knee    LIPOMA RESECTION      OOPHORECTOMY      bilateral    PELVIC AND PARA-AORTIC LYMPH NODE DISSECTION      PELVIC LAPAROSCOPY      endometriosis    FL REMOVAL OF OVARY/TUBE(S)      SINUS SURGERY      x2    TONSILLECTOMY         Medications  Current Outpatient Prescriptions   Medication Sig    albuterol 90 mcg/actuation inhaler Inhale 2 puffs into the lungs every 4 (four) hours as needed for Wheezing.    ALPRAZolam (XANAX) 0.5 MG tablet Take 1 tablet (0.5 mg total) by mouth daily as needed for Anxiety.    atenolol (TENORMIN) 50 MG tablet TAKE 1 TABLET (50 MG TOTAL) BY MOUTH 2 (TWO) TIMES DAILY.    azelaic acid (FINACEA) 15 % Foam Apply thin film to face qhs    azelastine (ASTELIN) 137 mcg nasal spray 1-2 sprays (137-274 mcg total) by Nasal route 2 (two) times daily as needed for Rhinitis.    DULoxetine (CYMBALTA) 30 MG capsule Take 1 capsule (30 mg total) by mouth 2 (two) times daily.    EPINEPHrine (EPIPEN) 0.3 mg/0.3 mL AtIn Inject 0.3 mLs (0.3 mg total) into the muscle once.    fluticasone (FLONASE) 50 mcg/actuation nasal spray 1 spray by Each Nare route daily as needed for Rhinitis.    levocetirizine (XYZAL) 5 MG tablet Take 1 tablet (5 mg total) by mouth once daily.    levothyroxine (SYNTHROID) 50 MCG tablet Take 1 tablet (50 mcg total) by mouth once daily.    promethazine (PHENERGAN) 25 MG tablet Take 1 tablet (25 mg total) by mouth every 6 (six) hours as needed for Nausea.    traMADol (ULTRAM) 50 mg tablet Take 1-2 tablets ( mg total) by mouth every 8 (eight) hours as needed for Pain.    triamterene-hydrochlorothiazide 37.5-25 mg (DYAZIDE) 37.5-25 mg per capsule Take 1 capsule by mouth once daily.    valacyclovir (VALTREX) 1000 MG tablet Take 1 tablet (1,000 mg total) by  mouth every 12 (twelve) hours.    vitamin D (VITAMIN D3) 1000 units Tab Take 1 tablet (1,000 Units total) by mouth once daily.     Current Facility-Administered Medications   Medication    diphenhydrAMINE injection 50 mg       Allergies  Review of patient's allergies indicates:   Allergen Reactions    Lortab [hydrocodone-acetaminophen] Itching and Rash     States can take - may have been formulation    Other Anaphylaxis     mushrooms    Diflucan  [fluconazole]      Other reaction(s): Hives    Iodine and iodide containing products Hives     Iv iodine only    Mushroom combination no.1      Other reaction(s): Bronchial Constriction       Family History  Family History   Problem Relation Age of Onset    Colon cancer Other     Migraines Mother     Cancer Mother         endometrial cancer     Hypertension Mother     Hypothyroidism Mother     Heart disease Mother     Hyperlipidemia Mother     Migraines Maternal Grandmother     Aneurysm Maternal Grandmother         intracranial aneurysm    Stroke Maternal Grandmother     Cancer Maternal Aunt     Cancer Paternal Grandfather         lymphoma non hodgkins     Diabetes Father     Skin cancer Unknown     Rheum arthritis Paternal Grandmother 80    Rheum arthritis Maternal Aunt     Ovarian cancer Neg Hx     Uterine cancer Neg Hx     Breast cancer Neg Hx        Social History  Social History     Social History    Marital status: Single     Spouse name: N/A    Number of children: 1    Years of education: 23     Occupational History    RN [employer]     Social History Main Topics    Smoking status: Never Smoker    Smokeless tobacco: Never Used    Alcohol use Yes      Comment: socially    Drug use: Unknown    Sexual activity: Not on file     Other Topics Concern    Are You Pregnant Or Think You May Be? No    Breast-Feeding No     Social History Narrative    No narrative on file               Review of Systems     Constitutional:  Negative    HENT: Negative  Eyes: Negative  Respiratory: Negative  Cardiovascular: Negative  Musculoskeletal: HPI  Skin: Negative  Neurological: Negative  Hematological: Negative  Endocrine: Negative                 Physical Exam    There were no vitals filed for this visit.  Body mass index is 37.11 kg/m².  Physical Examination:     General appearance -  well appearing, and in no distress  Mental status - awake  Neck - supple  Chest -  symmetric air entry  Heart - normal rate   Abdomen - soft      Assessment     1. Bilateral hand pain    2. Bilateral wrist pain    3. Trigger finger of left thumb          Plan

## 2018-05-16 NOTE — LETTER
May 16, 2018      Elif Lew MD  [address]  [facility] [address]           [facility]  [address] [facility] [address] [place] [address]  Phone: [phone]          Patient: Emily Max   MR Number: [id number]   YOB: 1975   Date of Visit: 5/16/2018       Dear Dr. Elif Lew:    Thank you for referring Emily Max to me for evaluation. Attached you will find relevant portions of my assessment and plan of care.    If you have questions, please do not hesitate to call me. I look forward to following Emily Max along with you.    Sincerely,    Ramsey Gonzalez MD    Enclosure  CC:  No Recipients    If you would like to receive this communication electronically, please contact [email or web address] or [phone] to request more information on [employer] Link access.    For providers and/or their staff who would like to refer a patient to [facility], please contact us through our one-stop-shop provider referral line, [facility], at [phone].    If you feel you have received this communication in error or would no longer like to receive these types of communications, please e-mail [email or web address]

## 2018-05-16 NOTE — PROCEDURES
Tendon Sheath  Date/Time: 5/16/2018 12:25 PM  Performed by: WARREN MELENDEZ  Authorized by: WARREN MELENDEZ     Location:  Thumb  Site:  L thumb MCP  Medications:  20 mg triamcinolone hexacetonide 20 mg/mL

## 2018-09-05 ENCOUNTER — HOSPITAL ENCOUNTER (OUTPATIENT)
Dept: RADIOLOGY | Facility: HOSPITAL | Age: 43
Discharge: HOME | End: 2018-09-05
Attending: ORTHOPAEDIC SURGERY
Payer: COMMERCIAL

## 2018-09-05 ENCOUNTER — OFFICE VISIT (OUTPATIENT)
Dept: SPORTS MEDICINE | Facility: CLINIC | Age: 43
End: 2018-09-05
Payer: COMMERCIAL

## 2018-09-05 VITALS
BODY MASS INDEX: 37.15 KG/M2 | HEART RATE: 87 BPM | SYSTOLIC BLOOD PRESSURE: 128 MMHG | WEIGHT: 223 LBS | DIASTOLIC BLOOD PRESSURE: 78 MMHG | HEIGHT: 65 IN

## 2018-09-05 DIAGNOSIS — M25.561 RIGHT KNEE PAIN, UNSPECIFIED CHRONICITY: ICD-10-CM

## 2018-09-05 DIAGNOSIS — M17.11 PRIMARY OSTEOARTHRITIS OF RIGHT KNEE: Primary | ICD-10-CM

## 2018-09-05 PROCEDURE — 99214 OFFICE O/P EST MOD 30 MIN: CPT | Mod: S$GLB,,, | Performed by: ORTHOPAEDIC SURGERY

## 2018-09-05 PROCEDURE — 73564 X-RAY EXAM KNEE 4 OR MORE: CPT | Mod: TC,50,FY,PO

## 2018-09-05 PROCEDURE — 99999 PR PBB SHADOW E&M-EST. PATIENT-LVL IV: CPT | Mod: PBBFAC,,, | Performed by: ORTHOPAEDIC SURGERY

## 2018-09-05 PROCEDURE — 73564 X-RAY EXAM KNEE 4 OR MORE: CPT | Mod: 26,50,, | Performed by: RADIOLOGY

## 2018-09-05 NOTE — PROGRESS NOTES
HISTORY OF PRESENT ILLNESS:   Pt is here today for followup of her knee arthroscopy.  she is overall doing well.  We have reviewed her findings and discussed plan of care and future treatment options.      Today she reports she is having continued medial sided knee pain with activity. She lost 20 lbs and pain improved, but unfortunately she gained weight again and pain returned.   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    Pain is worse with ambulation and lateral movement. She is working on losing weight again.     She completed Synvisc injection series in December without relief.     DATE OF PROCEDURE: 08/24/2017  PROCEDURE PERFORMED:   right  1. knee arthroscopic chondroplasty (CPT 26223)  2. knee arthroscopic medial (CPT 94974) meniscectomy   3. knee arthroscopic partial synovectomy/debridement (CPT 74053).   4. knee arthroscopic plica excision(CPT 84981).    5. Knee arthroscopic-assisted arthrocentesis of viable structural human allograft tissue      matrix ([employer]) (CPT 50424)              6. Knee arthroscopic loose body removal (CPT 69130)    In the patellofemoral compartment, there was chondral damage to:  Patella 10 x 10 mm grade 2  Trochlea 10 x 10 mm grade 2  Chondroplasty was performed using arthroscopic shaver.     There was chondral damage to:  Medial femoral condyle 10 x 15 mm grade 4  Medial tibial plateau 15 x 10 mm grade 2  Chondroplasty was performed using arthroscopic shaver.     There was chondral damage to:  Lateral tibial plateau 15 x 10 mm grade 2  Chondroplasty was performed using arthroscopic shaver.     Loose bodies measuring 13 x 5 x 7 mm and 10 x 4 x 2 mm  mm were removed from medial compartment using arthroscopic grasper.      PAST MEDICAL HISTORY:   Past Medical History:   Diagnosis Date    Abnormal Pap smear     Cervical cancer March 2013    FIGO IB1 AdenoCA Robotic radical hsyt,bso and bplnd    Depressive disorder, not  elsewhere classified     Endometriosis of uterus     Foot fracture, right     Headache(784.0)     Hypertension     Hypothyroidism     Migraine headache     Urinary tract infection     Vaginal atrophy 5/8/2018    Visit for screening mammogram 12/16/2015    Well woman exam with routine gynecological exam 12/16/2015     PAST SURGICAL HISTORY:   Past Surgical History:   Procedure Laterality Date    HYSTERECTOMY  May 2013    Robotic radical hyst, bso, blplnd    KNEE SURGERY  08/24/2017    right knee    LIPOMA RESECTION      OOPHORECTOMY      bilateral    PELVIC AND PARA-AORTIC LYMPH NODE DISSECTION      PELVIC LAPAROSCOPY      endometriosis    WA REMOVAL OF OVARY/TUBE(S)      SINUS SURGERY      x2    TONSILLECTOMY       FAMILY HISTORY:   Family History   Problem Relation Age of Onset    Colon cancer Other     Migraines Mother     Cancer Mother         endometrial cancer     Hypertension Mother     Hypothyroidism Mother     Heart disease Mother     Hyperlipidemia Mother     Migraines Maternal Grandmother     Aneurysm Maternal Grandmother         intracranial aneurysm    Stroke Maternal Grandmother     Cancer Maternal Aunt     Cancer Paternal Grandfather         lymphoma non hodgkins     Diabetes Father     Skin cancer Unknown     Rheum arthritis Paternal Grandmother 80    Rheum arthritis Maternal Aunt     Ovarian cancer Neg Hx     Uterine cancer Neg Hx     Breast cancer Neg Hx      SOCIAL HISTORY:   Social History     Socioeconomic History    Marital status: Single     Spouse name: Not on file    Number of children: 1    Years of education: 23    Highest education level: Not on file   Social Needs    Financial resource strain: Not on file    Food insecurity - worry: Not on file    Food insecurity - inability: Not on file    Transportation needs - medical: Not on file    Transportation needs - non-medical: Not on file   Occupational History    Occupation: RN     Employer:  [employer]   Tobacco Use    Smoking status: Never Smoker    Smokeless tobacco: Never Used   Substance and Sexual Activity    Alcohol use: Yes     Comment: socially    Drug use: Not on file    Sexual activity: Not on file   Other Topics Concern    Are you pregnant or think you may be? No    Breast-feeding No   Social History Narrative    Not on file       MEDICATIONS:   Current Outpatient Medications:     ALPRAZolam (XANAX) 0.5 MG tablet, Take 1 tablet (0.5 mg total) by mouth daily as needed for Anxiety., Disp: 30 tablet, Rfl: 0    atenolol (TENORMIN) 50 MG tablet, TAKE 1 TABLET (50 MG TOTAL) BY MOUTH 2 (TWO) TIMES DAILY., Disp: 180 tablet, Rfl: 3    azelaic acid (FINACEA) 15 % Foam, Apply thin film to face qhs, Disp: 50 g, Rfl: 6    azelastine (ASTELIN) 137 mcg nasal spray, 1-2 sprays (137-274 mcg total) by Nasal route 2 (two) times daily as needed for Rhinitis., Disp: 30 mL, Rfl: 5    DULoxetine (CYMBALTA) 30 MG capsule, Take 1 capsule (30 mg total) by mouth 2 (two) times daily., Disp: 90 capsule, Rfl: 3    fluticasone (FLONASE) 50 mcg/actuation nasal spray, 1 spray by Each Nare route daily as needed for Rhinitis., Disp: , Rfl:     levocetirizine (XYZAL) 5 MG tablet, Take 1 tablet (5 mg total) by mouth once daily., Disp: 90 tablet, Rfl: 3    levothyroxine (SYNTHROID) 50 MCG tablet, Take 1 tablet (50 mcg total) by mouth once daily., Disp: 90 tablet, Rfl: 3    promethazine (PHENERGAN) 25 MG tablet, Take 1 tablet (25 mg total) by mouth every 6 (six) hours as needed for Nausea., Disp: 16 tablet, Rfl: 0    traMADol (ULTRAM) 50 mg tablet, Take 1-2 tablets ( mg total) by mouth every 8 (eight) hours as needed for Pain., Disp: 30 tablet, Rfl: 0    triamterene-hydrochlorothiazide 37.5-25 mg (DYAZIDE) 37.5-25 mg per capsule, Take 1 capsule by mouth once daily., Disp: 90 capsule, Rfl: 3    valacyclovir (VALTREX) 1000 MG tablet, Take 1 tablet (1,000 mg total) by mouth every 12 (twelve)  "hours., Disp: 4 tablet, Rfl: 0    vitamin D (VITAMIN D3) 1000 units Tab, Take 1 tablet (1,000 Units total) by mouth once daily., Disp: 30 tablet, Rfl: prn    albuterol 90 mcg/actuation inhaler, Inhale 2 puffs into the lungs every 4 (four) hours as needed for Wheezing., Disp: 6.7 g, Rfl: 3    EPINEPHrine (EPIPEN) 0.3 mg/0.3 mL AtIn, Inject 0.3 mLs (0.3 mg total) into the muscle once., Disp: 2 each, Rfl: 2    Current Facility-Administered Medications:     diphenhydrAMINE injection 50 mg, 50 mg, Intravenous, PRN, Aleks Kwok III, PA-C  ALLERGIES:   Review of patient's allergies indicates:   Allergen Reactions    Lortab [hydrocodone-acetaminophen] Itching and Rash     States can take - may have been formulation    Other Anaphylaxis     mushrooms    Diflucan  [fluconazole]      Other reaction(s): Hives    Iodine and iodide containing products Hives     Iv iodine only    Mushroom combination no.1      Other reaction(s): Bronchial Constriction       VITAL SIGNS: /78   Pulse 87   Ht 5' 5" (1.651 m)   Wt 101.2 kg (223 lb)   BMI 37.11 kg/m²                         Review of Systems   Constitution: Negative. Negative for chills, fever and night sweats.   HENT: Negative for congestion and headaches.    Eyes: Negative for blurred vision, left vision loss and right vision loss.   Cardiovascular: Negative for chest pain and syncope.   Respiratory: Negative for cough and shortness of breath.    Endocrine: Negative for polydipsia, polyphagia and polyuria.   Hematologic/Lymphatic: Negative for bleeding problem. Does not bruise/bleed easily.   Skin: Negative for dry skin, itching and rash.   Musculoskeletal: Negative for falls and muscle weakness.   Gastrointestinal: Negative for abdominal pain and bowel incontinence.   Genitourinary: Negative for bladder incontinence and nocturia.   Neurological: Negative for disturbances in coordination, loss of balance and seizures.   Psychiatric/Behavioral: Negative for " depression. The patient does not have insomnia.    Allergic/Immunologic: Negative for hives and persistent infections.                                                                 PHYSICAL EXAMINATION:     Alert and oriented x 3. NAD. Nonlabored breathing. Extra occular eyemovements intact. Head atraumatic and normocephalic.     Right knee:  Incision sites healed well  No evidence of any erythema, infection or induration  Range of motion 0-120 degrees (contralateral knee with 5 degrees hyperextension)  Minimal effusion  2+ DP pulse  No swelling, no calf tenderness  - Jackie's sign  + medial joint line tendernes  Negative McMurrays  Moderate quad atrophy                                                                                 ASSESSMENT:                                                                                                                                               1. Status post above                                                                                           PLAN:                                                                                                                                                     1. Continue with home aquatic therapy  2. Continue with weight loss effort with goal of 195lbs by January  3. Recommend trial of Euflexxa injection series with Jean EL as previous Synvisc injections were ineffective but have different molecular weight and makeup  4. she will see Jean back in 4 months                                      5. All questions were answered and she should contact us if she  has any questions or concerns in the interim.                 Goal is 145 lbs, was 230 lbs, now 225 lbs in last month

## 2018-09-06 ENCOUNTER — OFFICE VISIT (OUTPATIENT)
Dept: PSYCHIATRY | Facility: CLINIC | Age: 43
End: 2018-09-06
Payer: COMMERCIAL

## 2018-09-06 DIAGNOSIS — F41.9 ANXIETY: Primary | ICD-10-CM

## 2018-09-06 DIAGNOSIS — F43.21 ADJUSTMENT DISORDER WITH DEPRESSED MOOD: ICD-10-CM

## 2018-09-06 DIAGNOSIS — F98.8 ATTENTION DEFICIT DISORDER (ADD) IN ADULT: ICD-10-CM

## 2018-09-06 PROCEDURE — 90834 PSYTX W PT 45 MINUTES: CPT | Mod: S$GLB,,, | Performed by: SOCIAL WORKER

## 2018-09-06 NOTE — PROGRESS NOTES
"Individual Psychotherapy (PhD/LCSW)    2018    Site:  [place]         Therapeutic Intervention: Met with patient.  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 Overall mood has been better and focus is also better in new work environment. She is realizing she does not know her own preferences, likes and dislikes, is working to get in touch with these and "know myself." Pt mainly grieving loss of dog "my best friend". Stakarla  2 months ago, pt continues to cry even in her sleep. Discuss distress she feels about decisions for dog's treatment, and how to resolve this. Pt will f/u next week before leaving again for [place].     Treatment plan:  · Target symptoms: depression, anxiety   · Why chosen therapy is appropriate versus another modality: relevant to diagnosis  · Outcome monitoring methods: self-report  · Therapeutic intervention type: insight oriented psychotherapy    Risk parameters:  Patient reports no suicidal ideation  Patient reports no homicidal ideation  Patient reports no self-injurious behavior  Patient reports no violent behavior    Verbal deficits: None    Patient's response to intervention:  The patient's response to intervention is motivated.    Progress toward goals and other mental status changes:  The patient's progress toward goals is fair .    Diagnosis:   Anxiety, adjustment disorder with depression due to grief,  ADD in adult    Plan:  individual psychotherapy    Return to clinic: as needed  "

## 2018-09-13 ENCOUNTER — HOSPITAL ENCOUNTER (OUTPATIENT)
Dept: RADIOLOGY | Facility: HOSPITAL | Age: 43
Discharge: HOME | End: 2018-09-13
Attending: OBSTETRICS & GYNECOLOGY
Payer: COMMERCIAL

## 2018-09-13 DIAGNOSIS — Z12.31 SCREENING MAMMOGRAM, ENCOUNTER FOR: ICD-10-CM

## 2018-09-13 PROCEDURE — 77063 BREAST TOMOSYNTHESIS BI: CPT | Mod: 26,,, | Performed by: RADIOLOGY

## 2018-09-13 PROCEDURE — 77067 SCR MAMMO BI INCL CAD: CPT | Mod: TC

## 2018-09-13 PROCEDURE — 77067 SCR MAMMO BI INCL CAD: CPT | Mod: 26,,, | Performed by: RADIOLOGY

## 2018-09-17 ENCOUNTER — TELEPHONE (OUTPATIENT)
Dept: GYNECOLOGIC ONCOLOGY | Facility: CLINIC | Age: 43
End: 2018-09-17

## 2018-12-06 NOTE — LETTER
December 2, 2017      Kelly Zaman MD  [address]  [place] [address]           [facility]  [address]  [place] [address]  Phone: [phone]          Patient: Emily Max   MR Number: [id number]   YOB: 1975   Date of Visit: 12/1/2017       Dear Dr. Kelly Zaman:    Thank you for referring Emily Max to me for evaluation. Attached you will find relevant portions of my assessment and plan of care.    If you have questions, please do not hesitate to call me. I look forward to following Emily Max along with you.    Sincerely,    Aleks Kwok III, PA-C    Enclosure  CC:  No Recipients    If you would like to receive this communication electronically, please contact [email or web address] or [phone] to request more information on [email or web address] Link access.    For providers and/or their staff who would like to refer a patient to [facility], please contact us through our one-stop-shop provider referral line, [facility], at [phone].    If you feel you have received this communication in error or would no longer like to receive these types of communications, please e-mail [email or web address]          Nursing Note by Jlil Love MA at 11/27/17 01:27 PM     Author:  Jill Love MA Service:  (none) Author Type:  Medical Assistant     Filed:  11/27/17 01:27 PM Encounter Date:  11/27/2017 Status:  Signed     :  Jill Love MA (Medical Assistant)            Patient roomed. Name and birth date were verified. Wait time explained to patient and number of patient ahead of him to be seen.Patient is here to day for the following:  Fever (up to 100.0-101.0,1 week.) and Cough      Jerman Lockett was offered treatment for pain control:[AM1.1T] Yes.  Patient refused comfort measures to reduce pain.[AM1.1M]    Last visit with GIOVANNA MOLINA was on No match found  Last visit with [facility] was on 08/30/2016 at  6:00 PM in [facility] SE[facility]  Match done based on reference date of today 11/27/17       [facility] [place] [address]  [facility] [place] [address][AM1.1T]            Revision History        User Key Date/Time User Provider Type Action    > AM1.1 11/27/17 01:27 PM Jill Love MA Medical Assistant Sign    M - Manual, T - Template

## 2018-12-13 ENCOUNTER — TELEPHONE (OUTPATIENT)
Dept: NEUROLOGY | Facility: CLINIC | Age: 43
End: 2018-12-13

## 2018-12-13 NOTE — TELEPHONE ENCOUNTER
I called patient to schedule an appointment with Dr. Molina, there was no answer and no voice mail to leave a message. I think the phone number is no longer a working number.

## 2019-02-01 ENCOUNTER — TELEPHONE (OUTPATIENT)
Dept: INTERNAL MEDICINE | Facility: CLINIC | Age: 44
End: 2019-02-01

## 2019-02-01 NOTE — TELEPHONE ENCOUNTER
Spoke to patient and informed that paperwork is complete and signed by PCP also faxed to [facility].

## 2019-02-01 NOTE — TELEPHONE ENCOUNTER
Spoke to patient. Patient states that she is not asking for restrictions in reference to paperwork. Patient mentioned that she is currently taking prescribed medications including Cymbalta and other anti depressants. 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    Just to inform.

## 2019-02-12 DIAGNOSIS — F41.1 GAD (GENERALIZED ANXIETY DISORDER): ICD-10-CM

## 2019-02-12 DIAGNOSIS — F33.1 MODERATE EPISODE OF RECURRENT MAJOR DEPRESSIVE DISORDER: ICD-10-CM

## 2019-02-12 RX ORDER — DULOXETIN HYDROCHLORIDE 30 MG/1
30 CAPSULE, DELAYED RELEASE ORAL 2 TIMES DAILY
Qty: 90 CAPSULE | Refills: 2 | Status: SHIPPED | OUTPATIENT
Start: 2019-02-12 | End: 2019-03-18 | Stop reason: SDUPTHER

## 2019-03-18 DIAGNOSIS — I10 ESSENTIAL HYPERTENSION: ICD-10-CM

## 2019-03-18 DIAGNOSIS — F33.1 MODERATE EPISODE OF RECURRENT MAJOR DEPRESSIVE DISORDER: ICD-10-CM

## 2019-03-18 DIAGNOSIS — E03.9 ACQUIRED HYPOTHYROIDISM: ICD-10-CM

## 2019-03-18 DIAGNOSIS — F41.1 GAD (GENERALIZED ANXIETY DISORDER): ICD-10-CM

## 2019-03-18 RX ORDER — LEVOTHYROXINE SODIUM 50 UG/1
50 TABLET ORAL DAILY
Qty: 90 TABLET | Refills: 0 | Status: SHIPPED | OUTPATIENT
Start: 2019-03-18 | End: 2019-08-27 | Stop reason: SDUPTHER

## 2019-03-18 RX ORDER — TRIAMTERENE AND HYDROCHLOROTHIAZIDE 37.5; 25 MG/1; MG/1
1 CAPSULE ORAL DAILY
Qty: 90 CAPSULE | Refills: 0 | Status: SHIPPED | OUTPATIENT
Start: 2019-03-18 | End: 2019-07-06 | Stop reason: SDUPTHER

## 2019-03-18 RX ORDER — DULOXETIN HYDROCHLORIDE 30 MG/1
30 CAPSULE, DELAYED RELEASE ORAL 2 TIMES DAILY
Qty: 90 CAPSULE | Refills: 0 | Status: SHIPPED | OUTPATIENT
Start: 2019-03-18 | End: 2019-11-13 | Stop reason: SDUPTHER

## 2019-03-18 RX ORDER — ATENOLOL 50 MG/1
TABLET ORAL
Qty: 180 TABLET | Refills: 0 | Status: SHIPPED | OUTPATIENT
Start: 2019-03-18 | End: 2019-11-13 | Stop reason: SDUPTHER

## 2019-07-06 DIAGNOSIS — I10 ESSENTIAL HYPERTENSION: ICD-10-CM

## 2019-07-08 RX ORDER — TRIAMTERENE AND HYDROCHLOROTHIAZIDE 37.5; 25 MG/1; MG/1
1 CAPSULE ORAL DAILY
Qty: 30 CAPSULE | Refills: 2 | Status: SHIPPED | OUTPATIENT
Start: 2019-07-08 | End: 2019-11-13 | Stop reason: SDUPTHER

## 2019-07-19 ENCOUNTER — PATIENT MESSAGE (OUTPATIENT)
Dept: SPORTS MEDICINE | Facility: CLINIC | Age: 44
End: 2019-07-19

## 2019-07-19 ENCOUNTER — TELEPHONE (OUTPATIENT)
Dept: SPORTS MEDICINE | Facility: CLINIC | Age: 44
End: 2019-07-19

## 2019-07-19 NOTE — TELEPHONE ENCOUNTER
Tried calling patient about coming in earlier on Monday and was not able to leave a voicemail. Sent portal message asking patient to call back to change appointment time on Monday.

## 2019-07-22 ENCOUNTER — TELEPHONE (OUTPATIENT)
Dept: SPORTS MEDICINE | Facility: CLINIC | Age: 44
End: 2019-07-22

## 2019-07-22 NOTE — TELEPHONE ENCOUNTER
Left another message letting patient know I canceled her appointment today 7/22/19 at 4pm. Dr Zaman will not be here that late. Told patient to call back to reschedule.

## 2019-07-22 NOTE — TELEPHONE ENCOUNTER
Left message again for patient to call back to move appointment time sooner or to a different day.

## 2019-08-14 ENCOUNTER — PATIENT MESSAGE (OUTPATIENT)
Dept: SPORTS MEDICINE | Facility: CLINIC | Age: 44
End: 2019-08-14

## 2019-08-14 ENCOUNTER — OFFICE VISIT (OUTPATIENT)
Dept: SPORTS MEDICINE | Facility: CLINIC | Age: 44
End: 2019-08-14
Payer: COMMERCIAL

## 2019-08-14 VITALS
HEART RATE: 79 BPM | BODY MASS INDEX: 36.65 KG/M2 | DIASTOLIC BLOOD PRESSURE: 78 MMHG | SYSTOLIC BLOOD PRESSURE: 121 MMHG | WEIGHT: 220 LBS | HEIGHT: 65 IN

## 2019-08-14 DIAGNOSIS — M17.11 OSTEOARTHRITIS OF RIGHT KNEE, UNSPECIFIED OSTEOARTHRITIS TYPE: Primary | ICD-10-CM

## 2019-08-14 PROCEDURE — 3078F PR MOST RECENT DIASTOLIC BLOOD PRESSURE < 80 MM HG: ICD-10-PCS | Mod: CPTII,S$GLB,, | Performed by: ORTHOPAEDIC SURGERY

## 2019-08-14 PROCEDURE — 3078F DIAST BP <80 MM HG: CPT | Mod: CPTII,S$GLB,, | Performed by: ORTHOPAEDIC SURGERY

## 2019-08-14 PROCEDURE — 3008F BODY MASS INDEX DOCD: CPT | Mod: CPTII,S$GLB,, | Performed by: ORTHOPAEDIC SURGERY

## 2019-08-14 PROCEDURE — 99999 PR PBB SHADOW E&M-EST. PATIENT-LVL IV: CPT | Mod: PBBFAC,,, | Performed by: ORTHOPAEDIC SURGERY

## 2019-08-14 PROCEDURE — 3074F PR MOST RECENT SYSTOLIC BLOOD PRESSURE < 130 MM HG: ICD-10-PCS | Mod: CPTII,S$GLB,, | Performed by: ORTHOPAEDIC SURGERY

## 2019-08-14 PROCEDURE — 3008F PR BODY MASS INDEX (BMI) DOCUMENTED: ICD-10-PCS | Mod: CPTII,S$GLB,, | Performed by: ORTHOPAEDIC SURGERY

## 2019-08-14 PROCEDURE — 99999 PR PBB SHADOW E&M-EST. PATIENT-LVL IV: ICD-10-PCS | Mod: PBBFAC,,, | Performed by: ORTHOPAEDIC SURGERY

## 2019-08-14 PROCEDURE — 99214 PR OFFICE/OUTPT VISIT, EST, LEVL IV, 30-39 MIN: ICD-10-PCS | Mod: S$GLB,,, | Performed by: ORTHOPAEDIC SURGERY

## 2019-08-14 PROCEDURE — 3074F SYST BP LT 130 MM HG: CPT | Mod: CPTII,S$GLB,, | Performed by: ORTHOPAEDIC SURGERY

## 2019-08-14 PROCEDURE — 99214 OFFICE O/P EST MOD 30 MIN: CPT | Mod: S$GLB,,, | Performed by: ORTHOPAEDIC SURGERY

## 2019-08-14 NOTE — PROGRESS NOTES
HISTORY OF PRESENT ILLNESS:   Pt is here today for followup of her knee arthroscopy.  she is overall doing well.  We have reviewed her findings and discussed plan of care and future treatment options.      Today she reports she is having continued medial sided knee pain with activity.     Patient notes that her knee pain never got 100% better   She has been doing some exercise here and there and she notes losing 15-20 pounds and that her knee pain has not improved   She has tried multiple diets and only lost 2-3 pounds and she is not able to exercise because of her knee pain   For the past 6 weeks her pain has become more constant and more severe     She notes crepitus  She has pain with full knee flexion, sleeping at night with her knee flexed and on her side   She notes pain with standing     She completed Synvisc injection series in December without relief.     DATE OF PROCEDURE: 08/24/2017  PROCEDURE PERFORMED:   right  1. knee arthroscopic chondroplasty (CPT 86228)  2. knee arthroscopic medial (CPT 99879) meniscectomy   3. knee arthroscopic partial synovectomy/debridement (CPT 34389).   4. knee arthroscopic plica excision(CPT 12934).    5. Knee arthroscopic-assisted arthrocentesis of viable structural human allograft tissue      matrix ([employer]) (CPT 48257)              6. Knee arthroscopic loose body removal (CPT 83610)    In the patellofemoral compartment, there was chondral damage to:  Patella 10 x 10 mm grade 2  Trochlea 10 x 10 mm grade 2  Chondroplasty was performed using arthroscopic shaver.     There was chondral damage to:  Medial femoral condyle 10 x 15 mm grade 4  Medial tibial plateau 15 x 10 mm grade 2  Chondroplasty was performed using arthroscopic shaver.     There was chondral damage to:  Lateral tibial plateau 15 x 10 mm grade 2  Chondroplasty was performed using arthroscopic shaver.     Loose bodies measuring 13 x 5 x 7 mm and 10 x 4 x 2 mm  mm were removed from medial compartment using  arthroscopic grasper.      PAST MEDICAL HISTORY:   Past Medical History:   Diagnosis Date    Abnormal Pap smear     Cervical cancer March 2013    FIGO IB1 AdenoCA Robotic radical hsyt,bso and bplnd    Depressive disorder, not elsewhere classified     Endometriosis of uterus     Foot fracture, right     Headache(784.0)     Hypertension     Hypothyroidism     Migraine headache     Urinary tract infection     Vaginal atrophy 5/8/2018    Visit for screening mammogram 12/16/2015    Well woman exam with routine gynecological exam 12/16/2015     PAST SURGICAL HISTORY:   Past Surgical History:   Procedure Laterality Date    ARTHROSCOPY-KNEE-MENISECTOMY-MEDIAL; REMOVAL LOOSE BODIES, PARTIAL SYNOVECTOMY/DEBRIDEMENT; PLICA EXCISION Right 8/24/2017    [facility] OR    BREAST BIOPSY Right     Core bx, benign    CHONDROPLASTY-KNEE-ARTHROSCOPIC Right 8/24/2017    [facility] OR    CYSTOSCOPY N/A 5/7/2013    [facility] 2ND FLR    CYSTOSCOPY, WITH RETROGRADE PYELOGRAM Bilateral 6/3/2013    Performed by Don Corral [facility] at [facility] OR 1ST FLR    HYSTERECTOMY  May 2013    Robotic radical hyst, bso, blplnd    INJECTION-STEROID-ARTHROSCOPIC ASSISTED BIO D INJECTION Right 8/24/2017    [facility] OR    KNEE SURGERY  08/24/2017    right knee    LIPOMA RESECTION      LYMPHADENECTOMY-PELVIC Bilateral 5/7/2013    [facility] at [facility] OR 2ND FLR    OOPHORECTOMY      bilateral    PELVIC AND PARA-AORTIC LYMPH NODE DISSECTION      PELVIC LAPAROSCOPY      endometriosis    W[place] REMOVAL OF OVARY/TUBE(S)      ROBOTIC HYSTERECTOMY, RADICAL N/A 5/7/2013    Performed [facility] at [facility] OR 2ND FLR    SALPINGO-OOPHORECTOMY, BILATERAL Bilateral 5/7/2013    [facility] at [facility] OR 2ND FLR    SINUS SURGERY      x2    TONSILLECTOMY       FAMILY HISTORY:   Family History    Problem Relation Age of Onset    Colon cancer Other     Migraines Mother     Cancer Mother         endometrial cancer     Hypertension Mother     Hypothyroidism Mother     Heart disease Mother     Hyperlipidemia Mother     Migraines Maternal Grandmother     Aneurysm Maternal Grandmother         intracranial aneurysm    Stroke Maternal Grandmother     Cancer Maternal Aunt     Cancer Paternal Grandfather         lymphoma non hodgkins     Diabetes Father     Skin cancer Unknown     Rheum arthritis Paternal Grandmother 80    Rheum arthritis Maternal Aunt     Ovarian cancer Neg Hx     Uterine cancer Neg Hx     Breast cancer Neg Hx      SOCIAL HISTORY:   Social History     Socioeconomic History    Marital status: Single     Spouse name: Not on file    Number of children: 1    Years of education: 23    Highest education level: Not on file   Occupational History    Occupation: RN     Employer: [employer]   Social Needs    Financial resource strain: Not on file    Food insecurity:     Worry: Not on file     Inability: Not on file    Transportation needs:     Medical: Not on file     Non-medical: Not on file   Tobacco Use    Smoking status: Never Smoker    Smokeless tobacco: Never Used   Substance and Sexual Activity    Alcohol use: Yes     Comment: socially    Drug use: Not on file    Sexual activity: Not on file   Lifestyle    Physical activity:     Days per week: Not on file     Minutes per session: Not on file    Stress: Not on file   Relationships    Social connections:     Talks on phone: Not on file     Gets together: Not on file     Attends Sikhism service: Not on file     Active member of club or organization: Not on file     Attends meetings of clubs or organizations: Not on file     Relationship status: Not on file   Other Topics Concern    Are you pregnant or think you may be? No    Breast-feeding No   Social History Narrative    Not on file        MEDICATIONS:   Current Outpatient Medications:     ALPRAZolam (XANAX) 0.5 MG tablet, Take 1 tablet (0.5 mg total) by mouth daily as needed for Anxiety., Disp: 30 tablet, Rfl: 0    atenolol (TENORMIN) 50 MG tablet, TAKE 1 TABLET (50 MG TOTAL) BY MOUTH 2 (TWO) TIMES DAILY., Disp: 180 tablet, Rfl: 0    azelaic acid (FINACEA) 15 % Foam, Apply thin film to face qhs, Disp: 50 g, Rfl: 6    DULoxetine (CYMBALTA) 30 MG capsule, Take 1 capsule (30 mg total) by mouth 2 (two) times daily., Disp: 90 capsule, Rfl: 0    fluticasone (FLONASE) 50 mcg/actuation nasal spray, 1 spray by Each Nare route daily as needed for Rhinitis., Disp: , Rfl:     levocetirizine (XYZAL) 5 MG tablet, Take 1 tablet (5 mg total) by mouth once daily., Disp: 90 tablet, Rfl: 3    levothyroxine (SYNTHROID) 50 MCG tablet, Take 1 tablet (50 mcg total) by mouth once daily., Disp: 90 tablet, Rfl: 0    promethazine (PHENERGAN) 25 MG tablet, Take 1 tablet (25 mg total) by mouth every 6 (six) hours as needed for Nausea., Disp: 16 tablet, Rfl: 0    traMADol (ULTRAM) 50 mg tablet, Take 1-2 tablets ( mg total) by mouth every 8 (eight) hours as needed for Pain., Disp: 30 tablet, Rfl: 0    triamterene-hydrochlorothiazide 37.5-25 mg (DYAZIDE) 37.5-25 mg per capsule, Take 1 capsule by mouth once daily. You must be seen for further refills. Please call [phone] to schedule., Disp: 30 capsule, Rfl: 2    vitamin D (VITAMIN D3) 1000 units Tab, Take 1 tablet (1,000 Units total) by mouth once daily., Disp: 30 tablet, Rfl: prn    albuterol 90 mcg/actuation inhaler, Inhale 2 puffs into the lungs every 4 (four) hours as needed for Wheezing., Disp: 6.7 g, Rfl: 3    azelastine (ASTELIN) 137 mcg nasal spray, 1-2 sprays (137-274 mcg total) by Nasal route 2 (two) times daily as needed for Rhinitis., Disp: 30 mL, Rfl: 5    EPINEPHrine (EPIPEN) 0.3 mg/0.3 mL AtIn, Inject 0.3 mLs (0.3 mg total) into the muscle once., Disp: 2 each, Rfl: 2    valacyclovir  "(VALTREX) 1000 MG tablet, Take 1 tablet (1,000 mg total) by mouth every 12 (twelve) hours., Disp: 4 tablet, Rfl: 0    Current Facility-Administered Medications:     diphenhydrAMINE injection 50 mg, 50 mg, Intravenous, PRN, Aleks Kwok III, PA-C  ALLERGIES:   Review of patient's allergies indicates:   Allergen Reactions    Lortab [hydrocodone-acetaminophen] Itching and Rash     States can take - may have been formulation    Other Anaphylaxis     mushrooms    Diflucan  [fluconazole]      Other reaction(s): Hives    Iodine and iodide containing products Hives     Iv iodine only    Mushroom combination no.1      Other reaction(s): Bronchial Constriction       VITAL SIGNS: /78   Pulse 79   Ht 5' 5" (1.651 m)   Wt 99.8 kg (220 lb)   BMI 36.61 kg/m²                         Review of Systems   Constitution: Negative. Negative for chills, fever and night sweats.   HENT: Negative for congestion and headaches.    Eyes: Negative for blurred vision, left vision loss and right vision loss.   Cardiovascular: Negative for chest pain and syncope.   Respiratory: Negative for cough and shortness of breath.    Endocrine: Negative for polydipsia, polyphagia and polyuria.   Hematologic/Lymphatic: Negative for bleeding problem. Does not bruise/bleed easily.   Skin: Negative for dry skin, itching and rash.   Musculoskeletal: Negative for falls and muscle weakness.   Gastrointestinal: Negative for abdominal pain and bowel incontinence.   Genitourinary: Negative for bladder incontinence and nocturia.   Neurological: Negative for disturbances in coordination, loss of balance and seizures.   Psychiatric/Behavioral: Negative for depression. The patient does not have insomnia.    Allergic/Immunologic: Negative for hives and persistent infections.                                                                 PHYSICAL EXAMINATION:     Alert and oriented x 3. NAD. Nonlabored breathing. Extra occular eyemovements intact. Head " atraumatic and normocephalic.     Right knee:  Incision sites healed well  No evidence of any erythema, infection or induration  Range of motion 0-120 degrees   Minimal effusion  2+ DP pulse  No swelling, no calf tenderness  - Jackie's sign  + medial joint line tenderness  Moderate quad atrophy    + pain with extension (described as aching)                                                                                 ASSESSMENT:                                                                                                                                               1. Status post above                                                                                           PLAN:                                                                                                                                                     1. Euflexxa injection series with Jean Kwok   2. Discuss difficulty with weight loss with her primary care at her visit next week   3. she will see Jean for her injections                                    4. All questions were answered and she should contact us if she  has any questions or concerns in the interim.

## 2019-08-15 DIAGNOSIS — M79.641 BILATERAL HAND PAIN: Primary | ICD-10-CM

## 2019-08-15 DIAGNOSIS — M79.642 BILATERAL HAND PAIN: Primary | ICD-10-CM

## 2019-08-22 ENCOUNTER — OFFICE VISIT (OUTPATIENT)
Dept: PSYCHIATRY | Facility: CLINIC | Age: 44
End: 2019-08-22
Payer: COMMERCIAL

## 2019-08-22 DIAGNOSIS — F98.8 ATTENTION DEFICIT DISORDER (ADD) IN ADULT: ICD-10-CM

## 2019-08-22 DIAGNOSIS — F41.9 ANXIETY: Primary | ICD-10-CM

## 2019-08-22 PROCEDURE — 90834 PSYTX W PT 45 MINUTES: CPT | Mod: S$GLB,,, | Performed by: SOCIAL WORKER

## 2019-08-22 PROCEDURE — 90834 PR PSYCHOTHERAPY W/PATIENT, 45 MIN: ICD-10-PCS | Mod: S$GLB,,, | Performed by: SOCIAL WORKER

## 2019-08-22 NOTE — PROGRESS NOTES
Individual Psychotherapy (PhD/LCSW)    8/22/2019    Site:  [place]         Therapeutic Intervention: Met with patient.  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 Review personal growth in confidence, relationship-building. Discuss hopes and concerns about taking mom to [facility]. Discuss tools for preparing to resume dating when she feels ready.    Treatment plan:  · Target symptoms: depression, anxiety   · Why chosen therapy is appropriate versus another modality: relevant to diagnosis  · Outcome monitoring methods: self-report  · Therapeutic intervention type: insight oriented psychotherapy    Risk parameters:  Patient reports no suicidal ideation  Patient reports no homicidal ideation  Patient reports no self-injurious behavior  Patient reports no violent behavior    Verbal deficits: None    Patient's response to intervention:  The patient's response to intervention is motivated.    Progress toward goals and other mental status changes:  The patient's progress toward goals is good    Diagnosis:   Anxiety, ADD in adult    Plan:  individual psychotherapy    Return to clinic: as needed

## 2019-08-26 ENCOUNTER — OFFICE VISIT (OUTPATIENT)
Dept: SPORTS MEDICINE | Facility: CLINIC | Age: 44
End: 2019-08-26
Payer: COMMERCIAL

## 2019-08-26 VITALS
DIASTOLIC BLOOD PRESSURE: 87 MMHG | BODY MASS INDEX: 36.65 KG/M2 | HEART RATE: 81 BPM | HEIGHT: 65 IN | SYSTOLIC BLOOD PRESSURE: 127 MMHG | WEIGHT: 220 LBS

## 2019-08-26 DIAGNOSIS — M17.11 OSTEOARTHRITIS OF RIGHT KNEE, UNSPECIFIED OSTEOARTHRITIS TYPE: Primary | ICD-10-CM

## 2019-08-26 DIAGNOSIS — G89.29 CHRONIC PAIN OF RIGHT KNEE: ICD-10-CM

## 2019-08-26 DIAGNOSIS — M25.561 CHRONIC PAIN OF RIGHT KNEE: ICD-10-CM

## 2019-08-26 PROCEDURE — 20610 PR DRAIN/INJECT LARGE JOINT/BURSA: ICD-10-PCS | Mod: RT,S$GLB,, | Performed by: PHYSICIAN ASSISTANT

## 2019-08-26 PROCEDURE — 99999 PR PBB SHADOW E&M-EST. PATIENT-LVL IV: ICD-10-PCS | Mod: PBBFAC,,, | Performed by: PHYSICIAN ASSISTANT

## 2019-08-26 PROCEDURE — 99499 NO LOS: ICD-10-PCS | Mod: S$GLB,,, | Performed by: PHYSICIAN ASSISTANT

## 2019-08-26 PROCEDURE — 99499 UNLISTED E&M SERVICE: CPT | Mod: S$GLB,,, | Performed by: PHYSICIAN ASSISTANT

## 2019-08-26 PROCEDURE — 20610 DRAIN/INJ JOINT/BURSA W/O US: CPT | Mod: RT,S$GLB,, | Performed by: PHYSICIAN ASSISTANT

## 2019-08-26 PROCEDURE — 99999 PR PBB SHADOW E&M-EST. PATIENT-LVL IV: CPT | Mod: PBBFAC,,, | Performed by: PHYSICIAN ASSISTANT

## 2019-08-26 NOTE — PROGRESS NOTES
Patient is here for follow up of knee arthritis. Pt is requesting Euflexxa injection #1.  PMFH reviewed per encounter record. Has failed other conservative modalities including NSAIDS, activity modification, weight loss.    She has received good relief with injections in the past.     The prior shots were tolerated well.    PHYSICAL EXAMINATION:     General: The patient is alert and oriented x 3. Mood is pleasant.   Observation of ears, eyes and nose reveals no gross abnormalities. No   labored breathing observed.     No signs of infection or adverse reaction to knee.        Euflexxa Injection Procedure #1    A time out was performed, including verification of patient ID, procedure, site and side, availability of information and equipment, review of safety issues, and agreement with consent, the procedure site was marked.    The patient was prepped aseptically with povidone-iodine swabsticks. A diagnostic and therapeutic injection of 2cc Euflexxa was given under sterile technique using a 21.5g x 1.5 needle from the anterolateral aspect of the right knee Joint with the patient in the seated position.     Emily aMx had no adverse reactions to the medication. Pain decreased. female was instructed to apply ice to the joint for 20 minutes and avoid strenuous activities for 24-36 hours following the injection. female was warned of possible blood pressure changes during that time. Following that time, female can resume activities as prior to the injection.    female was reminded to call the clinic immediately for any adverse side effects as explained in clinic today.    RTC in 1 week for injection #2  All patients questions were answered. Patient was advised to call us with any concerns or questions.

## 2019-08-27 DIAGNOSIS — E03.9 ACQUIRED HYPOTHYROIDISM: ICD-10-CM

## 2019-08-27 RX ORDER — LEVOTHYROXINE SODIUM 50 UG/1
50 TABLET ORAL DAILY
Qty: 90 TABLET | Refills: 3 | Status: SHIPPED | OUTPATIENT
Start: 2019-08-27 | End: 2019-11-13 | Stop reason: SDUPTHER

## 2019-09-03 ENCOUNTER — TELEPHONE (OUTPATIENT)
Dept: SPORTS MEDICINE | Facility: CLINIC | Age: 44
End: 2019-09-03

## 2019-09-03 ENCOUNTER — PATIENT MESSAGE (OUTPATIENT)
Dept: SPORTS MEDICINE | Facility: CLINIC | Age: 44
End: 2019-09-03

## 2019-09-03 NOTE — TELEPHONE ENCOUNTER
LM informing Patient of joint injection appointment being scheduled on preferred date 9/20/19 for 4:15pm

## 2019-09-13 ENCOUNTER — TELEPHONE (OUTPATIENT)
Dept: ADMINISTRATIVE | Facility: OTHER | Age: 44
End: 2019-09-13

## 2019-09-13 ENCOUNTER — PATIENT MESSAGE (OUTPATIENT)
Dept: SPORTS MEDICINE | Facility: CLINIC | Age: 44
End: 2019-09-13

## 2019-09-16 ENCOUNTER — PATIENT MESSAGE (OUTPATIENT)
Dept: SPORTS MEDICINE | Facility: CLINIC | Age: 44
End: 2019-09-16

## 2019-09-16 ENCOUNTER — TELEPHONE (OUTPATIENT)
Dept: SPORTS MEDICINE | Facility: CLINIC | Age: 44
End: 2019-09-16

## 2019-09-16 NOTE — TELEPHONE ENCOUNTER
Called Patient (LM) and sent message via Patient Portal regarding rescheduling 9/20/19 appointment with Jean Vuong.

## 2019-09-17 ENCOUNTER — TELEPHONE (OUTPATIENT)
Dept: ADMINISTRATIVE | Facility: OTHER | Age: 44
End: 2019-09-17

## 2019-09-25 ENCOUNTER — TELEPHONE (OUTPATIENT)
Dept: ADMINISTRATIVE | Facility: OTHER | Age: 44
End: 2019-09-25

## 2019-09-27 ENCOUNTER — TELEPHONE (OUTPATIENT)
Dept: ADMINISTRATIVE | Facility: OTHER | Age: 44
End: 2019-09-27

## 2019-09-30 ENCOUNTER — TELEPHONE (OUTPATIENT)
Dept: ADMINISTRATIVE | Facility: OTHER | Age: 44
End: 2019-09-30

## 2019-10-03 ENCOUNTER — TELEPHONE (OUTPATIENT)
Dept: ADMINISTRATIVE | Facility: OTHER | Age: 44
End: 2019-10-03

## 2019-10-04 ENCOUNTER — OFFICE VISIT (OUTPATIENT)
Dept: GYNECOLOGIC ONCOLOGY | Facility: CLINIC | Age: 44
End: 2019-10-04
Payer: COMMERCIAL

## 2019-10-04 VITALS
HEART RATE: 96 BPM | SYSTOLIC BLOOD PRESSURE: 124 MMHG | DIASTOLIC BLOOD PRESSURE: 72 MMHG | HEIGHT: 65 IN | WEIGHT: 232.56 LBS | BODY MASS INDEX: 38.75 KG/M2

## 2019-10-04 DIAGNOSIS — Z01.419 WELL WOMAN EXAM WITH ROUTINE GYNECOLOGICAL EXAM: ICD-10-CM

## 2019-10-04 DIAGNOSIS — Z12.31 SCREENING MAMMOGRAM, ENCOUNTER FOR: ICD-10-CM

## 2019-10-04 DIAGNOSIS — Z85.41 HISTORY OF CERVICAL CANCER: Primary | ICD-10-CM

## 2019-10-04 PROCEDURE — 99396 PR PREVENTIVE VISIT,EST,40-64: ICD-10-PCS | Mod: S$PBB,,, | Performed by: OBSTETRICS & GYNECOLOGY

## 2019-10-04 PROCEDURE — 99396 PREV VISIT EST AGE 40-64: CPT | Mod: S$PBB,,, | Performed by: OBSTETRICS & GYNECOLOGY

## 2019-10-04 PROCEDURE — 3078F DIAST BP <80 MM HG: CPT | Mod: CPTII,S$GLB,, | Performed by: OBSTETRICS & GYNECOLOGY

## 2019-10-04 PROCEDURE — 99999 PR PBB SHADOW E&M-EST. PATIENT-LVL IV: CPT | Mod: PBBFAC,,, | Performed by: OBSTETRICS & GYNECOLOGY

## 2019-10-04 PROCEDURE — 3078F PR MOST RECENT DIASTOLIC BLOOD PRESSURE < 80 MM HG: ICD-10-PCS | Mod: CPTII,S$GLB,, | Performed by: OBSTETRICS & GYNECOLOGY

## 2019-10-04 PROCEDURE — 88141 LIQUID-BASED PAP SMEAR, SCREENING: ICD-10-PCS | Mod: ,,, | Performed by: PATHOLOGY

## 2019-10-04 PROCEDURE — 3074F PR MOST RECENT SYSTOLIC BLOOD PRESSURE < 130 MM HG: ICD-10-PCS | Mod: CPTII,S$GLB,, | Performed by: OBSTETRICS & GYNECOLOGY

## 2019-10-04 PROCEDURE — 99999 PR PBB SHADOW E&M-EST. PATIENT-LVL IV: ICD-10-PCS | Mod: PBBFAC,,, | Performed by: OBSTETRICS & GYNECOLOGY

## 2019-10-04 PROCEDURE — 88141 CYTOPATH C/V INTERPRET: CPT | Mod: ,,, | Performed by: PATHOLOGY

## 2019-10-04 PROCEDURE — 3074F SYST BP LT 130 MM HG: CPT | Mod: CPTII,S$GLB,, | Performed by: OBSTETRICS & GYNECOLOGY

## 2019-10-04 PROCEDURE — 88175 CYTOPATH C/V AUTO FLUID REDO: CPT | Performed by: PATHOLOGY

## 2019-10-04 NOTE — PROGRESS NOTES
"Subjective:       Patient ID: Emily Max is a 44 y.o. female.    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     She denies vaginal bleeding.     Last seen May 2018 at which time she was to RTC in 3 months. 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         Mammogram : Sept 13 2018: normal.    Pap: Aug 1, 2017: normal.      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 She required no postoperative therapy.    Review of Systems   Constitutional: Negative for chills, fatigue and fever.   Eyes: Negative for visual disturbance.   Respiratory: Negative for cough, shortness of breath and wheezing.    Cardiovascular: Negative for chest pain, palpitations and leg swelling.   Gastrointestinal: Negative for abdominal distention, abdominal pain, constipation, diarrhea, nausea and vomiting.   Genitourinary: Negative for difficulty urinating, dysuria, frequency, genital sores, hematuria, pelvic pain, urgency, vaginal bleeding, vaginal discharge and vaginal pain.   Musculoskeletal: Negative for gait problem and neck stiffness.   Skin: Negative for rash.   Neurological: Negative for seizures and weakness.   Hematological: Negative for adenopathy. Does not bruise/bleed easily.   Psychiatric/Behavioral: The patient is not nervous/anxious.        Objective:   /72   Pulse 96   Ht 5' 5" (1.651 m)   Wt 105.5 kg (232 lb 9.4 oz)   BMI 38.70 kg/m²      Physical Exam   Constitutional: She is oriented to person, place, and time. She appears well-developed and well-nourished.   HENT:   Head: Normocephalic and atraumatic.   Eyes: No scleral icterus.   Neck: No tracheal " deviation present. No thyroid mass and no thyromegaly present.   Cardiovascular: Normal rate and regular rhythm.   Pulmonary/Chest: Effort normal and breath sounds normal. She has no wheezes. Right breast exhibits no mass, no nipple discharge, no skin change and no tenderness. Left breast exhibits no mass, no nipple discharge, no skin change and no tenderness.   Abdominal: She exhibits no distension and no mass. There is no hepatosplenomegaly. There is no tenderness. There is no rebound and no guarding.   Genitourinary:   Genitourinary Comments: Bimanual exam:  Vulva: no lesions. Normal appearance  Urethra: Normal size and location. No lesions  Bladder: No masses or tenderness.  Vagina: normal mucosa. No lesion  Cervix: absent.   Uterus: absent.  Adnexa: no masses.  Rectovaginal: No posterior cul de sac thickening or nodularity.  Rectal: no masses. Nontender. Normal tone.      Musculoskeletal: She exhibits no edema or tenderness.   Lymphadenopathy:     She has no cervical adenopathy.     She has no axillary adenopathy.        Right: No inguinal and no supraclavicular adenopathy present.        Left: No inguinal and no supraclavicular adenopathy present.   Neurological: She is alert and oriented to person, place, and time.   Skin: Skin is warm and dry. No rash noted.   Psychiatric: She has a normal mood and affect. Her behavior is normal. Judgment and thought content normal.       Assessment:       1. History of cervical cancer    2. Well woman exam with routine gynecological exam    3. Screening mammogram, encounter for        Plan:   History of cervical cancer   KAVIN   RTC in 1 year.   -     Liquid-based pap smear, screening    Well woman exam with routine gynecological exam  Counseling time of 10 minutes discussing calcium, vitamin D and exercise. Questions answered.     Screening mammogram, encounter for  -     Mammo Digital Screening Bilat; Future; Expected date: 10/04/2019

## 2019-10-08 ENCOUNTER — TELEPHONE (OUTPATIENT)
Dept: ADMINISTRATIVE | Facility: HOSPITAL | Age: 44
End: 2019-10-08

## 2019-10-08 ENCOUNTER — PATIENT OUTREACH (OUTPATIENT)
Dept: ADMINISTRATIVE | Facility: HOSPITAL | Age: 44
End: 2019-10-08

## 2019-10-08 ENCOUNTER — PATIENT MESSAGE (OUTPATIENT)
Dept: GYNECOLOGIC ONCOLOGY | Facility: CLINIC | Age: 44
End: 2019-10-08

## 2019-10-08 DIAGNOSIS — R73.03 PRE-DIABETES: ICD-10-CM

## 2019-10-08 DIAGNOSIS — E55.9 MILD VITAMIN D DEFICIENCY: ICD-10-CM

## 2019-10-08 DIAGNOSIS — Z00.00 ANNUAL PHYSICAL EXAM: ICD-10-CM

## 2019-10-08 DIAGNOSIS — E66.01 CLASS 2 SEVERE OBESITY DUE TO EXCESS CALORIES WITH SERIOUS COMORBIDITY AND BODY MASS INDEX (BMI) OF 37.0 TO 37.9 IN ADULT: ICD-10-CM

## 2019-10-08 DIAGNOSIS — I10 ESSENTIAL HYPERTENSION: Primary | ICD-10-CM

## 2019-10-08 DIAGNOSIS — E03.8 OTHER SPECIFIED HYPOTHYROIDISM: ICD-10-CM

## 2019-10-10 ENCOUNTER — TELEPHONE (OUTPATIENT)
Dept: ADMINISTRATIVE | Facility: OTHER | Age: 44
End: 2019-10-10

## 2019-10-10 DIAGNOSIS — C53.1 MALIGNANT NEOPLASM OF EXOCERVIX: ICD-10-CM

## 2019-10-14 DIAGNOSIS — R87.622 LOW GRADE SQUAMOUS INTRAEPITH LESION ON CYTOLOGIC SMEAR VAGINA (LGSIL): ICD-10-CM

## 2019-10-14 RX ORDER — ESTRADIOL 10 UG/1
10 INSERT VAGINAL
Qty: 8 TABLET | Refills: 12 | Status: SHIPPED | OUTPATIENT
Start: 2019-10-14 | End: 2021-03-24 | Stop reason: SDUPTHER

## 2019-10-21 ENCOUNTER — LAB VISIT (OUTPATIENT)
Dept: LAB | Facility: HOSPITAL | Age: 44
End: 2019-10-21
Attending: INTERNAL MEDICINE
Payer: COMMERCIAL

## 2019-10-21 DIAGNOSIS — E06.5 HYPOTHYROIDISM DUE TO FIBROUS INVASIVE THYROIDITIS: ICD-10-CM

## 2019-10-21 DIAGNOSIS — E66.01 MORBID OBESITY: ICD-10-CM

## 2019-10-21 DIAGNOSIS — I10 ESSENTIAL HYPERTENSION, MALIGNANT: Primary | ICD-10-CM

## 2019-10-21 DIAGNOSIS — Z00.00 ROUTINE GENERAL MEDICAL EXAMINATION AT A HEALTH CARE FACILITY: ICD-10-CM

## 2019-10-21 DIAGNOSIS — E03.8 HYPOTHYROIDISM DUE TO FIBROUS INVASIVE THYROIDITIS: ICD-10-CM

## 2019-10-21 DIAGNOSIS — R73.03 DIABETES MELLITUS, LATENT: ICD-10-CM

## 2019-10-21 LAB
ALBUMIN SERPL BCP-MCNC: 3.9 G/DL (ref 3.5–5.2)
ALP SERPL-CCNC: 65 U/L (ref 55–135)
ALT SERPL W/O P-5'-P-CCNC: 27 U/L (ref 10–44)
ANION GAP SERPL CALC-SCNC: 8 MMOL/L (ref 8–16)
AST SERPL-CCNC: 21 U/L (ref 10–40)
BILIRUB SERPL-MCNC: 0.6 MG/DL (ref 0.1–1)
BUN SERPL-MCNC: 18 MG/DL (ref 6–20)
CALCIUM SERPL-MCNC: 9.2 MG/DL (ref 8.7–10.5)
CHLORIDE SERPL-SCNC: 103 MMOL/L (ref 95–110)
CHOLEST SERPL-MCNC: 177 MG/DL (ref 120–199)
CHOLEST/HDLC SERPL: 3 {RATIO} (ref 2–5)
CO2 SERPL-SCNC: 29 MMOL/L (ref 23–29)
CREAT SERPL-MCNC: 0.7 MG/DL (ref 0.5–1.4)
EST. GFR  (AFRICAN AMERICAN): >60 ML/MIN/1.73 M^2
EST. GFR  (NON AFRICAN AMERICAN): >60 ML/MIN/1.73 M^2
ESTIMATED AVG GLUCOSE: 123 MG/DL (ref 68–131)
GLUCOSE SERPL-MCNC: 125 MG/DL (ref 70–110)
HBA1C MFR BLD HPLC: 5.9 % (ref 4.5–6.2)
HDLC SERPL-MCNC: 60 MG/DL (ref 40–75)
HDLC SERPL: 33.9 % (ref 20–50)
LDLC SERPL CALC-MCNC: 97.6 MG/DL (ref 63–159)
NONHDLC SERPL-MCNC: 117 MG/DL
POTASSIUM SERPL-SCNC: 3.9 MMOL/L (ref 3.5–5.1)
PROT SERPL-MCNC: 7.4 G/DL (ref 6–8.4)
SODIUM SERPL-SCNC: 140 MMOL/L (ref 136–145)
TRIGL SERPL-MCNC: 97 MG/DL (ref 30–150)
TSH SERPL DL<=0.005 MIU/L-ACNC: 3.15 UIU/ML (ref 0.34–5.6)

## 2019-10-21 PROCEDURE — 80061 LIPID PANEL: CPT

## 2019-10-21 PROCEDURE — 36415 COLL VENOUS BLD VENIPUNCTURE: CPT

## 2019-10-21 PROCEDURE — 84443 ASSAY THYROID STIM HORMONE: CPT

## 2019-10-21 PROCEDURE — 83036 HEMOGLOBIN GLYCOSYLATED A1C: CPT

## 2019-10-21 PROCEDURE — 80053 COMPREHEN METABOLIC PANEL: CPT

## 2019-10-23 ENCOUNTER — PATIENT MESSAGE (OUTPATIENT)
Dept: INTERNAL MEDICINE | Facility: CLINIC | Age: 44
End: 2019-10-23

## 2019-10-30 ENCOUNTER — PATIENT OUTREACH (OUTPATIENT)
Dept: ADMINISTRATIVE | Facility: HOSPITAL | Age: 44
End: 2019-10-30

## 2019-10-31 ENCOUNTER — OFFICE VISIT (OUTPATIENT)
Dept: GYNECOLOGIC ONCOLOGY | Facility: CLINIC | Age: 44
End: 2019-10-31
Payer: COMMERCIAL

## 2019-10-31 VITALS
DIASTOLIC BLOOD PRESSURE: 73 MMHG | HEART RATE: 78 BPM | WEIGHT: 234.38 LBS | BODY MASS INDEX: 39 KG/M2 | SYSTOLIC BLOOD PRESSURE: 132 MMHG

## 2019-10-31 DIAGNOSIS — N95.2 VAGINAL ATROPHY: ICD-10-CM

## 2019-10-31 DIAGNOSIS — R87.622 LOW GRADE SQUAMOUS INTRAEPITH LESION ON CYTOLOGIC SMEAR VAGINA (LGSIL): ICD-10-CM

## 2019-10-31 DIAGNOSIS — C53.1 MALIGNANT NEOPLASM OF EXOCERVIX: Primary | ICD-10-CM

## 2019-10-31 PROCEDURE — 99499 NO LOS: ICD-10-PCS | Mod: S$GLB,,, | Performed by: OBSTETRICS & GYNECOLOGY

## 2019-10-31 PROCEDURE — 57420 EXAM OF VAGINA W/SCOPE: CPT | Mod: S$GLB,,, | Performed by: OBSTETRICS & GYNECOLOGY

## 2019-10-31 PROCEDURE — 99999 PR PBB SHADOW E&M-EST. PATIENT-LVL II: ICD-10-PCS | Mod: PBBFAC,,, | Performed by: OBSTETRICS & GYNECOLOGY

## 2019-10-31 PROCEDURE — 57420 COLPOSCOPY: ICD-10-PCS | Mod: S$GLB,,, | Performed by: OBSTETRICS & GYNECOLOGY

## 2019-10-31 PROCEDURE — 99499 UNLISTED E&M SERVICE: CPT | Mod: S$GLB,,, | Performed by: OBSTETRICS & GYNECOLOGY

## 2019-10-31 PROCEDURE — 99999 PR PBB SHADOW E&M-EST. PATIENT-LVL II: CPT | Mod: PBBFAC,,, | Performed by: OBSTETRICS & GYNECOLOGY

## 2019-10-31 NOTE — PROCEDURES
Colposcopy  Date/Time: 10/31/2019 2:00 PM  Performed by: Dylan Matias MD  Authorized by: Dylan Matias MD     Consent Done?:  Yes (Written)    Colposcopy Site:  Vagina  Acrowhite Lesion: No    Atypical Vessels: No    Biopsy?: No     Patient tolerated the procedure well with no immediate complications.   Post-operative instructions were provided for the patient.   Patient was discharged and will follow up if any complications occur

## 2019-10-31 NOTE — PROGRESS NOTES
Subjective:       Patient ID: Emily Max is a 44 y.o. female.    Chief Complaint: History of cervical cancer and Colposcopy    HPI   Patient seen in Oct for follow up for cervical cancer. Pap returned a LSIL.   Was started on vaginal estrogen.   Here for colposcopy.   She is very anxious. Here with her mother.     Pap: Oct 2019: LSIL    Mammogram : Sept 13 2018: normal.    Pap: Aug 1, 2017: normal.      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 She required no postoperative therapy.      Review of Systems    Objective:   /73   Pulse 78   Wt 106.3 kg (234 lb 5.6 oz)   BMI 39.00 kg/m²      Physical Exam   Genitourinary:   Genitourinary Comments: Normal colposcopy. Atrophic changes. No lesions seen.        Assessment:       1. Malignant neoplasm of exocervix    2. Low grade squamous intraepith lesion on cytologic smear vagina (lgsil)    3. Vaginal atrophy        Plan:   Malignant neoplasm of exocervix  RTC in 6 months for pap  Low grade squamous intraepith lesion on cytologic smear vagina (lgsil)  Due to vaginal atrophy  Vaginal atrophy  Continue with vagifem.

## 2019-11-01 ENCOUNTER — PATIENT MESSAGE (OUTPATIENT)
Dept: SPORTS MEDICINE | Facility: CLINIC | Age: 44
End: 2019-11-01

## 2019-11-03 ENCOUNTER — TELEPHONE (OUTPATIENT)
Dept: SPORTS MEDICINE | Facility: CLINIC | Age: 44
End: 2019-11-03

## 2019-11-03 DIAGNOSIS — M17.11 OSTEOARTHRITIS OF RIGHT KNEE, UNSPECIFIED OSTEOARTHRITIS TYPE: Primary | ICD-10-CM

## 2019-11-03 NOTE — TELEPHONE ENCOUNTER
----- Message from Nanda Hermosillo MA sent at 11/1/2019  4:00 PM CDT -----  Spoke with Patient informing her that she will have to start injection process from the beginning, Patient understood.   Will need new referral placed.

## 2019-11-03 NOTE — TELEPHONE ENCOUNTER
Patient only complete 1 previous injection due to life circumstances she could not complete the series. Will re-order euflexxa injection series for patient.

## 2019-11-04 ENCOUNTER — PATIENT MESSAGE (OUTPATIENT)
Dept: SPORTS MEDICINE | Facility: CLINIC | Age: 44
End: 2019-11-04

## 2019-11-13 ENCOUNTER — OFFICE VISIT (OUTPATIENT)
Dept: INTERNAL MEDICINE | Facility: CLINIC | Age: 44
End: 2019-11-13
Payer: COMMERCIAL

## 2019-11-13 ENCOUNTER — LAB VISIT (OUTPATIENT)
Dept: LAB | Facility: HOSPITAL | Age: 44
End: 2019-11-13
Attending: INTERNAL MEDICINE
Payer: COMMERCIAL

## 2019-11-13 VITALS
BODY MASS INDEX: 38.82 KG/M2 | HEIGHT: 65 IN | SYSTOLIC BLOOD PRESSURE: 130 MMHG | WEIGHT: 233 LBS | DIASTOLIC BLOOD PRESSURE: 80 MMHG

## 2019-11-13 DIAGNOSIS — E03.9 ACQUIRED HYPOTHYROIDISM: ICD-10-CM

## 2019-11-13 DIAGNOSIS — F41.1 GAD (GENERALIZED ANXIETY DISORDER): ICD-10-CM

## 2019-11-13 DIAGNOSIS — G47.10 HYPERSOMNIA: ICD-10-CM

## 2019-11-13 DIAGNOSIS — Z91.09 ENVIRONMENTAL ALLERGIES: ICD-10-CM

## 2019-11-13 DIAGNOSIS — I10 ESSENTIAL HYPERTENSION: ICD-10-CM

## 2019-11-13 DIAGNOSIS — R19.7 DIARRHEA, UNSPECIFIED TYPE: Primary | ICD-10-CM

## 2019-11-13 DIAGNOSIS — R19.7 DIARRHEA, UNSPECIFIED TYPE: ICD-10-CM

## 2019-11-13 DIAGNOSIS — F33.1 MODERATE EPISODE OF RECURRENT MAJOR DEPRESSIVE DISORDER: ICD-10-CM

## 2019-11-13 DIAGNOSIS — R53.83 FATIGUE, UNSPECIFIED TYPE: ICD-10-CM

## 2019-11-13 LAB
BASOPHILS # BLD AUTO: 0.03 K/UL (ref 0–0.2)
BASOPHILS NFR BLD: 0.4 % (ref 0–1.9)
DIFFERENTIAL METHOD: ABNORMAL
EOSINOPHIL # BLD AUTO: 0.4 K/UL (ref 0–0.5)
EOSINOPHIL NFR BLD: 5.8 % (ref 0–8)
ERYTHROCYTE [DISTWIDTH] IN BLOOD BY AUTOMATED COUNT: 12.7 % (ref 11.5–14.5)
HCT VFR BLD AUTO: 41 % (ref 37–48.5)
HGB BLD-MCNC: 14.3 G/DL (ref 12–16)
LYMPHOCYTES # BLD AUTO: 2.8 K/UL (ref 1–4.8)
LYMPHOCYTES NFR BLD: 38.4 % (ref 18–48)
MCH RBC QN AUTO: 30.7 PG (ref 27–31)
MCHC RBC AUTO-ENTMCNC: 34.9 G/DL (ref 32–36)
MCV RBC AUTO: 88 FL (ref 82–98)
MONOCYTES # BLD AUTO: 1.1 K/UL (ref 0.3–1)
MONOCYTES NFR BLD: 15.1 % (ref 4–15)
NEUTROPHILS # BLD AUTO: 2.9 K/UL (ref 1.8–7.7)
NEUTROPHILS NFR BLD: 40.3 % (ref 38–73)
PLATELET # BLD AUTO: 236 K/UL (ref 150–350)
PMV BLD AUTO: 9.3 FL (ref 9.2–12.9)
RBC # BLD AUTO: 4.66 M/UL (ref 4–5.4)
WBC # BLD AUTO: 7.27 K/UL (ref 3.9–12.7)

## 2019-11-13 PROCEDURE — 3075F PR MOST RECENT SYSTOLIC BLOOD PRESS GE 130-139MM HG: ICD-10-PCS | Mod: CPTII,S$GLB,, | Performed by: INTERNAL MEDICINE

## 2019-11-13 PROCEDURE — 3075F SYST BP GE 130 - 139MM HG: CPT | Mod: CPTII,S$GLB,, | Performed by: INTERNAL MEDICINE

## 2019-11-13 PROCEDURE — 99396 PR PREVENTIVE VISIT,EST,40-64: ICD-10-PCS | Mod: S$GLB,,, | Performed by: INTERNAL MEDICINE

## 2019-11-13 PROCEDURE — 36415 COLL VENOUS BLD VENIPUNCTURE: CPT

## 2019-11-13 PROCEDURE — 99999 PR PBB SHADOW E&M-EST. PATIENT-LVL III: ICD-10-PCS | Mod: PBBFAC,,, | Performed by: INTERNAL MEDICINE

## 2019-11-13 PROCEDURE — 99396 PREV VISIT EST AGE 40-64: CPT | Mod: S$GLB,,, | Performed by: INTERNAL MEDICINE

## 2019-11-13 PROCEDURE — 85025 COMPLETE CBC W/AUTO DIFF WBC: CPT

## 2019-11-13 PROCEDURE — 3079F PR MOST RECENT DIASTOLIC BLOOD PRESSURE 80-89 MM HG: ICD-10-PCS | Mod: CPTII,S$GLB,, | Performed by: INTERNAL MEDICINE

## 2019-11-13 PROCEDURE — 3079F DIAST BP 80-89 MM HG: CPT | Mod: CPTII,S$GLB,, | Performed by: INTERNAL MEDICINE

## 2019-11-13 PROCEDURE — 99999 PR PBB SHADOW E&M-EST. PATIENT-LVL III: CPT | Mod: PBBFAC,,, | Performed by: INTERNAL MEDICINE

## 2019-11-13 RX ORDER — ATENOLOL 50 MG/1
TABLET ORAL
Qty: 180 TABLET | Refills: 3 | Status: SHIPPED | OUTPATIENT
Start: 2019-11-13 | End: 2020-12-16 | Stop reason: SDUPTHER

## 2019-11-13 RX ORDER — DULOXETIN HYDROCHLORIDE 30 MG/1
30 CAPSULE, DELAYED RELEASE ORAL 2 TIMES DAILY
Qty: 90 CAPSULE | Refills: 3 | Status: SHIPPED | OUTPATIENT
Start: 2019-11-13 | End: 2020-02-15 | Stop reason: SDUPTHER

## 2019-11-13 RX ORDER — ALPRAZOLAM 0.5 MG/1
0.5 TABLET ORAL DAILY PRN
Qty: 30 TABLET | Refills: 0 | Status: SHIPPED | OUTPATIENT
Start: 2019-11-13 | End: 2020-12-16 | Stop reason: SDUPTHER

## 2019-11-13 RX ORDER — LEVOCETIRIZINE DIHYDROCHLORIDE 5 MG/1
5 TABLET, FILM COATED ORAL DAILY
Qty: 90 TABLET | Refills: 3 | Status: SHIPPED | OUTPATIENT
Start: 2019-11-13 | End: 2020-12-16 | Stop reason: SDUPTHER

## 2019-11-13 RX ORDER — LEVOTHYROXINE SODIUM 50 UG/1
50 TABLET ORAL DAILY
Qty: 90 TABLET | Refills: 3 | Status: SHIPPED | OUTPATIENT
Start: 2019-11-13 | End: 2020-11-09

## 2019-11-13 RX ORDER — TRIAMTERENE AND HYDROCHLOROTHIAZIDE 37.5; 25 MG/1; MG/1
1 CAPSULE ORAL DAILY
Qty: 90 CAPSULE | Refills: 3 | Status: SHIPPED | OUTPATIENT
Start: 2019-11-13 | End: 2020-05-11

## 2019-11-13 NOTE — PROGRESS NOTES
"Subjective:       Patient ID: Emily Max is a 44 y.o. female.    Chief Complaint: Annual Exam (general physical.); Diarrhea (perioidic, recurrent episodes-reports of pasty stools gone to watery stools. not passing any blood in urine/stool. feels abdominal bloating/gassy feeling. ); Nausea (has been feeling nauseated for the past 2 weeks, recurrent. no emesis involved.); and Gastroesophageal Reflux (happens day or two before diarrhea occurs, takes Tums and increase in water intake to help relieve. some heartburn. )    HPI   Emily Max is a 44 y.o. female here for a yearly preventative healthcare visit.     She has a history of HTN, Obesity, Migraine H/As, Hypothyroidism, GERD, Allergic Rhinitis, Anxiety, Depression, S/P EVLT for Chronic Venous Insufficiency, and S/P Hysterectomy for Stage 1B Cervical Cancer 5/2013.      Trigger finger issues  Having steroid injections w Dr. Henson.   Plans on likely surgery in future.     Right knee torn meniscus. Getting injections w Dr. Zaman.     Fatigue  2-3 months ago especially bad.   Sleeping a lot. Anxiety seems to be doing well.  Has to go to sleep as soon as she gets home. Still feels tired when wakes up.  Not snoring. Sleeping 10-12 hours per day. No crazy dreams, no hypnagogic or hypnapopic hallucinations.     Stomach upset, bloating, diarrhea. Going on 6 weeks and worse 2 weeks.   Watery diarrhea and "pasty" stool alternating. Having 1-3 BM daily.   Only normal once or twice a week. Temp 99. Nausea for a week.   No obvious blood. Fecal urgency, lots of burping. No pain.   -----------------------  PMH:  1. HTN           Atenolol 50 mg BID           Dyazide 37.5-25 mg/day     2. Migraine H/A           Atenolol 50 mg BID     3. Hypothyroidism           Synthroid 50 mcg/day     4. Vit D Deficiency            Cholecalciferol 1,000 units daily     5. Anxiety & Depression.              Followed by MARK LennonW and Dr. Asencio " "Zoltan/Penny             Cymbalta 30 mg/day              wellbutrin?              Rare use of Xanax 0.5mg daily prn     6. Obesity             (BMI-38)     7. Hx of GERD     8. Chronic Venous Insufficiency and Varicose Veins              Followed in Vascular Surgery               S/P Right EVLT 5/2014              S/P Left EVLT 1/19/17     9. Stage 1B Cervical CA             S/P Total Hysterectomy 5/2013             Followed by Dr. Matias - last visit - 7/2016     10. Surgical Menopause              Estrace 0.5      11. Allergic Rhinitis & Food Allergies             Followed by Dr. Finley (Allergy)             Astelin Nasal Spray, Xyzal 5 mg, Epipen     12. Pre-DM  5.9% a1c  Review of Systems   Constitutional: Negative for fever.   HENT: Negative.    Eyes: Negative.    Respiratory: Negative for shortness of breath.    Cardiovascular: Negative for chest pain and leg swelling.   Gastrointestinal: Positive for abdominal distention, diarrhea and nausea. Negative for abdominal pain and vomiting.   Genitourinary: Negative.    Musculoskeletal: Negative for arthralgias.   Skin: Negative for rash.   Psychiatric/Behavioral: Negative.        Objective:   /80 (BP Location: Left arm, Patient Position: Sitting, BP Method: Large (Manual))   Ht 5' 5" (1.651 m)   Wt 105.7 kg (233 lb)   BMI 38.77 kg/m²      Physical Exam   Constitutional: She is oriented to person, place, and time. She appears well-developed and well-nourished.   HENT:   Head: Normocephalic and atraumatic.   Eyes: Pupils are equal, round, and reactive to light. Conjunctivae and EOM are normal.   Neck: Neck supple. No thyromegaly present.   Cardiovascular: Normal rate, regular rhythm and normal heart sounds.   No murmur heard.  Pulmonary/Chest: Effort normal and breath sounds normal. No respiratory distress. She has no wheezes.   Abdominal: Soft. Bowel sounds are normal. She exhibits no distension. There is no tenderness.   Musculoskeletal: Normal range of " motion.   Neurological: She is alert and oriented to person, place, and time.   Skin: Skin is warm and dry. No rash noted.   Psychiatric: She has a normal mood and affect. Judgment and thought content normal.   Vitals reviewed.      Assessment:       1. Diarrhea, unspecified type    2. REGINA (generalized anxiety disorder)    3. Essential hypertension    4. Moderate episode of recurrent major depressive disorder    5. Acquired hypothyroidism    6. Environmental allergies    7. Fatigue, unspecified type    8. Hypersomnia        Plan:       Emily was seen today for annual exam, diarrhea, nausea and gastroesophageal reflux.    Diagnoses and all orders for this visit:    Diarrhea, nausea, bloating  Advised minimizing nsaid usage (currently ibu 800mg 3-5 times per week)  -     Stool culture; Future; Expected date: 11/13/2019  -     CBC auto differential; Future; Expected date: 11/13/2019   c diff considered but diarrhea is intermittent and not copious in amount and not all stools are watery    REGINA (generalized anxiety disorder)  -     ALPRAZolam (XANAX) 0.5 MG tablet; Take 1 tablet (0.5 mg total) by mouth daily as needed for Anxiety.  Dispense: 30 tablet; Refill: 0  -     DULoxetine (CYMBALTA) 30 MG capsule; Take 1 capsule (30 mg total) by mouth 2 (two) times daily.  Dispense: 90 capsule; Refill: 3    Essential hypertension  -     atenolol (TENORMIN) 50 MG tablet; TAKE 1 TABLET (50 MG TOTAL) BY MOUTH 2 (TWO) TIMES DAILY.  Dispense: 180 tablet; Refill: 3  -     triamterene-hydrochlorothiazide 37.5-25 mg (DYAZIDE) 37.5-25 mg per capsule; Take 1 capsule by mouth once daily.  Dispense: 90 capsule; Refill: 3    Moderate episode of recurrent major depressive disorder  -     DULoxetine (CYMBALTA) 30 MG capsule; Take 1 capsule (30 mg total) by mouth 2 (two) times daily.  Dispense: 90 capsule; Refill: 3    Acquired hypothyroidism  -     levothyroxine (SYNTHROID) 50 MCG tablet; Take 1 tablet (50 mcg total) by mouth once daily.   Dispense: 90 tablet; Refill: 3    Environmental allergies  -     levocetirizine (XYZAL) 5 MG tablet; Take 1 tablet (5 mg total) by mouth once daily.  Dispense: 90 tablet; Refill: 3    Fatigue, unspecified type  Hypersomnia  -     Ambulatory referral to Sleep Disorders        tdap 2018 and flu oct 2019  Will do egd/cscope if fails to improve/stool cx negative or if cbc abnl

## 2019-11-16 ENCOUNTER — PATIENT OUTREACH (OUTPATIENT)
Dept: ADMINISTRATIVE | Facility: OTHER | Age: 44
End: 2019-11-16

## 2019-11-22 ENCOUNTER — PATIENT OUTREACH (OUTPATIENT)
Dept: ADMINISTRATIVE | Facility: OTHER | Age: 44
End: 2019-11-22

## 2019-12-02 ENCOUNTER — PATIENT OUTREACH (OUTPATIENT)
Dept: ADMINISTRATIVE | Facility: OTHER | Age: 44
End: 2019-12-02

## 2019-12-02 ENCOUNTER — OFFICE VISIT (OUTPATIENT)
Dept: SPORTS MEDICINE | Facility: CLINIC | Age: 44
End: 2019-12-02
Payer: COMMERCIAL

## 2019-12-02 VITALS
HEART RATE: 97 BPM | SYSTOLIC BLOOD PRESSURE: 129 MMHG | DIASTOLIC BLOOD PRESSURE: 87 MMHG | HEIGHT: 65 IN | WEIGHT: 233 LBS | BODY MASS INDEX: 38.82 KG/M2

## 2019-12-02 DIAGNOSIS — M17.11 OSTEOARTHRITIS OF RIGHT KNEE, UNSPECIFIED OSTEOARTHRITIS TYPE: Primary | ICD-10-CM

## 2019-12-02 DIAGNOSIS — G89.29 CHRONIC PAIN OF RIGHT KNEE: ICD-10-CM

## 2019-12-02 DIAGNOSIS — M25.561 CHRONIC PAIN OF RIGHT KNEE: ICD-10-CM

## 2019-12-02 PROCEDURE — 99499 NO LOS: ICD-10-PCS | Mod: S$GLB,,, | Performed by: PHYSICIAN ASSISTANT

## 2019-12-02 PROCEDURE — 20610 PR DRAIN/INJECT LARGE JOINT/BURSA: ICD-10-PCS | Mod: RT,S$GLB,, | Performed by: PHYSICIAN ASSISTANT

## 2019-12-02 PROCEDURE — 99499 UNLISTED E&M SERVICE: CPT | Mod: S$GLB,,, | Performed by: PHYSICIAN ASSISTANT

## 2019-12-02 PROCEDURE — 20610 DRAIN/INJ JOINT/BURSA W/O US: CPT | Mod: RT,S$GLB,, | Performed by: PHYSICIAN ASSISTANT

## 2019-12-02 PROCEDURE — 99999 PR PBB SHADOW E&M-EST. PATIENT-LVL IV: CPT | Mod: PBBFAC,,, | Performed by: PHYSICIAN ASSISTANT

## 2019-12-02 PROCEDURE — 99999 PR PBB SHADOW E&M-EST. PATIENT-LVL IV: ICD-10-PCS | Mod: PBBFAC,,, | Performed by: PHYSICIAN ASSISTANT

## 2019-12-04 NOTE — PROGRESS NOTES
Patient is here for follow up of knee arthritis. Pt is requesting Euflexxa injection #1.  PMFH reviewed per encounter record. Has failed other conservative modalities including NSAIDS, activity modification, weight loss.    She has received good relief with injections in the past.     The prior shots were tolerated well.    PHYSICAL EXAMINATION:     General: The patient is alert and oriented x 3. Mood is pleasant.   Observation of ears, eyes and nose reveals no gross abnormalities. No   labored breathing observed.     No signs of infection or adverse reaction to knee.        Euflexxa Injection Procedure #1    A time out was performed, including verification of patient ID, procedure, site and side, availability of information and equipment, review of safety issues, and agreement with consent, the procedure site was marked.    The patient was prepped aseptically with povidone-iodine swabsticks. A diagnostic and therapeutic injection of 2cc Euflexxa was given under sterile technique using a 21.5g x 1.5 needle from the anterolateral aspect of the right knee Joint with the patient in the seated position.     Emily Max had no adverse reactions to the medication. Pain decreased. female was instructed to apply ice to the joint for 20 minutes and avoid strenuous activities for 24-36 hours following the injection. female was warned of possible blood pressure changes during that time. Following that time, female can resume activities as prior to the injection.    female was reminded to call the clinic immediately for any adverse side effects as explained in clinic today.    RTC in 1 week for injection #2  All patients questions were answered. Patient was advised to call us with any concerns or questions.

## 2019-12-08 ENCOUNTER — PATIENT OUTREACH (OUTPATIENT)
Dept: ADMINISTRATIVE | Facility: OTHER | Age: 44
End: 2019-12-08

## 2019-12-09 ENCOUNTER — OFFICE VISIT (OUTPATIENT)
Dept: SPORTS MEDICINE | Facility: CLINIC | Age: 44
End: 2019-12-09
Payer: COMMERCIAL

## 2019-12-09 VITALS
BODY MASS INDEX: 38.82 KG/M2 | WEIGHT: 233 LBS | SYSTOLIC BLOOD PRESSURE: 120 MMHG | DIASTOLIC BLOOD PRESSURE: 81 MMHG | HEIGHT: 65 IN | HEART RATE: 70 BPM

## 2019-12-09 DIAGNOSIS — M17.11 OSTEOARTHRITIS OF RIGHT KNEE, UNSPECIFIED OSTEOARTHRITIS TYPE: Primary | ICD-10-CM

## 2019-12-09 PROCEDURE — 20610 PR DRAIN/INJECT LARGE JOINT/BURSA: ICD-10-PCS | Mod: RT,S$GLB,, | Performed by: PHYSICIAN ASSISTANT

## 2019-12-09 PROCEDURE — 99999 PR PBB SHADOW E&M-EST. PATIENT-LVL IV: CPT | Mod: PBBFAC,,, | Performed by: PHYSICIAN ASSISTANT

## 2019-12-09 PROCEDURE — 99999 PR PBB SHADOW E&M-EST. PATIENT-LVL IV: ICD-10-PCS | Mod: PBBFAC,,, | Performed by: PHYSICIAN ASSISTANT

## 2019-12-09 PROCEDURE — 99499 NO LOS: ICD-10-PCS | Mod: S$GLB,,, | Performed by: PHYSICIAN ASSISTANT

## 2019-12-09 PROCEDURE — 99499 UNLISTED E&M SERVICE: CPT | Mod: S$GLB,,, | Performed by: PHYSICIAN ASSISTANT

## 2019-12-09 PROCEDURE — 20610 DRAIN/INJ JOINT/BURSA W/O US: CPT | Mod: RT,S$GLB,, | Performed by: PHYSICIAN ASSISTANT

## 2019-12-09 NOTE — PROGRESS NOTES
Patient is here for follow up of knee arthritis. Pt is requesting Euflexxa injection #2.  PMFH reviewed per encounter record. Has failed other conservative modalities including NSAIDS, activity modification, weight loss.    She has received good relief with injections in the past.     The prior shots were tolerated well.    PHYSICAL EXAMINATION:     General: The patient is alert and oriented x 3. Mood is pleasant.   Observation of ears, eyes and nose reveals no gross abnormalities. No   labored breathing observed.     No signs of infection or adverse reaction to knee.        Euflexxa Injection Procedure #2    A time out was performed, including verification of patient ID, procedure, site and side, availability of information and equipment, review of safety issues, and agreement with consent, the procedure site was marked.    The patient was prepped aseptically with povidone-iodine swabsticks. A diagnostic and therapeutic injection of 2cc Euflexxa was given under sterile technique using a 21.5g x 1.5 needle from the anteromedial aspect of the right knee Joint with the patient in the seated position.     Emily Max had no adverse reactions to the medication. Pain decreased. female was instructed to apply ice to the joint for 20 minutes and avoid strenuous activities for 24-36 hours following the injection. female was warned of possible blood pressure changes during that time. Following that time, female can resume activities as prior to the injection.    female was reminded to call the clinic immediately for any adverse side effects as explained in clinic today.    RTC in 1 week for injection #3  Either anterolateral or medial next time.   All patients questions were answered. Patient was advised to call us with any concerns or questions.

## 2019-12-11 ENCOUNTER — OFFICE VISIT (OUTPATIENT)
Dept: DERMATOLOGY | Facility: CLINIC | Age: 44
End: 2019-12-11
Payer: COMMERCIAL

## 2019-12-11 DIAGNOSIS — L72.0 EPIDERMAL INCLUSION CYST: ICD-10-CM

## 2019-12-11 DIAGNOSIS — L08.9 SUPERFICIAL SKIN INFECTION: Primary | ICD-10-CM

## 2019-12-11 PROCEDURE — 99214 PR OFFICE/OUTPT VISIT, EST, LEVL IV, 30-39 MIN: ICD-10-PCS | Mod: 25,S$GLB,, | Performed by: DERMATOLOGY

## 2019-12-11 PROCEDURE — 99999 PR PBB SHADOW E&M-EST. PATIENT-LVL II: CPT | Mod: PBBFAC,,, | Performed by: DERMATOLOGY

## 2019-12-11 PROCEDURE — 11900 PR INJECTION INTO SKIN LESIONS, UP TO 7: ICD-10-PCS | Mod: S$GLB,,, | Performed by: DERMATOLOGY

## 2019-12-11 PROCEDURE — 99999 PR PBB SHADOW E&M-EST. PATIENT-LVL II: ICD-10-PCS | Mod: PBBFAC,,, | Performed by: DERMATOLOGY

## 2019-12-11 PROCEDURE — 99214 OFFICE O/P EST MOD 30 MIN: CPT | Mod: 25,S$GLB,, | Performed by: DERMATOLOGY

## 2019-12-11 PROCEDURE — 11900 INJECT SKIN LESIONS </W 7: CPT | Mod: S$GLB,,, | Performed by: DERMATOLOGY

## 2019-12-11 RX ORDER — DOXYCYCLINE 100 MG/1
1 TABLET ORAL 2 TIMES DAILY
Qty: 20 TABLET | Refills: 1 | Status: SHIPPED | OUTPATIENT
Start: 2019-12-11 | End: 2019-12-21

## 2019-12-11 NOTE — PATIENT INSTRUCTIONS
Doxycycline may cause GI discomfort, esophageal irritation/ulceration, and increased sun sensitivity. Patient was counseled to take medicine with meals and at least 1 hour before lying down.

## 2019-12-11 NOTE — PROGRESS NOTES
Subjective:       Patient ID:  Emily Max is a 44 y.o. female who presents for   Chief Complaint   Patient presents with    Acne     l side of face     Acne  - Initial  Affected locations: face  Duration: 1 week  Signs / symptoms: itching, tender, oozing, inflamed and irritated  Severity: mild  Timing: constant  Aggravated by: nothing  Relieving factors/Treatments tried: nothing  Improvement on treatment: mild (tried Hibiclens, peroxide, Dr. Soler's, mud mask, I&D)        Review of Systems   Constitutional: Negative for fever, chills and fatigue.   Skin: Positive for daily sunscreen use. Negative for recent sunburn and wears hat.   Hematologic/Lymphatic: Does not bruise/bleed easily.        Objective:    Physical Exam   Constitutional: She appears well-developed and well-nourished. No distress.   Neurological: She is alert and oriented to person, place, and time. She is not disoriented.   Psychiatric: She has a normal mood and affect.   Skin:                 Diagram Legend     Erythematous scaling macule/papule c/w actinic keratosis       Vascular papule c/w angioma      Pigmented verrucoid papule/plaque c/w seborrheic keratosis      Yellow umbilicated papule c/w sebaceous hyperplasia      Irregularly shaped tan macule c/w lentigo     1-2 mm smooth white papules consistent with Milia      Movable subcutaneous cyst with punctum c/w epidermal inclusion cyst      Subcutaneous movable cyst c/w pilar cyst      Firm pink to brown papule c/w dermatofibroma      Pedunculated fleshy papule(s) c/w skin tag(s)      Evenly pigmented macule c/w junctional nevus     Mildly variegated pigmented, slightly irregular-bordered macule c/w mildly atypical nevus      Flesh colored to evenly pigmented papule c/w intradermal nevus       Pink pearly papule/plaque c/w basal cell carcinoma      Erythematous hyperkeratotic cursted plaque c/w SCC      Surgical scar with no sign of skin cancer recurrence      Open and closed  comedones      Inflammatory papules and pustules      Verrucoid papule consistent consistent with wart     Erythematous eczematous patches and plaques     Dystrophic onycholytic nail with subungual debris c/w onychomycosis     Umbilicated papule    Erythematous-base heme-crusted tan verrucoid plaque consistent with inflamed seborrheic keratosis     Erythematous Silvery Scaling Plaque c/w Psoriasis     See annotation      Assessment / Plan:        Superficial skin infection  -     doxycycline monohydrate 100 mg Tab; Take 1 tablet (100 mg total) by mouth 2 (two) times daily. for 10 days  Dispense: 20 tablet; Refill: 1    Discussed benefits and risks of doxycyline therapy including but not limited to GI discomfort, esophageal irritation/ulceration, and increased sun sensitivity. Patient was counseled to take medicine with meals and at least 1 hour before lying down.     Epidermal Inclusion cyst- Inflammed    Intralesional Kenalog 5 mg/cc (0.5 cc total) injected into 1 lesions on the face today after obtaining verbal consent including risk of surrounding hypopigmentation. Patient tolerated procedure well.    Units: 1  NDC for Kenalog 10mg/cc:  7135-3004-14                 No follow-ups on file.

## 2019-12-16 ENCOUNTER — PATIENT MESSAGE (OUTPATIENT)
Dept: SPORTS MEDICINE | Facility: CLINIC | Age: 44
End: 2019-12-16

## 2019-12-19 ENCOUNTER — PATIENT OUTREACH (OUTPATIENT)
Dept: ADMINISTRATIVE | Facility: OTHER | Age: 44
End: 2019-12-19

## 2020-02-03 ENCOUNTER — PATIENT MESSAGE (OUTPATIENT)
Dept: DERMATOLOGY | Facility: CLINIC | Age: 45
End: 2020-02-03

## 2020-02-03 ENCOUNTER — PATIENT MESSAGE (OUTPATIENT)
Dept: INTERNAL MEDICINE | Facility: CLINIC | Age: 45
End: 2020-02-03

## 2020-02-04 NOTE — TELEPHONE ENCOUNTER
Do you have form Mindy? I don't see an attachment.   Just place on my desk and will fill out. Thanks.

## 2020-02-07 NOTE — TELEPHONE ENCOUNTER
I apologize, I was rooming a patient. The paperwork should be in that black bin by my computer. Dr Lew had signed it on Wed pm before she left. It has patient's name on it. Should be at the top slot on black bin.

## 2020-02-07 NOTE — TELEPHONE ENCOUNTER
After Dr. Lew signed, she placed paper on that top slot in black bin. I specifically remember her signing it. Also check my file drawer as well or in check out by Tomeka in Louann's folder.

## 2020-02-09 ENCOUNTER — PATIENT MESSAGE (OUTPATIENT)
Dept: DERMATOLOGY | Facility: CLINIC | Age: 45
End: 2020-02-09

## 2020-02-10 ENCOUNTER — PATIENT MESSAGE (OUTPATIENT)
Dept: INTERNAL MEDICINE | Facility: CLINIC | Age: 45
End: 2020-02-10

## 2020-02-10 ENCOUNTER — TELEPHONE (OUTPATIENT)
Dept: INTERNAL MEDICINE | Facility: CLINIC | Age: 45
End: 2020-02-10

## 2020-02-10 DIAGNOSIS — L72.0 EPIDERMAL INCLUSION CYST: Primary | ICD-10-CM

## 2020-02-10 RX ORDER — DOXYCYCLINE 100 MG/1
1 TABLET ORAL 2 TIMES DAILY
Qty: 20 TABLET | Refills: 3 | Status: SHIPPED | OUTPATIENT
Start: 2020-02-10 | End: 2020-02-20

## 2020-02-10 NOTE — TELEPHONE ENCOUNTER
Dmitry Porras,   I did send message over to Dr Lew as high priority (!) on this am for physician's statement to be attached through via portal. Thanks so much! I apologize I did not respond right away, clinic became busy on this am.

## 2020-02-10 NOTE — TELEPHONE ENCOUNTER
----- Message from Chiquita Seth sent at 2/10/2020  9:43 AM CST -----  Contact: Self [phone]  Patient would like to speak with you about her paperwork and needs an answer before noon because she has been waiting for a week now and wants the message sent on high alert. Please advise

## 2020-02-10 NOTE — TELEPHONE ENCOUNTER
Called and spoke to patient on this am. Patient was asking on last week if you could attach it via portal for her. (the sheet you filled on Wed pm, her physician's statement. Patient is requesting this to be done as soon as possible.    Please advise.

## 2020-02-15 DIAGNOSIS — F33.1 MODERATE EPISODE OF RECURRENT MAJOR DEPRESSIVE DISORDER: ICD-10-CM

## 2020-02-15 DIAGNOSIS — F41.1 GAD (GENERALIZED ANXIETY DISORDER): ICD-10-CM

## 2020-02-17 RX ORDER — DULOXETIN HYDROCHLORIDE 30 MG/1
30 CAPSULE, DELAYED RELEASE ORAL 2 TIMES DAILY
Qty: 90 CAPSULE | Refills: 3 | Status: SHIPPED | OUTPATIENT
Start: 2020-02-17 | End: 2020-12-16 | Stop reason: SDUPTHER

## 2020-04-23 DIAGNOSIS — Z01.84 ANTIBODY RESPONSE EXAMINATION: ICD-10-CM

## 2020-05-10 DIAGNOSIS — I10 ESSENTIAL HYPERTENSION: ICD-10-CM

## 2020-05-11 RX ORDER — TRIAMTERENE AND HYDROCHLOROTHIAZIDE 37.5; 25 MG/1; MG/1
1 CAPSULE ORAL DAILY
Qty: 90 CAPSULE | Refills: 0 | Status: SHIPPED | OUTPATIENT
Start: 2020-05-11 | End: 2020-12-16 | Stop reason: SDUPTHER

## 2020-05-12 NOTE — PROGRESS NOTES
Refill Authorization Note     is requesting a refill authorization.    Brief assessment and rationale for refill: APPROVE: prr     Medication-related problems identified: Requires labs    Medication Therapy Plan: NTBO(SCr/Na/K); Labs will be suspended at this time    Medication reconciliation completed: No                         Comments:   Automatic Epic Protocol Generated Data:    Requested Prescriptions   Signed Prescriptions Disp Refills    triamterene-hydrochlorothiazide 37.5-25 mg (DYAZIDE) 37.5-25 mg per capsule 90 capsule 0     Sig: Take 1 capsule by mouth once daily.       Cardiovascular: Diuretic Combos Failed - 5/10/2020  9:06 AM        Failed - K in normal range and within 180 days     Potassium   Date Value Ref Range Status   10/21/2019 3.9 3.5 - 5.1 mmol/L Final   05/15/2018 4.0 3.5 - 5.1 mmol/L Final   08/22/2017 4.4 3.5 - 5.1 mmol/L Final              Failed - Na is between 130 and 148 and within 180 days     Sodium   Date Value Ref Range Status   10/21/2019 140 136 - 145 mmol/L Final   05/15/2018 141 136 - 145 mmol/L Final   08/22/2017 143 136 - 145 mmol/L Final              Failed - Cr is 1.3 or below and within 180 days     Creatinine   Date Value Ref Range Status   10/21/2019 0.7 0.5 - 1.4 mg/dL Final   05/15/2018 0.8 0.5 - 1.4 mg/dL Final   08/22/2017 0.9 0.5 - 1.4 mg/dL Final              Failed - eGFR within 180 days     eGFR if non    Date Value Ref Range Status   10/21/2019 >60.0 >60 mL/min/1.73 m^2 Final     Comment:     Calculation used to obtain the estimated glomerular filtration  rate (eGFR) is the CKD-EPI equation.      05/15/2018 >60 >60 mL/min/1.73 m^2 Final     Comment:     Calculation used to obtain the estimated glomerular filtration  rate (eGFR) is the CKD-EPI equation.      08/22/2017 >60 >60 mL/min/1.73 m^2 Final     Comment:     Calculation used to obtain the estimated glomerular filtration  rate (eGFR) is the CKD-EPI equation. Since race is unknown    in our information system, the eGFR values for   -American and Non--American patients are given   for each creatinine result.       eGFR if    Date Value Ref Range Status   10/21/2019 >60.0 >60 mL/min/1.73 m^2 Final   05/15/2018 >60 >60 mL/min/1.73 m^2 Final   08/22/2017 >60 >60 mL/min/1.73 m^2 Final              Passed - Patient is at least 18 years old        Passed - Negative Pregnancy Status Check        Passed - Last BP in normal range within 360 days.     BP Readings from Last 3 Encounters:   12/09/19 120/81   12/02/19 129/87   11/13/19 130/80              Passed - Office visit in past 12 months or future 90 days.     Recent Outpatient Visits            5 months ago Superficial skin infection    [facility] - Dermatology Paula Ramirez MD    5 months ago Osteoarthritis of right knee, unspecified osteoarthritis type    [facility] Aleks Kwok III, PA-C    5 months ago Osteoarthritis of right knee, unspecified osteoarthritis type    [facility] Medicine Aleks Kwok III, PA-C    6 months ago Diarrhea, unspecified type    [facility] Elif Lew MD    6 months ago Malignant neoplasm of exocervix    [facility] - GYN Oncology Dylan Matias MD          Future Appointments              In 2 months MD [facility] - GYN Oncology, [facility]    In 2 months Aishwarya Elizondo MD [place], [place]                Passed - Ca in normal range and within 360 days     Calcium   Date Value Ref Range Status   10/21/2019 9.2 8.7 - 10.5 mg/dL Final   05/15/2018 9.7 8.7 - 10.5 mg/dL Final   08/22/2017 10.2 8.7 - 10.5 mg/dL Final                 Appointments  past 12m or future 3m with PCP    Date Provider   Last Visit   11/13/2019 Elif Lew MD   Next Visit   Visit date not found Elif Lew MD   ED visits in past 90 days: 0     Note composed:10:44 PM 05/11/2020

## 2020-05-23 DIAGNOSIS — Z01.84 ANTIBODY RESPONSE EXAMINATION: ICD-10-CM

## 2020-06-22 DIAGNOSIS — Z01.84 ANTIBODY RESPONSE EXAMINATION: ICD-10-CM

## 2020-07-22 DIAGNOSIS — Z01.84 ANTIBODY RESPONSE EXAMINATION: ICD-10-CM

## 2020-08-21 DIAGNOSIS — Z01.84 ANTIBODY RESPONSE EXAMINATION: ICD-10-CM

## 2020-09-20 DIAGNOSIS — Z01.84 ANTIBODY RESPONSE EXAMINATION: ICD-10-CM

## 2020-10-20 DIAGNOSIS — Z01.84 ANTIBODY RESPONSE EXAMINATION: ICD-10-CM

## 2020-10-23 DIAGNOSIS — Z12.31 OTHER SCREENING MAMMOGRAM: ICD-10-CM

## 2020-11-07 DIAGNOSIS — E03.9 ACQUIRED HYPOTHYROIDISM: ICD-10-CM

## 2020-11-07 NOTE — TELEPHONE ENCOUNTER
Care Due:                  Date            Visit Type   Department     Provider  --------------------------------------------------------------------------------                                ESTABLISHED   [facility] INTERNAL  Last Visit: 11-      PATIENT      MEDICINE       JORDEN RAMÍREZ  Next Visit: None Scheduled  None         None Found                                                            Last  Test          Frequency    Reason                     Performed    Due Date  --------------------------------------------------------------------------------    Office Visit  12 months..  DULoxetine,                11- 11-                             levocetirizine,                             levothyroxine,                             triamterene-hydrochloroth                             iazide...................    CMP.........  12 months..  DULoxetine,                10-   10-                             triamterene-hydrochloroth                             iazide...................    TSH.........  12 months..  levothyroxine............  10-   10-    Powered by [employer]. Reference number: [id number]. 11/07/2020 1:15:51 PM   CST

## 2020-11-07 NOTE — TELEPHONE ENCOUNTER
Encounter details (provider/department) have been updated by [facility] staff  As of this time Standard and HF Protocols and CDM: Does not populate or display   Updated: Department  Of note. CDM should display. medication Is delegated and encounter details have been updated  Will resend refill request encounter to P Centralized Refill Staff Pool.   [facility]   Note composed:1:14 PM 11/07/2020

## 2020-11-09 RX ORDER — LEVOTHYROXINE SODIUM 50 UG/1
TABLET ORAL
Qty: 90 TABLET | Refills: 0 | Status: SHIPPED | OUTPATIENT
Start: 2020-11-09 | End: 2020-12-16 | Stop reason: SDUPTHER

## 2020-11-09 NOTE — TELEPHONE ENCOUNTER
Provider Staff:     Action is required for this patient.     Please schedule patient for Annual and Labs (CMP, LIPIDS, A1C, TSH)    Thanks!  [facility]     Appointments  past 12m or future 3m with PCP    Date Provider   Last Visit   11/13/2019 Elif Lew MD   Next Visit   Visit date not found Elif Lew MD     Note composed:7:31 AM 11/09/2020

## 2020-11-09 NOTE — PROGRESS NOTES
Refill Authorization Note   Emily Max is requesting a refill authorization.  Brief assessment and rationale for refill: Approve    -Medication-related problems identified:   Non-adherence (knowledge deficit) non-intentional  Requires labs  Requires appointment  Medication Therapy Plan: CDMR. NEEDS APPT(ANNUAL). LABS(CMP, LIPIDS, TSH, A1C PER DESIREE 10/08/19) PER WOG; PER EPIC DATA 7% ADHERENCE    Medication reconciliation completed: No   Comments:       Requested Prescriptions   Pending Prescriptions Disp Refills    levothyroxine (SYNTHROID) 50 MCG tablet [Pharmacy Med Name: LEVOTHYROXINE 50 MCG TABLET] 90 tablet 0     Sig: TAKE ONE TABLET BY MOUTH DAILY       Thyroid Hormones Protocol Failed - 11/7/2020  1:15 PM        Failed - Normal TSH within past 12 months      Lab Results   Component Value Date    TSH 3.150 10/21/2019    TSH 2.544 05/15/2018    TSH 1.904 05/17/2017              Passed - Patient is not currently pregnant        Passed - No positive pregnancy test in past 12 months         Passed - Visit with authorizing provider in past 12 months or upcoming 90 days        Thyroid Hormones Protocol Failed - 11/7/2020  1:15 PM        Failed - Normal TSH in past 12 months     Lab Results   Component Value Date    TSH 3.150 10/21/2019    TSH 2.544 05/15/2018    TSH 1.904 05/17/2017              Failed - Normal Free T4 within past 12 months     Lab Results   Component Value Date    FREET4 0.98 08/13/2010             Passed - No positive pregnancy test in past 12 months         Passed - Visit with authorizing provider in past 12 months or upcoming 90 days         Passed - Patient is not currently pregnant       Endocrinology:  Hypothyroid Agents Failed - 11/7/2020  1:15 PM        Failed - Manual Review: FT4 is not required if last TSH is WNL.        Failed - TSH in normal range and within 360 days     TSH   Date Value Ref Range Status   10/21/2019 3.150 0.340 - 5.600 uIU/mL Final   05/15/2018 2.544 0.400 -  4.000 uIU/mL Final   05/17/2017 1.904 0.400 - 4.000 uIU/mL Final              Failed - T4 free within 1080 days     Free T4   Date Value Ref Range Status   08/13/2010 0.98 0.71 - 1.51 ng/dl Final              Passed - Patient is at least 18 years old        Passed - Negative Pregnancy Status Check        Passed - Office visit in past 12 months or future 90 days     Recent Outpatient Visits            11 months ago Superficial skin infection    [facility] D[facility] 11th Fl Paula Ramirez MD    11 months ago Osteoarthritis of right knee, unspecified osteoarthritis type    [facility] - Sports Med 1st Fl Aleks Impastato III, PA-C    11 months ago Osteoarthritis of right knee, unspecified osteoarthritis type    [facility] - Sports Med 1st Fl Aleks Impastato III, PA-C    12 months ago Diarrhea, unspecified type    [facility] Elif Lew MD    1 year ago Malignant neoplasm of exocervix    [facility] - Gyn Onc 2nd Fl Dylan Matias MD          Future Appointments              In 4 weeks Sixto Guzman MD [place]    In 1 month Dylan Matias MD [facility] - Gyn Onc 2nd Fl, [facility]                    Appointments  past 12m or future 3m with PCP    Date Provider   Last Visit   11/13/2019 Elif Lew MD   Next Visit   Visit date not found Elif Lew MD   ED visits in past 90 days: 0     Note composed:7:28 AM 11/09/2020

## 2020-11-17 ENCOUNTER — HOSPITAL ENCOUNTER (OUTPATIENT)
Dept: RADIOLOGY | Facility: HOSPITAL | Age: 45
Discharge: HOME | End: 2020-11-17
Attending: INTERNAL MEDICINE
Payer: COMMERCIAL

## 2020-11-17 DIAGNOSIS — Z12.31 OTHER SCREENING MAMMOGRAM: ICD-10-CM

## 2020-11-17 PROCEDURE — 77063 BREAST TOMOSYNTHESIS BI: CPT | Mod: 26,,, | Performed by: RADIOLOGY

## 2020-11-17 PROCEDURE — 77067 SCR MAMMO BI INCL CAD: CPT | Mod: 26,,, | Performed by: RADIOLOGY

## 2020-11-17 PROCEDURE — 77063 MAMMO DIGITAL SCREENING BILAT WITH TOMO: ICD-10-PCS | Mod: 26,,, | Performed by: RADIOLOGY

## 2020-11-17 PROCEDURE — 77067 SCR MAMMO BI INCL CAD: CPT | Mod: TC

## 2020-11-17 PROCEDURE — 77067 MAMMO DIGITAL SCREENING BILAT WITH TOMO: ICD-10-PCS | Mod: 26,,, | Performed by: RADIOLOGY

## 2020-11-19 DIAGNOSIS — Z01.84 ANTIBODY RESPONSE EXAMINATION: ICD-10-CM

## 2020-12-03 ENCOUNTER — TELEPHONE (OUTPATIENT)
Dept: ADMINISTRATIVE | Facility: OTHER | Age: 45
End: 2020-12-03

## 2020-12-14 ENCOUNTER — HOSPITAL ENCOUNTER (OUTPATIENT)
Dept: RADIOLOGY | Facility: HOSPITAL | Age: 45
Discharge: HOME | End: 2020-12-14
Attending: ORTHOPAEDIC SURGERY
Payer: COMMERCIAL

## 2020-12-14 ENCOUNTER — OFFICE VISIT (OUTPATIENT)
Dept: SPORTS MEDICINE | Facility: CLINIC | Age: 45
End: 2020-12-14
Payer: COMMERCIAL

## 2020-12-14 VITALS
BODY MASS INDEX: 39.65 KG/M2 | SYSTOLIC BLOOD PRESSURE: 127 MMHG | WEIGHT: 238 LBS | HEART RATE: 71 BPM | DIASTOLIC BLOOD PRESSURE: 87 MMHG | HEIGHT: 65 IN

## 2020-12-14 DIAGNOSIS — M25.562 LEFT KNEE PAIN, UNSPECIFIED CHRONICITY: ICD-10-CM

## 2020-12-14 DIAGNOSIS — M25.562 LEFT KNEE PAIN, UNSPECIFIED CHRONICITY: Primary | ICD-10-CM

## 2020-12-14 PROCEDURE — 99999 PR PBB SHADOW E&M-EST. PATIENT-LVL IV: ICD-10-PCS | Mod: PBBFAC,,, | Performed by: ORTHOPAEDIC SURGERY

## 2020-12-14 PROCEDURE — 73564 XR KNEE ORTHO BILAT WITH FLEXION: ICD-10-PCS | Mod: 26,50,, | Performed by: RADIOLOGY

## 2020-12-14 PROCEDURE — 73564 X-RAY EXAM KNEE 4 OR MORE: CPT | Mod: TC,50

## 2020-12-14 PROCEDURE — 99214 PR OFFICE/OUTPT VISIT, EST, LEVL IV, 30-39 MIN: ICD-10-PCS | Mod: S$GLB,,, | Performed by: ORTHOPAEDIC SURGERY

## 2020-12-14 PROCEDURE — 99214 OFFICE O/P EST MOD 30 MIN: CPT | Mod: S$GLB,,, | Performed by: ORTHOPAEDIC SURGERY

## 2020-12-14 PROCEDURE — 99999 PR PBB SHADOW E&M-EST. PATIENT-LVL IV: CPT | Mod: PBBFAC,,, | Performed by: ORTHOPAEDIC SURGERY

## 2020-12-14 PROCEDURE — 73564 X-RAY EXAM KNEE 4 OR MORE: CPT | Mod: 26,50,, | Performed by: RADIOLOGY

## 2020-12-14 NOTE — PROGRESS NOTES
CC: Left knee pain    45 y.o. Female with a history of Left pain who She states that the pain is severe and not responding to any conservative care.      + mechanical symptoms, no instability    Is affecting ADLs.      October her 55lb dog ran into her left knee head on      R SANE 70 (preop 10)  L SANE 70    DATE OF PROCEDURE: 08/24/2017  PROCEDURE PERFORMED:   right  1. knee arthroscopic chondroplasty (CPT 68760)  2. knee arthroscopic medial (CPT 57514) meniscectomy   3. knee arthroscopic partial synovectomy/debridement (CPT 63180).   4. knee arthroscopic plica excision(CPT 74268).    5. Knee arthroscopic-assisted arthrocentesis of viable structural human allograft tissue      matrix ([employer]) (CPT 09735)              6. Knee arthroscopic loose body removal (CPT 32983)    In the patellofemoral compartment, there was chondral damage to:  Patella 10 x 10 mm grade 2  Trochlea 10 x 10 mm grade 2  Chondroplasty was performed using arthroscopic shaver.     There was chondral damage to:  Medial femoral condyle 10 x 15 mm grade 4  Medial tibial plateau 15 x 10 mm grade 2  Chondroplasty was performed using arthroscopic shaver.     There was chondral damage to:  Lateral tibial plateau 15 x 10 mm grade 2  Chondroplasty was performed using arthroscopic shaver.     Loose bodies measuring 13 x 5 x 7 mm and 10 x 4 x 2 mm  mm were removed from medial compartment using arthroscopic grasper.        Review of Systems   Constitution: Negative. Negative for chills, fever and night sweats.   HENT: Negative for congestion and headaches.    Eyes: Negative for blurred vision, left vision loss and right vision loss.   Cardiovascular: Negative for chest pain and syncope.   Respiratory: Negative for cough and shortness of breath.    Endocrine: Negative for polydipsia, polyphagia and polyuria.   Hematologic/Lymphatic: Negative for bleeding problem. Does not bruise/bleed easily.   Skin: Negative for dry skin, itching and rash.    Musculoskeletal: Negative for falls. Positive for knee pain and muscle weakness.   Gastrointestinal: Negative for abdominal pain and bowel incontinence.   Genitourinary: Negative for bladder incontinence and nocturia.   Neurological: Negative for disturbances in coordination, loss of balance and seizures.   Psychiatric/Behavioral: Negative for depression. The patient does not have insomnia.    Allergic/Immunologic: Negative for hives and persistent infections.     PAST MEDICAL HISTORY:   Past Medical History:   Diagnosis Date    Abnormal Pap smear     Cervical cancer March 2013    FIGO IB1 AdenoCA Robotic radical hsyt,bso and bplnd    Depressive disorder, not elsewhere classified     Endometriosis of uterus     Foot fracture, right     Headache(784.0)     Hypertension     Hypothyroidism     Low grade squamous intraepith lesion on cytologic smear vagina (lgsil) 10/14/2019    Malignant neoplasm of exocervix 10/10/2019    Migraine headache     Urinary tract infection     Vaginal atrophy 5/8/2018    Visit for screening mammogram 12/16/2015    Well woman exam with routine gynecological exam 12/16/2015     PAST SURGICAL HISTORY:   Past Surgical History:   Procedure Laterality Date    BREAST BIOPSY Right     Core bx, benign    HYSTERECTOMY  May 2013    Robotic radical hyst, bso, blplnd    KNEE SURGERY  08/24/2017    right knee    LIPOMA RESECTION      OOPHORECTOMY      bilateral    PELVIC AND PARA-AORTIC LYMPH NODE DISSECTION      PELVIC LAPAROSCOPY      endometriosis    HI REMOVAL OF OVARY/TUBE(S)      SINUS SURGERY      x2    TONSILLECTOMY       FAMILY HISTORY:   Family History   Problem Relation Age of Onset    Colon cancer Other     Migraines Mother     Cancer Mother         endometrial cancer     Hypertension Mother     Hypothyroidism Mother     Heart disease Mother     Hyperlipidemia Mother     Migraines Maternal Grandmother     Aneurysm Maternal Grandmother         intracranial  aneurysm    Stroke Maternal Grandmother     Cancer Maternal Aunt     Cancer Paternal Grandfather         lymphoma non hodgkins     Diabetes Father     Skin cancer Unknown     Rheum arthritis Paternal Grandmother 80    Rheum arthritis Maternal Aunt     Ovarian cancer Neg Hx     Uterine cancer Neg Hx     Breast cancer Neg Hx      SOCIAL HISTORY:   Social History     Socioeconomic History    Marital status: Single     Spouse name: Not on file    Number of children: 1    Years of education: 23    Highest education level: Not on file   Occupational History    Occupation: RN     Employer: [employer]   Social Needs    Financial resource strain: Not on file    Food insecurity     Worry: Not on file     Inability: Not on file    Transportation needs     Medical: Not on file     Non-medical: Not on file   Tobacco Use    Smoking status: Never Smoker    Smokeless tobacco: Never Used   Substance and Sexual Activity    Alcohol use: Yes     Comment: socially    Drug use: Not on file    Sexual activity: Not on file   Lifestyle    Physical activity     Days per week: Not on file     Minutes per session: Not on file    Stress: Not on file   Relationships    Social connections     Talks on phone: Not on file     Gets together: Not on file     Attends Protestant service: Not on file     Active member of club or organization: Not on file     Attends meetings of clubs or organizations: Not on file     Relationship status: Not on file   Other Topics Concern    Are you pregnant or think you may be? No    Breast-feeding No   Social History Narrative    Not on file       MEDICATIONS:   Current Outpatient Medications:     albuterol 90 mcg/actuation inhaler, Inhale 2 puffs into the lungs every 4 (four) hours as needed for Wheezing., Disp: 6.7 g, Rfl: 3    ALPRAZolam (XANAX) 0.5 MG tablet, Take 1 tablet (0.5 mg total) by mouth daily as needed for Anxiety., Disp: 30 tablet, Rfl: 0    atenolol  (TENORMIN) 50 MG tablet, TAKE 1 TABLET (50 MG TOTAL) BY MOUTH 2 (TWO) TIMES DAILY., Disp: 180 tablet, Rfl: 3    azelaic acid (FINACEA) 15 % Foam, Apply thin film to face qhs, Disp: 50 g, Rfl: 6    cetirizine (ZYRTEC) 10 MG tablet, TAKE 1/2 TAB BY MOUTH EVERY DAY, Disp: , Rfl: 0    DULoxetine (CYMBALTA) 30 MG capsule, Take 1 capsule (30 mg total) by mouth 2 (two) times daily., Disp: 90 capsule, Rfl: 3    EPINEPHrine (EPIPEN) 0.3 mg/0.3 mL AtIn, Inject 0.3 mLs (0.3 mg total) into the muscle once., Disp: 2 each, Rfl: 2    estradiol (VAGIFEM) 10 mcg Tab, Place 1 tablet (10 mcg total) vaginally twice a week., Disp: 8 tablet, Rfl: 12    fluticasone (FLONASE) 50 mcg/actuation nasal spray, 1 spray by Each Nare route daily as needed for Rhinitis., Disp: , Rfl:     hydrOXYzine HCl (ATARAX) 25 MG tablet, TAKE 1 TABLET BY MOUTH 3 TIMES DAILY AS NEEDED. CAN TAKE 2 TABS AT ONCE IF NEEDED., Disp: , Rfl: 2    levocetirizine (XYZAL) 5 MG tablet, Take 1 tablet (5 mg total) by mouth once daily., Disp: 90 tablet, Rfl: 3    levothyroxine (SYNTHROID) 50 MCG tablet, TAKE ONE TABLET BY MOUTH DAILY, Disp: 90 tablet, Rfl: 0    promethazine (PHENERGAN) 25 MG tablet, Take 1 tablet (25 mg total) by mouth every 6 (six) hours as needed for Nausea., Disp: 16 tablet, Rfl: 0    traMADol (ULTRAM) 50 mg tablet, Take 1-2 tablets ( mg total) by mouth every 8 (eight) hours as needed for Pain., Disp: 30 tablet, Rfl: 0    triamterene-hydrochlorothiazide 37.5-25 mg (DYAZIDE) 37.5-25 mg per capsule, Take 1 capsule by mouth once daily., Disp: 90 capsule, Rfl: 0    valacyclovir (VALTREX) 1000 MG tablet, Take 1 tablet (1,000 mg total) by mouth every 12 (twelve) hours., Disp: 4 tablet, Rfl: 0    vitamin D (VITAMIN D3) 1000 units Tab, Take 1 tablet (1,000 Units total) by mouth once daily., Disp: 30 tablet, Rfl: prn    Current Facility-Administered Medications:     diphenhydrAMINE injection 50 mg, 50 mg, Intravenous, PRN, Aleks Kwok III,  "PA-C  ALLERGIES:   Review of patient's allergies indicates:   Allergen Reactions    Lortab [hydrocodone-acetaminophen] Itching and Rash     States can take - may have been formulation    Other Anaphylaxis     mushrooms    Diflucan  [fluconazole]      Other reaction(s): Hives    Iodine and iodide containing products Hives     Iv iodine only    Mushroom combination no.1      Other reaction(s): Bronchial Constriction       VITAL SIGNS: /87   Pulse 71   Ht 5' 5" (1.651 m)   Wt 108 kg (238 lb)   BMI 39.61 kg/m²      PHYSICAL EXAMINATION  VITAL SIGNS: /87   Pulse 71   Ht 5' 5" (1.651 m)   Wt 108 kg (238 lb)   BMI 39.61 kg/m²    General:  The patient is alert and oriented x 3.  Mood is pleasant.  Observation of ears, eyes and nose reveal no gross abnormalities.  HEENT: NCAT, sclera nonicteric  Lungs: Respirations are equal and unlabored.    Left KNEE EXAMINATION     OBSERVATION / INSPECTION   Gait:   Nonantalgic   Alignment:  Neutral   Scars:   None   Muscle atrophy: Mild  Effusion:  None   Warmth:  None   Discoloration:   none     TENDERNESS / CREPITUS (T / C):          T / C      T / C   Patella   - / -   Lateral joint line   - / -    Peripatellar medial  -  Medial joint line    + / -    Peripatellar lateral -  Medial plica   - / -    Patellar tendon -   Popliteal fossa  - / -    Quad tendon   -   Gastrocnemius   -   Prepatellar Bursa - / -   Quadricep   -   Tibial tubercle  -  Thigh/hamstring  -   Pes anserine/HS -  Fibula    -   ITB   - / -  Tibia     -   Tib/fib joint  - / -  LCL    -     MFC   - / -   MCL: Proximal  +    LFC   - / -    Distal   -          ROM: (* = pain)  PASSIVE   ACTIVE    Left :   5 / 0 / 145   5 / 0 / 145     Right :    5 / 0 / 145   5 / 0 / 145    PATELLOFEMORAL EXAMINATION:  See above noted areas of tenderness.   Patella position    Subluxation / dislocation: Centered           Sup. / Inf;   Normal   Crepitus (PF):    Absent   Patellar " Mobility:       Medial-lateral:   Normal    Superior-inferior:  Normal    Inferior tilt   Normal    Patellar tendon:  Normal   Lateral tilt:    Normal   J-sign:     None   Patellofemoral grind:   No pain       MENISCAL SIGNS:     Pain on terminal extension:  +  Pain on terminal flexion:  +  Hipolitos maneuver:  + for pain  Squat     + posterior joint pain    LIGAMENT EXAMINATION:  ACL / Lachman:  normal (-1 to 2mm)    PCL-Post.  drawer: normal 0 to 2mm  MCL- Valgus:  normal 0 to 2mm  LCL- Varus:  normal 0 to 2mm  Pivot shift: normal (Equal)   Dial Test: difference c/w other side   At 30° flexion: normal (< 5°)    At 90° flexion: normal (< 5°)   Reverse Pivot Shift:   normal (Equal)     STRENGTH: (* = with pain) PAINFUL SIDE   Quadricep   5/5   Hamstrin/5    EXTREMITY NEURO-VASCULAR EXAMINATION:   Sensation:  Grossly intact to light touch all dermatomal regions.   Motor Function:  Fully intact motor function at hip, knee, foot and ankle    DTRs;  quadriceps and  achilles 2+.  No clonus and downgoing Babinski.    Vascular status:  DP and PT pulses 2+, brisk capillary refill, symmetric.     Other Findings:       X-rays:  including standing, weight bearing AP and flexion bilateral knees, lateral and merchant views ordered and images reviewed by me show:  No fracture, dislocation     ASSESSMENT:    Left Knee  Probable Meniscus tear  medial     Right knee euflexxa series  PLAN:   MRI Left knee  Hold out of sports until MRI  All questions were answered, pt will contact us for questions or concerns in the interim.

## 2020-12-15 ENCOUNTER — TELEPHONE (OUTPATIENT)
Dept: SPORTS MEDICINE | Facility: CLINIC | Age: 45
End: 2020-12-15

## 2020-12-15 DIAGNOSIS — M17.11 PRIMARY OSTEOARTHRITIS OF RIGHT KNEE: Primary | ICD-10-CM

## 2020-12-15 NOTE — TELEPHONE ENCOUNTER
----- Message from Kristyn Carter MA sent at 12/15/2020  3:41 PM CST -----  Please place Euflexxa order in for right knee. Let me know once signed so I am able to schedule.    Kristyn Carter MA  Medical Assistant to Dr. Kelly Zaman

## 2020-12-16 ENCOUNTER — PATIENT MESSAGE (OUTPATIENT)
Dept: INTERNAL MEDICINE | Facility: CLINIC | Age: 45
End: 2020-12-16

## 2020-12-16 DIAGNOSIS — F33.1 MODERATE EPISODE OF RECURRENT MAJOR DEPRESSIVE DISORDER: ICD-10-CM

## 2020-12-16 DIAGNOSIS — F41.1 GAD (GENERALIZED ANXIETY DISORDER): ICD-10-CM

## 2020-12-16 DIAGNOSIS — Z91.09 ENVIRONMENTAL ALLERGIES: ICD-10-CM

## 2020-12-16 DIAGNOSIS — E03.9 ACQUIRED HYPOTHYROIDISM: ICD-10-CM

## 2020-12-16 DIAGNOSIS — I10 ESSENTIAL HYPERTENSION: ICD-10-CM

## 2020-12-16 RX ORDER — DULOXETIN HYDROCHLORIDE 30 MG/1
30 CAPSULE, DELAYED RELEASE ORAL 2 TIMES DAILY
Qty: 90 CAPSULE | Refills: 3 | Status: SHIPPED | OUTPATIENT
Start: 2020-12-16 | End: 2021-06-11 | Stop reason: SDUPTHER

## 2020-12-16 RX ORDER — ALPRAZOLAM 0.5 MG/1
0.5 TABLET ORAL DAILY PRN
Qty: 30 TABLET | Refills: 0 | Status: SHIPPED | OUTPATIENT
Start: 2020-12-16 | End: 2022-08-29 | Stop reason: SDUPTHER

## 2020-12-16 RX ORDER — TRIAMTERENE AND HYDROCHLOROTHIAZIDE 37.5; 25 MG/1; MG/1
1 CAPSULE ORAL DAILY
Qty: 90 CAPSULE | Refills: 3 | Status: SHIPPED | OUTPATIENT
Start: 2020-12-16 | End: 2021-12-21

## 2020-12-16 RX ORDER — LEVOCETIRIZINE DIHYDROCHLORIDE 5 MG/1
5 TABLET, FILM COATED ORAL DAILY
Qty: 90 TABLET | Refills: 3 | Status: SHIPPED | OUTPATIENT
Start: 2020-12-16

## 2020-12-16 RX ORDER — LEVOTHYROXINE SODIUM 50 UG/1
50 TABLET ORAL DAILY
Qty: 90 TABLET | Refills: 0 | Status: SHIPPED | OUTPATIENT
Start: 2020-12-16 | End: 2021-05-28 | Stop reason: SDUPTHER

## 2020-12-16 RX ORDER — ATENOLOL 50 MG/1
TABLET ORAL
Qty: 180 TABLET | Refills: 3 | Status: SHIPPED | OUTPATIENT
Start: 2020-12-16 | End: 2022-01-06 | Stop reason: SDUPTHER

## 2020-12-16 NOTE — TELEPHONE ENCOUNTER
No new care gaps identified.  Powered by [employer]. Reference number: [id number]. 12/16/2020 11:06:49 AM   CST

## 2020-12-17 ENCOUNTER — HOSPITAL ENCOUNTER (OUTPATIENT)
Dept: RADIOLOGY | Facility: HOSPITAL | Age: 45
Discharge: HOME | End: 2020-12-17
Attending: ORTHOPAEDIC SURGERY
Payer: COMMERCIAL

## 2020-12-17 DIAGNOSIS — M25.562 LEFT KNEE PAIN, UNSPECIFIED CHRONICITY: ICD-10-CM

## 2020-12-17 PROCEDURE — 73721 MRI KNEE WITHOUT CONTRAST LEFT: ICD-10-PCS | Mod: 26,LT,, | Performed by: RADIOLOGY

## 2020-12-17 PROCEDURE — 73721 MRI JNT OF LWR EXTRE W/O DYE: CPT | Mod: 26,LT,, | Performed by: RADIOLOGY

## 2020-12-17 PROCEDURE — 73721 MRI JNT OF LWR EXTRE W/O DYE: CPT | Mod: TC,LT

## 2020-12-18 ENCOUNTER — PATIENT MESSAGE (OUTPATIENT)
Dept: SPORTS MEDICINE | Facility: CLINIC | Age: 45
End: 2020-12-18

## 2020-12-23 NOTE — PROGRESS NOTES
Called patient to discuss MRI results    MRI reviewed personally by me:  Shows Left knee knee medial meniscal body tear, chondromalacia.     ASSESSMENT:    Left Knee Meniscus tear.      she would benefit from knee arthroscopy, possible plica excision, possible chondroplasty with possible meniscectomy given the above.     PLAN: We have discussed the surgery and recovery of arthroscopic knee surgery. she understands that there may be limited weightbearing up to several weeks after surgery depending on procedures that are performed at the time of surgery.    The spectrum of treatment options were discussed with the patient, including nonoperative and operative options.  After thorough discussion, the patient has elected to undergo surgical treatment to include:  left   a. Knee arthroscopic medial meniscectomy    b. Knee arthroscopic possible plica excision   c. Knee arthroscopic possible chondroplasty    The details of the surgical procedure were explained, including the location of probable incisions and a description of likely hardware and/or grafts to be used.  The patient understands the likely convalescence after surgery.  Also, we have thoroughly discussed the risks, benefits and alternatives to surgery, including, but not limited to, the risk of infection, joint stiffness, blood clot (including DVT and/or pulmonary embolus), neurologic and vascular injury.  It was explained that, if tissue has been repaired or reconstructed, there is a chance of failure, which may require further management.

## 2020-12-28 ENCOUNTER — PATIENT MESSAGE (OUTPATIENT)
Dept: INTERNAL MEDICINE | Facility: CLINIC | Age: 45
End: 2020-12-28

## 2021-01-04 ENCOUNTER — TELEPHONE (OUTPATIENT)
Dept: SPORTS MEDICINE | Facility: CLINIC | Age: 46
End: 2021-01-04

## 2021-01-04 DIAGNOSIS — M17.11 OSTEOARTHRITIS OF RIGHT KNEE, UNSPECIFIED OSTEOARTHRITIS TYPE: ICD-10-CM

## 2021-01-04 DIAGNOSIS — M17.12 OSTEOARTHRITIS OF LEFT KNEE, UNSPECIFIED OSTEOARTHRITIS TYPE: Primary | ICD-10-CM

## 2021-01-04 DIAGNOSIS — M17.11 PRIMARY OSTEOARTHRITIS OF RIGHT KNEE: ICD-10-CM

## 2021-01-05 ENCOUNTER — OFFICE VISIT (OUTPATIENT)
Dept: CARDIOLOGY | Facility: CLINIC | Age: 46
End: 2021-01-05
Payer: COMMERCIAL

## 2021-01-05 VITALS
BODY MASS INDEX: 39.89 KG/M2 | DIASTOLIC BLOOD PRESSURE: 88 MMHG | SYSTOLIC BLOOD PRESSURE: 129 MMHG | HEIGHT: 65 IN | HEART RATE: 74 BPM | WEIGHT: 239.44 LBS

## 2021-01-05 DIAGNOSIS — I87.2 VENOUS INSUFFICIENCY OF BOTH LOWER EXTREMITIES: Primary | ICD-10-CM

## 2021-01-05 DIAGNOSIS — I10 ESSENTIAL HYPERTENSION: Primary | ICD-10-CM

## 2021-01-05 DIAGNOSIS — I87.2 VENOUS INSUFFICIENCY OF BOTH LOWER EXTREMITIES: ICD-10-CM

## 2021-01-05 PROCEDURE — 99203 OFFICE O/P NEW LOW 30 MIN: CPT | Mod: S$GLB,,, | Performed by: INTERNAL MEDICINE

## 2021-01-05 PROCEDURE — 93000 ELECTROCARDIOGRAM COMPLETE: CPT | Mod: S$GLB,,, | Performed by: INTERNAL MEDICINE

## 2021-01-05 PROCEDURE — 99203 PR OFFICE/OUTPT VISIT, NEW, LEVL III, 30-44 MIN: ICD-10-PCS | Mod: S$GLB,,, | Performed by: INTERNAL MEDICINE

## 2021-01-05 PROCEDURE — 99999 PR PBB SHADOW E&M-EST. PATIENT-LVL III: ICD-10-PCS | Mod: PBBFAC,,, | Performed by: INTERNAL MEDICINE

## 2021-01-05 PROCEDURE — 93000 EKG 12-LEAD: ICD-10-PCS | Mod: S$GLB,,, | Performed by: INTERNAL MEDICINE

## 2021-01-05 PROCEDURE — 99999 PR PBB SHADOW E&M-EST. PATIENT-LVL III: CPT | Mod: PBBFAC,,, | Performed by: INTERNAL MEDICINE

## 2021-01-12 ENCOUNTER — TELEPHONE (OUTPATIENT)
Dept: SPORTS MEDICINE | Facility: CLINIC | Age: 46
End: 2021-01-12

## 2021-02-10 ENCOUNTER — PATIENT MESSAGE (OUTPATIENT)
Dept: INTERNAL MEDICINE | Facility: CLINIC | Age: 46
End: 2021-02-10

## 2021-02-20 ENCOUNTER — PATIENT MESSAGE (OUTPATIENT)
Dept: INTERNAL MEDICINE | Facility: CLINIC | Age: 46
End: 2021-02-20

## 2021-02-23 ENCOUNTER — OFFICE VISIT (OUTPATIENT)
Dept: INTERNAL MEDICINE | Facility: CLINIC | Age: 46
End: 2021-02-23
Payer: COMMERCIAL

## 2021-02-23 VITALS
BODY MASS INDEX: 40.32 KG/M2 | SYSTOLIC BLOOD PRESSURE: 110 MMHG | WEIGHT: 242 LBS | HEIGHT: 65 IN | DIASTOLIC BLOOD PRESSURE: 88 MMHG

## 2021-02-23 DIAGNOSIS — R73.03 PRE-DIABETES: ICD-10-CM

## 2021-02-23 DIAGNOSIS — I10 ESSENTIAL HYPERTENSION: ICD-10-CM

## 2021-02-23 DIAGNOSIS — Z00.00 HEALTH CARE MAINTENANCE: ICD-10-CM

## 2021-02-23 DIAGNOSIS — J02.9 PHARYNGITIS, UNSPECIFIED ETIOLOGY: Primary | ICD-10-CM

## 2021-02-23 DIAGNOSIS — E03.8 OTHER SPECIFIED HYPOTHYROIDISM: ICD-10-CM

## 2021-02-23 LAB — GROUP A STREP, MOLECULAR: NEGATIVE

## 2021-02-23 PROCEDURE — 99999 PR PBB SHADOW E&M-EST. PATIENT-LVL II: CPT | Mod: PBBFAC,,, | Performed by: INTERNAL MEDICINE

## 2021-02-23 PROCEDURE — 99999 PR PBB SHADOW E&M-EST. PATIENT-LVL II: ICD-10-PCS | Mod: PBBFAC,,, | Performed by: INTERNAL MEDICINE

## 2021-02-23 PROCEDURE — 87070 CULTURE OTHR SPECIMN AEROBIC: CPT

## 2021-02-23 PROCEDURE — 99214 PR OFFICE/OUTPT VISIT, EST, LEVL IV, 30-39 MIN: ICD-10-PCS | Mod: S$GLB,,, | Performed by: INTERNAL MEDICINE

## 2021-02-23 PROCEDURE — 99214 OFFICE O/P EST MOD 30 MIN: CPT | Mod: S$GLB,,, | Performed by: INTERNAL MEDICINE

## 2021-02-23 PROCEDURE — 87651 STREP A DNA AMP PROBE: CPT

## 2021-02-23 RX ORDER — CHOLECALCIFEROL (VITAMIN D3) 25 MCG
5000 TABLET ORAL DAILY
Qty: 30 TABLET
Start: 2021-02-23

## 2021-02-23 RX ORDER — CEFDINIR 300 MG/1
300 CAPSULE ORAL 2 TIMES DAILY
Qty: 20 CAPSULE | Refills: 0 | Status: SHIPPED | OUTPATIENT
Start: 2021-02-23 | End: 2021-03-05

## 2021-02-25 LAB — BACTERIA THROAT CULT: NORMAL

## 2021-03-01 ENCOUNTER — PATIENT MESSAGE (OUTPATIENT)
Dept: INTERNAL MEDICINE | Facility: CLINIC | Age: 46
End: 2021-03-01

## 2021-03-06 ENCOUNTER — PATIENT OUTREACH (OUTPATIENT)
Dept: ADMINISTRATIVE | Facility: OTHER | Age: 46
End: 2021-03-06

## 2021-03-08 ENCOUNTER — OFFICE VISIT (OUTPATIENT)
Dept: DERMATOLOGY | Facility: CLINIC | Age: 46
End: 2021-03-08
Payer: COMMERCIAL

## 2021-03-08 VITALS — BODY MASS INDEX: 40.27 KG/M2 | WEIGHT: 242 LBS

## 2021-03-08 DIAGNOSIS — L71.9 ROSACEA: ICD-10-CM

## 2021-03-08 DIAGNOSIS — L82.1 SEBORRHEIC KERATOSES: ICD-10-CM

## 2021-03-08 DIAGNOSIS — L81.1 MELASMA: Primary | ICD-10-CM

## 2021-03-08 PROCEDURE — 99999 PR PBB SHADOW E&M-EST. PATIENT-LVL III: ICD-10-PCS | Mod: PBBFAC,,, | Performed by: DERMATOLOGY

## 2021-03-08 PROCEDURE — 99213 PR OFFICE/OUTPT VISIT, EST, LEVL III, 20-29 MIN: ICD-10-PCS | Mod: S$GLB,,, | Performed by: DERMATOLOGY

## 2021-03-08 PROCEDURE — 99213 OFFICE O/P EST LOW 20 MIN: CPT | Mod: S$GLB,,, | Performed by: DERMATOLOGY

## 2021-03-08 PROCEDURE — 99999 PR PBB SHADOW E&M-EST. PATIENT-LVL III: CPT | Mod: PBBFAC,,, | Performed by: DERMATOLOGY

## 2021-03-08 RX ORDER — AZELAIC ACID 0.15 G/G
AEROSOL, FOAM TOPICAL
Qty: 50 G | Refills: 6 | Status: SHIPPED | OUTPATIENT
Start: 2021-03-08 | End: 2024-03-06

## 2021-03-08 RX ORDER — HYDROQUINONE 40 MG/G
CREAM TOPICAL
Qty: 28.35 G | Refills: 3 | Status: SHIPPED | OUTPATIENT
Start: 2021-03-08

## 2021-03-09 ENCOUNTER — PATIENT MESSAGE (OUTPATIENT)
Dept: RESEARCH | Facility: HOSPITAL | Age: 46
End: 2021-03-09

## 2021-03-24 ENCOUNTER — OFFICE VISIT (OUTPATIENT)
Dept: GYNECOLOGIC ONCOLOGY | Facility: CLINIC | Age: 46
End: 2021-03-24
Payer: COMMERCIAL

## 2021-03-24 VITALS
HEART RATE: 75 BPM | BODY MASS INDEX: 39.99 KG/M2 | WEIGHT: 240.31 LBS | DIASTOLIC BLOOD PRESSURE: 75 MMHG | SYSTOLIC BLOOD PRESSURE: 122 MMHG

## 2021-03-24 DIAGNOSIS — C53.1 MALIGNANT NEOPLASM OF EXOCERVIX: Primary | ICD-10-CM

## 2021-03-24 DIAGNOSIS — Z83.719 FH: COLONIC POLYPS: ICD-10-CM

## 2021-03-24 DIAGNOSIS — Z12.31 SCREENING MAMMOGRAM, ENCOUNTER FOR: ICD-10-CM

## 2021-03-24 DIAGNOSIS — R87.622 LOW GRADE SQUAMOUS INTRAEPITH LESION ON CYTOLOGIC SMEAR VAGINA (LGSIL): ICD-10-CM

## 2021-03-24 DIAGNOSIS — Z01.419 WELL WOMAN EXAM WITH ROUTINE GYNECOLOGICAL EXAM: ICD-10-CM

## 2021-03-24 PROCEDURE — 1126F PR PAIN SEVERITY QUANTIFIED, NO PAIN PRESENT: ICD-10-PCS | Mod: S$GLB,,, | Performed by: OBSTETRICS & GYNECOLOGY

## 2021-03-24 PROCEDURE — 88175 CYTOPATH C/V AUTO FLUID REDO: CPT | Performed by: OBSTETRICS & GYNECOLOGY

## 2021-03-24 PROCEDURE — 99396 PREV VISIT EST AGE 40-64: CPT | Mod: S$GLB,,, | Performed by: OBSTETRICS & GYNECOLOGY

## 2021-03-24 PROCEDURE — 99999 PR PBB SHADOW E&M-EST. PATIENT-LVL III: CPT | Mod: PBBFAC,,, | Performed by: OBSTETRICS & GYNECOLOGY

## 2021-03-24 PROCEDURE — 3078F DIAST BP <80 MM HG: CPT | Mod: CPTII,S$GLB,, | Performed by: OBSTETRICS & GYNECOLOGY

## 2021-03-24 PROCEDURE — 1126F AMNT PAIN NOTED NONE PRSNT: CPT | Mod: S$GLB,,, | Performed by: OBSTETRICS & GYNECOLOGY

## 2021-03-24 PROCEDURE — 99999 PR PBB SHADOW E&M-EST. PATIENT-LVL III: ICD-10-PCS | Mod: PBBFAC,,, | Performed by: OBSTETRICS & GYNECOLOGY

## 2021-03-24 PROCEDURE — 3008F BODY MASS INDEX DOCD: CPT | Mod: CPTII,S$GLB,, | Performed by: OBSTETRICS & GYNECOLOGY

## 2021-03-24 PROCEDURE — 3008F PR BODY MASS INDEX (BMI) DOCUMENTED: ICD-10-PCS | Mod: CPTII,S$GLB,, | Performed by: OBSTETRICS & GYNECOLOGY

## 2021-03-24 PROCEDURE — 3074F PR MOST RECENT SYSTOLIC BLOOD PRESSURE < 130 MM HG: ICD-10-PCS | Mod: CPTII,S$GLB,, | Performed by: OBSTETRICS & GYNECOLOGY

## 2021-03-24 PROCEDURE — 3078F PR MOST RECENT DIASTOLIC BLOOD PRESSURE < 80 MM HG: ICD-10-PCS | Mod: CPTII,S$GLB,, | Performed by: OBSTETRICS & GYNECOLOGY

## 2021-03-24 PROCEDURE — 99396 PR PREVENTIVE VISIT,EST,40-64: ICD-10-PCS | Mod: S$GLB,,, | Performed by: OBSTETRICS & GYNECOLOGY

## 2021-03-24 PROCEDURE — 3074F SYST BP LT 130 MM HG: CPT | Mod: CPTII,S$GLB,, | Performed by: OBSTETRICS & GYNECOLOGY

## 2021-03-24 RX ORDER — ESTRADIOL 10 UG/1
10 INSERT VAGINAL
Qty: 8 TABLET | Refills: 12 | Status: SHIPPED | OUTPATIENT
Start: 2021-03-25 | End: 2022-03-25

## 2021-03-26 ENCOUNTER — PATIENT MESSAGE (OUTPATIENT)
Dept: RESEARCH | Facility: HOSPITAL | Age: 46
End: 2021-03-26

## 2021-03-31 ENCOUNTER — PATIENT MESSAGE (OUTPATIENT)
Dept: SPORTS MEDICINE | Facility: CLINIC | Age: 46
End: 2021-03-31

## 2021-04-01 LAB
CLINICAL INFO: NORMAL
CYTO CVX: NORMAL
CYTOLOGIST CVX/VAG CYTO: NORMAL
CYTOLOGY CMNT CVX/VAG CYTO-IMP: NORMAL
CYTOLOGY PAP THIN PREP EXPLANATION: NORMAL
DATE OF PREVIOUS PAP: NORMAL
DATE PREVIOUS BX: YES
LMP START DATE: NORMAL
SPECIMEN SOURCE CVX/VAG CYTO: NORMAL
STAT OF ADQ CVX/VAG CYTO-IMP: NORMAL

## 2021-04-14 ENCOUNTER — PATIENT MESSAGE (OUTPATIENT)
Dept: SPORTS MEDICINE | Facility: CLINIC | Age: 46
End: 2021-04-14

## 2021-04-16 DIAGNOSIS — S83.282A ACUTE LATERAL MENISCUS TEAR OF LEFT KNEE, INITIAL ENCOUNTER: Primary | ICD-10-CM

## 2021-04-16 DIAGNOSIS — M67.50 PLICA SYNDROME: ICD-10-CM

## 2021-04-16 DIAGNOSIS — Z01.818 PRE-OP TESTING: Primary | ICD-10-CM

## 2021-04-16 DIAGNOSIS — M94.262 CHONDROMALACIA OF LEFT KNEE: ICD-10-CM

## 2021-05-04 ENCOUNTER — PATIENT MESSAGE (OUTPATIENT)
Dept: INTERNAL MEDICINE | Facility: CLINIC | Age: 46
End: 2021-05-04

## 2021-05-04 ENCOUNTER — PATIENT MESSAGE (OUTPATIENT)
Dept: GYNECOLOGIC ONCOLOGY | Facility: CLINIC | Age: 46
End: 2021-05-04

## 2021-05-04 DIAGNOSIS — Z12.11 COLON CANCER SCREENING: ICD-10-CM

## 2021-05-05 ENCOUNTER — TELEPHONE (OUTPATIENT)
Dept: GYNECOLOGIC ONCOLOGY | Facility: CLINIC | Age: 46
End: 2021-05-05

## 2021-05-06 ENCOUNTER — TELEPHONE (OUTPATIENT)
Dept: ENDOSCOPY | Facility: HOSPITAL | Age: 46
End: 2021-05-06

## 2021-05-06 DIAGNOSIS — Z01.818 PRE-OP TESTING: Primary | ICD-10-CM

## 2021-05-06 DIAGNOSIS — Z12.11 COLON CANCER SCREENING: Primary | ICD-10-CM

## 2021-05-06 RX ORDER — SODIUM, POTASSIUM,MAG SULFATES 17.5-3.13G
1 SOLUTION, RECONSTITUTED, ORAL ORAL ONCE
Qty: 1 BOTTLE | Refills: 0 | Status: SHIPPED | OUTPATIENT
Start: 2021-05-06 | End: 2021-05-06

## 2021-05-10 ENCOUNTER — PATIENT MESSAGE (OUTPATIENT)
Dept: INTERNAL MEDICINE | Facility: CLINIC | Age: 46
End: 2021-05-10

## 2021-05-28 ENCOUNTER — LAB VISIT (OUTPATIENT)
Dept: LAB | Facility: HOSPITAL | Age: 46
End: 2021-05-28
Attending: INTERNAL MEDICINE
Payer: COMMERCIAL

## 2021-05-28 ENCOUNTER — OFFICE VISIT (OUTPATIENT)
Dept: INTERNAL MEDICINE | Facility: CLINIC | Age: 46
End: 2021-05-28
Payer: COMMERCIAL

## 2021-05-28 VITALS
WEIGHT: 240 LBS | HEART RATE: 71 BPM | BODY MASS INDEX: 39.99 KG/M2 | OXYGEN SATURATION: 97 % | SYSTOLIC BLOOD PRESSURE: 104 MMHG | DIASTOLIC BLOOD PRESSURE: 70 MMHG | HEIGHT: 65 IN

## 2021-05-28 DIAGNOSIS — R73.03 PRE-DIABETES: ICD-10-CM

## 2021-05-28 DIAGNOSIS — Z01.818 PRE-OPERATIVE EXAMINATION FOR INTERNAL MEDICINE: ICD-10-CM

## 2021-05-28 DIAGNOSIS — E03.9 ACQUIRED HYPOTHYROIDISM: ICD-10-CM

## 2021-05-28 DIAGNOSIS — Z00.00 HEALTH CARE MAINTENANCE: Primary | ICD-10-CM

## 2021-05-28 DIAGNOSIS — I10 ESSENTIAL HYPERTENSION: ICD-10-CM

## 2021-05-28 DIAGNOSIS — E03.8 OTHER SPECIFIED HYPOTHYROIDISM: ICD-10-CM

## 2021-05-28 DIAGNOSIS — F33.1 MODERATE EPISODE OF RECURRENT MAJOR DEPRESSIVE DISORDER: ICD-10-CM

## 2021-05-28 DIAGNOSIS — I87.2 VENOUS INSUFFICIENCY OF BOTH LOWER EXTREMITIES: ICD-10-CM

## 2021-05-28 LAB
ALBUMIN SERPL BCP-MCNC: 3.7 G/DL (ref 3.5–5.2)
ALP SERPL-CCNC: 76 U/L (ref 55–135)
ALT SERPL W/O P-5'-P-CCNC: 38 U/L (ref 10–44)
ANION GAP SERPL CALC-SCNC: 9 MMOL/L (ref 8–16)
AST SERPL-CCNC: 32 U/L (ref 10–40)
BASOPHILS # BLD AUTO: 0.06 K/UL (ref 0–0.2)
BASOPHILS NFR BLD: 1 % (ref 0–1.9)
BILIRUB SERPL-MCNC: 0.3 MG/DL (ref 0.1–1)
BUN SERPL-MCNC: 13 MG/DL (ref 6–20)
CALCIUM SERPL-MCNC: 9.4 MG/DL (ref 8.7–10.5)
CHLORIDE SERPL-SCNC: 109 MMOL/L (ref 95–110)
CHOLEST SERPL-MCNC: 167 MG/DL (ref 120–199)
CHOLEST/HDLC SERPL: 2.9 {RATIO} (ref 2–5)
CO2 SERPL-SCNC: 25 MMOL/L (ref 23–29)
CREAT SERPL-MCNC: 0.9 MG/DL (ref 0.5–1.4)
DIFFERENTIAL METHOD: ABNORMAL
EOSINOPHIL # BLD AUTO: 0.4 K/UL (ref 0–0.5)
EOSINOPHIL NFR BLD: 6.1 % (ref 0–8)
ERYTHROCYTE [DISTWIDTH] IN BLOOD BY AUTOMATED COUNT: 13 % (ref 11.5–14.5)
EST. GFR  (AFRICAN AMERICAN): >60 ML/MIN/1.73 M^2
EST. GFR  (NON AFRICAN AMERICAN): >60 ML/MIN/1.73 M^2
ESTIMATED AVG GLUCOSE: 134 MG/DL (ref 68–131)
GLUCOSE SERPL-MCNC: 107 MG/DL (ref 70–110)
HBA1C MFR BLD: 6.3 % (ref 4–5.6)
HCT VFR BLD AUTO: 43.8 % (ref 37–48.5)
HDLC SERPL-MCNC: 57 MG/DL (ref 40–75)
HDLC SERPL: 34.1 % (ref 20–50)
HGB BLD-MCNC: 14.7 G/DL (ref 12–16)
IMM GRANULOCYTES # BLD AUTO: 0.02 K/UL (ref 0–0.04)
IMM GRANULOCYTES NFR BLD AUTO: 0.3 % (ref 0–0.5)
LDLC SERPL CALC-MCNC: 97.6 MG/DL (ref 63–159)
LYMPHOCYTES # BLD AUTO: 2.1 K/UL (ref 1–4.8)
LYMPHOCYTES NFR BLD: 34.1 % (ref 18–48)
MCH RBC QN AUTO: 30.1 PG (ref 27–31)
MCHC RBC AUTO-ENTMCNC: 33.6 G/DL (ref 32–36)
MCV RBC AUTO: 90 FL (ref 82–98)
MONOCYTES # BLD AUTO: 1 K/UL (ref 0.3–1)
MONOCYTES NFR BLD: 15.3 % (ref 4–15)
NEUTROPHILS # BLD AUTO: 2.7 K/UL (ref 1.8–7.7)
NEUTROPHILS NFR BLD: 43.2 % (ref 38–73)
NONHDLC SERPL-MCNC: 110 MG/DL
NRBC BLD-RTO: 0 /100 WBC
PLATELET # BLD AUTO: 249 K/UL (ref 150–450)
PMV BLD AUTO: 9.5 FL (ref 9.2–12.9)
POTASSIUM SERPL-SCNC: 3.6 MMOL/L (ref 3.5–5.1)
PROT SERPL-MCNC: 7.8 G/DL (ref 6–8.4)
RBC # BLD AUTO: 4.89 M/UL (ref 4–5.4)
SODIUM SERPL-SCNC: 143 MMOL/L (ref 136–145)
T4 FREE SERPL-MCNC: 1.02 NG/DL (ref 0.71–1.51)
TRIGL SERPL-MCNC: 62 MG/DL (ref 30–150)
TSH SERPL DL<=0.005 MIU/L-ACNC: 1.35 UIU/ML (ref 0.4–4)
WBC # BLD AUTO: 6.27 K/UL (ref 3.9–12.7)

## 2021-05-28 PROCEDURE — 1126F AMNT PAIN NOTED NONE PRSNT: CPT | Mod: S$GLB,,, | Performed by: INTERNAL MEDICINE

## 2021-05-28 PROCEDURE — 80053 COMPREHEN METABOLIC PANEL: CPT | Performed by: INTERNAL MEDICINE

## 2021-05-28 PROCEDURE — 80061 LIPID PANEL: CPT | Performed by: INTERNAL MEDICINE

## 2021-05-28 PROCEDURE — 99999 PR PBB SHADOW E&M-EST. PATIENT-LVL III: ICD-10-PCS | Mod: PBBFAC,,, | Performed by: INTERNAL MEDICINE

## 2021-05-28 PROCEDURE — 3008F PR BODY MASS INDEX (BMI) DOCUMENTED: ICD-10-PCS | Mod: CPTII,S$GLB,, | Performed by: INTERNAL MEDICINE

## 2021-05-28 PROCEDURE — 99396 PREV VISIT EST AGE 40-64: CPT | Mod: S$GLB,,, | Performed by: INTERNAL MEDICINE

## 2021-05-28 PROCEDURE — 3008F BODY MASS INDEX DOCD: CPT | Mod: CPTII,S$GLB,, | Performed by: INTERNAL MEDICINE

## 2021-05-28 PROCEDURE — 84443 ASSAY THYROID STIM HORMONE: CPT | Performed by: INTERNAL MEDICINE

## 2021-05-28 PROCEDURE — 84439 ASSAY OF FREE THYROXINE: CPT | Performed by: INTERNAL MEDICINE

## 2021-05-28 PROCEDURE — 99396 PR PREVENTIVE VISIT,EST,40-64: ICD-10-PCS | Mod: S$GLB,,, | Performed by: INTERNAL MEDICINE

## 2021-05-28 PROCEDURE — 99999 PR PBB SHADOW E&M-EST. PATIENT-LVL III: CPT | Mod: PBBFAC,,, | Performed by: INTERNAL MEDICINE

## 2021-05-28 PROCEDURE — 85025 COMPLETE CBC W/AUTO DIFF WBC: CPT | Performed by: INTERNAL MEDICINE

## 2021-05-28 PROCEDURE — 83036 HEMOGLOBIN GLYCOSYLATED A1C: CPT | Performed by: INTERNAL MEDICINE

## 2021-05-28 PROCEDURE — 36415 COLL VENOUS BLD VENIPUNCTURE: CPT | Performed by: INTERNAL MEDICINE

## 2021-05-28 PROCEDURE — 1126F PR PAIN SEVERITY QUANTIFIED, NO PAIN PRESENT: ICD-10-PCS | Mod: S$GLB,,, | Performed by: INTERNAL MEDICINE

## 2021-05-28 RX ORDER — LEVOTHYROXINE SODIUM 50 UG/1
50 TABLET ORAL DAILY
Qty: 90 TABLET | Refills: 0 | Status: SHIPPED | OUTPATIENT
Start: 2021-05-28 | End: 2021-06-11 | Stop reason: SDUPTHER

## 2021-05-28 RX ORDER — ONDANSETRON 4 MG/1
4 TABLET, ORALLY DISINTEGRATING ORAL 2 TIMES DAILY PRN
Qty: 10 TABLET | Refills: 3 | Status: SHIPPED | OUTPATIENT
Start: 2021-05-28 | End: 2023-10-12 | Stop reason: SDUPTHER

## 2021-05-28 RX ORDER — METFORMIN HYDROCHLORIDE 500 MG/1
500 TABLET ORAL
Qty: 90 TABLET | Refills: 3 | Status: SHIPPED | OUTPATIENT
Start: 2021-05-28 | End: 2022-06-09

## 2021-05-30 ENCOUNTER — PATIENT MESSAGE (OUTPATIENT)
Dept: SURGERY | Facility: HOSPITAL | Age: 46
End: 2021-05-30

## 2021-05-31 ENCOUNTER — TELEPHONE (OUTPATIENT)
Dept: INTERNAL MEDICINE | Facility: CLINIC | Age: 46
End: 2021-05-31

## 2021-06-05 ENCOUNTER — PATIENT OUTREACH (OUTPATIENT)
Dept: ADMINISTRATIVE | Facility: OTHER | Age: 46
End: 2021-06-05

## 2021-06-07 ENCOUNTER — OFFICE VISIT (OUTPATIENT)
Dept: SPORTS MEDICINE | Facility: CLINIC | Age: 46
End: 2021-06-07
Payer: COMMERCIAL

## 2021-06-07 VITALS — HEIGHT: 65 IN | BODY MASS INDEX: 39.94 KG/M2

## 2021-06-07 DIAGNOSIS — Z01.818 PRE-OP TESTING: ICD-10-CM

## 2021-06-07 DIAGNOSIS — M94.262 CHONDROMALACIA OF LEFT KNEE: ICD-10-CM

## 2021-06-07 DIAGNOSIS — S83.282A ACUTE LATERAL MENISCUS TEAR OF LEFT KNEE, INITIAL ENCOUNTER: Primary | ICD-10-CM

## 2021-06-07 DIAGNOSIS — S83.242A ACUTE MEDIAL MENISCUS TEAR OF LEFT KNEE: ICD-10-CM

## 2021-06-07 PROCEDURE — 3008F BODY MASS INDEX DOCD: CPT | Mod: CPTII,S$GLB,, | Performed by: PHYSICIAN ASSISTANT

## 2021-06-07 PROCEDURE — 99999 PR PBB SHADOW E&M-EST. PATIENT-LVL III: CPT | Mod: PBBFAC,,, | Performed by: PHYSICIAN ASSISTANT

## 2021-06-07 PROCEDURE — 3008F PR BODY MASS INDEX (BMI) DOCUMENTED: ICD-10-PCS | Mod: CPTII,S$GLB,, | Performed by: PHYSICIAN ASSISTANT

## 2021-06-07 PROCEDURE — 99499 NO LOS: ICD-10-PCS | Mod: S$GLB,,, | Performed by: PHYSICIAN ASSISTANT

## 2021-06-07 PROCEDURE — 99999 PR PBB SHADOW E&M-EST. PATIENT-LVL III: ICD-10-PCS | Mod: PBBFAC,,, | Performed by: PHYSICIAN ASSISTANT

## 2021-06-07 PROCEDURE — 99499 UNLISTED E&M SERVICE: CPT | Mod: S$GLB,,, | Performed by: PHYSICIAN ASSISTANT

## 2021-06-07 RX ORDER — SODIUM CHLORIDE 9 MG/ML
INJECTION, SOLUTION INTRAVENOUS CONTINUOUS
Status: CANCELLED | OUTPATIENT
Start: 2021-06-07

## 2021-06-07 RX ORDER — PROMETHAZINE HYDROCHLORIDE 25 MG/1
25 TABLET ORAL EVERY 6 HOURS PRN
Qty: 8 TABLET | Refills: 0 | Status: SHIPPED | OUTPATIENT
Start: 2021-06-07 | End: 2023-10-12

## 2021-06-07 RX ORDER — TRAMADOL HYDROCHLORIDE 50 MG/1
50-100 TABLET ORAL EVERY 6 HOURS PRN
Qty: 21 TABLET | Refills: 0 | Status: SHIPPED | OUTPATIENT
Start: 2021-06-07 | End: 2022-08-29

## 2021-06-07 RX ORDER — CEFAZOLIN SODIUM 2 G/50ML
2 SOLUTION INTRAVENOUS
Status: CANCELLED | OUTPATIENT
Start: 2021-06-07

## 2021-06-07 RX ORDER — ASPIRIN 81 MG/1
81 TABLET ORAL DAILY
Qty: 28 TABLET | Refills: 0 | COMMUNITY
Start: 2021-06-07 | End: 2022-11-03

## 2021-06-10 ENCOUNTER — ANESTHESIA (OUTPATIENT)
Dept: SURGERY | Facility: HOSPITAL | Age: 46
End: 2021-06-10
Payer: COMMERCIAL

## 2021-06-10 ENCOUNTER — HOSPITAL ENCOUNTER (OUTPATIENT)
Facility: HOSPITAL | Age: 46
Discharge: HOME | End: 2021-06-10
Attending: ORTHOPAEDIC SURGERY | Admitting: ORTHOPAEDIC SURGERY
Payer: COMMERCIAL

## 2021-06-10 ENCOUNTER — ANESTHESIA EVENT (OUTPATIENT)
Dept: SURGERY | Facility: HOSPITAL | Age: 46
End: 2021-06-10
Payer: COMMERCIAL

## 2021-06-10 VITALS
BODY MASS INDEX: 39.15 KG/M2 | WEIGHT: 235 LBS | SYSTOLIC BLOOD PRESSURE: 123 MMHG | TEMPERATURE: 97 F | HEIGHT: 65 IN | HEART RATE: 78 BPM | DIASTOLIC BLOOD PRESSURE: 69 MMHG | RESPIRATION RATE: 16 BRPM | OXYGEN SATURATION: 94 %

## 2021-06-10 DIAGNOSIS — S83.242A ACUTE MEDIAL MENISCUS TEAR OF LEFT KNEE: ICD-10-CM

## 2021-06-10 DIAGNOSIS — M94.262 CHONDROMALACIA OF LEFT KNEE: ICD-10-CM

## 2021-06-10 DIAGNOSIS — S83.282A ACUTE LATERAL MENISCUS TEAR OF LEFT KNEE, INITIAL ENCOUNTER: ICD-10-CM

## 2021-06-10 LAB
POCT GLUCOSE: 112 MG/DL (ref 70–110)
POCT GLUCOSE: 135 MG/DL (ref 70–110)

## 2021-06-10 PROCEDURE — 63600175 PHARM REV CODE 636 W HCPCS: Performed by: PHYSICIAN ASSISTANT

## 2021-06-10 PROCEDURE — 94761 N-INVAS EAR/PLS OXIMETRY MLT: CPT

## 2021-06-10 PROCEDURE — D9220A PRA ANESTHESIA: ICD-10-PCS | Mod: CRNA,,, | Performed by: NURSE ANESTHETIST, CERTIFIED REGISTERED

## 2021-06-10 PROCEDURE — 29880 PR KNEE SCOPE MED/LAT MENISCECTOMY: ICD-10-PCS | Mod: LT,,, | Performed by: ORTHOPAEDIC SURGERY

## 2021-06-10 PROCEDURE — D9220A PRA ANESTHESIA: Mod: CRNA,,, | Performed by: NURSE ANESTHETIST, CERTIFIED REGISTERED

## 2021-06-10 PROCEDURE — 63600175 PHARM REV CODE 636 W HCPCS: Performed by: NURSE ANESTHETIST, CERTIFIED REGISTERED

## 2021-06-10 PROCEDURE — 29880 ARTHRS KNE SRG MNISECTMY M&L: CPT | Mod: LT,,, | Performed by: ORTHOPAEDIC SURGERY

## 2021-06-10 PROCEDURE — 25000003 PHARM REV CODE 250: Performed by: ANESTHESIOLOGY

## 2021-06-10 PROCEDURE — 37000008 HC ANESTHESIA 1ST 15 MINUTES: Performed by: ORTHOPAEDIC SURGERY

## 2021-06-10 PROCEDURE — D9220A PRA ANESTHESIA: ICD-10-PCS | Mod: ANES,,, | Performed by: ANESTHESIOLOGY

## 2021-06-10 PROCEDURE — 71000033 HC RECOVERY, INTIAL HOUR: Performed by: ORTHOPAEDIC SURGERY

## 2021-06-10 PROCEDURE — 82962 GLUCOSE BLOOD TEST: CPT | Performed by: ORTHOPAEDIC SURGERY

## 2021-06-10 PROCEDURE — 25000003 PHARM REV CODE 250: Performed by: NURSE ANESTHETIST, CERTIFIED REGISTERED

## 2021-06-10 PROCEDURE — 36000711: Performed by: ORTHOPAEDIC SURGERY

## 2021-06-10 PROCEDURE — 63600175 PHARM REV CODE 636 W HCPCS: Performed by: ORTHOPAEDIC SURGERY

## 2021-06-10 PROCEDURE — 27201423 OPTIME MED/SURG SUP & DEVICES STERILE SUPPLY: Performed by: ORTHOPAEDIC SURGERY

## 2021-06-10 PROCEDURE — 71000039 HC RECOVERY, EACH ADD'L HOUR: Performed by: ORTHOPAEDIC SURGERY

## 2021-06-10 PROCEDURE — 25000003 PHARM REV CODE 250: Performed by: ORTHOPAEDIC SURGERY

## 2021-06-10 PROCEDURE — 25000003 PHARM REV CODE 250: Performed by: STUDENT IN AN ORGANIZED HEALTH CARE EDUCATION/TRAINING PROGRAM

## 2021-06-10 PROCEDURE — 71000015 HC POSTOP RECOV 1ST HR: Performed by: ORTHOPAEDIC SURGERY

## 2021-06-10 PROCEDURE — 63600175 PHARM REV CODE 636 W HCPCS: Performed by: ANESTHESIOLOGY

## 2021-06-10 PROCEDURE — 25000003 PHARM REV CODE 250: Performed by: PHYSICIAN ASSISTANT

## 2021-06-10 PROCEDURE — 99900035 HC TECH TIME PER 15 MIN (STAT)

## 2021-06-10 PROCEDURE — 36000710: Performed by: ORTHOPAEDIC SURGERY

## 2021-06-10 PROCEDURE — 37000009 HC ANESTHESIA EA ADD 15 MINS: Performed by: ORTHOPAEDIC SURGERY

## 2021-06-10 PROCEDURE — D9220A PRA ANESTHESIA: Mod: ANES,,, | Performed by: ANESTHESIOLOGY

## 2021-06-10 RX ORDER — OXYCODONE HYDROCHLORIDE 5 MG/1
10 TABLET ORAL EVERY 4 HOURS PRN
Status: DISCONTINUED | OUTPATIENT
Start: 2021-06-10 | End: 2021-06-10 | Stop reason: HOSPADM

## 2021-06-10 RX ORDER — HALOPERIDOL 5 MG/ML
0.5 INJECTION INTRAMUSCULAR EVERY 10 MIN PRN
Status: DISCONTINUED | OUTPATIENT
Start: 2021-06-10 | End: 2021-06-10 | Stop reason: HOSPADM

## 2021-06-10 RX ORDER — FENTANYL CITRATE 50 UG/ML
INJECTION, SOLUTION INTRAMUSCULAR; INTRAVENOUS
Status: DISCONTINUED | OUTPATIENT
Start: 2021-06-10 | End: 2021-06-10

## 2021-06-10 RX ORDER — MORPHINE SULFATE 2 MG/ML
2 INJECTION, SOLUTION INTRAMUSCULAR; INTRAVENOUS EVERY 10 MIN PRN
Status: DISCONTINUED | OUTPATIENT
Start: 2021-06-10 | End: 2021-06-10 | Stop reason: HOSPADM

## 2021-06-10 RX ORDER — EPINEPHRINE 1 MG/ML
INJECTION, SOLUTION INTRACARDIAC; INTRAMUSCULAR; INTRAVENOUS; SUBCUTANEOUS
Status: DISCONTINUED | OUTPATIENT
Start: 2021-06-10 | End: 2021-06-10 | Stop reason: HOSPADM

## 2021-06-10 RX ORDER — TRAMADOL HYDROCHLORIDE 50 MG/1
100 TABLET ORAL EVERY 6 HOURS PRN
Status: DISCONTINUED | OUTPATIENT
Start: 2021-06-10 | End: 2021-06-10 | Stop reason: HOSPADM

## 2021-06-10 RX ORDER — SODIUM CHLORIDE 9 MG/ML
INJECTION, SOLUTION INTRAVENOUS CONTINUOUS
Status: DISCONTINUED | OUTPATIENT
Start: 2021-06-10 | End: 2021-06-10 | Stop reason: HOSPADM

## 2021-06-10 RX ORDER — KETOROLAC TROMETHAMINE 30 MG/ML
INJECTION, SOLUTION INTRAMUSCULAR; INTRAVENOUS
Status: DISCONTINUED | OUTPATIENT
Start: 2021-06-10 | End: 2021-06-10 | Stop reason: HOSPADM

## 2021-06-10 RX ORDER — CEFAZOLIN SODIUM 1 G/3ML
2 INJECTION, POWDER, FOR SOLUTION INTRAMUSCULAR; INTRAVENOUS
Status: COMPLETED | OUTPATIENT
Start: 2021-06-10 | End: 2021-06-10

## 2021-06-10 RX ORDER — OXYCODONE HYDROCHLORIDE 5 MG/1
5 TABLET ORAL
Status: DISCONTINUED | OUTPATIENT
Start: 2021-06-10 | End: 2021-06-10 | Stop reason: HOSPADM

## 2021-06-10 RX ORDER — PROPOFOL 10 MG/ML
VIAL (ML) INTRAVENOUS
Status: DISCONTINUED | OUTPATIENT
Start: 2021-06-10 | End: 2021-06-10

## 2021-06-10 RX ORDER — LIDOCAINE HYDROCHLORIDE 20 MG/ML
INJECTION INTRAVENOUS
Status: DISCONTINUED | OUTPATIENT
Start: 2021-06-10 | End: 2021-06-10

## 2021-06-10 RX ORDER — MIDAZOLAM HYDROCHLORIDE 1 MG/ML
INJECTION, SOLUTION INTRAMUSCULAR; INTRAVENOUS
Status: DISCONTINUED | OUTPATIENT
Start: 2021-06-10 | End: 2021-06-10

## 2021-06-10 RX ORDER — FAMOTIDINE 10 MG/ML
INJECTION INTRAVENOUS
Status: DISCONTINUED | OUTPATIENT
Start: 2021-06-10 | End: 2021-06-10

## 2021-06-10 RX ORDER — ROPIVACAINE HYDROCHLORIDE 5 MG/ML
INJECTION, SOLUTION EPIDURAL; INFILTRATION; PERINEURAL
Status: DISCONTINUED | OUTPATIENT
Start: 2021-06-10 | End: 2021-06-10 | Stop reason: HOSPADM

## 2021-06-10 RX ORDER — SODIUM CHLORIDE 0.9 % (FLUSH) 0.9 %
10 SYRINGE (ML) INJECTION
Status: DISCONTINUED | OUTPATIENT
Start: 2021-06-10 | End: 2021-06-10 | Stop reason: HOSPADM

## 2021-06-10 RX ORDER — DEXAMETHASONE SODIUM PHOSPHATE 4 MG/ML
INJECTION, SOLUTION INTRA-ARTICULAR; INTRALESIONAL; INTRAMUSCULAR; INTRAVENOUS; SOFT TISSUE
Status: DISCONTINUED | OUTPATIENT
Start: 2021-06-10 | End: 2021-06-10

## 2021-06-10 RX ORDER — FENTANYL CITRATE 50 UG/ML
25 INJECTION, SOLUTION INTRAMUSCULAR; INTRAVENOUS EVERY 5 MIN PRN
Status: DISCONTINUED | OUTPATIENT
Start: 2021-06-10 | End: 2021-06-10 | Stop reason: HOSPADM

## 2021-06-10 RX ORDER — KETAMINE HYDROCHLORIDE 50 MG/ML
INJECTION, SOLUTION INTRAMUSCULAR; INTRAVENOUS
Status: DISCONTINUED | OUTPATIENT
Start: 2021-06-10 | End: 2021-06-10 | Stop reason: HOSPADM

## 2021-06-10 RX ORDER — ONDANSETRON 2 MG/ML
4 INJECTION INTRAMUSCULAR; INTRAVENOUS EVERY 12 HOURS PRN
Status: DISCONTINUED | OUTPATIENT
Start: 2021-06-10 | End: 2021-06-10 | Stop reason: HOSPADM

## 2021-06-10 RX ORDER — ONDANSETRON 2 MG/ML
4 INJECTION INTRAMUSCULAR; INTRAVENOUS DAILY PRN
Status: DISCONTINUED | OUTPATIENT
Start: 2021-06-10 | End: 2021-06-10 | Stop reason: HOSPADM

## 2021-06-10 RX ORDER — PROMETHAZINE HYDROCHLORIDE 25 MG/1
25 TABLET ORAL EVERY 6 HOURS PRN
Status: DISCONTINUED | OUTPATIENT
Start: 2021-06-10 | End: 2021-06-10 | Stop reason: HOSPADM

## 2021-06-10 RX ORDER — KETAMINE HCL IN 0.9 % NACL 50 MG/5 ML
SYRINGE (ML) INTRAVENOUS
Status: DISCONTINUED | OUTPATIENT
Start: 2021-06-10 | End: 2021-06-10

## 2021-06-10 RX ADMIN — OXYCODONE 5 MG: 5 TABLET ORAL at 11:06

## 2021-06-10 RX ADMIN — HALOPERIDOL LACTATE 0.5 MG: 5 INJECTION, SOLUTION INTRAMUSCULAR at 11:06

## 2021-06-10 RX ADMIN — CEFAZOLIN 2 G: 330 INJECTION, POWDER, FOR SOLUTION INTRAMUSCULAR; INTRAVENOUS at 09:06

## 2021-06-10 RX ADMIN — FENTANYL CITRATE 25 MCG: 50 INJECTION INTRAMUSCULAR; INTRAVENOUS at 11:06

## 2021-06-10 RX ADMIN — MIDAZOLAM HYDROCHLORIDE 2 MG: 1 INJECTION, SOLUTION INTRAMUSCULAR; INTRAVENOUS at 09:06

## 2021-06-10 RX ADMIN — PROPOFOL 200 MG: 10 INJECTION, EMULSION INTRAVENOUS at 09:06

## 2021-06-10 RX ADMIN — Medication 30 MG: at 09:06

## 2021-06-10 RX ADMIN — SODIUM CHLORIDE: 0.9 INJECTION, SOLUTION INTRAVENOUS at 08:06

## 2021-06-10 RX ADMIN — LIDOCAINE HYDROCHLORIDE 100 MG: 20 INJECTION, SOLUTION INTRAVENOUS at 09:06

## 2021-06-10 RX ADMIN — Medication 10 MG: at 10:06

## 2021-06-10 RX ADMIN — DEXAMETHASONE SODIUM PHOSPHATE 8 MG: 4 INJECTION, SOLUTION INTRAMUSCULAR; INTRAVENOUS at 09:06

## 2021-06-10 RX ADMIN — FAMOTIDINE 20 MG: 10 INJECTION, SOLUTION INTRAVENOUS at 09:06

## 2021-06-10 RX ADMIN — TRAMADOL HYDROCHLORIDE 100 MG: 50 TABLET, FILM COATED ORAL at 11:06

## 2021-06-10 RX ADMIN — FENTANYL CITRATE 100 MCG: 50 INJECTION, SOLUTION INTRAMUSCULAR; INTRAVENOUS at 09:06

## 2021-06-11 ENCOUNTER — PATIENT MESSAGE (OUTPATIENT)
Dept: INTERNAL MEDICINE | Facility: CLINIC | Age: 46
End: 2021-06-11

## 2021-06-11 ENCOUNTER — CLINICAL SUPPORT (OUTPATIENT)
Dept: REHABILITATION | Facility: HOSPITAL | Age: 46
End: 2021-06-11
Payer: COMMERCIAL

## 2021-06-11 DIAGNOSIS — M94.262 CHONDROMALACIA OF LEFT KNEE: ICD-10-CM

## 2021-06-11 DIAGNOSIS — M25.662 DECREASED RANGE OF MOTION OF LEFT KNEE: ICD-10-CM

## 2021-06-11 DIAGNOSIS — F33.1 MODERATE EPISODE OF RECURRENT MAJOR DEPRESSIVE DISORDER: ICD-10-CM

## 2021-06-11 DIAGNOSIS — S83.282A ACUTE LATERAL MENISCUS TEAR OF LEFT KNEE, INITIAL ENCOUNTER: ICD-10-CM

## 2021-06-11 DIAGNOSIS — S83.242A ACUTE MEDIAL MENISCUS TEAR OF LEFT KNEE: ICD-10-CM

## 2021-06-11 DIAGNOSIS — Z74.09 IMPAIRED FUNCTIONAL MOBILITY, BALANCE, GAIT, AND ENDURANCE: ICD-10-CM

## 2021-06-11 DIAGNOSIS — E03.9 ACQUIRED HYPOTHYROIDISM: ICD-10-CM

## 2021-06-11 DIAGNOSIS — F41.1 GAD (GENERALIZED ANXIETY DISORDER): ICD-10-CM

## 2021-06-11 DIAGNOSIS — M25.562 ACUTE PAIN OF LEFT KNEE: ICD-10-CM

## 2021-06-11 DIAGNOSIS — S83.242A ACUTE MEDIAL MENISCUS TEAR OF LEFT KNEE, INITIAL ENCOUNTER: ICD-10-CM

## 2021-06-11 PROCEDURE — 97110 THERAPEUTIC EXERCISES: CPT

## 2021-06-11 PROCEDURE — 97162 PT EVAL MOD COMPLEX 30 MIN: CPT

## 2021-06-11 RX ORDER — DULOXETIN HYDROCHLORIDE 30 MG/1
30 CAPSULE, DELAYED RELEASE ORAL 2 TIMES DAILY
Qty: 90 CAPSULE | Refills: 3 | Status: SHIPPED | OUTPATIENT
Start: 2021-06-11 | End: 2022-08-29 | Stop reason: SDUPTHER

## 2021-06-11 RX ORDER — LEVOTHYROXINE SODIUM 50 UG/1
50 TABLET ORAL DAILY
Qty: 90 TABLET | Refills: 3 | Status: SHIPPED | OUTPATIENT
Start: 2021-06-11 | End: 2021-12-16

## 2021-06-13 PROBLEM — Z74.09 IMPAIRED FUNCTIONAL MOBILITY, BALANCE, GAIT, AND ENDURANCE: Status: ACTIVE | Noted: 2021-06-13

## 2021-06-13 PROBLEM — M25.662 DECREASED RANGE OF MOTION OF LEFT KNEE: Status: ACTIVE | Noted: 2021-06-13

## 2021-06-13 PROBLEM — M25.562 ACUTE PAIN OF LEFT KNEE: Status: ACTIVE | Noted: 2021-06-13

## 2021-06-13 PROBLEM — M25.661 DECREASED ROM OF RIGHT KNEE: Status: RESOLVED | Noted: 2017-08-25 | Resolved: 2021-06-13

## 2021-06-15 ENCOUNTER — CLINICAL SUPPORT (OUTPATIENT)
Dept: REHABILITATION | Facility: HOSPITAL | Age: 46
End: 2021-06-15
Payer: COMMERCIAL

## 2021-06-15 DIAGNOSIS — M25.662 DECREASED RANGE OF MOTION OF LEFT KNEE: ICD-10-CM

## 2021-06-15 DIAGNOSIS — Z74.09 IMPAIRED FUNCTIONAL MOBILITY, BALANCE, GAIT, AND ENDURANCE: ICD-10-CM

## 2021-06-15 DIAGNOSIS — M25.562 ACUTE PAIN OF LEFT KNEE: ICD-10-CM

## 2021-06-15 PROCEDURE — 97110 THERAPEUTIC EXERCISES: CPT

## 2021-06-15 PROCEDURE — 97140 MANUAL THERAPY 1/> REGIONS: CPT

## 2021-06-22 ENCOUNTER — CLINICAL SUPPORT (OUTPATIENT)
Dept: REHABILITATION | Facility: HOSPITAL | Age: 46
End: 2021-06-22
Payer: COMMERCIAL

## 2021-06-22 DIAGNOSIS — M25.562 ACUTE PAIN OF LEFT KNEE: ICD-10-CM

## 2021-06-22 DIAGNOSIS — M25.662 DECREASED RANGE OF MOTION OF LEFT KNEE: ICD-10-CM

## 2021-06-22 DIAGNOSIS — Z74.09 IMPAIRED FUNCTIONAL MOBILITY, BALANCE, GAIT, AND ENDURANCE: ICD-10-CM

## 2021-06-22 PROCEDURE — 97110 THERAPEUTIC EXERCISES: CPT

## 2021-06-22 PROCEDURE — 97140 MANUAL THERAPY 1/> REGIONS: CPT

## 2021-06-23 ENCOUNTER — PATIENT MESSAGE (OUTPATIENT)
Dept: SPORTS MEDICINE | Facility: CLINIC | Age: 46
End: 2021-06-23

## 2021-06-28 ENCOUNTER — OFFICE VISIT (OUTPATIENT)
Dept: SPORTS MEDICINE | Facility: CLINIC | Age: 46
End: 2021-06-28
Payer: COMMERCIAL

## 2021-06-28 ENCOUNTER — CLINICAL SUPPORT (OUTPATIENT)
Dept: REHABILITATION | Facility: HOSPITAL | Age: 46
End: 2021-06-28
Payer: COMMERCIAL

## 2021-06-28 VITALS
SYSTOLIC BLOOD PRESSURE: 125 MMHG | BODY MASS INDEX: 39.15 KG/M2 | WEIGHT: 235 LBS | DIASTOLIC BLOOD PRESSURE: 82 MMHG | HEART RATE: 82 BPM | HEIGHT: 65 IN

## 2021-06-28 DIAGNOSIS — Z98.890 S/P ARTHROSCOPIC SURGERY OF LEFT KNEE: Primary | ICD-10-CM

## 2021-06-28 DIAGNOSIS — Z74.09 IMPAIRED FUNCTIONAL MOBILITY, BALANCE, GAIT, AND ENDURANCE: ICD-10-CM

## 2021-06-28 DIAGNOSIS — M25.562 ACUTE PAIN OF LEFT KNEE: ICD-10-CM

## 2021-06-28 DIAGNOSIS — M25.562 ACUTE POSTOPERATIVE PAIN OF LEFT KNEE: ICD-10-CM

## 2021-06-28 DIAGNOSIS — M25.662 DECREASED RANGE OF MOTION OF LEFT KNEE: ICD-10-CM

## 2021-06-28 DIAGNOSIS — G89.18 ACUTE POSTOPERATIVE PAIN OF LEFT KNEE: ICD-10-CM

## 2021-06-28 PROCEDURE — 99999 PR PBB SHADOW E&M-EST. PATIENT-LVL IV: CPT | Mod: PBBFAC,,, | Performed by: PHYSICIAN ASSISTANT

## 2021-06-28 PROCEDURE — 1126F AMNT PAIN NOTED NONE PRSNT: CPT | Mod: S$GLB,,, | Performed by: PHYSICIAN ASSISTANT

## 2021-06-28 PROCEDURE — 99999 PR PBB SHADOW E&M-EST. PATIENT-LVL IV: ICD-10-PCS | Mod: PBBFAC,,, | Performed by: PHYSICIAN ASSISTANT

## 2021-06-28 PROCEDURE — 97110 THERAPEUTIC EXERCISES: CPT

## 2021-06-28 PROCEDURE — 3008F BODY MASS INDEX DOCD: CPT | Mod: CPTII,S$GLB,, | Performed by: PHYSICIAN ASSISTANT

## 2021-06-28 PROCEDURE — 3008F PR BODY MASS INDEX (BMI) DOCUMENTED: ICD-10-PCS | Mod: CPTII,S$GLB,, | Performed by: PHYSICIAN ASSISTANT

## 2021-06-28 PROCEDURE — 99024 PR POST-OP FOLLOW-UP VISIT: ICD-10-PCS | Mod: S$GLB,,, | Performed by: PHYSICIAN ASSISTANT

## 2021-06-28 PROCEDURE — 1126F PR PAIN SEVERITY QUANTIFIED, NO PAIN PRESENT: ICD-10-PCS | Mod: S$GLB,,, | Performed by: PHYSICIAN ASSISTANT

## 2021-06-28 PROCEDURE — 97140 MANUAL THERAPY 1/> REGIONS: CPT

## 2021-06-28 PROCEDURE — 99024 POSTOP FOLLOW-UP VISIT: CPT | Mod: S$GLB,,, | Performed by: PHYSICIAN ASSISTANT

## 2021-07-14 ENCOUNTER — PATIENT MESSAGE (OUTPATIENT)
Dept: SPORTS MEDICINE | Facility: CLINIC | Age: 46
End: 2021-07-14

## 2021-07-20 ENCOUNTER — TELEPHONE (OUTPATIENT)
Dept: SPORTS MEDICINE | Facility: CLINIC | Age: 46
End: 2021-07-20

## 2021-07-30 ENCOUNTER — TELEPHONE (OUTPATIENT)
Dept: SPORTS MEDICINE | Facility: CLINIC | Age: 46
End: 2021-07-30

## 2021-08-04 ENCOUNTER — OFFICE VISIT (OUTPATIENT)
Dept: SPORTS MEDICINE | Facility: CLINIC | Age: 46
End: 2021-08-04
Payer: COMMERCIAL

## 2021-08-04 VITALS
HEIGHT: 65 IN | BODY MASS INDEX: 39.99 KG/M2 | HEART RATE: 81 BPM | TEMPERATURE: 98 F | DIASTOLIC BLOOD PRESSURE: 89 MMHG | SYSTOLIC BLOOD PRESSURE: 131 MMHG | WEIGHT: 240 LBS

## 2021-08-04 DIAGNOSIS — Z98.890 S/P ARTHROSCOPIC SURGERY OF LEFT KNEE: Primary | ICD-10-CM

## 2021-08-04 PROCEDURE — 1159F MED LIST DOCD IN RCRD: CPT | Mod: CPTII,S$GLB,, | Performed by: ORTHOPAEDIC SURGERY

## 2021-08-04 PROCEDURE — 1160F RVW MEDS BY RX/DR IN RCRD: CPT | Mod: CPTII,S$GLB,, | Performed by: ORTHOPAEDIC SURGERY

## 2021-08-04 PROCEDURE — 3044F HG A1C LEVEL LT 7.0%: CPT | Mod: CPTII,S$GLB,, | Performed by: ORTHOPAEDIC SURGERY

## 2021-08-04 PROCEDURE — 1126F PR PAIN SEVERITY QUANTIFIED, NO PAIN PRESENT: ICD-10-PCS | Mod: CPTII,S$GLB,, | Performed by: ORTHOPAEDIC SURGERY

## 2021-08-04 PROCEDURE — 3008F BODY MASS INDEX DOCD: CPT | Mod: CPTII,S$GLB,, | Performed by: ORTHOPAEDIC SURGERY

## 2021-08-04 PROCEDURE — 99999 PR PBB SHADOW E&M-EST. PATIENT-LVL IV: CPT | Mod: PBBFAC,,, | Performed by: ORTHOPAEDIC SURGERY

## 2021-08-04 PROCEDURE — 3075F PR MOST RECENT SYSTOLIC BLOOD PRESS GE 130-139MM HG: ICD-10-PCS | Mod: CPTII,S$GLB,, | Performed by: ORTHOPAEDIC SURGERY

## 2021-08-04 PROCEDURE — 3008F PR BODY MASS INDEX (BMI) DOCUMENTED: ICD-10-PCS | Mod: CPTII,S$GLB,, | Performed by: ORTHOPAEDIC SURGERY

## 2021-08-04 PROCEDURE — 3075F SYST BP GE 130 - 139MM HG: CPT | Mod: CPTII,S$GLB,, | Performed by: ORTHOPAEDIC SURGERY

## 2021-08-04 PROCEDURE — 3079F DIAST BP 80-89 MM HG: CPT | Mod: CPTII,S$GLB,, | Performed by: ORTHOPAEDIC SURGERY

## 2021-08-04 PROCEDURE — 1160F PR REVIEW ALL MEDS BY PRESCRIBER/CLIN PHARMACIST DOCUMENTED: ICD-10-PCS | Mod: CPTII,S$GLB,, | Performed by: ORTHOPAEDIC SURGERY

## 2021-08-04 PROCEDURE — 99024 POSTOP FOLLOW-UP VISIT: CPT | Mod: S$GLB,,, | Performed by: ORTHOPAEDIC SURGERY

## 2021-08-04 PROCEDURE — 3079F PR MOST RECENT DIASTOLIC BLOOD PRESSURE 80-89 MM HG: ICD-10-PCS | Mod: CPTII,S$GLB,, | Performed by: ORTHOPAEDIC SURGERY

## 2021-08-04 PROCEDURE — 1126F AMNT PAIN NOTED NONE PRSNT: CPT | Mod: CPTII,S$GLB,, | Performed by: ORTHOPAEDIC SURGERY

## 2021-08-04 PROCEDURE — 1159F PR MEDICATION LIST DOCUMENTED IN MEDICAL RECORD: ICD-10-PCS | Mod: CPTII,S$GLB,, | Performed by: ORTHOPAEDIC SURGERY

## 2021-08-04 PROCEDURE — 99024 PR POST-OP FOLLOW-UP VISIT: ICD-10-PCS | Mod: S$GLB,,, | Performed by: ORTHOPAEDIC SURGERY

## 2021-08-04 PROCEDURE — 99999 PR PBB SHADOW E&M-EST. PATIENT-LVL IV: ICD-10-PCS | Mod: PBBFAC,,, | Performed by: ORTHOPAEDIC SURGERY

## 2021-08-04 PROCEDURE — 3044F PR MOST RECENT HEMOGLOBIN A1C LEVEL <7.0%: ICD-10-PCS | Mod: CPTII,S$GLB,, | Performed by: ORTHOPAEDIC SURGERY

## 2021-09-14 ENCOUNTER — PATIENT MESSAGE (OUTPATIENT)
Dept: SURGERY | Facility: CLINIC | Age: 46
End: 2021-09-14

## 2021-10-06 ENCOUNTER — OFFICE VISIT (OUTPATIENT)
Dept: INTERNAL MEDICINE | Facility: CLINIC | Age: 46
End: 2021-10-06
Payer: COMMERCIAL

## 2021-10-06 VITALS
SYSTOLIC BLOOD PRESSURE: 120 MMHG | HEIGHT: 65 IN | BODY MASS INDEX: 39.56 KG/M2 | DIASTOLIC BLOOD PRESSURE: 90 MMHG | OXYGEN SATURATION: 97 % | HEART RATE: 93 BPM | WEIGHT: 237.44 LBS | TEMPERATURE: 99 F

## 2021-10-06 DIAGNOSIS — J02.9 SORE THROAT: Primary | ICD-10-CM

## 2021-10-06 DIAGNOSIS — I10 ESSENTIAL HYPERTENSION: ICD-10-CM

## 2021-10-06 LAB
CTP QC/QA: YES
MOLECULAR STREP A: NEGATIVE

## 2021-10-06 PROCEDURE — 3008F PR BODY MASS INDEX (BMI) DOCUMENTED: ICD-10-PCS | Mod: CPTII,S$GLB,, | Performed by: INTERNAL MEDICINE

## 2021-10-06 PROCEDURE — 3008F BODY MASS INDEX DOCD: CPT | Mod: CPTII,S$GLB,, | Performed by: INTERNAL MEDICINE

## 2021-10-06 PROCEDURE — 99999 PR PBB SHADOW E&M-EST. PATIENT-LVL IV: ICD-10-PCS | Mod: PBBFAC,,, | Performed by: INTERNAL MEDICINE

## 2021-10-06 PROCEDURE — 87070 CULTURE OTHR SPECIMN AEROBIC: CPT | Performed by: INTERNAL MEDICINE

## 2021-10-06 PROCEDURE — 87651 POCT STREP A MOLECULAR: ICD-10-PCS | Mod: QW,S$GLB,, | Performed by: INTERNAL MEDICINE

## 2021-10-06 PROCEDURE — 87651 STREP A DNA AMP PROBE: CPT | Mod: QW,S$GLB,, | Performed by: INTERNAL MEDICINE

## 2021-10-06 PROCEDURE — 3044F PR MOST RECENT HEMOGLOBIN A1C LEVEL <7.0%: ICD-10-PCS | Mod: CPTII,S$GLB,, | Performed by: INTERNAL MEDICINE

## 2021-10-06 PROCEDURE — 99999 PR PBB SHADOW E&M-EST. PATIENT-LVL IV: CPT | Mod: PBBFAC,,, | Performed by: INTERNAL MEDICINE

## 2021-10-06 PROCEDURE — 1159F MED LIST DOCD IN RCRD: CPT | Mod: CPTII,S$GLB,, | Performed by: INTERNAL MEDICINE

## 2021-10-06 PROCEDURE — 3080F DIAST BP >= 90 MM HG: CPT | Mod: CPTII,S$GLB,, | Performed by: INTERNAL MEDICINE

## 2021-10-06 PROCEDURE — 3044F HG A1C LEVEL LT 7.0%: CPT | Mod: CPTII,S$GLB,, | Performed by: INTERNAL MEDICINE

## 2021-10-06 PROCEDURE — 99213 PR OFFICE/OUTPT VISIT, EST, LEVL III, 20-29 MIN: ICD-10-PCS | Mod: S$GLB,,, | Performed by: INTERNAL MEDICINE

## 2021-10-06 PROCEDURE — 3074F PR MOST RECENT SYSTOLIC BLOOD PRESSURE < 130 MM HG: ICD-10-PCS | Mod: CPTII,S$GLB,, | Performed by: INTERNAL MEDICINE

## 2021-10-06 PROCEDURE — 1159F PR MEDICATION LIST DOCUMENTED IN MEDICAL RECORD: ICD-10-PCS | Mod: CPTII,S$GLB,, | Performed by: INTERNAL MEDICINE

## 2021-10-06 PROCEDURE — 3074F SYST BP LT 130 MM HG: CPT | Mod: CPTII,S$GLB,, | Performed by: INTERNAL MEDICINE

## 2021-10-06 PROCEDURE — 3080F PR MOST RECENT DIASTOLIC BLOOD PRESSURE >= 90 MM HG: ICD-10-PCS | Mod: CPTII,S$GLB,, | Performed by: INTERNAL MEDICINE

## 2021-10-06 PROCEDURE — 99213 OFFICE O/P EST LOW 20 MIN: CPT | Mod: S$GLB,,, | Performed by: INTERNAL MEDICINE

## 2021-10-06 RX ORDER — DOXYCYCLINE 100 MG/1
100 CAPSULE ORAL 2 TIMES DAILY
Qty: 20 CAPSULE | Refills: 1 | Status: SHIPPED | OUTPATIENT
Start: 2021-10-06 | End: 2021-10-16

## 2021-10-07 ENCOUNTER — IMMUNIZATION (OUTPATIENT)
Dept: INTERNAL MEDICINE | Facility: CLINIC | Age: 46
End: 2021-10-07
Payer: COMMERCIAL

## 2021-10-07 DIAGNOSIS — Z23 NEED FOR VACCINATION: Primary | ICD-10-CM

## 2021-10-07 PROCEDURE — 91300 COVID-19, MRNA, LNP-S, PF, 30 MCG/0.3 ML DOSE VACCINE: CPT | Mod: PBBFAC | Performed by: INTERNAL MEDICINE

## 2021-10-07 PROCEDURE — 0003A COVID-19, MRNA, LNP-S, PF, 30 MCG/0.3 ML DOSE VACCINE: CPT | Mod: CV19,PBBFAC | Performed by: INTERNAL MEDICINE

## 2021-10-08 ENCOUNTER — PATIENT MESSAGE (OUTPATIENT)
Dept: INTERNAL MEDICINE | Facility: CLINIC | Age: 46
End: 2021-10-08

## 2021-10-09 LAB — BACTERIA THROAT CULT: NORMAL

## 2021-10-18 ENCOUNTER — PATIENT MESSAGE (OUTPATIENT)
Dept: INTERNAL MEDICINE | Facility: CLINIC | Age: 46
End: 2021-10-18
Payer: COMMERCIAL

## 2021-10-20 DIAGNOSIS — Z12.11 SPECIAL SCREENING FOR MALIGNANT NEOPLASMS, COLON: Primary | ICD-10-CM

## 2021-10-20 RX ORDER — SODIUM, POTASSIUM,MAG SULFATES 17.5-3.13G
1 SOLUTION, RECONSTITUTED, ORAL ORAL
Qty: 1 KIT | Refills: 0 | Status: SHIPPED | OUTPATIENT
Start: 2021-10-20 | End: 2022-08-29

## 2021-10-25 ENCOUNTER — PATIENT OUTREACH (OUTPATIENT)
Dept: ADMINISTRATIVE | Facility: OTHER | Age: 46
End: 2021-10-25
Payer: COMMERCIAL

## 2021-12-14 ENCOUNTER — HOSPITAL ENCOUNTER (OUTPATIENT)
Dept: RADIOLOGY | Facility: HOSPITAL | Age: 46
Discharge: HOME | End: 2021-12-14
Attending: OBSTETRICS & GYNECOLOGY
Payer: COMMERCIAL

## 2021-12-14 VITALS — HEIGHT: 65 IN | BODY MASS INDEX: 39.99 KG/M2 | WEIGHT: 240 LBS

## 2021-12-14 DIAGNOSIS — Z12.31 SCREENING MAMMOGRAM, ENCOUNTER FOR: ICD-10-CM

## 2021-12-14 PROCEDURE — 77067 MAMMO DIGITAL SCREENING BILAT WITH TOMO: ICD-10-PCS | Mod: 26,,, | Performed by: RADIOLOGY

## 2021-12-14 PROCEDURE — 77063 BREAST TOMOSYNTHESIS BI: CPT | Mod: 26,,, | Performed by: RADIOLOGY

## 2021-12-14 PROCEDURE — 77067 SCR MAMMO BI INCL CAD: CPT | Mod: TC

## 2021-12-14 PROCEDURE — 77067 SCR MAMMO BI INCL CAD: CPT | Mod: 26,,, | Performed by: RADIOLOGY

## 2021-12-14 PROCEDURE — 77063 MAMMO DIGITAL SCREENING BILAT WITH TOMO: ICD-10-PCS | Mod: 26,,, | Performed by: RADIOLOGY

## 2021-12-20 DIAGNOSIS — I10 ESSENTIAL HYPERTENSION: ICD-10-CM

## 2021-12-21 RX ORDER — TRIAMTERENE AND HYDROCHLOROTHIAZIDE 37.5; 25 MG/1; MG/1
CAPSULE ORAL
Qty: 90 CAPSULE | Refills: 3 | Status: SHIPPED | OUTPATIENT
Start: 2021-12-21 | End: 2022-08-29 | Stop reason: SDUPTHER

## 2021-12-23 ENCOUNTER — PATIENT MESSAGE (OUTPATIENT)
Dept: SPORTS MEDICINE | Facility: CLINIC | Age: 46
End: 2021-12-23
Payer: COMMERCIAL

## 2021-12-27 ENCOUNTER — PATIENT MESSAGE (OUTPATIENT)
Dept: DERMATOLOGY | Facility: CLINIC | Age: 46
End: 2021-12-27
Payer: COMMERCIAL

## 2021-12-28 RX ORDER — AZELAIC ACID 0.15 G/G
GEL TOPICAL DAILY
Qty: 60 G | Refills: 3 | Status: SHIPPED | OUTPATIENT
Start: 2021-12-28 | End: 2022-12-28

## 2021-12-29 ENCOUNTER — LAB VISIT (OUTPATIENT)
Dept: PRIMARY CARE CLINIC | Facility: OTHER | Age: 46
End: 2021-12-29
Payer: COMMERCIAL

## 2021-12-29 DIAGNOSIS — R11.2 NAUSEA AND VOMITING, INTRACTABILITY OF VOMITING NOT SPECIFIED, UNSPECIFIED VOMITING TYPE: Primary | ICD-10-CM

## 2021-12-29 LAB
CTP QC/QA: YES
SARS-COV-2 AG RESP QL IA.RAPID: NEGATIVE

## 2022-01-06 ENCOUNTER — TELEPHONE (OUTPATIENT)
Dept: INTERNAL MEDICINE | Facility: CLINIC | Age: 47
End: 2022-01-06

## 2022-01-06 DIAGNOSIS — I10 ESSENTIAL HYPERTENSION: ICD-10-CM

## 2022-01-06 RX ORDER — ATENOLOL 50 MG/1
TABLET ORAL
Qty: 180 TABLET | Refills: 3 | Status: SHIPPED | OUTPATIENT
Start: 2022-01-06 | End: 2022-08-29 | Stop reason: SDUPTHER

## 2022-01-13 ENCOUNTER — PATIENT MESSAGE (OUTPATIENT)
Dept: ENDOSCOPY | Facility: HOSPITAL | Age: 47
End: 2022-01-13
Payer: COMMERCIAL

## 2022-04-03 ENCOUNTER — PATIENT OUTREACH (OUTPATIENT)
Dept: ADMINISTRATIVE | Facility: OTHER | Age: 47
End: 2022-04-03
Payer: COMMERCIAL

## 2022-04-03 DIAGNOSIS — Z12.11 ENCOUNTER FOR FIT (FECAL IMMUNOCHEMICAL TEST) SCREENING: Primary | ICD-10-CM

## 2022-04-04 ENCOUNTER — HOSPITAL ENCOUNTER (OUTPATIENT)
Dept: RADIOLOGY | Facility: HOSPITAL | Age: 47
Discharge: HOME | End: 2022-04-04
Attending: ORTHOPAEDIC SURGERY
Payer: COMMERCIAL

## 2022-04-04 ENCOUNTER — OFFICE VISIT (OUTPATIENT)
Dept: SPORTS MEDICINE | Facility: CLINIC | Age: 47
End: 2022-04-04
Payer: COMMERCIAL

## 2022-04-04 VITALS
BODY MASS INDEX: 39.49 KG/M2 | HEART RATE: 90 BPM | WEIGHT: 237 LBS | SYSTOLIC BLOOD PRESSURE: 141 MMHG | DIASTOLIC BLOOD PRESSURE: 83 MMHG | HEIGHT: 65 IN

## 2022-04-04 DIAGNOSIS — M25.561 PAIN IN BOTH KNEES, UNSPECIFIED CHRONICITY: ICD-10-CM

## 2022-04-04 DIAGNOSIS — M25.562 ACUTE PAIN OF LEFT KNEE: Primary | ICD-10-CM

## 2022-04-04 DIAGNOSIS — M25.562 PAIN IN BOTH KNEES, UNSPECIFIED CHRONICITY: ICD-10-CM

## 2022-04-04 PROCEDURE — 3079F DIAST BP 80-89 MM HG: CPT | Mod: CPTII,S$GLB,, | Performed by: ORTHOPAEDIC SURGERY

## 2022-04-04 PROCEDURE — 99999 PR PBB SHADOW E&M-EST. PATIENT-LVL III: ICD-10-PCS | Mod: PBBFAC,,, | Performed by: ORTHOPAEDIC SURGERY

## 2022-04-04 PROCEDURE — 1159F PR MEDICATION LIST DOCUMENTED IN MEDICAL RECORD: ICD-10-PCS | Mod: CPTII,S$GLB,, | Performed by: ORTHOPAEDIC SURGERY

## 2022-04-04 PROCEDURE — 3077F PR MOST RECENT SYSTOLIC BLOOD PRESSURE >= 140 MM HG: ICD-10-PCS | Mod: CPTII,S$GLB,, | Performed by: ORTHOPAEDIC SURGERY

## 2022-04-04 PROCEDURE — 3008F BODY MASS INDEX DOCD: CPT | Mod: CPTII,S$GLB,, | Performed by: ORTHOPAEDIC SURGERY

## 2022-04-04 PROCEDURE — 3008F PR BODY MASS INDEX (BMI) DOCUMENTED: ICD-10-PCS | Mod: CPTII,S$GLB,, | Performed by: ORTHOPAEDIC SURGERY

## 2022-04-04 PROCEDURE — 99213 OFFICE O/P EST LOW 20 MIN: CPT | Mod: S$GLB,,, | Performed by: ORTHOPAEDIC SURGERY

## 2022-04-04 PROCEDURE — 73564 X-RAY EXAM KNEE 4 OR MORE: CPT | Mod: 26,50,, | Performed by: INTERNAL MEDICINE

## 2022-04-04 PROCEDURE — 3079F PR MOST RECENT DIASTOLIC BLOOD PRESSURE 80-89 MM HG: ICD-10-PCS | Mod: CPTII,S$GLB,, | Performed by: ORTHOPAEDIC SURGERY

## 2022-04-04 PROCEDURE — 99213 PR OFFICE/OUTPT VISIT, EST, LEVL III, 20-29 MIN: ICD-10-PCS | Mod: S$GLB,,, | Performed by: ORTHOPAEDIC SURGERY

## 2022-04-04 PROCEDURE — 3077F SYST BP >= 140 MM HG: CPT | Mod: CPTII,S$GLB,, | Performed by: ORTHOPAEDIC SURGERY

## 2022-04-04 PROCEDURE — 73564 X-RAY EXAM KNEE 4 OR MORE: CPT | Mod: TC,50

## 2022-04-04 PROCEDURE — 73564 XR KNEE ORTHO BILAT WITH FLEXION: ICD-10-PCS | Mod: 26,50,, | Performed by: INTERNAL MEDICINE

## 2022-04-04 PROCEDURE — 1160F PR REVIEW ALL MEDS BY PRESCRIBER/CLIN PHARMACIST DOCUMENTED: ICD-10-PCS | Mod: CPTII,S$GLB,, | Performed by: ORTHOPAEDIC SURGERY

## 2022-04-04 PROCEDURE — 99999 PR PBB SHADOW E&M-EST. PATIENT-LVL III: CPT | Mod: PBBFAC,,, | Performed by: ORTHOPAEDIC SURGERY

## 2022-04-04 PROCEDURE — 1159F MED LIST DOCD IN RCRD: CPT | Mod: CPTII,S$GLB,, | Performed by: ORTHOPAEDIC SURGERY

## 2022-04-04 PROCEDURE — 1160F RVW MEDS BY RX/DR IN RCRD: CPT | Mod: CPTII,S$GLB,, | Performed by: ORTHOPAEDIC SURGERY

## 2022-04-04 NOTE — PROGRESS NOTES
CC: Left knee pain    47 y.o. Female with a history of Left knee pain who She states that the pain is severe and not responding to any conservative care.      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      SANE: 80    DATE OF PROCEDURE: 06/10/2021  SURGEON:  Kelly Zaman M.D  PROCEDURE PERFORMED:   left  1. knee arthroscopic chondroplasty (CPT 70824)  2. knee arthroscopic medial and lateral (CPT 57056) meniscectomy   3. knee arthroscopic partial synovectomy/debridement (CPT 00550).   4. knee arthroscopic plica excision(CPT 35444).    5. Knee arthroscopic lysis of adhesions (CPT 00505)    In the patellofemoral compartment, there was chondral damage to:  Patella 15 x 20 mm grade 2  Trochlea 20 x 20 mm grade 2  Chondroplasty was performed using arthroscopic shaver.     There was chondral damage to:  Medial femoral condyle 30 x 40 mm grade 3  Chondroplasty was performed using arthroscopic shaver.     There was chondral damage to:  Lateral tibial plateau 10 x 20 mm grade 2  Chondroplasty was performed using arthroscopic shaver.    Review of Systems   Constitution: Negative. Negative for chills, fever and night sweats.   HENT: Negative for congestion and headaches.    Eyes: Negative for blurred vision, left vision loss and right vision loss.   Cardiovascular: Negative for chest pain and syncope.   Respiratory: Negative for cough and shortness of breath.    Endocrine: Negative for polydipsia, polyphagia and polyuria.   Hematologic/Lymphatic: Negative for bleeding problem. Does not bruise/bleed easily.   Skin:  Negative for dry skin, itching and rash.   Musculoskeletal: Negative for falls. Positive for knee pain and muscle weakness.   Gastrointestinal: Negative for abdominal pain and bowel incontinence.   Genitourinary: Negative for bladder incontinence and nocturia.   Neurological: Negative for disturbances in coordination, loss of balance and seizures.   Psychiatric/Behavioral: Negative for depression. The patient does not have insomnia.    Allergic/Immunologic: Negative for hives and persistent infections.     PAST MEDICAL HISTORY:   Past Medical History:   Diagnosis Date    Abnormal Pap smear     Cervical cancer March 2013    FIGO IB1 Adeno[facility] Robotic radical hsyt,bso and bplnd    Colon cancer screening 5/4/2021    Depressive disorder, not elsewhere classified     Endometriosis of uterus     FH: colonic polyps 3/24/2021    Foot fracture, right     Headache(784.0)     Hypertension     Hypothyroidism     Low grade squamous intraepith lesion on cytologic smear vagina (lgsil) 10/14/2019    Malignant neoplasm of exocervix 10/10/2019    Migraine headache     Urinary tract infection     Vaginal atrophy 5/8/2018    Visit for screening mammogram 12/16/2015    Well woman exam with routine gynecological exam 12/16/2015     PAST SURGICAL HISTORY:   Past Surgical History:   Procedure Laterality Date    BREAST BIOPSY Right 2002    Core bx, benign    CHONDROPLASTY OF KNEE Left 6/10/2021    Procedure: CHONDROPLASTY, KNEE;  Surgeon: Kelly Zaman MD;  Location: [facility] OR;  Service: Orthopedics;  Laterality: Left;    HYSTERECTOMY  May 2013    Robotic radical hyst, bso, blplnd    KNEE ARTHROSCOPY W/ MENISCECTOMY Left 6/10/2021    Procedure: ARTHROSCOPY, KNEE, WITH MENISCECTOMY MEDIAL;  Surgeon: Kelly Zaman MD;  Location: [facility] OR;  Service: Orthopedics;  Laterality: Left;    KNEE SURGERY  08/24/2017    right knee    LIPOMA RESECTION      OOPHORECTOMY      bilateral    PELVIC AND PARA-AORTIC LYMPH NODE DISSECTION       PELVIC LAPAROSCOPY      endometriosis    WV REMOVAL OF OVARY/TUBE(S)      SINUS SURGERY      x2    SYNOVECTOMY OF KNEE Left 6/10/2021    Procedure: SYNOVECTOMY, KNEE;  Surgeon: Kelly Zaman MD;  Location: [facility];  Service: Orthopedics;  Laterality: Left;    TONSILLECTOMY       FAMILY HISTORY:   Family History   Problem Relation Age of Onset    Colon cancer Other     Migraines Mother     Cancer Mother         endometrial cancer     Hypertension Mother     Hypothyroidism Mother     Heart disease Mother     Hyperlipidemia Mother     Migraines Maternal Grandmother     Aneurysm Maternal Grandmother         intracranial aneurysm    Stroke Maternal Grandmother     Cancer Maternal Aunt     Cancer Paternal Grandfather         lymphoma non hodgkins     Diabetes Father     Skin cancer Unknown     Rheum arthritis Paternal Grandmother 80    Rheum arthritis Maternal Aunt     Ovarian cancer Neg Hx     Uterine cancer Neg Hx     Breast cancer Neg Hx      SOCIAL HISTORY:   Social History     Socioeconomic History    Marital status: Single    Number of children: 1    Years of education: 23   Occupational History    Occupation: RN     Employer: [employer]   Tobacco Use    Smoking status: Never Smoker    Smokeless tobacco: Never Used   Substance and Sexual Activity    Alcohol use: Yes     Comment: socially   Other Topics Concern    Are you pregnant or think you may be? No    Breast-feeding No       MEDICATIONS:   Current Outpatient Medications:     albuterol 90 mcg/actuation inhaler, Inhale 2 puffs into the lungs every 4 (four) hours as needed for Wheezing., Disp: 6.7 g, Rfl: 3    ALPRAZolam (XANAX) 0.5 MG tablet, Take 1 tablet (0.5 mg total) by mouth daily as needed for Anxiety., Disp: 30 tablet, Rfl: 0    aspirin (ECOTRIN) 81 MG EC tablet, Take 1 tablet (81 mg total) by mouth once daily. For 4 weeks starting the day after surgery., Disp: 28 tablet, Rfl: 0    atenoloL (TENORMIN) 50 MG  tablet, TAKE 1 TABLET (50 MG TOTAL) BY MOUTH 2 (TWO) TIMES DAILY., Disp: 180 tablet, Rfl: 3    azelaic acid (FINACEA) 15 % Foam, Apply thin film to face qhs, Disp: 50 g, Rfl: 6    azelaic acid (FINACEA) 15 % gel, Apply topically once daily., Disp: 60 g, Rfl: 3    DULoxetine (CYMBALTA) 30 MG capsule, Take 1 capsule (30 mg total) by mouth 2 (two) times daily., Disp: 90 capsule, Rfl: 3    EPINEPHrine (EPIPEN) 0.3 mg/0.3 mL AtIn, Inject 0.3 mLs (0.3 mg total) into the muscle once., Disp: 2 each, Rfl: 2    estradioL (VAGIFEM) 10 mcg Tab, Place 1 tablet (10 mcg total) vaginally twice a week., Disp: 8 tablet, Rfl: 12    fluticasone (FLONASE) 50 mcg/actuation nasal spray, 1 spray by Each Nare route daily as needed for Rhinitis., Disp: , Rfl:     hydroquinone 4 % Crea, Use hs for dark spots, Disp: 28.35 g, Rfl: 3    levocetirizine (XYZAL) 5 MG tablet, Take 1 tablet (5 mg total) by mouth once daily., Disp: 90 tablet, Rfl: 3    levothyroxine (SYNTHROID) 50 MCG tablet, TAKE ONE TABLET BY MOUTH EVERY DAY, Disp: 90 tablet, Rfl: 1    metFORMIN (GLUCOPHAGE) 500 MG tablet, Take 1 tablet (500 mg total) by mouth daily with breakfast., Disp: 90 tablet, Rfl: 3    ondansetron (ZOFRAN-ODT) 4 MG TbDL, Take 1 tablet (4 mg total) by mouth 2 (two) times daily as needed (nausea)., Disp: 10 tablet, Rfl: 3    promethazine (PHENERGAN) 25 MG tablet, Take 1 tablet (25 mg total) by mouth every 6 (six) hours as needed for Nausea., Disp: 8 tablet, Rfl: 0    sodium,potassium,mag sulfates (SUPREP BOWEL PREP KIT) 17.5-3.13-1.6 gram SolR, Take 354 mLs by mouth as needed (for colonoscopy)., Disp: 1 kit, Rfl: 0    traMADoL (ULTRAM) 50 mg tablet, Take 1-2 tablets ( mg total) by mouth every 6 (six) hours as needed for Pain., Disp: 21 tablet, Rfl: 0    triamterene-hydrochlorothiazide 37.5-25 mg (DYAZIDE) 37.5-25 mg per capsule, TAKE ONE CAPSULE BY MOUTH EVERY DAY, Disp: 90 capsule, Rfl: 3    vitamin D (VITAMIN D3) 1000 units Tab, Take 5  "tablets (5,000 Units total) by mouth once daily., Disp: 30 tablet, Rfl: prn    Current Facility-Administered Medications:     diphenhydrAMINE injection 50 mg, 50 mg, Intravenous, PRN, Aleks Kwok III, PA-C  ALLERGIES:   Review of patient's allergies indicates:   Allergen Reactions    Lortab [hydrocodone-acetaminophen] Itching and Rash     States can take - may have been formulation    Other Anaphylaxis     mushrooms    Diflucan  [fluconazole]      Other reaction(s): Hives    Iodine and iodide containing products Hives     Iv iodine only    Mushroom combination no.1      Other reaction(s): Bronchial Constriction       VITAL SIGNS: BP (!) 141/83   Pulse 90   Ht 5' 5" (1.651 m)   Wt 107.5 kg (237 lb)   BMI 39.44 kg/m²      PHYSICAL EXAMINATION  VITAL SIGNS: BP (!) 141/83   Pulse 90   Ht 5' 5" (1.651 m)   Wt 107.5 kg (237 lb)   BMI 39.44 kg/m²    General:  The patient is alert and oriented x 3.  Mood is pleasant.  Observation of ears, eyes and nose reveal no gross abnormalities.  HEENT: NCAT, sclera nonicteric  Lungs: Respirations are equal and unlabored.    Left KNEE EXAMINATION     OBSERVATION / INSPECTION   Gait:   Nonantalgic   Alignment:  Neutral   Scars:   None   Muscle atrophy: Mild  Effusion:  Minimal   Warmth:  None   Discoloration:   none     TENDERNESS / CREPITUS (T / C):          T / C      T / C   Patella   - / -   Lateral joint line   - / -    Peripatellar medial  -  Medial joint line    + / -    Peripatellar lateral -  Medial plica   - / -    Patellar tendon -   Popliteal fossa  - / -    Quad tendon   -   Gastrocnemius   -   Prepatellar Bursa - / -   Quadricep   -   Tibial tubercle  -  Thigh/hamstring  -   Pes anserine/HS -  Fibula    -   ITB   - / -  Tibia     -   Tib/fib joint  - / -  LCL    -     MFC   - / -   MCL: Proximal  -    LFC   - / -    Distal   -          ROM: (* = pain)  PASSIVE   ACTIVE    Left :   5 / 0 / 130   5 / 0 / 125     Right :    5 / 0 / 130   5 / 0 / " 125    PATELLOFEMORAL EXAMINATION:  See above noted areas of tenderness.   Patella position    Subluxation / dislocation: Centered           Sup. / Inf;   Normal   Crepitus (PF):    Absent   Patellar Mobility:       Medial-lateral:   Normal    Superior-inferior:  Normal    Inferior tilt   Normal    Patellar tendon:  Normal   Lateral tilt:    Normal   J-sign:     None   Patellofemoral grind:   No pain       MENISCAL SIGNS:     Pain on terminal extension:  +  Pain on terminal flexion:  +  Hipolitos maneuver:  + for pain  Squat     NT    LIGAMENT EXAMINATION:  ACL / Lachman:  normal (-1 to 2mm)    PCL-Post.  drawer: normal 0 to 2mm  MCL- Valgus:  normal 0 to 2mm  LCL- Varus:  normal 0 to 2mm  Pivot shift: normal (Equal)   Dial Test: difference c/w other side   At 30° flexion: normal (< 5°)    At 90° flexion: normal (< 5°)   Reverse Pivot Shift:   normal (Equal)     STRENGTH: (* = with pain) PAINFUL SIDE   Quadricep   5/5   Hamstrin/5    EXTREMITY NEURO-VASCULAR EXAMINATION:   Sensation:  Grossly intact to light touch all dermatomal regions.   Motor Function:  Fully intact motor function at hip, knee, foot and ankle    DTRs;  quadriceps and  achilles 2+.  No clonus and downgoing Babinski.    Vascular status:  DP and PT pulses 2+, brisk capillary refill, symmetric.     Other Findings:       X-rays:  including standing, weight bearing AP and flexion bilateral knees, lateral and merchant views ordered and images reviewed by me show:  No fracture, dislocation  There is medial compartment narrowing bilaterally.  There is osteophytic spurring about the knee bilaterally. There is no evidence of fracture or osseous destructive process.     ASSESSMENT:    Left Knee  Probable Meniscus re-tear  medial       PLAN:   MRI Left knee  All questions were answered, pt will contact us for questions or concerns in the interim.

## 2022-04-05 ENCOUNTER — PATIENT OUTREACH (OUTPATIENT)
Dept: ADMINISTRATIVE | Facility: OTHER | Age: 47
End: 2022-04-05
Payer: COMMERCIAL

## 2022-04-05 DIAGNOSIS — Z12.11 SPECIAL SCREENING FOR MALIGNANT NEOPLASM OF COLON: Primary | ICD-10-CM

## 2022-04-20 ENCOUNTER — PATIENT MESSAGE (OUTPATIENT)
Dept: SPORTS MEDICINE | Facility: CLINIC | Age: 47
End: 2022-04-20
Payer: COMMERCIAL

## 2022-05-09 ENCOUNTER — TELEPHONE (OUTPATIENT)
Dept: GYNECOLOGIC ONCOLOGY | Facility: CLINIC | Age: 47
End: 2022-05-09
Payer: COMMERCIAL

## 2022-05-09 NOTE — TELEPHONE ENCOUNTER
"Return call left voicemail to call back if need be. ----- Message from Cheli Trejo MA sent at 5/9/2022  9:21 AM CDT -----    ----- Message -----  From: Rachana Beard  Sent: 5/9/2022   8:17 AM CDT  To: Franko Hickman Staff    Patient Status: active    Scheduling Appt : return     Time/Date Preference: first available    8th Story Active User?: yes    Relationship to Patient?: self    Contact Preference?:  [phone]    Treating Provider: Dr Abdul    Do you feel you need to be seen today? No    Additional Notes: Former Dr Matias pt          "Thank you for all that you do for our patients'         "

## 2022-05-10 ENCOUNTER — OFFICE VISIT (OUTPATIENT)
Dept: URGENT CARE | Facility: CLINIC | Age: 47
End: 2022-05-10
Payer: COMMERCIAL

## 2022-05-10 VITALS — HEART RATE: 84 BPM | DIASTOLIC BLOOD PRESSURE: 103 MMHG | SYSTOLIC BLOOD PRESSURE: 147 MMHG

## 2022-05-10 DIAGNOSIS — S80.11XA CONTUSION, KNEE AND LOWER LEG, RIGHT, INITIAL ENCOUNTER: ICD-10-CM

## 2022-05-10 DIAGNOSIS — Z02.6 ENCOUNTER RELATED TO WORKER'S COMPENSATION CLAIM: Primary | ICD-10-CM

## 2022-05-10 DIAGNOSIS — S80.01XA CONTUSION, KNEE AND LOWER LEG, RIGHT, INITIAL ENCOUNTER: ICD-10-CM

## 2022-05-10 DIAGNOSIS — S80.02XA CONTUSION, KNEE AND LOWER LEG, LEFT, INITIAL ENCOUNTER: ICD-10-CM

## 2022-05-10 DIAGNOSIS — S60.221A CONTUSION OF RIGHT HAND, INITIAL ENCOUNTER: ICD-10-CM

## 2022-05-10 DIAGNOSIS — S80.12XA CONTUSION, KNEE AND LOWER LEG, LEFT, INITIAL ENCOUNTER: ICD-10-CM

## 2022-05-10 LAB
CTP QC/QA: YES
POC 10 PANEL DRUG SCREEN: NEGATIVE

## 2022-05-10 PROCEDURE — 73562 X-RAY EXAM OF KNEE 3: CPT | Mod: LT,S$GLB,, | Performed by: RADIOLOGY

## 2022-05-10 PROCEDURE — 99203 OFFICE O/P NEW LOW 30 MIN: CPT | Mod: S$GLB,,,

## 2022-05-10 PROCEDURE — 99203 PR OFFICE/OUTPT VISIT, NEW, LEVL III, 30-44 MIN: ICD-10-PCS | Mod: S$GLB,,,

## 2022-05-10 PROCEDURE — 73562 X-RAY EXAM OF KNEE 3: CPT | Mod: RT,S$GLB,, | Performed by: RADIOLOGY

## 2022-05-10 PROCEDURE — 80305 POCT RAPID DRUG SCREEN 10 PANEL: ICD-10-PCS | Mod: S$GLB,,,

## 2022-05-10 PROCEDURE — 73130 X-RAY EXAM OF HAND: CPT | Mod: RT,S$GLB,, | Performed by: RADIOLOGY

## 2022-05-10 PROCEDURE — 73130 XR HAND COMPLETE 3 VIEW RIGHT: ICD-10-PCS | Mod: RT,S$GLB,, | Performed by: RADIOLOGY

## 2022-05-10 PROCEDURE — 73562 XR KNEE 3 VIEW RIGHT: ICD-10-PCS | Mod: RT,S$GLB,, | Performed by: RADIOLOGY

## 2022-05-10 PROCEDURE — 80305 DRUG TEST PRSMV DIR OPT OBS: CPT | Mod: S$GLB,,,

## 2022-05-10 NOTE — PROGRESS NOTES
Subjective:       Patient ID: Emily Max is a 47 y.o. female.    Chief Complaint: Fall    She tripped on edge of entry floor mat and landed onto bilateral knees and outstretched rt hand. Minor discomfort to hypothenar area of right hand. Her main concern is right knee pain. Prior history bilateral meniscus surgery. Using ice and OTC naproxen to help with symptoms. She works in [employer] where she has ability to periodically sit and elevate leg.    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  She was hurrying through the front door and her feet got caught up in the rug.  She landed on her RIGHT knee first then her RIGHT hand then her LEFT knee.  There is some swelling and bruising noted, on both knees.     RIGHT knee: is popping, pain is 7/10, pain comes with ambulation  RIGHT wrist: is sore, popping, pain with certain movement. Pain is 4/10.  LEFT knee: is bruised from impact, on the lower knee, pain in below the knee down the shin.     jh    Fall  The accident occurred 12 to 24 hours ago. The fall occurred while walking. She fell from a height of 3 to 5 ft. She landed on hard floor. There was no blood loss. The point of impact was the right knee, left knee and right wrist. The pain is present in the left knee, right knee and right wrist. The pain is at a severity of 7/10. The pain is severe. The symptoms are aggravated by ambulation and pressure on injury. She has tried NSAID for the symptoms. The treatment provided mild relief.       Constitution: Negative.   HENT: Negative.    Neck: neck negative.   Cardiovascular: Negative.    Eyes: Negative.    Respiratory: Negative.    Gastrointestinal: Negative.    Endocrine: negative.   Genitourinary: Negative.    Musculoskeletal: Positive for pain, trauma, joint pain, joint swelling and abnormal ROM of joint.   Skin: Positive for color change, abrasion, erythema and bruising. Negative for rash and laceration.   Allergic/Immunologic: Negative.     Hematologic/Lymphatic: Negative.    Psychiatric/Behavioral: Negative.         Objective:      Physical Exam  Vitals and nursing note reviewed.   Constitutional:       Appearance: She is obese.   HENT:      Head: Normocephalic.   Eyes:      Conjunctiva/sclera: Conjunctivae normal.   Musculoskeletal:      Right wrist: Normal.      Right hand: Bony tenderness present. No swelling, deformity, lacerations or tenderness. Normal range of motion. Normal strength. Normal sensation. Normal pulse.        Hands:       Right knee: Swelling, bony tenderness and crepitus present. No deformity, erythema, ecchymosis or lacerations. Tenderness present. No LCL laxity, MCL laxity or ACL laxity. Normal alignment, normal meniscus and normal patellar mobility.      Instability Tests: Anterior drawer test negative. Anterior Lachman test negative. Medial Hipolito test negative and lateral Hipolito test negative.      Left knee: Swelling and bony tenderness present. No deformity, erythema, ecchymosis, lacerations or crepitus. Tenderness present. No LCL laxity, MCL laxity or ACL laxity.Normal alignment, normal meniscus and normal patellar mobility.      Instability Tests: Anterior drawer test negative. Anterior Lachman test negative. Medial Hipolito test negative and lateral Hipolito test negative.      Right lower leg: Swelling and tenderness present. No deformity, lacerations or bony tenderness.      Left lower leg: Swelling and tenderness present. No deformity, lacerations or bony tenderness.        Legs:       Comments: Tenderness on palpation to the area just inferior bilateral patella and inferior patellar tendon.  Mild swelling over these areas.  Symptoms greater to the right than left lower extremity.  Some crepitus noted to the right knee on passive movement.  No ligamentous laxity noted on exam.  Anterior drawer Hipolito testing is negative.  Right ankle inversion increases her right knee pain.   Skin:     General: Skin is warm.       Capillary Refill: Capillary refill takes less than 2 seconds.      Findings: Erythema present.   Neurological:      General: No focal deficit present.      Mental Status: She is alert.   Psychiatric:         Attention and Perception: Attention normal.         Mood and Affect: Mood and affect normal.         Speech: Speech normal.         Behavior: Behavior is cooperative.           I personally reviewed the x-ray results of her left knee, right knee, and right hand.  There were no acute findings however by bilateral knees indicate moderate to significant degenerative changes.  Imaging studies are still pending radiologist's report.  I did advise that I would contact her if the radiologist identified any areas of concern.  Assessment:       1. Encounter related to worker's compensation claim    2. Contusion, knee and lower leg, right, initial encounter    3. Contusion, knee and lower leg, left, initial encounter    4. Contusion of right hand, initial encounter        Plan:       She will continue with conservative measures including naproxen and ice.  She works the overnight shift and has been up since yesterday afternoon.  Next scheduled shift is this afternoon thus she is asked if she could return to work on her next scheduled shift on Thursday so that she may be able to sleep tonight.  No limitations will be issued but I will notated return to work on Thursday.  She was advised to discuss with her supervisor as well to be able to sleep given the short turnaround time for her next shift.     Patient Instructions: Apply ice 24-48 hours then apply heat/warm soaks, Elevated affected area   Restrictions: Home today  Follow up in about 1 week (around 5/17/2022).

## 2022-05-10 NOTE — LETTER
[facility]  [address] [address]  Phone: [phone]  Fax: [phone]  [facility]: [email or web address]    Pt Name: Emily Max  Injury Date: 05/09/2022   Employee ID: [id number] Date of First Treatment: 05/10/2022   Company: [employer]      Appointment Time: 09:05 AM Arrived: 9:05 AM   Provider: David Guerrero, DO Time Out: 12:35 PM     Office Treatment:   1. Encounter related to worker's compensation claim    2. Contusion, knee and lower leg, right, initial encounter    3. Contusion, knee and lower leg, left, initial encounter    4. Contusion of right hand, initial encounter          Patient Instructions: Apply ice 24-48 hours then apply heat/warm soaks, Elevated affected area      Restrictions: Home today   Regular Duty tomorrow.     Return Appointment: 5/17/2022 at 9:45 [facility]

## 2022-05-11 ENCOUNTER — PATIENT MESSAGE (OUTPATIENT)
Dept: ENDOSCOPY | Facility: HOSPITAL | Age: 47
End: 2022-05-11
Payer: COMMERCIAL

## 2022-05-11 DIAGNOSIS — Z12.11 SPECIAL SCREENING FOR MALIGNANT NEOPLASMS, COLON: Primary | ICD-10-CM

## 2022-05-11 RX ORDER — SODIUM, POTASSIUM,MAG SULFATES 17.5-3.13G
1 SOLUTION, RECONSTITUTED, ORAL ORAL DAILY
Qty: 1 KIT | Refills: 0 | Status: SHIPPED | OUTPATIENT
Start: 2022-05-11 | End: 2022-08-29

## 2022-05-18 ENCOUNTER — HOSPITAL ENCOUNTER (OUTPATIENT)
Dept: RADIOLOGY | Facility: HOSPITAL | Age: 47
Discharge: HOME | End: 2022-05-18
Attending: ORTHOPAEDIC SURGERY
Payer: COMMERCIAL

## 2022-05-18 DIAGNOSIS — M25.562 ACUTE PAIN OF LEFT KNEE: ICD-10-CM

## 2022-05-18 PROCEDURE — 73721 MRI KNEE WITHOUT CONTRAST LEFT: ICD-10-PCS | Mod: 26,LT,, | Performed by: RADIOLOGY

## 2022-05-18 PROCEDURE — 73721 MRI JNT OF LWR EXTRE W/O DYE: CPT | Mod: TC,LT

## 2022-05-18 PROCEDURE — 73721 MRI JNT OF LWR EXTRE W/O DYE: CPT | Mod: 26,LT,, | Performed by: RADIOLOGY

## 2022-05-19 ENCOUNTER — OFFICE VISIT (OUTPATIENT)
Dept: URGENT CARE | Facility: CLINIC | Age: 47
End: 2022-05-19
Payer: COMMERCIAL

## 2022-05-19 DIAGNOSIS — S60.221A CONTUSION OF RIGHT HAND, INITIAL ENCOUNTER: ICD-10-CM

## 2022-05-19 DIAGNOSIS — S80.12XA CONTUSION, KNEE AND LOWER LEG, LEFT, INITIAL ENCOUNTER: ICD-10-CM

## 2022-05-19 DIAGNOSIS — S80.11XA CONTUSION, KNEE AND LOWER LEG, RIGHT, INITIAL ENCOUNTER: ICD-10-CM

## 2022-05-19 DIAGNOSIS — Z02.6 ENCOUNTER RELATED TO WORKER'S COMPENSATION CLAIM: ICD-10-CM

## 2022-05-19 DIAGNOSIS — S80.01XA CONTUSION, KNEE AND LOWER LEG, RIGHT, INITIAL ENCOUNTER: ICD-10-CM

## 2022-05-19 DIAGNOSIS — S83.91XA SPRAIN OF RIGHT KNEE, UNSPECIFIED LIGAMENT, INITIAL ENCOUNTER: Primary | ICD-10-CM

## 2022-05-19 DIAGNOSIS — S80.02XA CONTUSION, KNEE AND LOWER LEG, LEFT, INITIAL ENCOUNTER: ICD-10-CM

## 2022-05-19 PROCEDURE — 99214 OFFICE O/P EST MOD 30 MIN: CPT | Mod: S$GLB,,, | Performed by: NURSE PRACTITIONER

## 2022-05-19 PROCEDURE — 99214 PR OFFICE/OUTPT VISIT, EST, LEVL IV, 30-39 MIN: ICD-10-PCS | Mod: S$GLB,,, | Performed by: NURSE PRACTITIONER

## 2022-05-19 NOTE — PROGRESS NOTES
"Subjective:       Patient ID: Emily Max is a 47 y.o. female.    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  Patient is here to follow up on her RIGHT knee , RIGHT hand then her LEFT knee.  There is some swelling and bruising noted, on both knees. RIGHT knee: is popping, pain is 3-4/10, pain comes with ambulation. RIGHT wrist: is fine. Pain is 4/10. LEFT knee: is bruised from impact, on the lower knee, pain in below the knee down the shin and bruised. Right feels a slippage in the knee. Feels loose.  [facility]      48 yo female here for f/u bilateral knee pain and wrist pain from fall. She states wrist pain has resolved. She has tenderness to left knee in area of ecchymosis. She states her right knee is still bothering her with certain movements. Patient has hx of bilateral knee pain and surgery. She had surgery on right knee in 2017. She states she had intermittent pain prior to fall however she has a new popping to right knee and feels like it is "loose"  Unstable. She had mri on left knee yesterday by dr sparks as f/u to left knee surgery.     Knee Injury  Associated symptoms include arthralgias and joint swelling. Pertinent negatives include no rash.   Hand Injury     Fall  The accident occurred 12 to 24 hours ago. The fall occurred while walking. She fell from a height of 3 to 5 ft. She landed on hard floor. There was no blood loss. The point of impact was the right knee, left knee and right wrist. The pain is present in the left knee, right knee and right wrist. The pain is at a severity of 7/10. The pain is severe. The symptoms are aggravated by ambulation and pressure on injury. She has tried NSAID for the symptoms. The treatment provided mild relief.       Constitution: Negative.   HENT: Negative.    Neck: neck negative.   Cardiovascular: Negative.    Eyes: Negative.    Respiratory: Negative.    Gastrointestinal: Negative.    Endocrine: " negative.   Genitourinary: Negative.  Negative for frequency and urgency.   Musculoskeletal: Positive for pain, trauma, joint pain, joint swelling and abnormal ROM of joint.   Skin: Positive for color change, abrasion and bruising. Negative for rash, laceration and erythema.   Allergic/Immunologic: Negative.    Neurological: Negative.    Hematologic/Lymphatic: Negative.    Psychiatric/Behavioral: Negative.         Objective:      Physical Exam  Vitals and nursing note reviewed.   Constitutional:       Appearance: She is obese.   HENT:      Head: Normocephalic.   Eyes:      Conjunctiva/sclera: Conjunctivae normal.   Musculoskeletal:      Right wrist: Normal.      Right hand: Bony tenderness present. No swelling, deformity, lacerations or tenderness. Normal range of motion. Normal strength. Normal sensation. Normal pulse.        Hands:       Right knee: Swelling, bony tenderness and crepitus present. No deformity, erythema, ecchymosis or lacerations. Tenderness present. No LCL laxity, MCL laxity or ACL laxity. Normal alignment, normal meniscus and normal patellar mobility.      Instability Tests: Anterior drawer test negative. Anterior Lachman test negative. Medial Hipolito test negative and lateral Hipolito test negative.      Left knee: Swelling and bony tenderness present. No deformity, erythema, ecchymosis, lacerations or crepitus. Tenderness present. No LCL laxity, MCL laxity or ACL laxity.Normal alignment, normal meniscus and normal patellar mobility.      Instability Tests: Anterior drawer test negative. Anterior Lachman test negative. Medial Hipolito test negative and lateral Hipolito test negative.      Right lower leg: Swelling and tenderness present. No deformity, lacerations or bony tenderness.      Left lower leg: Swelling and tenderness present. No deformity, lacerations or bony tenderness.        Legs:       Comments: Tenderness on palpation to the area just inferior bilateral patella and inferior  patellar tendon.  Mild swelling over these areas.  Symptoms greater to the right than left lower extremity.  Some crepitus noted to the right knee on passive movement.  No ligamentous laxity noted on exam.  Anterior drawer Hipolito testing is negative.  Right ankle inversion increases her right knee pain.    She has had no change in exam since previous visit    Skin:     General: Skin is warm.      Capillary Refill: Capillary refill takes less than 2 seconds.      Findings: Bruising present. No erythema.   Neurological:      General: No focal deficit present.      Mental Status: She is alert.   Psychiatric:         Attention and Perception: Attention normal.         Mood and Affect: Mood and affect normal.         Speech: Speech normal.         Behavior: Behavior is cooperative.         Assessment:       1. Sprain of right knee, unspecified ligament, initial encounter    2. Encounter related to worker's compensation claim    3. Contusion, knee and lower leg, right, initial encounter    4. Contusion, knee and lower leg, left, initial encounter    5. Contusion of right hand, initial encounter        Plan:     previous mri of right knee reviewed.   Left knee mri from 5/18/2022 reviewed    Will refer for physical therapy.  Will get MRI of right knee to evaluate new injury due to fall  Patient does not need restrictions therefore does not need one week follow up -will see back in two weeks for reevaluation   Advised heat to area of bruising on legs- suspect hematoma or deep bruising.         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

## 2022-05-19 NOTE — LETTER
[facility]  [address] [address]  Phone: [phone]  Fax: [phone]  [employer] [employer]: [id number]    Pt Name: Emily Max  Injury Date: 05/09/2022   Employee ID:  Date of First Treatment: 05/19/2022   Company: [employer]      Appointment Time: 10:30 AM Arrived: 10:24 AM   Provider: VIPIN Day Time Out: 12:15 PM     Office Treatment:   1. Sprain of right knee, unspecified ligament, initial encounter    2. Encounter related to worker's compensation claim    3. Contusion, knee and lower leg, right, initial encounter    4. Contusion, knee and lower leg, left, initial encounter    5. 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

## 2022-05-26 ENCOUNTER — TELEPHONE (OUTPATIENT)
Dept: SPORTS MEDICINE | Facility: CLINIC | Age: 47
End: 2022-05-26
Payer: COMMERCIAL

## 2022-05-26 DIAGNOSIS — M17.12 OSTEOARTHRITIS OF LEFT KNEE: Primary | ICD-10-CM

## 2022-05-26 NOTE — TELEPHONE ENCOUNTER
I called the patient and discussed her MRI results below:    Left knee MRI  1. Prior partial medial meniscectomy, as above.  No new meniscal tear identified.  2. Moderate cartilage loss in the medial compartment, as above, progressed from the 12/17/2020 exam.  3. Small joint effusion.    X-rays demonstrate Kellgren Juve grade 2 or greater     Dr. Zaman recommends proceeding with Euflexxa injections to the left knee.

## 2022-06-09 ENCOUNTER — OFFICE VISIT (OUTPATIENT)
Dept: GYNECOLOGIC ONCOLOGY | Facility: CLINIC | Age: 47
End: 2022-06-09
Payer: COMMERCIAL

## 2022-06-09 ENCOUNTER — HOSPITAL ENCOUNTER (OUTPATIENT)
Dept: RADIOLOGY | Facility: HOSPITAL | Age: 47
Discharge: HOME | End: 2022-06-09
Attending: NURSE PRACTITIONER
Payer: COMMERCIAL

## 2022-06-09 VITALS
WEIGHT: 233 LBS | HEIGHT: 65 IN | BODY MASS INDEX: 38.82 KG/M2 | SYSTOLIC BLOOD PRESSURE: 126 MMHG | DIASTOLIC BLOOD PRESSURE: 78 MMHG

## 2022-06-09 DIAGNOSIS — Z01.419 WELL WOMAN EXAM WITH ROUTINE GYNECOLOGICAL EXAM: Primary | ICD-10-CM

## 2022-06-09 DIAGNOSIS — S83.91XA SPRAIN OF RIGHT KNEE, UNSPECIFIED LIGAMENT, INITIAL ENCOUNTER: ICD-10-CM

## 2022-06-09 DIAGNOSIS — Z85.41 HX OF CERVICAL CANCER: ICD-10-CM

## 2022-06-09 DIAGNOSIS — C53.1 MALIGNANT NEOPLASM OF EXOCERVIX: ICD-10-CM

## 2022-06-09 DIAGNOSIS — S80.01XA CONTUSION, KNEE AND LOWER LEG, RIGHT, INITIAL ENCOUNTER: ICD-10-CM

## 2022-06-09 DIAGNOSIS — S80.11XA CONTUSION, KNEE AND LOWER LEG, RIGHT, INITIAL ENCOUNTER: ICD-10-CM

## 2022-06-09 PROCEDURE — 3008F BODY MASS INDEX DOCD: CPT | Mod: CPTII,S$GLB,, | Performed by: OBSTETRICS & GYNECOLOGY

## 2022-06-09 PROCEDURE — 73721 MRI JNT OF LWR EXTRE W/O DYE: CPT | Mod: TC,RT

## 2022-06-09 PROCEDURE — 73721 MRI KNEE WITHOUT CONTRAST RIGHT: ICD-10-PCS | Mod: 26,RT,, | Performed by: RADIOLOGY

## 2022-06-09 PROCEDURE — 3078F DIAST BP <80 MM HG: CPT | Mod: CPTII,S$GLB,, | Performed by: OBSTETRICS & GYNECOLOGY

## 2022-06-09 PROCEDURE — 99999 PR PBB SHADOW E&M-EST. PATIENT-LVL III: ICD-10-PCS | Mod: PBBFAC,,, | Performed by: OBSTETRICS & GYNECOLOGY

## 2022-06-09 PROCEDURE — 3074F PR MOST RECENT SYSTOLIC BLOOD PRESSURE < 130 MM HG: ICD-10-PCS | Mod: CPTII,S$GLB,, | Performed by: OBSTETRICS & GYNECOLOGY

## 2022-06-09 PROCEDURE — 73721 MRI JNT OF LWR EXTRE W/O DYE: CPT | Mod: 26,RT,, | Performed by: RADIOLOGY

## 2022-06-09 PROCEDURE — 3008F PR BODY MASS INDEX (BMI) DOCUMENTED: ICD-10-PCS | Mod: CPTII,S$GLB,, | Performed by: OBSTETRICS & GYNECOLOGY

## 2022-06-09 PROCEDURE — 99214 PR OFFICE/OUTPT VISIT, EST, LEVL IV, 30-39 MIN: ICD-10-PCS | Mod: S$GLB,,, | Performed by: OBSTETRICS & GYNECOLOGY

## 2022-06-09 PROCEDURE — 3074F SYST BP LT 130 MM HG: CPT | Mod: CPTII,S$GLB,, | Performed by: OBSTETRICS & GYNECOLOGY

## 2022-06-09 PROCEDURE — 1159F MED LIST DOCD IN RCRD: CPT | Mod: CPTII,S$GLB,, | Performed by: OBSTETRICS & GYNECOLOGY

## 2022-06-09 PROCEDURE — 99999 PR PBB SHADOW E&M-EST. PATIENT-LVL III: CPT | Mod: PBBFAC,,, | Performed by: OBSTETRICS & GYNECOLOGY

## 2022-06-09 PROCEDURE — 88175 CYTOPATH C/V AUTO FLUID REDO: CPT | Performed by: PHYSICIAN ASSISTANT

## 2022-06-09 PROCEDURE — 3078F PR MOST RECENT DIASTOLIC BLOOD PRESSURE < 80 MM HG: ICD-10-PCS | Mod: CPTII,S$GLB,, | Performed by: OBSTETRICS & GYNECOLOGY

## 2022-06-09 PROCEDURE — 1159F PR MEDICATION LIST DOCUMENTED IN MEDICAL RECORD: ICD-10-PCS | Mod: CPTII,S$GLB,, | Performed by: OBSTETRICS & GYNECOLOGY

## 2022-06-09 PROCEDURE — 99214 OFFICE O/P EST MOD 30 MIN: CPT | Mod: S$GLB,,, | Performed by: OBSTETRICS & GYNECOLOGY

## 2022-06-09 NOTE — PROGRESS NOTES
Subjective:       Patient ID: Emily Max is a 47 y.o. female.    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     Pap in 3/2021 normal  Was started on vaginal estrogen.      Mammogram: 12/2021: normal.       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 She required no postoperative therapy    Review of Systems   Constitutional: Negative for chills and fever.   HENT: Negative for mouth sores.    Respiratory: Negative for chest tightness and shortness of breath.    Cardiovascular: Negative for chest pain.   Gastrointestinal: Negative for abdominal distention, nausea and vomiting.   Genitourinary: Negative for dysuria, hematuria, pelvic pain, vaginal bleeding and vaginal discharge.   Neurological: Negative for syncope and weakness.   All other systems reviewed and are negative.        Objective:      Physical Exam  Vitals reviewed. Exam conducted with a chaperone present.   Constitutional:       Appearance: Normal appearance.   HENT:      Head: Normocephalic and atraumatic.   Eyes:      Extraocular Movements: Extraocular movements intact.      Conjunctiva/sclera: Conjunctivae normal.      Pupils: Pupils are equal, round, and reactive to light.   Pulmonary:      Effort: Pulmonary effort is normal. No respiratory distress.   Abdominal:      General: There is no distension.      Palpations: Abdomen is soft.      Tenderness: There is no abdominal tenderness.   Genitourinary:     Labia:         Right: No lesion.         Left: No lesion.       Vagina: Normal.      Uterus: Absent.       Comments: Pap smear collected  Musculoskeletal:         General: Normal range of motion.   Lymphadenopathy:      Lower Body: No  right inguinal adenopathy. No left inguinal adenopathy.   Skin:     General: Skin is warm and dry.   Neurological:      General: No focal deficit present.      Mental Status: She is alert and oriented to person, place, and time. Mental status is at baseline.   Psychiatric:         Mood and Affect: Mood normal.         Behavior: Behavior normal.         Thought Content: Thought content normal.         Judgment: Judgment normal.         Assessment:       Problem List Items Addressed This Visit        Oncology    Malignant neoplasm of exocervix      Other Visit Diagnoses     Well woman exam with routine gynecological exam    -  Primary    Relevant Orders    Liquid-Based Pap Smear, Screening    Hx of cervical cancer        Relevant Orders    Liquid-Based Pap Smear, Screening          Plan:       No evidence of disease on today's exam.   Surveillance pap smear collected.  Feels well, no new symptoms.     Recommend continued surveillance. RTC annually or sooner if needed.     I spent approximately 30 minutes reviewing the available records and evaluating the patient, out of which over 50% of the time was spent face to face with the patient in counseling and coordinating this patient's care.

## 2022-06-13 ENCOUNTER — PATIENT MESSAGE (OUTPATIENT)
Dept: SPORTS MEDICINE | Facility: CLINIC | Age: 47
End: 2022-06-13
Payer: COMMERCIAL

## 2022-06-16 LAB
CLINICAL INFO: NORMAL
CYTO CVX: NORMAL
CYTOLOGIST CVX/VAG CYTO: NORMAL
CYTOLOGIST CVX/VAG CYTO: NORMAL
CYTOLOGY CMNT CVX/VAG CYTO-IMP: NORMAL
CYTOLOGY PAP THIN PREP EXPLANATION: NORMAL
DATE OF PREVIOUS PAP: NORMAL
DATE PREVIOUS BX: YES
GEN CATEG CVX/VAG CYTO-IMP: NORMAL
LMP START DATE: NORMAL
MICROORGANISM CVX/VAG CYTO: NORMAL
PATHOLOGIST CVX/VAG CYTO: NORMAL
SERVICE CMNT-IMP: NORMAL
SPECIMEN SOURCE CVX/VAG CYTO: NORMAL
STAT OF ADQ CVX/VAG CYTO-IMP: NORMAL

## 2022-06-21 ENCOUNTER — OFFICE VISIT (OUTPATIENT)
Dept: URGENT CARE | Facility: CLINIC | Age: 47
End: 2022-06-21
Payer: COMMERCIAL

## 2022-06-21 VITALS
HEIGHT: 65 IN | DIASTOLIC BLOOD PRESSURE: 89 MMHG | SYSTOLIC BLOOD PRESSURE: 122 MMHG | BODY MASS INDEX: 38.49 KG/M2 | WEIGHT: 231 LBS | TEMPERATURE: 100 F | HEART RATE: 80 BPM

## 2022-06-21 DIAGNOSIS — S80.02XA CONTUSION, KNEE AND LOWER LEG, LEFT, INITIAL ENCOUNTER: ICD-10-CM

## 2022-06-21 DIAGNOSIS — S80.11XA CONTUSION, KNEE AND LOWER LEG, RIGHT, INITIAL ENCOUNTER: ICD-10-CM

## 2022-06-21 DIAGNOSIS — S83.241A ACUTE MEDIAL MENISCUS TEAR OF RIGHT KNEE, INITIAL ENCOUNTER: Primary | ICD-10-CM

## 2022-06-21 DIAGNOSIS — S80.01XA CONTUSION, KNEE AND LOWER LEG, RIGHT, INITIAL ENCOUNTER: ICD-10-CM

## 2022-06-21 DIAGNOSIS — S80.12XA CONTUSION, KNEE AND LOWER LEG, LEFT, INITIAL ENCOUNTER: ICD-10-CM

## 2022-06-21 PROCEDURE — 99214 OFFICE O/P EST MOD 30 MIN: CPT | Mod: S$GLB,,, | Performed by: NURSE PRACTITIONER

## 2022-06-21 PROCEDURE — 99214 PR OFFICE/OUTPT VISIT, EST, LEVL IV, 30-39 MIN: ICD-10-PCS | Mod: S$GLB,,, | Performed by: NURSE PRACTITIONER

## 2022-06-21 NOTE — PROGRESS NOTES
Subjective:       Patient ID: Emliy Max is a 47 y.o. female.    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 She landed on her RIGHT knee first then her RIGHT hand then her LEFT knee. There is no swelling or bruising present. Patient states everything feels fine. She is doing some of the exercises she learned from PT before but right now everything feels fine. RIGHT knee: is still popping but it does not keep her from performing her daily tasks. Current pain score 4/10. [facility]     Patient states due to work schedule she has not scheduled physical therapy. She did have mri. Here for mri results.       Constitution: Negative. Negative for activity change.   HENT: Negative.    Neck: neck negative.   Cardiovascular: Negative.  Negative for leg swelling.   Eyes: Negative.    Respiratory: Negative.    Gastrointestinal: Negative.    Endocrine: negative.   Genitourinary: Negative.  Negative for frequency and urgency.   Musculoskeletal: Positive for pain.   Skin: Negative.    Allergic/Immunologic: Negative.    Hematologic/Lymphatic: Negative.    Psychiatric/Behavioral: Negative.         Objective:      Physical Exam  Vitals and nursing note reviewed.   Constitutional:       General: She is not in acute distress.     Appearance: She is well-developed. She is not diaphoretic.   HENT:      Head: Normocephalic and atraumatic.      Right Ear: Hearing and external ear normal.      Left Ear: Hearing and external ear normal.      Nose: Nose normal. No nasal deformity.   Eyes:      General: Lids are normal. No scleral icterus.     Conjunctiva/sclera: Conjunctivae normal.   Neck:      Trachea: Trachea normal.   Cardiovascular:      Pulses: Normal pulses.   Pulmonary:      Effort: Pulmonary effort is normal. No respiratory distress.      Breath sounds: No stridor.   Musculoskeletal:       Cervical back: Normal range of motion.      Right knee: Tenderness present over the medial joint line.      Left knee: Normal.   Skin:     General: Skin is warm and dry.      Capillary Refill: Capillary refill takes less than 2 seconds.   Neurological:      Mental Status: She is alert. She is not disoriented.      GCS: GCS eye subscore is 4. GCS verbal subscore is 5. GCS motor subscore is 6.      Sensory: No sensory deficit.   Psychiatric:         Attention and Perception: She is attentive.         Speech: Speech normal.         Behavior: Behavior normal.         Assessment:       1. Acute medial meniscus tear of right knee, initial encounter    2. Contusion, knee and lower leg, right, initial encounter    3. Contusion, knee and lower leg, left, initial encounter        Plan:       Will refer patient to dr sparks - orthopedics for consult right knee mri findings  MRI Knee Without Contrast Right    Result Date: 6/9/2022  EXAMINATION: MRI KNEE WITHOUT CONTRAST RIGHT CLINICAL HISTORY: Knee trauma, internal derangement suspected, xray done;Contusion of right knee, initial encounter TECHNIQUE: Multiplanar, multisequence MRI of the right knee performed without contrast. COMPARISON: Right knee radiograph dated 05/10/2022 FINDINGS: Menisci:  The lateral meniscus is intact.  Diminutive caliber with complex tear of the medial meniscus body segment.  Posterior root attachments appear maintained. Ligaments:  The ACL, PCL, MCL, and LCL complex structures appear maintained.  Medial and lateral retinaculum appear intact. Tendons:  Extensor mechanism is maintained. Cartilage: Patellofemoral: Patellar cartilage appears intact.  Mild chondral heterogeneity of the trochlear groove. Medial tibiofemoral: Chondral irregularity with variable high-grade to full-thickness loss of the weight-bearing femoral condyle and opposing tibial plateau.  Mild subcortical edema. Lateral tibiofemoral: Some chondral thinning with scattered heterogeneity.   Focal fissure of the tibial plateau.  No significant subcortical edema. Bone: No acute fracture or infiltrative marrow process.  Knee joint osteophyte production. Miscellaneous: Physiologic knee joint fluid.  Neurovascular bundle structures demonstrate nothing unusual.  Two 0.4 cm mineralized intra-articular bodies adjacent to the medial femoral condyle versus centrally projecting osteophyte (series 6, image 19).     Complex tear with diminutive caliber of the medial meniscus body segment. Femorotibial chondral changes, more significant at the medial compartment as detailed. Two subcentimeter intra-articular bodies versus centrally projecting marginal osteophytes. Electronically signed by: Marco A Sheikh Date:    06/09/2022 Time:    14:57   she does state it is feeling better with the home exercises.  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

## 2022-06-21 NOTE — LETTER
[facility]  [address] [address]  Phone: [phone]  Fax: [phone]  [facility] [employer]: [email or web address]    Pt Name: Emily Max  Injury Date: 05/09/2022   Employee ID: [id number] Date of First Treatment: 06/21/2022   Company: [employer]      Appointment Time: 10:15 AM Arrived: 10:30am   Provider: VIPIN Day Time Out:10:30am     Office Treatment:   1. Acute medial meniscus tear of right knee, initial encounter    2. Contusion, knee and lower leg, right, initial encounter    3. Contusion, knee and lower leg, left, initial encounter          Patient Instructions: Attention not to aggravate affected area, Daily home exercises/warm soaks, Referral to specialist to be scheduled, once authorized    Restrictions: Regular Duty, Discharged to Ortho/Neuro/Opthomologist/Surgeon     Return Appointment: Visit date refer to orthopedics

## 2022-06-29 ENCOUNTER — TELEPHONE (OUTPATIENT)
Dept: OPTOMETRY | Facility: CLINIC | Age: 47
End: 2022-06-29
Payer: COMMERCIAL

## 2022-06-29 NOTE — TELEPHONE ENCOUNTER
Tried calling patient in regards to appointment on 6/30 with Dr. Ross--LVM stating [employer] is inactive.

## 2022-07-05 ENCOUNTER — ANESTHESIA EVENT (OUTPATIENT)
Dept: ENDOSCOPY | Facility: HOSPITAL | Age: 47
End: 2022-07-05
Payer: COMMERCIAL

## 2022-07-05 DIAGNOSIS — E03.9 ACQUIRED HYPOTHYROIDISM: ICD-10-CM

## 2022-07-05 NOTE — TELEPHONE ENCOUNTER
Refill Routing Note   Medication(s) are not appropriate for processing by [facility] for the following reason(s):      - Required laboratory values are abnormal    [facility] action(s):  Defer Medication-related problems identified: Requires labs        Medication reconciliation completed: No     Appointments  past 12m or future 3m with PCP    Date Provider   Last Visit   5/28/2021 Elif Lew MD   Next Visit   7/18/2022 Elif Lew MD   ED visits in past 90 days: 0        Note composed:3:43 PM 07/05/2022

## 2022-07-05 NOTE — TELEPHONE ENCOUNTER
Care Due:                  Date            Visit Type   Department     Provider  --------------------------------------------------------------------------------                                EP -                              PRIMARY      [facility] INTERNAL  Last Visit: 10-      CARE ([facility])   MEDICINE       Jim TRAORE                              ANNUAL                              CHECKUP/PHY  [facility] INTERNAL  Next Visit: 07-      [facility]       JORDEN RAMÍREZ                                                            Last  Test          Frequency    Reason                     Performed    Due Date  --------------------------------------------------------------------------------    CMP.........  12 months..  DULoxetine,                05- 05-                             triamterene-hydrochloroth                             iazide...................    [employer] Embedded Care Gaps. Reference number: [id number]. 7/05/2022   3:15:05 PM CDT

## 2022-07-06 ENCOUNTER — ANESTHESIA (OUTPATIENT)
Dept: ENDOSCOPY | Facility: HOSPITAL | Age: 47
End: 2022-07-06
Payer: COMMERCIAL

## 2022-07-06 ENCOUNTER — HOSPITAL ENCOUNTER (OUTPATIENT)
Facility: HOSPITAL | Age: 47
Discharge: HOME | End: 2022-07-06
Attending: COLON & RECTAL SURGERY | Admitting: COLON & RECTAL SURGERY
Payer: COMMERCIAL

## 2022-07-06 VITALS
TEMPERATURE: 98 F | DIASTOLIC BLOOD PRESSURE: 76 MMHG | HEART RATE: 76 BPM | BODY MASS INDEX: 37.49 KG/M2 | SYSTOLIC BLOOD PRESSURE: 128 MMHG | RESPIRATION RATE: 16 BRPM | HEIGHT: 65 IN | WEIGHT: 225 LBS | OXYGEN SATURATION: 95 %

## 2022-07-06 DIAGNOSIS — Z12.11 COLON CANCER SCREENING: Primary | ICD-10-CM

## 2022-07-06 PROCEDURE — G0121 COLON CA SCRN NOT HI RSK IND: HCPCS | Mod: ,,, | Performed by: COLON & RECTAL SURGERY

## 2022-07-06 PROCEDURE — 25000003 PHARM REV CODE 250: Performed by: COLON & RECTAL SURGERY

## 2022-07-06 PROCEDURE — 37000009 HC ANESTHESIA EA ADD 15 MINS: Performed by: COLON & RECTAL SURGERY

## 2022-07-06 PROCEDURE — E9220 PRA ENDO ANESTHESIA: HCPCS | Mod: ,,, | Performed by: NURSE ANESTHETIST, CERTIFIED REGISTERED

## 2022-07-06 PROCEDURE — 63600175 PHARM REV CODE 636 W HCPCS: Performed by: NURSE ANESTHETIST, CERTIFIED REGISTERED

## 2022-07-06 PROCEDURE — G0121 COLON CA SCRN NOT HI RSK IND: HCPCS | Performed by: COLON & RECTAL SURGERY

## 2022-07-06 PROCEDURE — E9220 PRA ENDO ANESTHESIA: ICD-10-PCS | Mod: ,,, | Performed by: NURSE ANESTHETIST, CERTIFIED REGISTERED

## 2022-07-06 PROCEDURE — 25000003 PHARM REV CODE 250: Performed by: NURSE ANESTHETIST, CERTIFIED REGISTERED

## 2022-07-06 PROCEDURE — 37000008 HC ANESTHESIA 1ST 15 MINUTES: Performed by: COLON & RECTAL SURGERY

## 2022-07-06 PROCEDURE — G0121 COLON CA SCRN NOT HI RSK IND: ICD-10-PCS | Mod: ,,, | Performed by: COLON & RECTAL SURGERY

## 2022-07-06 RX ORDER — LEVOTHYROXINE SODIUM 50 UG/1
TABLET ORAL
Qty: 90 TABLET | Refills: 0 | Status: SHIPPED | OUTPATIENT
Start: 2022-07-06 | End: 2022-08-29 | Stop reason: SDUPTHER

## 2022-07-06 RX ORDER — PROPOFOL 10 MG/ML
VIAL (ML) INTRAVENOUS
Status: DISCONTINUED | OUTPATIENT
Start: 2022-07-06 | End: 2022-07-06

## 2022-07-06 RX ORDER — LIDOCAINE HYDROCHLORIDE 20 MG/ML
INJECTION INTRAVENOUS
Status: DISCONTINUED | OUTPATIENT
Start: 2022-07-06 | End: 2022-07-06

## 2022-07-06 RX ORDER — PROPOFOL 10 MG/ML
VIAL (ML) INTRAVENOUS CONTINUOUS PRN
Status: DISCONTINUED | OUTPATIENT
Start: 2022-07-06 | End: 2022-07-06

## 2022-07-06 RX ORDER — SODIUM CHLORIDE 9 MG/ML
INJECTION, SOLUTION INTRAVENOUS CONTINUOUS
Status: DISCONTINUED | OUTPATIENT
Start: 2022-07-06 | End: 2022-07-06 | Stop reason: HOSPADM

## 2022-07-06 RX ADMIN — Medication 180 MCG/KG/MIN: at 10:07

## 2022-07-06 RX ADMIN — PROPOFOL 30 MG: 10 INJECTION, EMULSION INTRAVENOUS at 10:07

## 2022-07-06 RX ADMIN — PROPOFOL 100 MG: 10 INJECTION, EMULSION INTRAVENOUS at 10:07

## 2022-07-06 RX ADMIN — SODIUM CHLORIDE: 0.9 INJECTION, SOLUTION INTRAVENOUS at 09:07

## 2022-07-06 RX ADMIN — LIDOCAINE HYDROCHLORIDE 50 MG: 20 INJECTION, SOLUTION INTRAVENOUS at 10:07

## 2022-07-06 NOTE — ANESTHESIA POSTPROCEDURE EVALUATION
Anesthesia Post Evaluation    Patient: Emily Max    Procedure(s) Performed: Procedure(s) (LRB):  COLONOSCOPY (N/A)    Final Anesthesia Type: general      Patient location during evaluation: GI PACU  Patient participation: Yes- Able to Participate  Level of consciousness: awake and alert and oriented  Post-procedure vital signs: reviewed and stable  Pain management: adequate  Airway patency: patent    PONV status at discharge: No PONV  Anesthetic complications: no      Cardiovascular status: hemodynamically stable  Respiratory status: unassisted and spontaneous ventilation  Hydration status: euvolemic  Follow-up not needed.          Vitals Value Taken Time   /75 07/06/22 1050   Temp 36.4 °C (97.6 °F) 07/06/22 1050   Pulse 82 07/06/22 1050   Resp 16 07/06/22 1050   SpO2 95 % 07/06/22 1050         No case tracking events are documented in the log.      Pain/Kye Score: Kye Score: 10 (7/6/2022 10:51 AM)

## 2022-07-06 NOTE — PROVATION PATIENT INSTRUCTIONS
Discharge Summary/Instructions after an Endoscopic Procedure  Patient Name: Emily Hernandez  Patient MRN: [id number]  Patient YOB: 1975 Wednesday, July 6, 2022  Issac Delcid MD  Dear patient,  As a result of recent federal legislation (The Federal Cures Act), you may   receive lab or pathology results from your procedure in your [facility]   account before your physician is able to contact you. Your physician or   their representative will relay the results to you with their   recommendations at their soonest availability.  Thank you,  RESTRICTIONS:  During your procedure today, you received medications for sedation.  These   medications may affect your judgment, balance and coordination.  Therefore,   for 24 hours, you have the following restrictions:   - DO NOT drive a car, operate machinery, make legal/financial decisions,   sign important papers or drink alcohol.    ACTIVITY:  Today: no heavy lifting, straining or running due to procedural   sedation/anesthesia.  The following day: return to full activity including work.  DIET:  Eat and drink normally unless instructed otherwise.     TREATMENT FOR COMMON SIDE EFFECTS:  - Mild abdominal pain, nausea, belching, bloating or excessive gas:  rest,   eat lightly and use a heating pad.  - Sore Throat: treat with throat lozenges and/or gargle with warm salt   water.  - Because air was used during the procedure, expelling large amounts of air   from your rectum or belching is normal.  - If a bowel prep was taken, you may not have a bowel movement for 1-3 days.    This is normal.  SYMPTOMS TO WATCH FOR AND REPORT TO YOUR PHYSICIAN:  1. Abdominal pain or bloating, other than gas cramps.  2. Chest pain.  3. Back pain.  4. Signs of infection such as: chills or fever occurring within 24 hours   after the procedure.  5. Rectal bleeding, which would show as bright red, maroon, or black stools.   (A tablespoon of blood from the rectum is not serious, especially  if   hemorrhoids are present.)  6. Vomiting.  7. Weakness or dizziness.  GO DIRECTLY TO THE NEAREST EMERGENCY ROOM IF YOU HAVE ANY OF THE FOLLOWING:      Difficulty breathing              Chills and/or fever over 101 F   Persistent vomiting and/or vomiting blood   Severe abdominal pain   Severe chest pain   Black, tarry stools   Bleeding- more than one tablespoon   Any other symptom or condition that you feel may need urgent attention  Your doctor recommends these additional instructions:  If any biopsies were taken, your doctors clinic will contact you in 1 to 2   weeks with any results.  - Patient has a contact number available for emergencies.  The signs and   symptoms of potential delayed complications were discussed with the   patient.  Return to normal activities tomorrow.  Written discharge   instructions were provided to the patient.   - Discharge patient to home (ambulatory).   - Resume regular diet indefinitely.   - Continue present medications.   - Repeat colonoscopy in 10 years for screening purposes.  For questions, problems or results please call your physician - Issac Delcid MD at Work:  [phone].  [facility], EMERGENCY ROOM PHONE NUMBER: [phone]  IF A COMPLICATION OR EMERGENCY SITUATION ARISES AND YOU ARE UNABLE TO REACH   YOUR PHYSICIAN - GO DIRECTLY TO THE EMERGENCY ROOM.  Issac Delcid MD  7/6/2022 10:44:56 AM  This report has been verified and signed electronically.  Dear patient,  As a result of recent federal legislation (The Federal Cures Act), you may   receive lab or pathology results from your procedure in your [facility]   account before your physician is able to contact you. Your physician or   their representative will relay the results to you with their   recommendations at their soonest availability.  Thank you,  [employer]ATION

## 2022-07-06 NOTE — TRANSFER OF CARE
"Anesthesia Transfer of Care Note    Patient: Emily Max    Procedure(s) Performed: Procedure(s) (LRB):  COLONOSCOPY (N/A)    Patient location: PACU    Anesthesia Type: general    Transport from OR: Transported from OR on room air with adequate spontaneous ventilation    Post pain: adequate analgesia    Post assessment: no apparent anesthetic complications    Post vital signs: stable    Level of consciousness: awake    Nausea/Vomiting: no nausea/vomiting    Complications: none    Transfer of care protocol was followed      Last vitals:   Visit Vitals  /75 (BP Location: Left arm, Patient Position: Lying)   Pulse 83   Temp 36.5 °C (97.7 °F) (Temporal)   Resp 16   Ht 5' 5" (1.651 m)   Wt 102.1 kg (225 lb)   SpO2 95%   Breastfeeding No   BMI 37.44 kg/m²     "

## 2022-07-06 NOTE — PLAN OF CARE
Discharge instructions reviewed with patient who verbalized understanding. Answered all questions and concerns.

## 2022-07-06 NOTE — ANESTHESIA PREPROCEDURE EVALUATION
07/06/2022  Emily Max is a 47 y.o., female.  Past Medical History:   Diagnosis Date    Abnormal Pap smear     Cervical cancer March 2013    FIGO IB1 AdenoCA Robotic radical hsyt,bso and bplnd    Colon cancer screening 5/4/2021    Depressive disorder, not elsewhere classified     Endometriosis of uterus     FH: colonic polyps 3/24/2021    Foot fracture, right     Headache(784.0)     Hypertension     Hypothyroidism     Low grade squamous intraepith lesion on cytologic smear vagina (lgsil) 10/14/2019    Malignant neoplasm of exocervix 10/10/2019    Migraine headache     Urinary tract infection     Vaginal atrophy 5/8/2018    Visit for screening mammogram 12/16/2015    Well woman exam with routine gynecological exam 12/16/2015     Past Surgical History:   Procedure Laterality Date    BREAST BIOPSY Right 2002    Core bx, benign    CHONDROPLASTY OF KNEE Left 6/10/2021    Procedure: CHONDROPLASTY, KNEE;  Surgeon: Kelly Zaman MD;  Location: [facility] OR;  Service: Orthopedics;  Laterality: Left;    HYSTERECTOMY  May 2013    Robotic radical hyst, bso, blplnd    KNEE ARTHROSCOPY W/ MENISCECTOMY Left 6/10/2021    Procedure: ARTHROSCOPY, KNEE, WITH MENISCECTOMY MEDIAL;  Surgeon: Kelly Zaman MD;  Location: [facility] OR;  Service: Orthopedics;  Laterality: Left;    KNEE SURGERY  08/24/2017    right knee    LIPOMA RESECTION      OOPHORECTOMY      bilateral    PELVIC AND PARA-AORTIC LYMPH NODE DISSECTION      PELVIC LAPAROSCOPY      endometriosis    NV REMOVAL OF OVARY/TUBE(S)      SINUS SURGERY      x2    SYNOVECTOMY OF KNEE Left 6/10/2021    Procedure: SYNOVECTOMY, KNEE;  Surgeon: Kelly Zaman MD;  Location: [facility] OR;  Service: Orthopedics;  Laterality: Left;    TONSILLECTOMY           Pre-op Assessment    I have reviewed the Patient Summary Reports.     I have reviewed the Nursing Notes.  I have reviewed the NPO Status.   I have reviewed the Medications.     Review of Systems  Anesthesia Hx:  No problems with previous Anesthesia    Social:  Non-Smoker, Social Alcohol Use    Cardiovascular:   Hypertension    Neurological:   Headaches    Endocrine:   Hypothyroidism    Psych:   Psychiatric History          Physical Exam  General: Well nourished, Cooperative, Alert and Oriented    Airway:  Mallampati: I   Mouth Opening: Normal  TM Distance: Normal  Tongue: Normal  Neck ROM: Normal ROM    Dental:  Intact, Braces    Chest/Lungs:  Clear to auscultation, Normal Respiratory Rate    Heart:  Rate: Normal  Rhythm: Regular Rhythm        Anesthesia Plan  Type of Anesthesia, risks & benefits discussed:    Anesthesia Type: Gen Natural Airway  Intra-op Monitoring Plan: Standard ASA Monitors  Post Op Pain Control Plan: multimodal analgesia  Induction:  IV  Informed Consent: Informed consent signed with the Patient and all parties understand the risks and agree with anesthesia plan.  All questions answered.   ASA Score: 2  Day of Surgery Review of History & Physical: H&P Update referred to the surgeon/provider.    Ready For Surgery From Anesthesia Perspective.     .

## 2022-07-06 NOTE — H&P
COLONOSCOPY HISTORY AND PHYSICAL EXAM    Procedure : Colonoscopy      INDICATIONS: asymptomatic screening exam    Family Hx of CRC: none    Last Colonoscopy:  none  Findings: na       Past Medical History:   Diagnosis Date    Abnormal Pap smear     Cervical cancer March 2013    FIGO IB1 AdenoCA Robotic radical hsyt,bso and bplnd    Colon cancer screening 5/4/2021    Depressive disorder, not elsewhere classified     Endometriosis of uterus     FH: colonic polyps 3/24/2021    Foot fracture, right     Headache(784.0)     Hypertension     Hypothyroidism     Low grade squamous intraepith lesion on cytologic smear vagina (lgsil) 10/14/2019    Malignant neoplasm of exocervix 10/10/2019    Migraine headache     Urinary tract infection     Vaginal atrophy 5/8/2018    Visit for screening mammogram 12/16/2015    Well woman exam with routine gynecological exam 12/16/2015     Sedation Problems: NO  Family History   Problem Relation Age of Onset    Colon cancer Other     Migraines Mother     Cancer Mother         endometrial cancer     Hypertension Mother     Hypothyroidism Mother     Heart disease Mother     Hyperlipidemia Mother     Migraines Maternal Grandmother     Aneurysm Maternal Grandmother         intracranial aneurysm    Stroke Maternal Grandmother     Cancer Maternal Aunt     Cancer Paternal Grandfather         lymphoma non hodgkins     Diabetes Father     Skin cancer Unknown     Rheum arthritis Paternal Grandmother 80    Rheum arthritis Maternal Aunt     Ovarian cancer Neg Hx     Uterine cancer Neg Hx     Breast cancer Neg Hx      Fam Hx of Sedation Problems: NO  Social History     Socioeconomic History    Marital status: Single    Number of children: 1    Years of education: 23   Occupational History    Occupation: RN     Employer: [employer]   Tobacco Use    Smoking status: Never Smoker    Smokeless tobacco: Never Used   Substance and Sexual Activity     "Alcohol use: Yes     Alcohol/week: 1.0 standard drink     Types: 1 Glasses of wine per week     Comment: socially    Drug use: Never   Other Topics Concern    Are you pregnant or think you may be? No    Breast-feeding No       Review of Systems - Negative except   Respiratory ROS: no dyspnea  Cardiovascular ROS: no exertional chest pain  Gastrointestinal ROS: NO abdominal discomfort,  NO rectal bleeding  Musculoskeletal ROS: no muscular pain  Neurological ROS: no recent stroke    Physical Exam:  BP (!) 144/79 (BP Location: Left arm, Patient Position: Lying)   Pulse 83   Temp 97.7 °F (36.5 °C) (Temporal)   Resp 16   Ht 5' 5" (1.651 m)   Wt 102.1 kg (225 lb)   SpO2 95%   Breastfeeding No   BMI 37.44 kg/m²   General: no distress  Head: normocephalic  Mallampati Score   Neck: supple, symmetrical, trachea midline  Lungs:  normal respiratory effort  Heart: regular rate and rhythm  Abdomen: soft, non-tender non-distented; bowel sounds normal; no masses,  no organomegaly  Extremities: no cyanosis or edema, or clubbing    ASA:  II    PLAN  COLONOSCOPY.    SedationPlan :MAC    The details of the procedure, the possible need for biopsy or polypectomy and the potential risks including bleeding, perforation, missed polyps were discussed in detail.      "

## 2022-07-15 ENCOUNTER — TELEPHONE (OUTPATIENT)
Dept: SPORTS MEDICINE | Facility: CLINIC | Age: 47
End: 2022-07-15
Payer: COMMERCIAL

## 2022-07-15 NOTE — TELEPHONE ENCOUNTER
I called the patient to schedule her an appointment with Dr. Zaman for her right knee (workers comp)

## 2022-07-19 DIAGNOSIS — U07.1 COVID-19 VIRUS DETECTED: ICD-10-CM

## 2022-07-19 DIAGNOSIS — R05.9 COUGH: Primary | ICD-10-CM

## 2022-07-19 LAB
CTP QC/QA: YES
SARS-COV-2 AG RESP QL IA.RAPID: POSITIVE

## 2022-07-20 ENCOUNTER — PATIENT MESSAGE (OUTPATIENT)
Dept: INTERNAL MEDICINE | Facility: CLINIC | Age: 47
End: 2022-07-20
Payer: COMMERCIAL

## 2022-07-20 DIAGNOSIS — U07.1 COVID: ICD-10-CM

## 2022-07-20 DIAGNOSIS — U07.1 COVID-19: Primary | ICD-10-CM

## 2022-07-21 ENCOUNTER — TELEPHONE (OUTPATIENT)
Dept: INFECTIOUS DISEASES | Facility: HOSPITAL | Age: 47
End: 2022-07-21
Payer: COMMERCIAL

## 2022-07-25 ENCOUNTER — PATIENT MESSAGE (OUTPATIENT)
Dept: SPORTS MEDICINE | Facility: CLINIC | Age: 47
End: 2022-07-25
Payer: COMMERCIAL

## 2022-07-29 ENCOUNTER — PATIENT MESSAGE (OUTPATIENT)
Dept: SPORTS MEDICINE | Facility: CLINIC | Age: 47
End: 2022-07-29
Payer: COMMERCIAL

## 2022-08-08 ENCOUNTER — PATIENT MESSAGE (OUTPATIENT)
Dept: SPORTS MEDICINE | Facility: CLINIC | Age: 47
End: 2022-08-08
Payer: COMMERCIAL

## 2022-08-15 ENCOUNTER — OFFICE VISIT (OUTPATIENT)
Dept: SPORTS MEDICINE | Facility: CLINIC | Age: 47
End: 2022-08-15
Payer: COMMERCIAL

## 2022-08-15 VITALS
DIASTOLIC BLOOD PRESSURE: 80 MMHG | WEIGHT: 229 LBS | BODY MASS INDEX: 38.15 KG/M2 | HEART RATE: 76 BPM | HEIGHT: 65 IN | SYSTOLIC BLOOD PRESSURE: 111 MMHG

## 2022-08-15 DIAGNOSIS — S83.241A ACUTE MEDIAL MENISCUS TEAR, RIGHT, INITIAL ENCOUNTER: Primary | ICD-10-CM

## 2022-08-15 PROCEDURE — 99214 PR OFFICE/OUTPT VISIT, EST, LEVL IV, 30-39 MIN: ICD-10-PCS | Mod: S$GLB,,, | Performed by: ORTHOPAEDIC SURGERY

## 2022-08-15 PROCEDURE — 99999 PR PBB SHADOW E&M-EST. PATIENT-LVL III: CPT | Mod: PBBFAC,,, | Performed by: ORTHOPAEDIC SURGERY

## 2022-08-15 PROCEDURE — 99214 OFFICE O/P EST MOD 30 MIN: CPT | Mod: S$GLB,,, | Performed by: ORTHOPAEDIC SURGERY

## 2022-08-15 PROCEDURE — 99999 PR PBB SHADOW E&M-EST. PATIENT-LVL III: ICD-10-PCS | Mod: PBBFAC,,, | Performed by: ORTHOPAEDIC SURGERY

## 2022-08-15 NOTE — PROGRESS NOTES
CC: Right knee pain    47 y.o. Female with a history of right knee pain. On 5/10/2022, Pt was walking into work and tripped over the entrance rug landing on her right knee. Immediate swelling and bruising to right knee after BASSEM. No obvious deformity. Xray performed on 5/10/22 for bilateral knees and right wrist. MRI on 5/19/2022 for right knee. Pt reports catching, clicking, locking and episodes of instability. Pt is unable to go up and stairs or walk for long distances due to pain and instability. She states that the pain is severe and not responding to any conservative care.      + mechanical symptoms, + instability    Is affecting ADLs.      SANE: 30    DATE OF PROCEDURE: 08/24/2017  PROCEDURE PERFORMED:   right  1. knee arthroscopic chondroplasty (CPT 31327)  2. knee arthroscopic medial (CPT 91956) meniscectomy   3. knee arthroscopic partial synovectomy/debridement (CPT 55392).   4. knee arthroscopic plica excision(CPT 70925).    5. Knee arthroscopic-assisted arthrocentesis of viable structural human allograft tissue      matrix (BioDRestore) (CPT 82266)              6. Knee arthroscopic loose body removal (CPT 06878)     In the patellofemoral compartment, there was chondral damage to:  Patella 10 x 10 mm grade 2  Trochlea 10 x 10 mm grade 2  Chondroplasty was performed using arthroscopic shaver.     There was chondral damage to:  Medial femoral condyle 10 x 15 mm grade 4  Medial tibial plateau 15 x 10 mm grade 2  Chondroplasty was performed using arthroscopic shaver.     There was chondral damage to:  Lateral tibial plateau 15 x 10 mm grade 2  Chondroplasty was performed using arthroscopic shaver.     Loose bodies measuring 13 x 5 x 7 mm and 10 x 4 x 2 mm  mm were removed from medial compartment using arthroscopic grasper.    Review of Systems   Constitution: Negative. Negative for chills, fever and night sweats.   HENT: Negative for congestion and headaches.    Eyes: Negative for blurred vision, left vision  loss and right vision loss.   Cardiovascular: Negative for chest pain and syncope.   Respiratory: Negative for cough and shortness of breath.    Endocrine: Negative for polydipsia, polyphagia and polyuria.   Hematologic/Lymphatic: Negative for bleeding problem. Does not bruise/bleed easily.   Skin: Negative for dry skin, itching and rash.   Musculoskeletal: Negative for falls. Positive for knee pain and muscle weakness.   Gastrointestinal: Negative for abdominal pain and bowel incontinence.   Genitourinary: Negative for bladder incontinence and nocturia.   Neurological: Negative for disturbances in coordination, loss of balance and seizures.   Psychiatric/Behavioral: Negative for depression. The patient does not have insomnia.    Allergic/Immunologic: Negative for hives and persistent infections.     PAST MEDICAL HISTORY:   Past Medical History:   Diagnosis Date    Abnormal Pap smear     Cervical cancer March 2013    FIGO IB1 AdenoCA Robotic radical hsyt,bso and bplnd    Colon cancer screening 5/4/2021    Depressive disorder, not elsewhere classified     Endometriosis of uterus     FH: colonic polyps 3/24/2021    Foot fracture, right     Headache(784.0)     Hypertension     Hypothyroidism     Low grade squamous intraepith lesion on cytologic smear vagina (lgsil) 10/14/2019    Malignant neoplasm of exocervix 10/10/2019    Migraine headache     Urinary tract infection     Vaginal atrophy 5/8/2018    Visit for screening mammogram 12/16/2015    Well woman exam with routine gynecological exam 12/16/2015     PAST SURGICAL HISTORY:   Past Surgical History:   Procedure Laterality Date    BREAST BIOPSY Right 2002    Core bx, benign    CHONDROPLASTY OF KNEE Left 6/10/2021    Procedure: CHONDROPLASTY, KNEE;  Surgeon: Kelly Zaman MD;  Location: [facility];  Service: Orthopedics;  Laterality: Left;    COLONOSCOPY N/A 7/6/2022    Procedure: COLONOSCOPY;  Surgeon: Issac Delcid MD;  Location: [address]  [facility]);  Service: Endoscopy;  Laterality: N/A;  Fully vaccinated, prep instr portal -ml    HYSTERECTOMY  May 2013    Robotic radical hyst, bso, blplnd    KNEE ARTHROSCOPY W/ MENISCECTOMY Left 6/10/2021    Procedure: ARTHROSCOPY, KNEE, WITH MENISCECTOMY MEDIAL;  Surgeon: Kelly Zaman MD;  Location: [facility] OR;  Service: Orthopedics;  Laterality: Left;    KNEE SURGERY  08/24/2017    right knee    LIPOMA RESECTION      OOPHORECTOMY      bilateral    PELVIC AND PARA-AORTIC LYMPH NODE DISSECTION      PELVIC LAPAROSCOPY      endometriosis    VA REMOVAL OF OVARY/TUBE(S)      SINUS SURGERY      x2    SYNOVECTOMY OF KNEE Left 6/10/2021    Procedure: SYNOVECTOMY, KNEE;  Surgeon: Kelly Zaman MD;  Location: [facility] OR;  Service: Orthopedics;  Laterality: Left;    TONSILLECTOMY       FAMILY HISTORY:   Family History   Problem Relation Age of Onset    Colon cancer Other     Migraines Mother     Cancer Mother         endometrial cancer     Hypertension Mother     Hypothyroidism Mother     Heart disease Mother     Hyperlipidemia Mother     Migraines Maternal Grandmother     Aneurysm Maternal Grandmother         intracranial aneurysm    Stroke Maternal Grandmother     Cancer Maternal Aunt     Cancer Paternal Grandfather         lymphoma non hodgkins     Diabetes Father     Skin cancer Other     Rheum arthritis Paternal Grandmother 80    Rheum arthritis Maternal Aunt     Ovarian cancer Neg Hx     Uterine cancer Neg Hx     Breast cancer Neg Hx      SOCIAL HISTORY:   Social History     Socioeconomic History    Marital status: Single    Number of children: 1    Years of education: 23   Occupational History    Occupation: RN     Employer: [employer]   Tobacco Use    Smoking status: Never Smoker    Smokeless tobacco: Never Used   Substance and Sexual Activity    Alcohol use: Yes     Alcohol/week: 1.0 standard drink     Types: 1 Glasses of wine per week     Comment: socially    Drug use: Never    Other Topics Concern    Are you pregnant or think you may be? No    Breast-feeding No       MEDICATIONS:   Current Outpatient Medications:     ALPRAZolam (XANAX) 0.5 MG tablet, Take 1 tablet (0.5 mg total) by mouth daily as needed for Anxiety., Disp: 30 tablet, Rfl: 0    atenoloL (TENORMIN) 50 MG tablet, TAKE 1 TABLET (50 MG TOTAL) BY MOUTH 2 (TWO) TIMES DAILY., Disp: 180 tablet, Rfl: 3    azelaic acid (FINACEA) 15 % Foam, Apply thin film to face qhs, Disp: 50 g, Rfl: 6    azelaic acid (FINACEA) 15 % gel, Apply topically once daily., Disp: 60 g, Rfl: 3    DULoxetine (CYMBALTA) 30 MG capsule, Take 1 capsule (30 mg total) by mouth 2 (two) times daily., Disp: 90 capsule, Rfl: 3    fluticasone (FLONASE) 50 mcg/actuation nasal spray, 1 spray by Each Nare route daily as needed for Rhinitis., Disp: , Rfl:     hydroquinone 4 % Crea, Use hs for dark spots, Disp: 28.35 g, Rfl: 3    levocetirizine (XYZAL) 5 MG tablet, Take 1 tablet (5 mg total) by mouth once daily., Disp: 90 tablet, Rfl: 3    levothyroxine (SYNTHROID) 50 MCG tablet, TAKE ONE TABLET BY MOUTH EVERY DAY, Disp: 90 tablet, Rfl: 0    ondansetron (ZOFRAN-ODT) 4 MG TbDL, Take 1 tablet (4 mg total) by mouth 2 (two) times daily as needed (nausea)., Disp: 10 tablet, Rfl: 3    promethazine (PHENERGAN) 25 MG tablet, Take 1 tablet (25 mg total) by mouth every 6 (six) hours as needed for Nausea., Disp: 8 tablet, Rfl: 0    sodium,potassium,mag sulfates (SUPREP BOWEL PREP KIT) 17.5-3.13-1.6 gram SolR, Take 354 mLs by mouth as needed (for colonoscopy)., Disp: 1 kit, Rfl: 0    sodium,potassium,mag sulfates (SUPREP BOWEL PREP KIT) 17.5-3.13-1.6 gram SolR, Take 177 mLs by mouth once daily., Disp: 1 kit, Rfl: 0    traMADoL (ULTRAM) 50 mg tablet, Take 1-2 tablets ( mg total) by mouth every 6 (six) hours as needed for Pain., Disp: 21 tablet, Rfl: 0    triamterene-hydrochlorothiazide 37.5-25 mg (DYAZIDE) 37.5-25 mg per capsule, TAKE ONE CAPSULE BY MOUTH EVERY  "DAY, Disp: 90 capsule, Rfl: 3    vitamin D (VITAMIN D3) 1000 units Tab, Take 5 tablets (5,000 Units total) by mouth once daily., Disp: 30 tablet, Rfl: prn    albuterol 90 mcg/actuation inhaler, Inhale 2 puffs into the lungs every 4 (four) hours as needed for Wheezing., Disp: 6.7 g, Rfl: 3    aspirin (ECOTRIN) 81 MG EC tablet, Take 1 tablet (81 mg total) by mouth once daily. For 4 weeks starting the day after surgery., Disp: 28 tablet, Rfl: 0    EPINEPHrine (EPIPEN) 0.3 mg/0.3 mL AtIn, Inject 0.3 mLs (0.3 mg total) into the muscle once., Disp: 2 each, Rfl: 2    estradioL (VAGIFEM) 10 mcg Tab, Place 1 tablet (10 mcg total) vaginally twice a week., Disp: 8 tablet, Rfl: 12    Current Facility-Administered Medications:     diphenhydrAMINE injection 50 mg, 50 mg, Intravenous, PRN, Aleks Kwok III, PA-C  ALLERGIES:   Review of patient's allergies indicates:   Allergen Reactions    Lortab [hydrocodone-acetaminophen] Itching and Rash     States can take - may have been formulation    Other Anaphylaxis     mushrooms    Diflucan  [fluconazole]      Other reaction(s): Hives    Iodine and iodide containing products Hives     Iv iodine only    Mushroom combination no.1      Other reaction(s): Bronchial Constriction       VITAL SIGNS: /80   Pulse 76   Ht 5' 5" (1.651 m)   Wt 103.9 kg (229 lb)   BMI 38.11 kg/m²      PHYSICAL EXAMINATION  VITAL SIGNS: /80   Pulse 76   Ht 5' 5" (1.651 m)   Wt 103.9 kg (229 lb)   BMI 38.11 kg/m²    General:  The patient is alert and oriented x 3.  Mood is pleasant.  Observation of ears, eyes and nose reveal no gross abnormalities.  HEENT: NCAT, sclera nonicteric  Lungs: Respirations are equal and unlabored.    Right KNEE EXAMINATION     OBSERVATION / INSPECTION   Gait:   Nonantalgic   Alignment:  Neutral   Scars:   None   Muscle atrophy: Mild  Effusion:  None   Warmth:  None   Discoloration:   none     TENDERNESS / CREPITUS (T / C):          T / C      T / " C   Patella   - / -   Lateral joint line   - / -    Peripatellar medial  -  Medial joint line    + / -    Peripatellar lateral -  Medial plica   - / -    Patellar tendon -   Popliteal fossa  - / -    Quad tendon   -   Gastrocnemius   -   Prepatellar Bursa - / -   Quadricep   -   Tibial tubercle  -  Thigh/hamstring  -   Pes anserine/HS -  Fibula    -   ITB   - / -  Tibia     -   Tib/fib joint  - / -  LCL    -     MFC   - / -   MCL: Proximal  -    LFC   - / -    Distal   -          ROM: (* = pain)  PASSIVE   ACTIVE    Left :   0 / 0 / 108   0 / 0 / 120     Right :    5  0 / 110   5 / 0 / 110    PATELLOFEMORAL EXAMINATION:  See above noted areas of tenderness.   Patella position    Subluxation / dislocation: Centered           Sup. / Inf;   Normal   Crepitus (PF):    Absent   Patellar Mobility:       Medial-lateral:   Normal    Superior-inferior:  Normal    Inferior tilt   Normal    Patellar tendon:  Normal   Lateral tilt:    Normal   J-sign:     None   Patellofemoral grind:   No pain       MENISCAL SIGNS:     Pain on terminal extension:  +  Pain on terminal flexion:  +  Hipolitos maneuver:  + for pain  Squat     + posterior joint pain    LIGAMENT EXAMINATION:  ACL / Lachman:  normal (-1 to 2mm)    PCL-Post.  drawer: normal 0 to 2mm  MCL- Valgus:  normal 0 to 2mm  LCL- Varus:  normal 0 to 2mm  Pivot shift: normal (Equal)   Dial Test: difference c/w other side   At 30° flexion: normal (< 5°)    At 90° flexion: normal (< 5°)   Reverse Pivot Shift:   normal (Equal)     STRENGTH: (* = with pain) PAINFUL SIDE   Quadricep   5/5   Hamstrin/5    EXTREMITY NEURO-VASCULAR EXAMINATION:   Sensation:  Grossly intact to light touch all dermatomal regions.   Motor Function:  Fully intact motor function at hip, knee, foot and ankle    DTRs;  quadriceps and  achilles 2+.  No clonus and downgoing Babinski.    Vascular status:  DP and PT pulses 2+, brisk capillary refill, symmetric.     Other Findings:       X-rays:   including standing, weight bearing AP and flexion bilateral knees, lateral and merchant views ordered and images reviewed by me show:  No fracture, dislocation    MRI:     1.Complex tear with diminutive caliber of the medial meniscus body segment.     2. Femorotibial chondral changes, more significant at the medial compartment as detailed.     3. Two subcentimeter intra-articular bodies versus centrally projecting marginal osteophytes.     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        she would benefit from knee arthroscopy, possible plica excision, possible chondroplasty with possible meniscectomy given the above.     PLAN: We have discussed the surgery and recovery of arthroscopic knee surgery. she understands that there may be limited weightbearing up to several weeks after surgery depending on procedures that are performed at the time of surgery.    Pt will continue working normal duties with no restrictions.     The spectrum of treatment options were discussed with the patient, including nonoperative and operative options.  After thorough discussion, the patient has elected to undergo surgical treatment to include:  right    a. Knee arthroscopic medial meniscectomy      b. Knee arthroscopic possible plica excision   c. Knee arthroscopic possible chondroplasty    The details of the surgical procedure were explained, including the location of probable incisions and a description of likely hardware and/or grafts to be used.  The patient understands the likely convalescence after surgery.  Also, we have thoroughly discussed the risks, benefits and alternatives to surgery, including, but not limited to, the risk of infection, joint stiffness, blood clot (including DVT and/or pulmonary embolus), neurologic and vascular injury.  It was explained that, if tissue has been repaired or reconstructed, there is a chance of failure, which may require further management.      Patient has chondral  damage to knee.  Therefore, i can offer no guarantee whatsoever to the results of a knee arthroscopic surgery.  I believe that arthroscopic surgery will benefit the patient.  I explained this in detail today and patient acknowledged understanding and wishes to proceed.

## 2022-08-18 ENCOUNTER — TELEPHONE (OUTPATIENT)
Dept: SPORTS MEDICINE | Facility: CLINIC | Age: 47
End: 2022-08-18
Payer: COMMERCIAL

## 2022-08-18 ENCOUNTER — PATIENT MESSAGE (OUTPATIENT)
Dept: INTERNAL MEDICINE | Facility: CLINIC | Age: 47
End: 2022-08-18
Payer: COMMERCIAL

## 2022-08-18 DIAGNOSIS — E55.9 VITAMIN D DEFICIENCY: ICD-10-CM

## 2022-08-18 DIAGNOSIS — Z85.41 HISTORY OF CERVICAL CANCER: ICD-10-CM

## 2022-08-18 DIAGNOSIS — Z00.00 HEALTH CARE MAINTENANCE: Primary | ICD-10-CM

## 2022-08-18 NOTE — TELEPHONE ENCOUNTER
----- Message from Brendon Barnhart sent at 8/18/2022  1:41 PM CDT -----  Regarding: missed call  Type:  Patient Returning Call    Who Called:patient     Who Left Message for Patient:office    Does the patient know what this is regarding?:schedule procedure     Would the patient rather a call back or a response via [employer]? Call back     Best Call Back Number:[phone]  Additional Information: n/a

## 2022-08-18 NOTE — TELEPHONE ENCOUNTER
Spoke to patient and scheduled her knee surgery for 11/3/22. I also informed her that she would need to obtain PCP clearance within 30 days prior to surgey.

## 2022-08-19 DIAGNOSIS — S83.241A ACUTE MEDIAL MENISCUS TEAR OF RIGHT KNEE, INITIAL ENCOUNTER: Primary | ICD-10-CM

## 2022-08-19 DIAGNOSIS — M94.261 CHONDROMALACIA OF RIGHT KNEE: ICD-10-CM

## 2022-08-19 DIAGNOSIS — M67.50 PLICA SYNDROME: ICD-10-CM

## 2022-08-22 ENCOUNTER — OFFICE VISIT (OUTPATIENT)
Dept: OPTOMETRY | Facility: CLINIC | Age: 47
End: 2022-08-22
Payer: COMMERCIAL

## 2022-08-22 DIAGNOSIS — H40.039 ANATOMICAL NARROW ANGLE: Primary | ICD-10-CM

## 2022-08-22 DIAGNOSIS — R73.03 PRE-DIABETES: ICD-10-CM

## 2022-08-22 DIAGNOSIS — H52.4 HYPEROPIA WITH ASTIGMATISM AND PRESBYOPIA, BILATERAL: ICD-10-CM

## 2022-08-22 DIAGNOSIS — Z97.3 WEARS CONTACT LENSES: ICD-10-CM

## 2022-08-22 DIAGNOSIS — H52.03 HYPEROPIA WITH ASTIGMATISM AND PRESBYOPIA, BILATERAL: ICD-10-CM

## 2022-08-22 DIAGNOSIS — H04.123 DRY EYE SYNDROME OF BOTH EYES: ICD-10-CM

## 2022-08-22 DIAGNOSIS — H52.203 HYPEROPIA WITH ASTIGMATISM AND PRESBYOPIA, BILATERAL: ICD-10-CM

## 2022-08-22 DIAGNOSIS — Z46.0 FITTING AND ADJUSTMENT OF SPECTACLES AND CONTACT LENSES: Primary | ICD-10-CM

## 2022-08-22 PROCEDURE — 2023F PR DILATED RETINAL EXAM W/O EVID OF RETINOPATHY: ICD-10-PCS | Mod: CPTII,S$GLB,, | Performed by: OPTOMETRIST

## 2022-08-22 PROCEDURE — 2023F DILAT RTA XM W/O RTNOPTHY: CPT | Mod: CPTII,S$GLB,, | Performed by: OPTOMETRIST

## 2022-08-22 PROCEDURE — 92310 PR CONTACT LENS FITTING (NO CHANGE): ICD-10-PCS | Mod: CSM,S$GLB,, | Performed by: OPTOMETRIST

## 2022-08-22 PROCEDURE — 99499 NO LOS: ICD-10-PCS | Mod: S$GLB,,, | Performed by: OPTOMETRIST

## 2022-08-22 PROCEDURE — 92004 PR EYE EXAM, NEW PATIENT,COMPREHESV: ICD-10-PCS | Mod: S$GLB,,, | Performed by: OPTOMETRIST

## 2022-08-22 PROCEDURE — 92015 PR REFRACTION: ICD-10-PCS | Mod: S$GLB,,, | Performed by: OPTOMETRIST

## 2022-08-22 PROCEDURE — 1159F MED LIST DOCD IN RCRD: CPT | Mod: CPTII,S$GLB,, | Performed by: OPTOMETRIST

## 2022-08-22 PROCEDURE — 92310 CONTACT LENS FITTING OU: CPT | Mod: CSM,S$GLB,, | Performed by: OPTOMETRIST

## 2022-08-22 PROCEDURE — 99499 UNLISTED E&M SERVICE: CPT | Mod: S$GLB,,, | Performed by: OPTOMETRIST

## 2022-08-22 PROCEDURE — 92004 COMPRE OPH EXAM NEW PT 1/>: CPT | Mod: S$GLB,,, | Performed by: OPTOMETRIST

## 2022-08-22 PROCEDURE — 99999 PR PBB SHADOW E&M-EST. PATIENT-LVL III: CPT | Mod: PBBFAC,,, | Performed by: OPTOMETRIST

## 2022-08-22 PROCEDURE — 99999 PR PBB SHADOW E&M-EST. PATIENT-LVL III: ICD-10-PCS | Mod: PBBFAC,,, | Performed by: OPTOMETRIST

## 2022-08-22 PROCEDURE — 1159F PR MEDICATION LIST DOCUMENTED IN MEDICAL RECORD: ICD-10-PCS | Mod: CPTII,S$GLB,, | Performed by: OPTOMETRIST

## 2022-08-22 PROCEDURE — 92015 DETERMINE REFRACTIVE STATE: CPT | Mod: S$GLB,,, | Performed by: OPTOMETRIST

## 2022-08-22 NOTE — PROGRESS NOTES
"HPI     Last eye exam was approximately 1 year ago.   Patient reports she has [employer] vision coverage. She is an [employer]   employee.   Patient states decrease in overall vision since last exam. Was prescribed   glasses but can't tolerate bifocals and hates having to take glasses on   and off all day. Would like to try CL's again.  Patient denies diplopia, headaches, flashes/floaters, and pain.      Last edited by Kristen Ross, OD on 8/22/2022  4:48 PM. (History)            Assessment /Plan     For exam results, see Encounter Report.    Anatomical narrow angle    Dry eye syndrome of both eyes    Hyperopia with astigmatism and presbyopia, bilateral    Pre-diabetes      1. Educated pt on findings. Narrow angles OU. Patient has been dilated in the past, but angles appear very narrow. DFE not performed today. (+)FHx of glaucoma. Will refer to Dr. Waddell for further eval. Monitor.     2. Educated pt on findings. Recommended ATs BID-TID for added lubrication and comfort. Monitor.     3. Updated SRx, PAL - computer/reading. Trial framed in office with great result. Educated on possible adaptation symptoms. Patient aware that specs can only be used for computer and reading. Monitor yearly.   CL fit attempted with spherical lens. Patient unhappy with "ghosting" image. Will order toric lens. Patient to be called when lens comes in and will schedule Cl f/u. Monovision, OD distance (uncorrected) and OS near (corrected with CL). Monitor.     4. No retinopathy noted OU on un-dilated eye exam. Continue proper BS control and annual diabetic eye exams. Monitor yearly.       RTC with Dr. Waddell as directed, me yearly or prn.                  "

## 2022-08-23 ENCOUNTER — PATIENT MESSAGE (OUTPATIENT)
Dept: OPTOMETRY | Facility: CLINIC | Age: 47
End: 2022-08-23
Payer: COMMERCIAL

## 2022-08-29 ENCOUNTER — OFFICE VISIT (OUTPATIENT)
Dept: INTERNAL MEDICINE | Facility: CLINIC | Age: 47
End: 2022-08-29
Payer: COMMERCIAL

## 2022-08-29 ENCOUNTER — LAB VISIT (OUTPATIENT)
Dept: LAB | Facility: HOSPITAL | Age: 47
End: 2022-08-29
Payer: COMMERCIAL

## 2022-08-29 VITALS
WEIGHT: 227.06 LBS | HEIGHT: 65 IN | HEART RATE: 78 BPM | SYSTOLIC BLOOD PRESSURE: 116 MMHG | BODY MASS INDEX: 37.83 KG/M2 | OXYGEN SATURATION: 98 % | DIASTOLIC BLOOD PRESSURE: 78 MMHG

## 2022-08-29 DIAGNOSIS — Z00.00 HEALTH CARE MAINTENANCE: Primary | ICD-10-CM

## 2022-08-29 DIAGNOSIS — Z91.018 FOOD ALLERGY: ICD-10-CM

## 2022-08-29 DIAGNOSIS — R73.03 PRE-DIABETES: ICD-10-CM

## 2022-08-29 DIAGNOSIS — F41.1 GAD (GENERALIZED ANXIETY DISORDER): ICD-10-CM

## 2022-08-29 DIAGNOSIS — I10 ESSENTIAL HYPERTENSION: ICD-10-CM

## 2022-08-29 DIAGNOSIS — F33.1 MODERATE EPISODE OF RECURRENT MAJOR DEPRESSIVE DISORDER: ICD-10-CM

## 2022-08-29 DIAGNOSIS — E03.8 OTHER SPECIFIED HYPOTHYROIDISM: ICD-10-CM

## 2022-08-29 DIAGNOSIS — N30.90 CYSTITIS: ICD-10-CM

## 2022-08-29 DIAGNOSIS — E03.9 ACQUIRED HYPOTHYROIDISM: ICD-10-CM

## 2022-08-29 DIAGNOSIS — E55.9 VITAMIN D DEFICIENCY: ICD-10-CM

## 2022-08-29 DIAGNOSIS — I10 PRIMARY HYPERTENSION: ICD-10-CM

## 2022-08-29 DIAGNOSIS — Z00.00 HEALTH CARE MAINTENANCE: ICD-10-CM

## 2022-08-29 DIAGNOSIS — Z85.41 HISTORY OF CERVICAL CANCER: ICD-10-CM

## 2022-08-29 LAB
25(OH)D3+25(OH)D2 SERPL-MCNC: 87 NG/ML (ref 30–96)
ALBUMIN SERPL BCP-MCNC: 3.8 G/DL (ref 3.5–5.2)
ALP SERPL-CCNC: 79 U/L (ref 55–135)
ALT SERPL W/O P-5'-P-CCNC: 25 U/L (ref 10–44)
ANION GAP SERPL CALC-SCNC: 8 MMOL/L (ref 8–16)
AST SERPL-CCNC: 18 U/L (ref 10–40)
BASOPHILS # BLD AUTO: 0.06 K/UL (ref 0–0.2)
BASOPHILS NFR BLD: 0.9 % (ref 0–1.9)
BILIRUB SERPL-MCNC: 0.3 MG/DL (ref 0.1–1)
BILIRUB UR QL STRIP: NEGATIVE
BUN SERPL-MCNC: 19 MG/DL (ref 6–20)
CALCIUM SERPL-MCNC: 10.2 MG/DL (ref 8.7–10.5)
CANCER AG125 SERPL-ACNC: 8 U/ML (ref 0–30)
CHLORIDE SERPL-SCNC: 105 MMOL/L (ref 95–110)
CHOLEST SERPL-MCNC: 181 MG/DL (ref 120–199)
CHOLEST/HDLC SERPL: 3 {RATIO} (ref 2–5)
CLARITY UR REFRACT.AUTO: CLEAR
CO2 SERPL-SCNC: 29 MMOL/L (ref 23–29)
COLOR UR AUTO: YELLOW
CREAT SERPL-MCNC: 0.8 MG/DL (ref 0.5–1.4)
DIFFERENTIAL METHOD: ABNORMAL
EOSINOPHIL # BLD AUTO: 0.3 K/UL (ref 0–0.5)
EOSINOPHIL NFR BLD: 4.9 % (ref 0–8)
ERYTHROCYTE [DISTWIDTH] IN BLOOD BY AUTOMATED COUNT: 13.2 % (ref 11.5–14.5)
EST. GFR  (NO RACE VARIABLE): >60 ML/MIN/1.73 M^2
ESTIMATED AVG GLUCOSE: 117 MG/DL (ref 68–131)
GLUCOSE SERPL-MCNC: 106 MG/DL (ref 70–110)
GLUCOSE UR QL STRIP: NEGATIVE
HBA1C MFR BLD: 5.7 % (ref 4–5.6)
HCT VFR BLD AUTO: 40.3 % (ref 37–48.5)
HDLC SERPL-MCNC: 60 MG/DL (ref 40–75)
HDLC SERPL: 33.1 % (ref 20–50)
HGB BLD-MCNC: 14.2 G/DL (ref 12–16)
HGB UR QL STRIP: NEGATIVE
IMM GRANULOCYTES # BLD AUTO: 0.02 K/UL (ref 0–0.04)
IMM GRANULOCYTES NFR BLD AUTO: 0.3 % (ref 0–0.5)
KETONES UR QL STRIP: ABNORMAL
LDLC SERPL CALC-MCNC: 108.2 MG/DL (ref 63–159)
LEUKOCYTE ESTERASE UR QL STRIP: NEGATIVE
LYMPHOCYTES # BLD AUTO: 2.6 K/UL (ref 1–4.8)
LYMPHOCYTES NFR BLD: 41.3 % (ref 18–48)
MCH RBC QN AUTO: 30.9 PG (ref 27–31)
MCHC RBC AUTO-ENTMCNC: 35.2 G/DL (ref 32–36)
MCV RBC AUTO: 88 FL (ref 82–98)
MONOCYTES # BLD AUTO: 1 K/UL (ref 0.3–1)
MONOCYTES NFR BLD: 16.4 % (ref 4–15)
NEUTROPHILS # BLD AUTO: 2.3 K/UL (ref 1.8–7.7)
NEUTROPHILS NFR BLD: 36.2 % (ref 38–73)
NITRITE UR QL STRIP: NEGATIVE
NONHDLC SERPL-MCNC: 121 MG/DL
NRBC BLD-RTO: 0 /100 WBC
PH UR STRIP: 7 [PH] (ref 5–8)
PLATELET # BLD AUTO: 243 K/UL (ref 150–450)
PLATELET BLD QL SMEAR: ABNORMAL
PMV BLD AUTO: 9.7 FL (ref 9.2–12.9)
POTASSIUM SERPL-SCNC: 4 MMOL/L (ref 3.5–5.1)
PROT SERPL-MCNC: 7.8 G/DL (ref 6–8.4)
PROT UR QL STRIP: NEGATIVE
RBC # BLD AUTO: 4.59 M/UL (ref 4–5.4)
SODIUM SERPL-SCNC: 142 MMOL/L (ref 136–145)
SP GR UR STRIP: 1.02 (ref 1–1.03)
TRIGL SERPL-MCNC: 64 MG/DL (ref 30–150)
TSH SERPL DL<=0.005 MIU/L-ACNC: 1.68 UIU/ML (ref 0.4–4)
URN SPEC COLLECT METH UR: ABNORMAL
WBC # BLD AUTO: 6.34 K/UL (ref 3.9–12.7)

## 2022-08-29 PROCEDURE — 1159F PR MEDICATION LIST DOCUMENTED IN MEDICAL RECORD: ICD-10-PCS | Mod: CPTII,S$GLB,, | Performed by: INTERNAL MEDICINE

## 2022-08-29 PROCEDURE — 83036 HEMOGLOBIN GLYCOSYLATED A1C: CPT | Performed by: INTERNAL MEDICINE

## 2022-08-29 PROCEDURE — 84443 ASSAY THYROID STIM HORMONE: CPT | Performed by: INTERNAL MEDICINE

## 2022-08-29 PROCEDURE — 1160F RVW MEDS BY RX/DR IN RCRD: CPT | Mod: CPTII,S$GLB,, | Performed by: INTERNAL MEDICINE

## 2022-08-29 PROCEDURE — 80061 LIPID PANEL: CPT | Performed by: INTERNAL MEDICINE

## 2022-08-29 PROCEDURE — 3044F HG A1C LEVEL LT 7.0%: CPT | Mod: CPTII,S$GLB,, | Performed by: INTERNAL MEDICINE

## 2022-08-29 PROCEDURE — 99396 PREV VISIT EST AGE 40-64: CPT | Mod: S$GLB,,, | Performed by: INTERNAL MEDICINE

## 2022-08-29 PROCEDURE — 1160F PR REVIEW ALL MEDS BY PRESCRIBER/CLIN PHARMACIST DOCUMENTED: ICD-10-PCS | Mod: CPTII,S$GLB,, | Performed by: INTERNAL MEDICINE

## 2022-08-29 PROCEDURE — 3074F PR MOST RECENT SYSTOLIC BLOOD PRESSURE < 130 MM HG: ICD-10-PCS | Mod: CPTII,S$GLB,, | Performed by: INTERNAL MEDICINE

## 2022-08-29 PROCEDURE — 81003 URINALYSIS AUTO W/O SCOPE: CPT | Performed by: INTERNAL MEDICINE

## 2022-08-29 PROCEDURE — 99396 PR PREVENTIVE VISIT,EST,40-64: ICD-10-PCS | Mod: S$GLB,,, | Performed by: INTERNAL MEDICINE

## 2022-08-29 PROCEDURE — 3074F SYST BP LT 130 MM HG: CPT | Mod: CPTII,S$GLB,, | Performed by: INTERNAL MEDICINE

## 2022-08-29 PROCEDURE — 80053 COMPREHEN METABOLIC PANEL: CPT | Performed by: INTERNAL MEDICINE

## 2022-08-29 PROCEDURE — 1159F MED LIST DOCD IN RCRD: CPT | Mod: CPTII,S$GLB,, | Performed by: INTERNAL MEDICINE

## 2022-08-29 PROCEDURE — 3078F DIAST BP <80 MM HG: CPT | Mod: CPTII,S$GLB,, | Performed by: INTERNAL MEDICINE

## 2022-08-29 PROCEDURE — 3008F BODY MASS INDEX DOCD: CPT | Mod: CPTII,S$GLB,, | Performed by: INTERNAL MEDICINE

## 2022-08-29 PROCEDURE — 86304 IMMUNOASSAY TUMOR CA 125: CPT | Performed by: INTERNAL MEDICINE

## 2022-08-29 PROCEDURE — 3008F PR BODY MASS INDEX (BMI) DOCUMENTED: ICD-10-PCS | Mod: CPTII,S$GLB,, | Performed by: INTERNAL MEDICINE

## 2022-08-29 PROCEDURE — 3044F PR MOST RECENT HEMOGLOBIN A1C LEVEL <7.0%: ICD-10-PCS | Mod: CPTII,S$GLB,, | Performed by: INTERNAL MEDICINE

## 2022-08-29 PROCEDURE — 99999 PR PBB SHADOW E&M-EST. PATIENT-LVL IV: ICD-10-PCS | Mod: PBBFAC,,, | Performed by: INTERNAL MEDICINE

## 2022-08-29 PROCEDURE — 85025 COMPLETE CBC W/AUTO DIFF WBC: CPT | Performed by: INTERNAL MEDICINE

## 2022-08-29 PROCEDURE — 99999 PR PBB SHADOW E&M-EST. PATIENT-LVL IV: CPT | Mod: PBBFAC,,, | Performed by: INTERNAL MEDICINE

## 2022-08-29 PROCEDURE — 36415 COLL VENOUS BLD VENIPUNCTURE: CPT | Performed by: INTERNAL MEDICINE

## 2022-08-29 PROCEDURE — 82306 VITAMIN D 25 HYDROXY: CPT | Performed by: INTERNAL MEDICINE

## 2022-08-29 PROCEDURE — 3078F PR MOST RECENT DIASTOLIC BLOOD PRESSURE < 80 MM HG: ICD-10-PCS | Mod: CPTII,S$GLB,, | Performed by: INTERNAL MEDICINE

## 2022-08-29 RX ORDER — ALPRAZOLAM 0.5 MG/1
0.5 TABLET ORAL DAILY PRN
Qty: 30 TABLET | Refills: 0 | Status: SHIPPED | OUTPATIENT
Start: 2022-08-29 | End: 2023-10-12 | Stop reason: SDUPTHER

## 2022-08-29 RX ORDER — CIPROFLOXACIN 250 MG/1
250 TABLET, FILM COATED ORAL EVERY 12 HOURS
Qty: 6 TABLET | Refills: 0 | Status: SHIPPED | OUTPATIENT
Start: 2022-08-29 | End: 2022-09-01

## 2022-08-29 RX ORDER — LEVOTHYROXINE SODIUM 50 UG/1
50 TABLET ORAL DAILY
Qty: 90 TABLET | Refills: 3 | Status: SHIPPED | OUTPATIENT
Start: 2022-08-29

## 2022-08-29 RX ORDER — EPINEPHRINE 0.3 MG/.3ML
1 INJECTION SUBCUTANEOUS ONCE
Qty: 2 EACH | Refills: 2 | Status: SHIPPED | OUTPATIENT
Start: 2022-08-29 | End: 2022-08-29

## 2022-08-29 RX ORDER — TRIAMTERENE AND HYDROCHLOROTHIAZIDE 37.5; 25 MG/1; MG/1
1 CAPSULE ORAL DAILY
Qty: 90 CAPSULE | Refills: 3 | Status: SHIPPED | OUTPATIENT
Start: 2022-08-29

## 2022-08-29 RX ORDER — DULOXETIN HYDROCHLORIDE 30 MG/1
30 CAPSULE, DELAYED RELEASE ORAL 2 TIMES DAILY
Qty: 90 CAPSULE | Refills: 3 | Status: SHIPPED | OUTPATIENT
Start: 2022-08-29 | End: 2023-07-13 | Stop reason: SDUPTHER

## 2022-08-29 RX ORDER — ATENOLOL 50 MG/1
TABLET ORAL
Qty: 180 TABLET | Refills: 3 | Status: SHIPPED | OUTPATIENT
Start: 2022-08-29

## 2022-08-29 NOTE — PROGRESS NOTES
"Subjective:       Patient ID: Emily Max is a 47 y.o. female.    Chief Complaint: Annual Exam and Urinary Tract Infection    HPI  Emily Max is a 47 y.o. female here for a yearly preventative healthcare visit.     Possible UTI. Started two days ago, foul odor, urgency and hesitancy. She started cipro yesterday, on dose 3. Some flank pain on left. No fever. No chills.     Lost 15 lbs  Tried metformin once and made her sick.  "Keto light" - more fat and meat than usual.     Working full time at [employer], nights.    Fell, retore her right meniscus, surgery November.     Covid July 19th.     She has a history of HTN, Obesity, Migraine H/As, Hypothyroidism, GERD, Allergic Rhinitis, Anxiety, Depression, S/P EVLT for Chronic Venous Insufficiency, and S/P Hysterectomy for Stage 1B Cervical Cancer 5/2013, prediabetes.   a1c today in process; 6.3% in May '21.        PMH:  1. HTN           Atenolol 50 mg BID           Dyazide 37.5-25 mg/day     2. Migraine H/A           Atenolol 50 mg BID     3. Hypothyroidism           Synthroid 50 mcg/day     4. Vit D Deficiency            Cholecalciferol 1,000 units daily     5. Anxiety & Depression.              (previously) Followed by Ms. Abreu, LCSW and Dr. Elif Charlton/Penny             Cymbalta 30 mg bid                     Rare use of Xanax 0.5mg daily prn     6. Obesity             (BMI-37)   Has been doing great w weight loss on modified keto     7. Hx of GERD     8. Chronic Venous Insufficiency and Varicose Veins              Followed in Vascular Surgery               S/P Right EVLT 5/2014              S/P Left EVLT 1/19/17     9. Stage 1B Cervical CA             S/P Total Hysterectomy 5/2013             Followed by Dr. Matias - last visit - 7/2016     10. Surgical Menopause              Estrace 0.5 - will follow up w gyn     11. Allergic Rhinitis & Food Allergies             Followed by Dr. Finley (Allergy)             Astelin Nasal Spray, " "Xyzal 5 mg, Epipen  Review of Systems   Constitutional:  Negative for fever.   Respiratory:  Negative for shortness of breath.    Cardiovascular:  Negative for chest pain.   Musculoskeletal: Negative.    Skin: Negative.      Objective:   /78 (BP Location: Left arm, Patient Position: Sitting, BP Method: Large (Manual))   Pulse 78   Ht 5' 5" (1.651 m)   Wt 103 kg (227 lb 1.2 oz)   SpO2 98%   BMI 37.79 kg/m²      Physical Exam  Constitutional:       General: She is not in acute distress.     Appearance: She is well-developed. She is not diaphoretic.   HENT:      Head: Normocephalic and atraumatic.   Cardiovascular:      Rate and Rhythm: Normal rate and regular rhythm.   Pulmonary:      Effort: Pulmonary effort is normal. No respiratory distress.      Breath sounds: No wheezing or rales.   Skin:     General: Skin is warm and dry.   Neurological:      Mental Status: She is alert and oriented to person, place, and time.   Psychiatric:         Behavior: Behavior normal.       Assessment:       1. Health care maintenance    2. Pre-diabetes    3. Other specified hypothyroidism    4. Moderate episode of recurrent major depressive disorder    5. Primary hypertension    6. Cystitis    7. REGINA (generalized anxiety disorder)    8. Essential hypertension    9. Food allergy    10. Acquired hypothyroidism        Plan:       Emily was seen today for annual exam and urinary tract infection.    Diagnoses and all orders for this visit:    Health care maintenance    Pre-diabetes    Other specified hypothyroidism    Moderate episode of recurrent major depressive disorder  -     DULoxetine (CYMBALTA) 30 MG capsule; Take 1 capsule (30 mg total) by mouth 2 (two) times daily.    Primary hypertension    Cystitis  -     Urinalysis, Reflex to Urine Culture Urine, Clean Catch; Future  -     ciprofloxacin HCl (CIPRO) 250 MG tablet; Take 1 tablet (250 mg total) by mouth every 12 (twelve) hours. for 3 days  -     Urinalysis, Reflex to " Urine Culture Urine, Clean Catch    REGINA (generalized anxiety disorder)  -     ALPRAZolam (XANAX) 0.5 MG tablet; Take 1 tablet (0.5 mg total) by mouth daily as needed for Anxiety.  -     DULoxetine (CYMBALTA) 30 MG capsule; Take 1 capsule (30 mg total) by mouth 2 (two) times daily.    Essential hypertension  -     atenoloL (TENORMIN) 50 MG tablet; TAKE 1 TABLET (50 MG TOTAL) BY MOUTH 2 (TWO) TIMES DAILY.  -     triamterene-hydrochlorothiazide 37.5-25 mg (DYAZIDE) 37.5-25 mg per capsule; Take 1 capsule by mouth once daily.    Food allergy  -     EPINEPHrine (EPIPEN) 0.3 mg/0.3 mL AtIn; Inject 0.3 mLs (0.3 mg total) into the muscle once. for 1 dose    Acquired hypothyroidism  -     levothyroxine (SYNTHROID) 50 MCG tablet; Take 1 tablet (50 mcg total) by mouth once daily.          Tdap done at work; will send me note    Addendum  Pt is medically optimized to proceed with meniscus surgery at this time.   Sincerely,  Elif Lew MD

## 2022-09-21 ENCOUNTER — TELEPHONE (OUTPATIENT)
Dept: INTERNAL MEDICINE | Facility: CLINIC | Age: 47
End: 2022-09-21
Payer: COMMERCIAL

## 2022-09-28 ENCOUNTER — PATIENT MESSAGE (OUTPATIENT)
Dept: ADMINISTRATIVE | Facility: OTHER | Age: 47
End: 2022-09-28
Payer: COMMERCIAL

## 2022-09-28 ENCOUNTER — PATIENT MESSAGE (OUTPATIENT)
Dept: SPORTS MEDICINE | Facility: CLINIC | Age: 47
End: 2022-09-28
Payer: COMMERCIAL

## 2022-09-29 ENCOUNTER — PATIENT MESSAGE (OUTPATIENT)
Dept: ADMINISTRATIVE | Facility: OTHER | Age: 47
End: 2022-09-29
Payer: COMMERCIAL

## 2022-09-30 ENCOUNTER — PATIENT MESSAGE (OUTPATIENT)
Dept: SURGERY | Facility: HOSPITAL | Age: 47
End: 2022-09-30
Payer: COMMERCIAL

## 2022-10-07 ENCOUNTER — PATIENT MESSAGE (OUTPATIENT)
Dept: INTERNAL MEDICINE | Facility: CLINIC | Age: 47
End: 2022-10-07
Payer: COMMERCIAL

## 2022-10-12 ENCOUNTER — IMMUNIZATION (OUTPATIENT)
Dept: INTERNAL MEDICINE | Facility: CLINIC | Age: 47
End: 2022-10-12
Payer: COMMERCIAL

## 2022-10-12 PROCEDURE — 90471 FLU VACCINE (QUAD) GREATER THAN OR EQUAL TO 3YO PRESERVATIVE FREE IM: ICD-10-PCS | Mod: S$GLB,,, | Performed by: INTERNAL MEDICINE

## 2022-10-12 PROCEDURE — 90686 FLU VACCINE (QUAD) GREATER THAN OR EQUAL TO 3YO PRESERVATIVE FREE IM: ICD-10-PCS | Mod: S$GLB,,, | Performed by: INTERNAL MEDICINE

## 2022-10-12 PROCEDURE — 90471 IMMUNIZATION ADMIN: CPT | Mod: S$GLB,,, | Performed by: INTERNAL MEDICINE

## 2022-10-12 PROCEDURE — 90686 IIV4 VACC NO PRSV 0.5 ML IM: CPT | Mod: S$GLB,,, | Performed by: INTERNAL MEDICINE

## 2022-10-21 ENCOUNTER — PATIENT MESSAGE (OUTPATIENT)
Dept: SURGERY | Facility: HOSPITAL | Age: 47
End: 2022-10-21
Payer: COMMERCIAL

## 2022-10-31 ENCOUNTER — PATIENT MESSAGE (OUTPATIENT)
Dept: PREADMISSION TESTING | Facility: HOSPITAL | Age: 47
End: 2022-10-31
Payer: COMMERCIAL

## 2022-10-31 NOTE — ANESTHESIA PAT ROS NOTE
10/31/2022  Emily Max is a 47 y.o., female.      Pre-op Assessment    I have reviewed the Patient Summary Reports.       I have reviewed the Medications.     Review of Systems  Anesthesia Hx:  No problems with previous Anesthesia   History of prior surgery of interest to airway management or planning:  Previous anesthesia: General Colonoscopy 7/6/22.       Left Knee Arthroscopy 6/10/2021 with general anesthesia.  Procedure performed at an [facility].      Airway issues documented on chart review include mask, easy, laryngeal mask airway used       Denies Personal Hx of Anesthesia complications.                    Social:  Non-Smoker       Cardiovascular:     Hypertension              ECG has been reviewed.                          OB/GYN/PEDS:  Hysterectomy           Neurological:      Headaches                                 Endocrine:   Hypothyroidism          Psych:    depression               Past Surgical History:   Procedure Laterality Date    BREAST BIOPSY Right 2002    Core bx, benign    CHONDROPLASTY OF KNEE Left 6/10/2021    Procedure: CHONDROPLASTY, KNEE;  Surgeon: Kelly Zaman MD;  Location: [facility] OR;  Service: Orthopedics;  Laterality: Left;    COLONOSCOPY N/A 7/6/2022    Procedure: COLONOSCOPY;  Surgeon: Issac Delcid MD;  Location: [address]);  Service: Endoscopy;  Laterality: N/A;  Fully vaccinated, prep instr portal -ml    HYSTERECTOMY  May 2013    Robotic radical hyst, bso, blplnd    KNEE ARTHROSCOPY W/ MENISCECTOMY Left 6/10/2021    Procedure: ARTHROSCOPY, KNEE, WITH MENISCECTOMY MEDIAL;  Surgeon: Kelly Zaman MD;  Location: [facility];  Service: Orthopedics;  Laterality: Left;    KNEE SURGERY  08/24/2017    right knee    LIPOMA RESECTION      OOPHORECTOMY      bilateral    PELVIC AND PARA-AORTIC LYMPH NODE DISSECTION      PELVIC LAPAROSCOPY       endometriosis    NE REMOVAL OF OVARY/TUBE(S)      SINUS SURGERY      x2    SYNOVECTOMY OF KNEE Left 6/10/2021    Procedure: SYNOVECTOMY, KNEE;  Surgeon: Kelly Zaman MD;  Location: [facility] OR;  Service: Orthopedics;  Laterality: Left;    TONSILLECTOMY           Planned Procedure: Procedure(s) (LRB):  ARTHROSCOPY, KNEE, WITH MENISCECTOMY (Right)  CHONDROPLASTY, KNEE (Right)  SYNOVECTOMY, KNEE (Right)  Requested Anesthesia Type:General  Surgeon: Kelly Zaman MD  Service: Orthopedics  Known or anticipated Date of Surgery:11/3/2022    Surgeon notes: reviewed          Triage considerations:     The patient has no apparent active cardiac condition (No unstable coronary Syndrome such as severe unstable angina or recent [<1 month] myocardial infarction, decompensated CHF, severe valvular   disease or significant arrhythmia)    Previous anesthesia records:LMA General, Easy airway, Easy intubation, and No problems      Placement Date: 06/10/21; Airway Device: LMA; Mask Ventilation: Easy; Intubated: Postinduction; Airway Device Size: 4.5; Placement Verified By: Capnometry; Complicating Factors: None; Intubation Findings: Bilateral breath sounds; Securment: Lips; Complications: None;    Last PCP note: within 3 months  Medically cleared for surgery by PCP      EK2021:  Vent. Rate : 077 BPM     Atrial Rate : 077 BPM      P-R Int : 144 ms          QRS Dur : 084 ms       QT Int : 388 ms       P-R-T Axes : 029 049 017 degrees      QTc Int : 439 ms     Normal sinus rhythm   Normal ECG   When compared with ECG of 21-MAR-2014 08:13,   No significant change was found   Confirmed by BOLA WHITAKER MD ([id number]) on 2021 4:15:18 PM     Referred By: [email or web address]    SELF           Confirmed By:BOLA WHITAKER MD                Instructions given. (See in Nurse's note)    Ht:  5'5  Wt:  227 lb

## 2022-11-02 ENCOUNTER — TELEPHONE (OUTPATIENT)
Dept: SPORTS MEDICINE | Facility: CLINIC | Age: 47
End: 2022-11-02

## 2022-11-02 ENCOUNTER — OFFICE VISIT (OUTPATIENT)
Dept: SPORTS MEDICINE | Facility: CLINIC | Age: 47
End: 2022-11-02
Payer: COMMERCIAL

## 2022-11-02 VITALS
HEART RATE: 68 BPM | HEIGHT: 65 IN | WEIGHT: 230 LBS | DIASTOLIC BLOOD PRESSURE: 84 MMHG | SYSTOLIC BLOOD PRESSURE: 117 MMHG | BODY MASS INDEX: 38.32 KG/M2

## 2022-11-02 DIAGNOSIS — S83.241A ACUTE MEDIAL MENISCUS TEAR OF RIGHT KNEE, INITIAL ENCOUNTER: Primary | ICD-10-CM

## 2022-11-02 PROCEDURE — 3074F SYST BP LT 130 MM HG: CPT | Mod: CPTII,S$GLB,, | Performed by: PHYSICIAN ASSISTANT

## 2022-11-02 PROCEDURE — 99499 NO LOS: ICD-10-PCS | Mod: S$GLB,,, | Performed by: PHYSICIAN ASSISTANT

## 2022-11-02 PROCEDURE — 3079F PR MOST RECENT DIASTOLIC BLOOD PRESSURE 80-89 MM HG: ICD-10-PCS | Mod: CPTII,S$GLB,, | Performed by: PHYSICIAN ASSISTANT

## 2022-11-02 PROCEDURE — 99499 UNLISTED E&M SERVICE: CPT | Mod: S$GLB,,, | Performed by: PHYSICIAN ASSISTANT

## 2022-11-02 PROCEDURE — 3008F PR BODY MASS INDEX (BMI) DOCUMENTED: ICD-10-PCS | Mod: CPTII,S$GLB,, | Performed by: PHYSICIAN ASSISTANT

## 2022-11-02 PROCEDURE — 1160F PR REVIEW ALL MEDS BY PRESCRIBER/CLIN PHARMACIST DOCUMENTED: ICD-10-PCS | Mod: CPTII,S$GLB,, | Performed by: PHYSICIAN ASSISTANT

## 2022-11-02 PROCEDURE — 3008F BODY MASS INDEX DOCD: CPT | Mod: CPTII,S$GLB,, | Performed by: PHYSICIAN ASSISTANT

## 2022-11-02 PROCEDURE — 99999 PR PBB SHADOW E&M-EST. PATIENT-LVL V: ICD-10-PCS | Mod: PBBFAC,,, | Performed by: PHYSICIAN ASSISTANT

## 2022-11-02 PROCEDURE — 1160F RVW MEDS BY RX/DR IN RCRD: CPT | Mod: CPTII,S$GLB,, | Performed by: PHYSICIAN ASSISTANT

## 2022-11-02 PROCEDURE — 3074F PR MOST RECENT SYSTOLIC BLOOD PRESSURE < 130 MM HG: ICD-10-PCS | Mod: CPTII,S$GLB,, | Performed by: PHYSICIAN ASSISTANT

## 2022-11-02 PROCEDURE — 1159F MED LIST DOCD IN RCRD: CPT | Mod: CPTII,S$GLB,, | Performed by: PHYSICIAN ASSISTANT

## 2022-11-02 PROCEDURE — 3044F HG A1C LEVEL LT 7.0%: CPT | Mod: CPTII,S$GLB,, | Performed by: PHYSICIAN ASSISTANT

## 2022-11-02 PROCEDURE — 99999 PR PBB SHADOW E&M-EST. PATIENT-LVL V: CPT | Mod: PBBFAC,,, | Performed by: PHYSICIAN ASSISTANT

## 2022-11-02 PROCEDURE — 3079F DIAST BP 80-89 MM HG: CPT | Mod: CPTII,S$GLB,, | Performed by: PHYSICIAN ASSISTANT

## 2022-11-02 PROCEDURE — 3044F PR MOST RECENT HEMOGLOBIN A1C LEVEL <7.0%: ICD-10-PCS | Mod: CPTII,S$GLB,, | Performed by: PHYSICIAN ASSISTANT

## 2022-11-02 PROCEDURE — 1159F PR MEDICATION LIST DOCUMENTED IN MEDICAL RECORD: ICD-10-PCS | Mod: CPTII,S$GLB,, | Performed by: PHYSICIAN ASSISTANT

## 2022-11-02 RX ORDER — TRAMADOL HYDROCHLORIDE 50 MG/1
50-100 TABLET ORAL EVERY 6 HOURS PRN
Qty: 30 TABLET | Refills: 0 | Status: SHIPPED | OUTPATIENT
Start: 2022-11-02 | End: 2022-11-18 | Stop reason: SDUPTHER

## 2022-11-02 RX ORDER — SODIUM CHLORIDE 9 MG/ML
INJECTION, SOLUTION INTRAVENOUS CONTINUOUS
Status: CANCELLED | OUTPATIENT
Start: 2022-11-02

## 2022-11-02 NOTE — TELEPHONE ENCOUNTER
----- Message from Morenita Lacy sent at 11/2/2022  4:43 PM CDT -----  Pt calling in regards to time of arrival for procedure on tomorrow       Confirmed patient's contact info below:  Contact Name: Emily Max  Phone Number: [phone]

## 2022-11-02 NOTE — H&P (VIEW-ONLY)
Emily Max  is here for a completion of her perioperative paperwork. she  Is scheduled to undergo     right               a. Knee arthroscopic medial meniscectomy                 b. Knee arthroscopic possible plica excision              c. Knee arthroscopic possible chondroplasty on 11/3/22.      She is a healthy individual and does need clearance for this procedure which she has received from her PCP.     PAST MEDICAL HISTORY:   Past Medical History:   Diagnosis Date    Abnormal Pap smear     Cervical cancer March 2013    FIGO IB1 AdenoCA Robotic radical hsyt,bso and bplnd    Colon cancer screening 5/4/2021    Depressive disorder, not elsewhere classified     Endometriosis of uterus     FH: colonic polyps 3/24/2021    Foot fracture, right     Headache(784.0)     Hypertension     Hypothyroidism     Low grade squamous intraepith lesion on cytologic smear vagina (lgsil) 10/14/2019    Malignant neoplasm of exocervix 10/10/2019    Migraine headache     Urinary tract infection     Vaginal atrophy 5/8/2018    Visit for screening mammogram 12/16/2015    Well woman exam with routine gynecological exam 12/16/2015     PAST SURGICAL HISTORY:   Past Surgical History:   Procedure Laterality Date    BREAST BIOPSY Right 2002    Core bx, benign    CHONDROPLASTY OF KNEE Left 6/10/2021    Procedure: CHONDROPLASTY, KNEE;  Surgeon: Kelly Zaman MD;  Location: [facility] OR;  Service: Orthopedics;  Laterality: Left;    COLONOSCOPY N/A 7/6/2022    Procedure: COLONOSCOPY;  Surgeon: Issac Delcid MD;  Location: [address]);  Service: Endoscopy;  Laterality: N/A;  Fully vaccinated, prep instr portal -ml    HYSTERECTOMY  May 2013    Robotic radical hyst, bso, blplnd    KNEE ARTHROSCOPY W/ MENISCECTOMY Left 6/10/2021    Procedure: ARTHROSCOPY, KNEE, WITH MENISCECTOMY MEDIAL;  Surgeon: Kelly Zaman MD;  Location: [facility];  Service: Orthopedics;  Laterality: Left;    KNEE SURGERY  08/24/2017    right knee    LIPOMA  RESECTION      OOPHORECTOMY      bilateral    PELVIC AND PARA-AORTIC LYMPH NODE DISSECTION      PELVIC LAPAROSCOPY      endometriosis    RI REMOVAL OF OVARY/TUBE(S)      SINUS SURGERY      x2    SYNOVECTOMY OF KNEE Left 6/10/2021    Procedure: SYNOVECTOMY, KNEE;  Surgeon: Kelly Zaman MD;  Location: [facility];  Service: Orthopedics;  Laterality: Left;    TONSILLECTOMY       FAMILY HISTORY:   Family History   Problem Relation Age of Onset    Colon cancer Other     Migraines Mother     Cancer Mother         endometrial cancer     Hypertension Mother     Hypothyroidism Mother     Heart disease Mother     Hyperlipidemia Mother     Migraines Maternal Grandmother     Aneurysm Maternal Grandmother         intracranial aneurysm    Stroke Maternal Grandmother     Cancer Maternal Aunt     Cancer Paternal Grandfather         lymphoma non hodgkins     Diabetes Father     Skin cancer Other     Rheum arthritis Paternal Grandmother 80    Rheum arthritis Maternal Aunt     Ovarian cancer Neg Hx     Uterine cancer Neg Hx     Breast cancer Neg Hx      SOCIAL HISTORY:   Social History     Socioeconomic History    Marital status: Single    Number of children: 1    Years of education: 23   Occupational History    Occupation: RN     Employer: [employer]   Tobacco Use    Smoking status: Never    Smokeless tobacco: Never   Substance and Sexual Activity    Alcohol use: Yes     Alcohol/week: 1.0 standard drink     Types: 1 Glasses of wine per week     Comment: socially    Drug use: Never   Other Topics Concern    Are you pregnant or think you may be? No    Breast-feeding No       MEDICATIONS:   Current Outpatient Medications:     ALPRAZolam (XANAX) 0.5 MG tablet, Take 1 tablet (0.5 mg total) by mouth daily as needed for Anxiety., Disp: 30 tablet, Rfl: 0    atenoloL (TENORMIN) 50 MG tablet, TAKE 1 TABLET (50 MG TOTAL) BY MOUTH 2 (TWO) TIMES DAILY., Disp: 180 tablet, Rfl: 3    azelaic acid (FINACEA) 15 % Foam, Apply thin film to  face qhs, Disp: 50 g, Rfl: 6    azelaic acid (FINACEA) 15 % gel, Apply topically once daily., Disp: 60 g, Rfl: 3    DULoxetine (CYMBALTA) 30 MG capsule, Take 1 capsule (30 mg total) by mouth 2 (two) times daily., Disp: 90 capsule, Rfl: 3    fluticasone (FLONASE) 50 mcg/actuation nasal spray, 1 spray by Each Nare route daily as needed for Rhinitis., Disp: , Rfl:     hydroquinone 4 % Crea, Use hs for dark spots, Disp: 28.35 g, Rfl: 3    levocetirizine (XYZAL) 5 MG tablet, Take 1 tablet (5 mg total) by mouth once daily., Disp: 90 tablet, Rfl: 3    levothyroxine (SYNTHROID) 50 MCG tablet, Take 1 tablet (50 mcg total) by mouth once daily., Disp: 90 tablet, Rfl: 3    ondansetron (ZOFRAN-ODT) 4 MG TbDL, Take 1 tablet (4 mg total) by mouth 2 (two) times daily as needed (nausea)., Disp: 10 tablet, Rfl: 3    promethazine (PHENERGAN) 25 MG tablet, Take 1 tablet (25 mg total) by mouth every 6 (six) hours as needed for Nausea., Disp: 8 tablet, Rfl: 0    triamterene-hydrochlorothiazide 37.5-25 mg (DYAZIDE) 37.5-25 mg per capsule, Take 1 capsule by mouth once daily., Disp: 90 capsule, Rfl: 3    vitamin D (VITAMIN D3) 1000 units Tab, Take 5 tablets (5,000 Units total) by mouth once daily., Disp: 30 tablet, Rfl: prn    albuterol 90 mcg/actuation inhaler, Inhale 2 puffs into the lungs every 4 (four) hours as needed for Wheezing., Disp: 6.7 g, Rfl: 3    aspirin (ECOTRIN) 81 MG EC tablet, Take 1 tablet (81 mg total) by mouth once daily. For 4 weeks starting the day after surgery., Disp: 28 tablet, Rfl: 0    EPINEPHrine (EPIPEN) 0.3 mg/0.3 mL AtIn, Inject 0.3 mLs (0.3 mg total) into the muscle once. for 1 dose, Disp: 2 each, Rfl: 2    estradioL (VAGIFEM) 10 mcg Tab, Place 1 tablet (10 mcg total) vaginally twice a week., Disp: 8 tablet, Rfl: 12    traMADoL (ULTRAM) 50 mg tablet, Take 1-2 tablets ( mg total) by mouth every 6 (six) hours as needed for Pain., Disp: 30 tablet, Rfl: 0    Current Facility-Administered Medications:      "diphenhydrAMINE injection 50 mg, 50 mg, Intravenous, PRN, Aleks Kwok III, PA-C  ALLERGIES:   Review of patient's allergies indicates:   Allergen Reactions    Hydrocodone-acetaminophen Itching and Rash     States can take - may have been formulation    Iodine Anaphylaxis    Other Anaphylaxis     mushrooms    Fluconazole Hives     Other reaction(s): Hives    Iodine and iodide containing products Hives     Iv iodine only    Mushroom combination no.1      Other reaction(s): Bronchial Constriction       VITAL SIGNS: /84   Pulse 68   Ht 5' 5" (1.651 m)   Wt 104.3 kg (230 lb)   BMI 38.27 kg/m²      Risks, indications and benefits of the surgical procedure were discussed with the patient. All questions with regard to surgery, rehab, expected return to functional activities, activities of daily living and recreational endeavors were answered to her satisfaction.    It was explained to the patient that there may be an increase in surgical risks if the patient has certain co-morbidities such as but not limited to: Obesity, Cardiovascular issues (CHF, CAD, Arrhythmias), chronic pulmonary issues, previous or current neurovascular/neurological issues, previous strokes, diabetes mellitus, previous wound healing issues, previous wound or skin infections, PVD, clotting disorders, if the patient uses chronic steroids, if the patient takes or has immune compromising medications or diseases, or has previously or currently used tobacco products.     The patient verbalized that he/she does not have any additional clotting, bleeding, or blood disorders, other than what is list in her chart on today's review.     Then a brief history and physical exam were performed.    Review of Systems   Constitution: Negative. Negative for chills, fever and night sweats.   HENT: Negative for congestion and headaches.    Eyes: Negative for blurred vision, left vision loss and right vision loss.   Cardiovascular: Negative for chest pain " and syncope.   Respiratory: Negative for cough and shortness of breath.    Endocrine: Negative for polydipsia, polyphagia and polyuria.   Hematologic/Lymphatic: Negative for bleeding problem. Does not bruise/bleed easily.   Skin: Negative for dry skin, itching and rash.   Musculoskeletal: Negative for falls and muscle weakness.   Gastrointestinal: Negative for abdominal pain and bowel incontinence.   Genitourinary: Negative for bladder incontinence and nocturia.   Neurological: Negative for disturbances in coordination, loss of balance and seizures.   Psychiatric/Behavioral: Negative for depression. The patient does not have insomnia.    Allergic/Immunologic: Negative for hives and persistent infections.     PHYSICAL EXAM:  GEN: A&Ox3, WD WN NAD  HEENT: WNL  CHEST: CTAB, no W/R/R  HEART: RRR, no M/R/G  ABD: Soft, NT ND, BS x4 QUADS  MS; See Epic  NEURO: CN II-XII intact       The surgical consent was then reviewed with the patient, who agreed with all the contents of the consent form and it was signed. she was then given the surgery packet to bring with her to surgery center for the anesthesia portion of her perioperative paperwork (if needed)   For all physicians except for Dr. Keen, we will email and possibly fax the consent forms and booking sheets to [facility].    The patient was given the opportunity to ask questions about the surgical plan and consent form, and once no other questions were asked, I proceeded with the pre-op appointment.    PHYSICAL THERAPY:  She was also instructed regarding physical therapy and will begin on  POD1. She was given a copy of the original prescription to schedule. Another copy of this prescription was also faxed to [facility].    POST OP CARE:instructions were reviewed including care of the wound and dressing after surgery and when she can shower. Patient was told not sleep or lay on there surgical extremity following surgery as this could cause repair damage,  tissue damage, or nerve injury.    An extensive amount of time was spent on discussion of the following information based on what type of surgery the patient was having. Patient expressed understanding of the material below:    Shoulder surgery or upper extremity surgery requiring post-op sling:  It was explained to the patient that they should remove their arm from the sling approximately 6 times per day to do full elbow ROM (flexion and extension) and full supination and pronation of the elbow for approximately 5 minutes at a time to help prevent elbow stiffness, nerve pain or problems, or nerve injury. They were told to contact us if they begin having numbness and tingling of there surgical extremity that persists longer then 1 day without relief.     Extremity surgery requiring a splint:   It was explained to patient on how to properly elevated position there extremity to prevent pressure ulcers from occurring. I made sure that the patient understood that that surgical site may be numb following surgery and prevent them from feeling pressure pain that they would normal feel if a pressure injury was occurring. Pressure ulcers and there causes were discussed with the patient today.     Post-operative splint:  It was explain to the patient that they can contact us at anytime if they feel that there is a problem with their splint or under their splint that needs evaluation. If there is concern, questions, or discomfort with the splint then they can present to either our clinic or the [facility] ED for removal, evaluation, and replacement of the splint.    CRUTCHES OR WALKER: It was explained to the patient that if they are having a lower extremity surgery that they will require either a walker or crutches to ambulate safely with after surgery. It was explained that a cane or other assistive devices are not sufficient to safely ambulate with after surgery. I explained to the patient that I will place an  order for them to receive either crutches or a walker after surgery to go home with. It was explained that if they have crutches or a walker at home already, that they are REQUIRED to bring them to the hospital on the day of surgery. It was explained that if they do not have them at the hospital on the day of surgery that they WILL be provided a new pair or crutches or a walker to go home with to ensure ambulation will be safe if the patient needs to stop somewhere on the way home.      PAIN MANAGEMENT: Emily Max was also given a pain management regime, which includes the option of getting a TENS unit given to her by [facility] along with the education required for its use. She was also instructed regarding the Polar ice unit or gel ice packs that will be in place after surgery and her postoperative pain medications.     PAIN MEDICATION:  No Norco or percocet due to patient adverse events.   Ultram 50 mg Take 1-2 p.o. q.6 hours p.r.n. breakthrough pain,     Patient has phenergan and zofran at home.     DVT prophylaxis was discussed with the patient today including risk factors for developing DVTs and history of DVTs. The patient was asked if any specific recommendations were given from the doctor/s that did pre-operative surgical clearance. The patient was then given an education sheet about DVTs and PE with warning signs and symptoms of both and steps to take if they suspect either of these.    This along with the Modified Caprini risk assessment model for VTE in general surgical patients was used to determine the patient's DVT risk.     From: Ashwini MYAFIELD, Gregorio DA, Felicia SM, et al. Prevention of VTE in nonorthopedic surgical patients: antithrombotic therapy and prevention of thrombosis, 9th ed: American College of Chest [employer]icians evidence-based clinical practical guidelines. Chest 2012; 141:e227S. Copyright © 2012. Reproduced with permission from the American College of Chest Physicians.    The  below listed DVT prophylaxis regimen along with bilateral ALBERTO compression stockings will be used post-op. Length of treatment has been determined to be 10-42 days post-op by the above noted Caprini assessment model.     The patient was instructed to buy and take:  Aspirin 81mg QD x 4 weeks for DVT prophylaxis starting on the morning after surgery.  Patient will also use bilateral TEDs on lower extremities, SCDs during surgery, and early ambulation post-op. If the patient was previously taking 81mg baby aspirin, they were told to not take it starting 5 days prior to surgery and to restart the 81mg aspirin after surgery.       Patient was also told to buy over the counter Prilosec medication if needed and take it once daily for GI protection as long as they are taking NSAIDs or Aspirin.   Patient denies history of seizures.       The patient was told that narcotic pain medications may make them drowsy and instructions were given to not sign legal documents, drive or operate heavy machinery, cars, or equipment while under the influence of narcotic medications. The patient was told and understands that narcotic pain medications should only be used as needed to control pain and that other options of pain control include TENs unit and ice packs/unit.     As there were no other questions to be asked, she was given my business card along with Kelly Zaman MD business card if she has any questions or concerns prior to surgery or in the postop period.

## 2022-11-02 NOTE — H&P
Emily Max  is here for a completion of her perioperative paperwork. she  Is scheduled to undergo     right               a. Knee arthroscopic medial meniscectomy                 b. Knee arthroscopic possible plica excision              c. Knee arthroscopic possible chondroplasty on 11/3/22.      She is a healthy individual and does need clearance for this procedure which she has received from her PCP.     PAST MEDICAL HISTORY:   Past Medical History:   Diagnosis Date    Abnormal Pap smear     Cervical cancer March 2013    FIGO IB1 AdenoCA Robotic radical hsyt,bso and bplnd    Colon cancer screening 5/4/2021    Depressive disorder, not elsewhere classified     Endometriosis of uterus     FH: colonic polyps 3/24/2021    Foot fracture, right     Headache(784.0)     Hypertension     Hypothyroidism     Low grade squamous intraepith lesion on cytologic smear vagina (lgsil) 10/14/2019    Malignant neoplasm of exocervix 10/10/2019    Migraine headache     Urinary tract infection     Vaginal atrophy 5/8/2018    Visit for screening mammogram 12/16/2015    Well woman exam with routine gynecological exam 12/16/2015     PAST SURGICAL HISTORY:   Past Surgical History:   Procedure Laterality Date    BREAST BIOPSY Right 2002    Core bx, benign    CHONDROPLASTY OF KNEE Left 6/10/2021    Procedure: CHONDROPLASTY, KNEE;  Surgeon: Kelly Zaman MD;  Location: [facility] OR;  Service: Orthopedics;  Laterality: Left;    COLONOSCOPY N/A 7/6/2022    Procedure: COLONOSCOPY;  Surgeon: Issac Delcid MD;  Location: [address]);  Service: Endoscopy;  Laterality: N/A;  Fully vaccinated, prep instr portal -ml    HYSTERECTOMY  May 2013    Robotic radical hyst, bso, blplnd    KNEE ARTHROSCOPY W/ MENISCECTOMY Left 6/10/2021    Procedure: ARTHROSCOPY, KNEE, WITH MENISCECTOMY MEDIAL;  Surgeon: Kelly Zaman MD;  Location: [facility];  Service: Orthopedics;  Laterality: Left;    KNEE SURGERY  08/24/2017    right knee    LIPOMA  RESECTION      OOPHORECTOMY      bilateral    PELVIC AND PARA-AORTIC LYMPH NODE DISSECTION      PELVIC LAPAROSCOPY      endometriosis    MI REMOVAL OF OVARY/TUBE(S)      SINUS SURGERY      x2    SYNOVECTOMY OF KNEE Left 6/10/2021    Procedure: SYNOVECTOMY, KNEE;  Surgeon: Kelly Zaman MD;  Location: [facility];  Service: Orthopedics;  Laterality: Left;    TONSILLECTOMY       FAMILY HISTORY:   Family History   Problem Relation Age of Onset    Colon cancer Other     Migraines Mother     Cancer Mother         endometrial cancer     Hypertension Mother     Hypothyroidism Mother     Heart disease Mother     Hyperlipidemia Mother     Migraines Maternal Grandmother     Aneurysm Maternal Grandmother         intracranial aneurysm    Stroke Maternal Grandmother     Cancer Maternal Aunt     Cancer Paternal Grandfather         lymphoma non hodgkins     Diabetes Father     Skin cancer Other     Rheum arthritis Paternal Grandmother 80    Rheum arthritis Maternal Aunt     Ovarian cancer Neg Hx     Uterine cancer Neg Hx     Breast cancer Neg Hx      SOCIAL HISTORY:   Social History     Socioeconomic History    Marital status: Single    Number of children: 1    Years of education: 23   Occupational History    Occupation: RN     Employer: [employer]   Tobacco Use    Smoking status: Never    Smokeless tobacco: Never   Substance and Sexual Activity    Alcohol use: Yes     Alcohol/week: 1.0 standard drink     Types: 1 Glasses of wine per week     Comment: socially    Drug use: Never   Other Topics Concern    Are you pregnant or think you may be? No    Breast-feeding No       MEDICATIONS:   Current Outpatient Medications:     ALPRAZolam (XANAX) 0.5 MG tablet, Take 1 tablet (0.5 mg total) by mouth daily as needed for Anxiety., Disp: 30 tablet, Rfl: 0    atenoloL (TENORMIN) 50 MG tablet, TAKE 1 TABLET (50 MG TOTAL) BY MOUTH 2 (TWO) TIMES DAILY., Disp: 180 tablet, Rfl: 3    azelaic acid (FINACEA) 15 % Foam, Apply thin film to  face qhs, Disp: 50 g, Rfl: 6    azelaic acid (FINACEA) 15 % gel, Apply topically once daily., Disp: 60 g, Rfl: 3    DULoxetine (CYMBALTA) 30 MG capsule, Take 1 capsule (30 mg total) by mouth 2 (two) times daily., Disp: 90 capsule, Rfl: 3    fluticasone (FLONASE) 50 mcg/actuation nasal spray, 1 spray by Each Nare route daily as needed for Rhinitis., Disp: , Rfl:     hydroquinone 4 % Crea, Use hs for dark spots, Disp: 28.35 g, Rfl: 3    levocetirizine (XYZAL) 5 MG tablet, Take 1 tablet (5 mg total) by mouth once daily., Disp: 90 tablet, Rfl: 3    levothyroxine (SYNTHROID) 50 MCG tablet, Take 1 tablet (50 mcg total) by mouth once daily., Disp: 90 tablet, Rfl: 3    ondansetron (ZOFRAN-ODT) 4 MG TbDL, Take 1 tablet (4 mg total) by mouth 2 (two) times daily as needed (nausea)., Disp: 10 tablet, Rfl: 3    promethazine (PHENERGAN) 25 MG tablet, Take 1 tablet (25 mg total) by mouth every 6 (six) hours as needed for Nausea., Disp: 8 tablet, Rfl: 0    triamterene-hydrochlorothiazide 37.5-25 mg (DYAZIDE) 37.5-25 mg per capsule, Take 1 capsule by mouth once daily., Disp: 90 capsule, Rfl: 3    vitamin D (VITAMIN D3) 1000 units Tab, Take 5 tablets (5,000 Units total) by mouth once daily., Disp: 30 tablet, Rfl: prn    albuterol 90 mcg/actuation inhaler, Inhale 2 puffs into the lungs every 4 (four) hours as needed for Wheezing., Disp: 6.7 g, Rfl: 3    aspirin (ECOTRIN) 81 MG EC tablet, Take 1 tablet (81 mg total) by mouth once daily. For 4 weeks starting the day after surgery., Disp: 28 tablet, Rfl: 0    EPINEPHrine (EPIPEN) 0.3 mg/0.3 mL AtIn, Inject 0.3 mLs (0.3 mg total) into the muscle once. for 1 dose, Disp: 2 each, Rfl: 2    estradioL (VAGIFEM) 10 mcg Tab, Place 1 tablet (10 mcg total) vaginally twice a week., Disp: 8 tablet, Rfl: 12    traMADoL (ULTRAM) 50 mg tablet, Take 1-2 tablets ( mg total) by mouth every 6 (six) hours as needed for Pain., Disp: 30 tablet, Rfl: 0    Current Facility-Administered Medications:      "diphenhydrAMINE injection 50 mg, 50 mg, Intravenous, PRN, Aleks Kwok III, PA-C  ALLERGIES:   Review of patient's allergies indicates:   Allergen Reactions    Hydrocodone-acetaminophen Itching and Rash     States can take - may have been formulation    Iodine Anaphylaxis    Other Anaphylaxis     mushrooms    Fluconazole Hives     Other reaction(s): Hives    Iodine and iodide containing products Hives     Iv iodine only    Mushroom combination no.1      Other reaction(s): Bronchial Constriction       VITAL SIGNS: /84   Pulse 68   Ht 5' 5" (1.651 m)   Wt 104.3 kg (230 lb)   BMI 38.27 kg/m²      Risks, indications and benefits of the surgical procedure were discussed with the patient. All questions with regard to surgery, rehab, expected return to functional activities, activities of daily living and recreational endeavors were answered to her satisfaction.    It was explained to the patient that there may be an increase in surgical risks if the patient has certain co-morbidities such as but not limited to: Obesity, Cardiovascular issues (CHF, CAD, Arrhythmias), chronic pulmonary issues, previous or current neurovascular/neurological issues, previous strokes, diabetes mellitus, previous wound healing issues, previous wound or skin infections, PVD, clotting disorders, if the patient uses chronic steroids, if the patient takes or has immune compromising medications or diseases, or has previously or currently used tobacco products.     The patient verbalized that he/she does not have any additional clotting, bleeding, or blood disorders, other than what is list in her chart on today's review.     Then a brief history and physical exam were performed.    Review of Systems   Constitution: Negative. Negative for chills, fever and night sweats.   HENT: Negative for congestion and headaches.    Eyes: Negative for blurred vision, left vision loss and right vision loss.   Cardiovascular: Negative for chest pain " and syncope.   Respiratory: Negative for cough and shortness of breath.    Endocrine: Negative for polydipsia, polyphagia and polyuria.   Hematologic/Lymphatic: Negative for bleeding problem. Does not bruise/bleed easily.   Skin: Negative for dry skin, itching and rash.   Musculoskeletal: Negative for falls and muscle weakness.   Gastrointestinal: Negative for abdominal pain and bowel incontinence.   Genitourinary: Negative for bladder incontinence and nocturia.   Neurological: Negative for disturbances in coordination, loss of balance and seizures.   Psychiatric/Behavioral: Negative for depression. The patient does not have insomnia.    Allergic/Immunologic: Negative for hives and persistent infections.     PHYSICAL EXAM:  GEN: A&Ox3, WD WN NAD  HEENT: WNL  CHEST: CTAB, no W/R/R  HEART: RRR, no M/R/G  ABD: Soft, NT ND, BS x4 QUADS  MS; See Epic  NEURO: CN II-XII intact       The surgical consent was then reviewed with the patient, who agreed with all the contents of the consent form and it was signed. she was then given the surgery packet to bring with her to surgery center for the anesthesia portion of her perioperative paperwork (if needed)   For all physicians except for Dr. Keen, we will email and possibly fax the consent forms and booking sheets to [facility].    The patient was given the opportunity to ask questions about the surgical plan and consent form, and once no other questions were asked, I proceeded with the pre-op appointment.    PHYSICAL THERAPY:  She was also instructed regarding physical therapy and will begin on  POD1. She was given a copy of the original prescription to schedule. Another copy of this prescription was also faxed to [facility].    POST OP CARE:instructions were reviewed including care of the wound and dressing after surgery and when she can shower. Patient was told not sleep or lay on there surgical extremity following surgery as this could cause repair damage,  tissue damage, or nerve injury.    An extensive amount of time was spent on discussion of the following information based on what type of surgery the patient was having. Patient expressed understanding of the material below:    Shoulder surgery or upper extremity surgery requiring post-op sling:  It was explained to the patient that they should remove their arm from the sling approximately 6 times per day to do full elbow ROM (flexion and extension) and full supination and pronation of the elbow for approximately 5 minutes at a time to help prevent elbow stiffness, nerve pain or problems, or nerve injury. They were told to contact us if they begin having numbness and tingling of there surgical extremity that persists longer then 1 day without relief.     Extremity surgery requiring a splint:   It was explained to patient on how to properly elevated position there extremity to prevent pressure ulcers from occurring. I made sure that the patient understood that that surgical site may be numb following surgery and prevent them from feeling pressure pain that they would normal feel if a pressure injury was occurring. Pressure ulcers and there causes were discussed with the patient today.     Post-operative splint:  It was explain to the patient that they can contact us at anytime if they feel that there is a problem with their splint or under their splint that needs evaluation. If there is concern, questions, or discomfort with the splint then they can present to either our clinic or the [facility] ED for removal, evaluation, and replacement of the splint.    CRUTCHES OR WALKER: It was explained to the patient that if they are having a lower extremity surgery that they will require either a walker or crutches to ambulate safely with after surgery. It was explained that a cane or other assistive devices are not sufficient to safely ambulate with after surgery. I explained to the patient that I will place an  order for them to receive either crutches or a walker after surgery to go home with. It was explained that if they have crutches or a walker at home already, that they are REQUIRED to bring them to the hospital on the day of surgery. It was explained that if they do not have them at the hospital on the day of surgery that they WILL be provided a new pair or crutches or a walker to go home with to ensure ambulation will be safe if the patient needs to stop somewhere on the way home.      PAIN MANAGEMENT: Emily Max was also given a pain management regime, which includes the option of getting a TENS unit given to her by [facility] along with the education required for its use. She was also instructed regarding the Polar ice unit or gel ice packs that will be in place after surgery and her postoperative pain medications.     PAIN MEDICATION:  No Norco or percocet due to patient adverse events.   Ultram 50 mg Take 1-2 p.o. q.6 hours p.r.n. breakthrough pain,     Patient has phenergan and zofran at home.     DVT prophylaxis was discussed with the patient today including risk factors for developing DVTs and history of DVTs. The patient was asked if any specific recommendations were given from the doctor/s that did pre-operative surgical clearance. The patient was then given an education sheet about DVTs and PE with warning signs and symptoms of both and steps to take if they suspect either of these.    This along with the Modified Caprini risk assessment model for VTE in general surgical patients was used to determine the patient's DVT risk.     From: Ashwini MAYFIELD, Gregorio DA, Felicia SM, et al. Prevention of VTE in nonorthopedic surgical patients: antithrombotic therapy and prevention of thrombosis, 9th ed: American College of Chest Physicians evidence-based clinical practical guidelines. Chest 2012; 141:e227S. Copyright © 2012. Reproduced with permission from the American College of Chest Physicians.    The  below listed DVT prophylaxis regimen along with bilateral ALBERTO compression stockings will be used post-op. Length of treatment has been determined to be 10-42 days post-op by the above noted Caprini assessment model.     The patient was instructed to buy and take:  Aspirin 81mg QD x 4 weeks for DVT prophylaxis starting on the morning after surgery.  Patient will also use bilateral TEDs on lower extremities, SCDs during surgery, and early ambulation post-op. If the patient was previously taking 81mg baby aspirin, they were told to not take it starting 5 days prior to surgery and to restart the 81mg aspirin after surgery.       Patient was also told to buy over the counter Prilosec medication if needed and take it once daily for GI protection as long as they are taking NSAIDs or Aspirin.   Patient denies history of seizures.       The patient was told that narcotic pain medications may make them drowsy and instructions were given to not sign legal documents, drive or operate heavy machinery, cars, or equipment while under the influence of narcotic medications. The patient was told and understands that narcotic pain medications should only be used as needed to control pain and that other options of pain control include TENs unit and ice packs/unit.     As there were no other questions to be asked, she was given my business card along with Kelly Zaman MD business card if she has any questions or concerns prior to surgery or in the postop period.

## 2022-11-03 ENCOUNTER — HOSPITAL ENCOUNTER (OUTPATIENT)
Facility: HOSPITAL | Age: 47
Discharge: HOME | End: 2022-11-03
Attending: ORTHOPAEDIC SURGERY | Admitting: ORTHOPAEDIC SURGERY
Payer: COMMERCIAL

## 2022-11-03 ENCOUNTER — ANESTHESIA (OUTPATIENT)
Dept: SURGERY | Facility: HOSPITAL | Age: 47
End: 2022-11-03
Payer: COMMERCIAL

## 2022-11-03 ENCOUNTER — ANESTHESIA EVENT (OUTPATIENT)
Dept: SURGERY | Facility: HOSPITAL | Age: 47
End: 2022-11-03
Payer: COMMERCIAL

## 2022-11-03 VITALS
BODY MASS INDEX: 38.32 KG/M2 | SYSTOLIC BLOOD PRESSURE: 118 MMHG | RESPIRATION RATE: 25 BRPM | HEART RATE: 83 BPM | WEIGHT: 230 LBS | DIASTOLIC BLOOD PRESSURE: 64 MMHG | TEMPERATURE: 98 F | OXYGEN SATURATION: 94 % | HEIGHT: 65 IN

## 2022-11-03 DIAGNOSIS — S83.241D ACUTE MEDIAL MENISCUS TEAR OF RIGHT KNEE, SUBSEQUENT ENCOUNTER: Primary | ICD-10-CM

## 2022-11-03 DIAGNOSIS — S83.241A ACUTE MEDIAL MENISCUS TEAR OF RIGHT KNEE, INITIAL ENCOUNTER: ICD-10-CM

## 2022-11-03 PROCEDURE — D9220A PRA ANESTHESIA: Mod: CRNA,,, | Performed by: NURSE ANESTHETIST, CERTIFIED REGISTERED

## 2022-11-03 PROCEDURE — 29880 ARTHRS KNE SRG MNISECTMY M&L: CPT | Mod: AS,RT,, | Performed by: PHYSICIAN ASSISTANT

## 2022-11-03 PROCEDURE — 29880 ARTHRS KNE SRG MNISECTMY M&L: CPT | Mod: RT,,, | Performed by: ORTHOPAEDIC SURGERY

## 2022-11-03 PROCEDURE — 71000015 HC POSTOP RECOV 1ST HR: Performed by: ORTHOPAEDIC SURGERY

## 2022-11-03 PROCEDURE — 25000003 PHARM REV CODE 250: Performed by: ORTHOPAEDIC SURGERY

## 2022-11-03 PROCEDURE — D9220A PRA ANESTHESIA: ICD-10-PCS | Mod: CRNA,,, | Performed by: NURSE ANESTHETIST, CERTIFIED REGISTERED

## 2022-11-03 PROCEDURE — D9220A PRA ANESTHESIA: ICD-10-PCS | Mod: ANES,,, | Performed by: ANESTHESIOLOGY

## 2022-11-03 PROCEDURE — 25000242 PHARM REV CODE 250 ALT 637 W/ HCPCS: Performed by: NURSE ANESTHETIST, CERTIFIED REGISTERED

## 2022-11-03 PROCEDURE — 25000003 PHARM REV CODE 250: Performed by: NURSE ANESTHETIST, CERTIFIED REGISTERED

## 2022-11-03 PROCEDURE — 37000009 HC ANESTHESIA EA ADD 15 MINS: Performed by: ORTHOPAEDIC SURGERY

## 2022-11-03 PROCEDURE — 27201423 OPTIME MED/SURG SUP & DEVICES STERILE SUPPLY: Performed by: ORTHOPAEDIC SURGERY

## 2022-11-03 PROCEDURE — 36000710: Performed by: ORTHOPAEDIC SURGERY

## 2022-11-03 PROCEDURE — 99900035 HC TECH TIME PER 15 MIN (STAT)

## 2022-11-03 PROCEDURE — 25000003 PHARM REV CODE 250: Performed by: ANESTHESIOLOGY

## 2022-11-03 PROCEDURE — D9220A PRA ANESTHESIA: Mod: ANES,,, | Performed by: ANESTHESIOLOGY

## 2022-11-03 PROCEDURE — 63600175 PHARM REV CODE 636 W HCPCS: Performed by: ORTHOPAEDIC SURGERY

## 2022-11-03 PROCEDURE — 63600175 PHARM REV CODE 636 W HCPCS: Performed by: NURSE ANESTHETIST, CERTIFIED REGISTERED

## 2022-11-03 PROCEDURE — 94761 N-INVAS EAR/PLS OXIMETRY MLT: CPT

## 2022-11-03 PROCEDURE — 94640 AIRWAY INHALATION TREATMENT: CPT

## 2022-11-03 PROCEDURE — 29880 PR KNEE SCOPE MED/LAT MENISCECTOMY: ICD-10-PCS | Mod: RT,,, | Performed by: ORTHOPAEDIC SURGERY

## 2022-11-03 PROCEDURE — 29880 PR KNEE SCOPE MED/LAT MENISCECTOMY: ICD-10-PCS | Mod: AS,RT,, | Performed by: PHYSICIAN ASSISTANT

## 2022-11-03 PROCEDURE — 25000003 PHARM REV CODE 250: Performed by: PHYSICIAN ASSISTANT

## 2022-11-03 PROCEDURE — 37000008 HC ANESTHESIA 1ST 15 MINUTES: Performed by: ORTHOPAEDIC SURGERY

## 2022-11-03 PROCEDURE — 71000033 HC RECOVERY, INTIAL HOUR: Performed by: ORTHOPAEDIC SURGERY

## 2022-11-03 PROCEDURE — 36000711: Performed by: ORTHOPAEDIC SURGERY

## 2022-11-03 PROCEDURE — 25000242 PHARM REV CODE 250 ALT 637 W/ HCPCS: Performed by: ANESTHESIOLOGY

## 2022-11-03 RX ORDER — MORPHINE SULFATE 2 MG/ML
2 INJECTION, SOLUTION INTRAMUSCULAR; INTRAVENOUS EVERY 10 MIN PRN
Status: DISCONTINUED | OUTPATIENT
Start: 2022-11-03 | End: 2022-11-03 | Stop reason: HOSPADM

## 2022-11-03 RX ORDER — DEXAMETHASONE SODIUM PHOSPHATE 4 MG/ML
INJECTION, SOLUTION INTRA-ARTICULAR; INTRALESIONAL; INTRAMUSCULAR; INTRAVENOUS; SOFT TISSUE
Status: DISCONTINUED | OUTPATIENT
Start: 2022-11-03 | End: 2022-11-03

## 2022-11-03 RX ORDER — SCOLOPAMINE TRANSDERMAL SYSTEM 1 MG/1
PATCH, EXTENDED RELEASE TRANSDERMAL
Status: COMPLETED
Start: 2022-11-03 | End: 2022-11-03

## 2022-11-03 RX ORDER — HYDROMORPHONE HYDROCHLORIDE 1 MG/ML
0.2 INJECTION, SOLUTION INTRAMUSCULAR; INTRAVENOUS; SUBCUTANEOUS EVERY 5 MIN PRN
Status: DISCONTINUED | OUTPATIENT
Start: 2022-11-03 | End: 2022-11-03 | Stop reason: HOSPADM

## 2022-11-03 RX ORDER — ONDANSETRON 2 MG/ML
4 INJECTION INTRAMUSCULAR; INTRAVENOUS EVERY 12 HOURS PRN
Status: DISCONTINUED | OUTPATIENT
Start: 2022-11-03 | End: 2022-11-03 | Stop reason: HOSPADM

## 2022-11-03 RX ORDER — PROPOFOL 10 MG/ML
VIAL (ML) INTRAVENOUS
Status: DISCONTINUED | OUTPATIENT
Start: 2022-11-03 | End: 2022-11-03

## 2022-11-03 RX ORDER — SODIUM CHLORIDE 0.9 % (FLUSH) 0.9 %
10 SYRINGE (ML) INJECTION
Status: DISCONTINUED | OUTPATIENT
Start: 2022-11-03 | End: 2022-11-03 | Stop reason: HOSPADM

## 2022-11-03 RX ORDER — OXYCODONE HYDROCHLORIDE 5 MG/1
5 TABLET ORAL
Status: DISCONTINUED | OUTPATIENT
Start: 2022-11-03 | End: 2022-11-03 | Stop reason: HOSPADM

## 2022-11-03 RX ORDER — HALOPERIDOL 5 MG/ML
0.5 INJECTION INTRAMUSCULAR EVERY 10 MIN PRN
Status: DISCONTINUED | OUTPATIENT
Start: 2022-11-03 | End: 2022-11-03 | Stop reason: HOSPADM

## 2022-11-03 RX ORDER — KETOROLAC TROMETHAMINE 30 MG/ML
INJECTION, SOLUTION INTRAMUSCULAR; INTRAVENOUS
Status: DISCONTINUED | OUTPATIENT
Start: 2022-11-03 | End: 2022-11-03 | Stop reason: HOSPADM

## 2022-11-03 RX ORDER — ALBUTEROL SULFATE 2.5 MG/.5ML
2.5 SOLUTION RESPIRATORY (INHALATION) ONCE
Status: COMPLETED | OUTPATIENT
Start: 2022-11-03 | End: 2022-11-03

## 2022-11-03 RX ORDER — ROCURONIUM BROMIDE 10 MG/ML
INJECTION, SOLUTION INTRAVENOUS
Status: DISCONTINUED | OUTPATIENT
Start: 2022-11-03 | End: 2022-11-03

## 2022-11-03 RX ORDER — KETAMINE HYDROCHLORIDE 50 MG/ML
INJECTION, SOLUTION INTRAMUSCULAR; INTRAVENOUS
Status: DISCONTINUED | OUTPATIENT
Start: 2022-11-03 | End: 2022-11-03 | Stop reason: HOSPADM

## 2022-11-03 RX ORDER — PROPOFOL 10 MG/ML
VIAL (ML) INTRAVENOUS CONTINUOUS PRN
Status: DISCONTINUED | OUTPATIENT
Start: 2022-11-03 | End: 2022-11-03

## 2022-11-03 RX ORDER — MIDAZOLAM HYDROCHLORIDE 1 MG/ML
INJECTION INTRAMUSCULAR; INTRAVENOUS
Status: DISCONTINUED | OUTPATIENT
Start: 2022-11-03 | End: 2022-11-03

## 2022-11-03 RX ORDER — FAMOTIDINE 10 MG/ML
INJECTION INTRAVENOUS
Status: DISCONTINUED | OUTPATIENT
Start: 2022-11-03 | End: 2022-11-03

## 2022-11-03 RX ORDER — KETAMINE HCL IN 0.9 % NACL 50 MG/5 ML
SYRINGE (ML) INTRAVENOUS
Status: DISCONTINUED | OUTPATIENT
Start: 2022-11-03 | End: 2022-11-03

## 2022-11-03 RX ORDER — ALBUTEROL SULFATE 90 UG/1
AEROSOL, METERED RESPIRATORY (INHALATION)
Status: DISCONTINUED | OUTPATIENT
Start: 2022-11-03 | End: 2022-11-03

## 2022-11-03 RX ORDER — SODIUM CHLORIDE 9 MG/ML
INJECTION, SOLUTION INTRAVENOUS CONTINUOUS
Status: DISCONTINUED | OUTPATIENT
Start: 2022-11-03 | End: 2022-11-03 | Stop reason: HOSPADM

## 2022-11-03 RX ORDER — DIPHENHYDRAMINE HCL 25 MG
25 CAPSULE ORAL
COMMUNITY
End: 2023-10-12

## 2022-11-03 RX ORDER — EPINEPHRINE 1 MG/ML
INJECTION, SOLUTION, CONCENTRATE INTRAVENOUS
Status: DISCONTINUED | OUTPATIENT
Start: 2022-11-03 | End: 2022-11-03 | Stop reason: HOSPADM

## 2022-11-03 RX ORDER — ROPIVACAINE HYDROCHLORIDE 5 MG/ML
INJECTION, SOLUTION EPIDURAL; INFILTRATION; PERINEURAL
Status: DISCONTINUED | OUTPATIENT
Start: 2022-11-03 | End: 2022-11-03 | Stop reason: HOSPADM

## 2022-11-03 RX ORDER — DEXMEDETOMIDINE HYDROCHLORIDE 100 UG/ML
INJECTION, SOLUTION INTRAVENOUS
Status: DISCONTINUED | OUTPATIENT
Start: 2022-11-03 | End: 2022-11-03

## 2022-11-03 RX ORDER — TRAMADOL HYDROCHLORIDE 50 MG/1
100 TABLET ORAL EVERY 6 HOURS PRN
Status: DISCONTINUED | OUTPATIENT
Start: 2022-11-03 | End: 2022-11-03 | Stop reason: HOSPADM

## 2022-11-03 RX ORDER — SCOLOPAMINE TRANSDERMAL SYSTEM 1 MG/1
PATCH, EXTENDED RELEASE TRANSDERMAL
Status: DISCONTINUED | OUTPATIENT
Start: 2022-11-03 | End: 2022-11-03

## 2022-11-03 RX ORDER — ONDANSETRON 2 MG/ML
INJECTION INTRAMUSCULAR; INTRAVENOUS
Status: DISCONTINUED | OUTPATIENT
Start: 2022-11-03 | End: 2022-11-03

## 2022-11-03 RX ORDER — LIDOCAINE HYDROCHLORIDE 10 MG/ML
INJECTION, SOLUTION INTRAVENOUS
Status: DISCONTINUED | OUTPATIENT
Start: 2022-11-03 | End: 2022-11-03

## 2022-11-03 RX ORDER — FENTANYL CITRATE 50 UG/ML
INJECTION, SOLUTION INTRAMUSCULAR; INTRAVENOUS
Status: DISCONTINUED | OUTPATIENT
Start: 2022-11-03 | End: 2022-11-03

## 2022-11-03 RX ORDER — PROMETHAZINE HYDROCHLORIDE 25 MG/1
25 TABLET ORAL EVERY 6 HOURS PRN
Status: DISCONTINUED | OUTPATIENT
Start: 2022-11-03 | End: 2022-11-03 | Stop reason: HOSPADM

## 2022-11-03 RX ORDER — CEFAZOLIN SODIUM 2 G/50ML
2 SOLUTION INTRAVENOUS
Status: DISCONTINUED | OUTPATIENT
Start: 2022-11-03 | End: 2022-11-03 | Stop reason: HOSPADM

## 2022-11-03 RX ORDER — CEFAZOLIN SODIUM 1 G/3ML
INJECTION, POWDER, FOR SOLUTION INTRAMUSCULAR; INTRAVENOUS
Status: DISCONTINUED | OUTPATIENT
Start: 2022-11-03 | End: 2022-11-03

## 2022-11-03 RX ADMIN — PROPOFOL 50 MG: 10 INJECTION, EMULSION INTRAVENOUS at 08:11

## 2022-11-03 RX ADMIN — SODIUM CHLORIDE: 0.9 INJECTION, SOLUTION INTRAVENOUS at 06:11

## 2022-11-03 RX ADMIN — MIDAZOLAM HYDROCHLORIDE 2 MG: 1 INJECTION, SOLUTION INTRAMUSCULAR; INTRAVENOUS at 06:11

## 2022-11-03 RX ADMIN — PROPOFOL 200 MG: 10 INJECTION, EMULSION INTRAVENOUS at 07:11

## 2022-11-03 RX ADMIN — SODIUM CHLORIDE: 9 INJECTION, SOLUTION INTRAVENOUS at 07:11

## 2022-11-03 RX ADMIN — FAMOTIDINE 20 MG: 10 INJECTION, SOLUTION INTRAVENOUS at 07:11

## 2022-11-03 RX ADMIN — ONDANSETRON 4 MG: 2 INJECTION INTRAMUSCULAR; INTRAVENOUS at 07:11

## 2022-11-03 RX ADMIN — PROPOFOL 175 MCG/KG/MIN: 10 INJECTION, EMULSION INTRAVENOUS at 07:11

## 2022-11-03 RX ADMIN — DEXAMETHASONE SODIUM PHOSPHATE 8 MG: 4 INJECTION, SOLUTION INTRAMUSCULAR; INTRAVENOUS at 07:11

## 2022-11-03 RX ADMIN — OXYCODONE 5 MG: 5 TABLET ORAL at 08:11

## 2022-11-03 RX ADMIN — LIDOCAINE HYDROCHLORIDE 100 MG: 10 INJECTION, SOLUTION INTRAVENOUS at 07:11

## 2022-11-03 RX ADMIN — Medication 30 MG: at 07:11

## 2022-11-03 RX ADMIN — CEFAZOLIN 2 G: 330 INJECTION, POWDER, FOR SOLUTION INTRAMUSCULAR; INTRAVENOUS at 07:11

## 2022-11-03 RX ADMIN — SODIUM CHLORIDE, SODIUM GLUCONATE, SODIUM ACETATE, POTASSIUM CHLORIDE, MAGNESIUM CHLORIDE, SODIUM PHOSPHATE, DIBASIC, AND POTASSIUM PHOSPHATE: .53; .5; .37; .037; .03; .012; .00082 INJECTION, SOLUTION INTRAVENOUS at 07:11

## 2022-11-03 RX ADMIN — DEXMEDETOMIDINE HYDROCHLORIDE 25 MCG: 100 INJECTION, SOLUTION INTRAVENOUS at 07:11

## 2022-11-03 RX ADMIN — ALBUTEROL SULFATE 6 PUFF: 108 AEROSOL, METERED RESPIRATORY (INHALATION) at 08:11

## 2022-11-03 RX ADMIN — FENTANYL CITRATE 100 MCG: 50 INJECTION, SOLUTION INTRAMUSCULAR; INTRAVENOUS at 07:11

## 2022-11-03 RX ADMIN — PROPOFOL 100 MG: 10 INJECTION, EMULSION INTRAVENOUS at 07:11

## 2022-11-03 RX ADMIN — SCOPALAMINE 1 PATCH: 1 PATCH, EXTENDED RELEASE TRANSDERMAL at 07:11

## 2022-11-03 RX ADMIN — ROCURONIUM BROMIDE 20 MG: 10 INJECTION INTRAVENOUS at 07:11

## 2022-11-03 RX ADMIN — SUGAMMADEX 200 MG: 100 INJECTION, SOLUTION INTRAVENOUS at 08:11

## 2022-11-03 RX ADMIN — ROCURONIUM BROMIDE 30 MG: 10 INJECTION INTRAVENOUS at 07:11

## 2022-11-03 RX ADMIN — ALBUTEROL SULFATE 2.5 MG: 2.5 SOLUTION RESPIRATORY (INHALATION) at 08:11

## 2022-11-03 NOTE — ANESTHESIA PROCEDURE NOTES
Intubation    Date/Time: 11/3/2022 7:11 AM  Performed by: Leanne Bah CRNA  Authorized by: An Dozier MD     Intubation:     Induction:  Intravenous    Intubated:  Postinduction    Mask Ventilation:  Easy mask    Attempts:  2    Attempted By:  CRNA    Intubation method: clamped jaw.    Blade:  Meyer 2    Laryngeal View Grade: Grade I - full view of cords      Attempted By (2nd Attempt):  CRNA    Method of Intubation (2nd Attempt):  Direct    Blade (2nd Attempt):  Meyer 2    Laryngeal View Grade (2nd Attempt): Grade I - full view of cords      Difficult Airway Encountered?: No      Complications:  None    Airway Device:  Oral endotracheal tube    Airway Device Size:  7.0    Style/Cuff Inflation:  Cuffed    Inflation Amount (mL):  6    Tube secured:  21    Secured at:  The lips    Placement Verified By:  Capnometry    Complicating Factors:  None    Findings Post-Intubation:  BS equal bilateral

## 2022-11-03 NOTE — PLAN OF CARE
Jean ISAAC at bedside. Aware of earrings in place and mosquito bite on abdomen. OK to continue with planned procedure.

## 2022-11-03 NOTE — ANESTHESIA PREPROCEDURE EVALUATION
11/03/2022  Emily Max is a 47 y.o., female.    Patient Active Problem List   Diagnosis    Hypothyroidism    Hypertension    Mild vitamin D deficiency    Anxiety    Depression    Varicose vein of leg    Urinary, incontinence, stress female    Obesity    Venous insufficiency of both lower extremities    History of cervical cancer    Pre-diabetes    Surgical menopause    Periodic headache syndrome without status migrainosus, not intractable    Knee internal derangement    Vaginal atrophy    Malignant neoplasm of exocervix    Low grade squamous intraepith lesion on cytologic smear vagina (lgsil)    FH: colonic polyps    Colon cancer screening    Acute medial meniscus tear of left knee    Acute pain of left knee    Decreased range of motion of left knee    Impaired functional mobility, balance, gait, and endurance    COVID    Wears contact lenses     Past Surgical History:   Procedure Laterality Date    BREAST BIOPSY Right 2002    Core bx, benign    CHONDROPLASTY OF KNEE Left 6/10/2021    Procedure: CHONDROPLASTY, KNEE;  Surgeon: Kelly Zaman MD;  Location: [facility];  Service: Orthopedics;  Laterality: Left;    COLONOSCOPY N/A 7/6/2022    Procedure: COLONOSCOPY;  Surgeon: Issac Delcid MD;  Location: [address]);  Service: Endoscopy;  Laterality: N/A;  Fully vaccinated, prep instr portal -ml    HYSTERECTOMY  May 2013    Robotic radical hyst, bso, blplnd    KNEE ARTHROSCOPY W/ MENISCECTOMY Left 6/10/2021    Procedure: ARTHROSCOPY, KNEE, WITH MENISCECTOMY MEDIAL;  Surgeon: Kelly Zaman MD;  Location: [facility];  Service: Orthopedics;  Laterality: Left;    KNEE SURGERY  08/24/2017    right knee    LIPOMA RESECTION      OOPHORECTOMY      bilateral    PELVIC AND PARA-AORTIC LYMPH NODE DISSECTION      PELVIC LAPAROSCOPY      endometriosis    [place] REMOVAL OF  OVARY/TUBE(S)      SINUS SURGERY      x2    SYNOVECTOMY OF KNEE Left 6/10/2021    Procedure: SYNOVECTOMY, KNEE;  Surgeon: Kelly Zaman MD;  Location: [facility];  Service: Orthopedics;  Laterality: Left;    TONSILLECTOMY         Pre-op Assessment    I have reviewed the Patient Summary Reports.     I have reviewed the Nursing Notes. I have reviewed the NPO Status.      Review of Systems      Physical Exam  General: Well nourished    Airway:  Mallampati: II   Mouth Opening: Normal  TM Distance: Normal  Tongue: Normal  Neck ROM: Normal ROM        Anesthesia Plan  Type of Anesthesia, risks & benefits discussed:    Anesthesia Type: Gen ETT  Intra-op Monitoring Plan: Standard ASA Monitors  Post Op Pain Control Plan: multimodal analgesia  Induction:  IV  Airway Plan: Direct  Informed Consent: Informed consent signed with the Patient and all parties understand the risks and agree with anesthesia plan.  All questions answered.   ASA Score: 2  Day of Surgery Review of History & Physical: H&P Update referred to the surgeon/provider.    Ready For Surgery From Anesthesia Perspective.     .

## 2022-11-03 NOTE — OPERATIVE NOTE ADDENDUM
Certification of Assistant at Surgery       Surgery Date: 11/3/2022     Participating Surgeons:  Surgeon(s) and Role:     * Kelly Zaman MD - Primary    MÓNICA Kwok PA-C - 1st Assistant     Procedures:  Procedure(s) (LRB):  ARTHROSCOPY, KNEE, WITH MENISCECTOMY (Right)  CHONDROPLASTY, KNEE (Right)  SYNOVECTOMY, KNEE (Right)  [id number] (Right)    Assistant Surgeon's Certification of Necessity:  I understand that section 1842 (b) (6) (d) of the Social Security Act generally prohibits Medicare Part B reasonable charge payment for the services of assistants at surgery in teaching hospitals when qualified residents are available to furnish such services. I certify that the services for which payment is claimed were medically necessary, and that no qualified resident was available to perform the services. I further understand that these services are subject to post-payment review by the Medicare carrier.         Aleks Kwok PA-C    11/03/2022  8:18 AM

## 2022-11-03 NOTE — OP NOTE
DATE OF PROCEDURE: 11/03/2022    SURGEON:  Kelly Zaman M.D    ASSISTANT: MÓNICA Kwok PA-C  The use of an assistant was medically necessary for positioning, skin retraction, and assistance with this procedure. The procedure could not be performed without the use of an assistant.   There was no qualified resident/fellow available for assistance with this procedure.    PREOPERATIVE DIAGNOSES:   right  1. knee medial and lateral meniscus tear   2. knee plica.   3. knee possible chondromalacia  4. knee synovitis  5. Knee adhesions    POSTOPERATIVE DIAGNOSES:   right  1. knee medial and lateral meniscus tear   2. knee plica.   3. knee chondromalacia  4. knee synovitis.   5. Knee adhesions    PROCEDURE PERFORMED:   right  1. knee arthroscopic chondroplasty (CPT 04194)  2. knee arthroscopic medial and lateral (CPT 65121) meniscectomy   3. knee arthroscopic partial synovectomy/debridement (CPT 04184).   4. knee arthroscopic plica excision(CPT 82574).    5. Knee arthroscopic lysis of adhesions (CPT 10677)    ANESTHESIA: General with local 0.5% ropivicaine 30ml (5mg/ml), 60 mg ketamine, 60mg toradol (2ml toradol (30mg/ml))    BLOOD LOSS: Minimal.     DRAINS: None.     TOURNIQUET TIME: None.     COMPLICATIONS: None.     CONDITION ON TRANSFER: The patient was extubated and moved to the recovery room in stable condition, with compartments soft and capillary refill less than a   second in all digits.     BRIEF INDICATION OF MEDICAL NECESSITY: The patient is a 47 y.o. year-old female who has history and physical examination findings consistent with the above. Nonoperative versus operative options were discussed. The risks and benefits were discussed with the patient. The patient acknowledged understanding and wished to proceed with operative intervention. Informed consent was obtained prior to the procedure. Details of the surgical procedure were explained, including incisions and probable rehabilitation course. The patient  understands the likely length of convalescence after surgery; and we have explained the risks, benefits, and alternatives of surgery. Reasonable expectations and potential complications were discussed and acknowledged, including but not limited to infection, bleeding, blood clots, (DVT and/or PE), nerve injury, retear, instability, continued pain and stiffness. It was also explained that there was a chance of failure which may require further management. The patient agreed and understood and wished to proceed.     EXAMINATION UNDER ANESTHESIA of the OPERATIVE right KNEE: ROM 0-130 degrees, negative Lachman, negative pivot shift, stable to varus-valgus stress testing, negative effusion.     EXAMINATION UNDER ANESTHESIA of the NON-OPERATED left KNEE: ROM 0-130 degrees, negative Lachman, negative pivot shift, stable to varus-valgus stress testing, negative effusion.     PROCEDURE IN DETAIL: The correct operative site was marked by the operative surgeon.  The patient was then taken to the operating room and placed supine on the operating room table. General anesthesia was administered by the anesthesia team. All pressure points were carefully padded and checked. Preoperative antibiotics were administered. A well-padded tourniquet was placed high on the operative thigh. Examination was begun with the above findings. The non-operative leg was then placed a well-padded well-leg lopez, in a comfortable position. The operative leg was placed in an arthroscopic leg lopez at the level of the tourniquet. The operative leg was prepped and draped in the usual sterile fashion. After prepping and draping, the appropriate landmarks were noted on the skin.  2cc skin and sub-cutaneous tissue was infilttrated with local anesthetic mixture superolaterally at needle insufflation site. The knee was insufflated supero-laterally with saline. 9cc skin wheal and sub-cutaneous tissue and fat pad was infilttrated with local anesthetic mixture  at both portal sites; mid-lateral followed by infero-medial portals were created, and a systematic examination of the joint revealed the following:    There was no evidence of any suprapatellar pouch adhesions or compartmentalization.  There was no evidence of any loose bodies in the medial or lateral gutters.     In the patellofemoral compartment, there was chondral damage to:  Patella 15 x 20 mm grade 2  Trochlea 20 x 20 mm grade 2  Chondroplasty was performed using arthroscopic shaver.     There was chondral damage to:  Medial femoral condyle 30 x 40 mm grade 4  Medial tibial plateau 10 x 10 mm grade 4  Chondroplasty was performed using arthroscopic shaver.     There was chondral damage to:  Lateral tibial plateau 15 x 20 mm grade 2  Chondroplasty was performed using arthroscopic shaver.    In the medial compartment there was no evidence of meniscal instability.   Posterior horn medial meniscus tear,  complex was debrided with arthroscopic shaver and biter.  50% was debrided over an area of 2 cm.  Root and hoop fibers remained intact.    Attention was then turned to the notch. The ACL and PCL were probed carefully and found to be stable.     There was a hypertrophic infrapatellar plica.  This was debrided using arthroscopic biter and shaver.    There was some scar about the ACL.  This was debrided in the standard fashion and lysis of adhesions was performed.    In the lateral compartment there was no evidence of meniscal instability.   Posterior near root horn lateral meniscus tear,  parrot-beak was debrided with arthroscopic shaver and biter.  10% was debrided over an area of 1 cm.  Root and hoop fibers remained intact.    Synovitis was debrided in the knee as needed to the areas of concern in medial and lateral compartments.      The knee and incisions were then copiously irrigated and fluid was extravasated using suction.  The arthroscopic portal incisions were closed using inverted 4-0 Monocryl suture.  5cc  skin and sub-cutaneous tissue and around portals were infilttrated with local anesthetic mixture at both portal sites.  Steri-Strips were placed with Mastisol. Sterile TENS unit pads were placed which were medically necessary for pain relief. Wounds were dressed with Xeroform, 4x4s, and cast padding. ALBERTO hose was placed on the operative leg to match that of the ALBETRO hose placed preoperatively on the non-operative leg. Iceman was secured.  The patient was extubated and moved to the recovery room in stable condition with compartments soft and capillary refill less than a second in all digits.     POSTOPERATIVE PLAN: We will be following the arthroscopic partial meniscectomy guidelines with emphasis on patellar mobility.  This was discussed this with the patient's family after surgery.

## 2022-11-03 NOTE — PLAN OF CARE
VSS. Patient able to tolerate oral liquids. Patient reports tolerable pain level for discharge. Patient/family received home medication per bedside delivery. Dressing intact. Accu therm intact. Thigh TEDs intact. Patient instructed not to wear ALBERTO hose without wearing closed-back shoes or  socks due to increased risk of falls, verbalized understanding. Crutches at bedside for home use. No distress noted. Patient states she is ready for discharge. Discharge instructions reviewed with patient and family, verbalized understanding. IV discontinued with catheter tip intact. Family at bedside to help patient dress. Patient wheeled to lobby via staff.

## 2022-11-03 NOTE — PLAN OF CARE
"Nursing pre-op complete. Pt calm, will continue to monitor. Pt's mother at bedside. Belongings placed in locker #8, 1 bag and purse. Pt unable to remove 2 left ear earrings and 2 right ear earrings. Silk tape applied as instructed by Beth, OR charge nurse. Pt needs post-op crutches, Ht 5'5".   "

## 2022-11-03 NOTE — ANESTHESIA POSTPROCEDURE EVALUATION
Anesthesia Post Evaluation    Patient: Emily Max    Procedure(s) Performed: Procedure(s) (LRB):  ARTHROSCOPY, KNEE, WITH MENISCECTOMY (Right)  CHONDROPLASTY, KNEE (Right)  SYNOVECTOMY, KNEE (Right)  [id number] (Right)    Final Anesthesia Type: general      Patient location during evaluation: PACU  Patient participation: Yes- Able to Participate  Level of consciousness: awake and alert and oriented  Pain management: adequate  Airway patency: patent    PONV status at discharge: No PONV  Anesthetic complications: no      Cardiovascular status: blood pressure returned to baseline and hemodynamically stable  Respiratory status: unassisted  Hydration status: euvolemic  Follow-up not needed.          Vitals Value Taken Time   /64 11/03/22 0917   Temp 36.7 °C (98 °F) 11/03/22 0900   Pulse 83 11/03/22 0918   Resp 26 11/03/22 0918   SpO2 96 % 11/03/22 0917   Vitals shown include unvalidated device data.      Event Time   Out of Recovery 09:00:00         Pain/Kye Score: Pain Rating Prior to Med Admin: 2 (11/3/2022  8:35 AM)  Pain Rating Post Med Admin: 2 (11/3/2022  8:35 AM)  Kye Score: 10 (11/3/2022  9:00 AM)

## 2022-11-03 NOTE — DISCHARGE SUMMARY
[place] - Surgery ([facility])  Brief Operative Note    Surgery Date: 11/3/2022     Surgeon(s) and Role:     * Kelly Zaman MD - Primary    Assisting Surgeon: None    MÓNICA Kwok PA-C - 1st Assistant     Pre-op Diagnosis:  Acute medial meniscus tear of right knee, initial encounter [S83.241A]  Plica syndrome [M67.50]  Chondromalacia of right knee [M94.261]    Post-op Diagnosis:  Post-Op Diagnosis Codes:     * Acute medial meniscus tear of right knee, initial encounter [S83.241A]     * Plica syndrome [M67.50]     * Chondromalacia of right knee [M94.261]    Procedure(s) (LRB):  ARTHROSCOPY, KNEE, WITH MENISCECTOMY (Right)  CHONDROPLASTY, KNEE (Right)  SYNOVECTOMY, KNEE (Right)  [id number] (Right)    Anesthesia: General    Operative Findings: Right knee arthroscopy    Estimated Blood Loss: minimal          Specimens:   Specimen (24h ago, onward)      None              Discharge Note    OUTCOME: Patient tolerated treatment/procedure well without complication and is now ready for discharge.    DISPOSITION: Home or Self Care    FINAL DIAGNOSIS: right knee meniscus tear and DJD    FOLLOWUP: In clinic    DISCHARGE INSTRUCTIONS:    Discharge Procedure Orders   Diet general     Call MD for:  temperature >100.4     Call MD for:  persistent nausea and vomiting     Call MD for:  severe uncontrolled pain     Call MD for:  difficulty breathing, headache or visual disturbances     Call MD for:  redness, tenderness, or signs of infection (pain, swelling, redness, odor or green/yellow discharge around incision site)     Call MD for:  hives     Call MD for:  persistent dizziness or light-headedness     Ice to affected area   Order Comments: using barrier between ice and skin (specify duration&frequency)     No driving, operating heavy equipment or signing legal documents while taking pain medication     Remove dressing in 72 hours     Shower on day dressing removed (No bath)     Weight bearing as tolerated

## 2022-11-03 NOTE — TRANSFER OF CARE
"Anesthesia Transfer of Care Note    Patient: Emily Max    Procedure(s) Performed: Procedure(s) (LRB):  ARTHROSCOPY, KNEE, WITH MENISCECTOMY (Right)  CHONDROPLASTY, KNEE (Right)  SYNOVECTOMY, KNEE (Right)  [id number] (Right)    Patient location: PACU    Anesthesia Type: general    Transport from OR: Transported from OR on room air with adequate spontaneous ventilation. Transported from OR on 6-10 L/min O2 by face mask with adequate spontaneous ventilation    Post pain: adequate analgesia    Post assessment: no apparent anesthetic complications and tolerated procedure well    Post vital signs: stable    Level of consciousness: awake    Nausea/Vomiting: no nausea/vomiting    Complications: none    Transfer of care protocol was followed      Last vitals:   Visit Vitals  BP (!) 104/54 (BP Location: Right arm, Patient Position: Lying)   Pulse 87   Temp 36.4 °C (97.6 °F) (Temporal)   Resp 20   Ht 5' 5" (1.651 m)   Wt 104.3 kg (230 lb)   SpO2 97%   Breastfeeding No   BMI 38.27 kg/m²     "

## 2022-11-04 ENCOUNTER — CLINICAL SUPPORT (OUTPATIENT)
Dept: REHABILITATION | Facility: HOSPITAL | Age: 47
End: 2022-11-04
Payer: COMMERCIAL

## 2022-11-04 DIAGNOSIS — R29.898 DECREASED STRENGTH OF LOWER EXTREMITY: ICD-10-CM

## 2022-11-04 DIAGNOSIS — S83.241A ACUTE MEDIAL MENISCUS TEAR OF RIGHT KNEE, INITIAL ENCOUNTER: ICD-10-CM

## 2022-11-04 DIAGNOSIS — M25.661 DECREASED ROM OF RIGHT KNEE: ICD-10-CM

## 2022-11-04 PROCEDURE — 97161 PT EVAL LOW COMPLEX 20 MIN: CPT

## 2022-11-04 PROCEDURE — 97110 THERAPEUTIC EXERCISES: CPT

## 2022-11-04 NOTE — PLAN OF CARE
[facility]  Physical Therapy Initial Evaluation    Name: Emily Barajas [facility] Number: [id number]    Therapy Diagnosis:   Encounter Diagnoses   Name Primary?    Acute medial meniscus tear of right knee, initial encounter     Decreased ROM of right knee     Decreased strength of lower extremity      Physician: Aleks Kwok III, *    Physician Orders: PT Eval and Treat   Medical Diagnosis from Referral: Acute medial meniscus tear of right knee, initial encounter [S83.241A]  Evaluation Date: 11/4/2022  Authorization Period Expiration: 11/2/23  Plan of Care Expiration: 1/27/22  Visit # / Visits authorized: 1/ 1    Time In: 11:00 AM  Time Out: 12:00 PM    Total Billable Time: 60 minutes    Precautions: Standard  There was chondral damage to:  Medial femoral condyle 30 x 40 mm grade 4  Medial tibial plateau 10 x 10 mm grade 4  Chondroplasty was performed using arthroscopic shaver.     There was chondral damage to:  Lateral tibial plateau 15 x 20 mm grade 2  Chondroplasty was performed using arthroscopic shaver  PROCEDURE PERFORMED:   right  1. knee arthroscopic chondroplasty (CPT 64318)  2. knee arthroscopic medial and lateral (CPT 12293) meniscectomy   3. knee arthroscopic partial synovectomy/debridement (CPT 70669).   4. knee arthroscopic plica excision(CPT 81721).    5. Knee arthroscopic lysis of adhesions (CPT 01316)    POSTOPERATIVE PLAN: We will be following the arthroscopic partial meniscectomy guidelines with emphasis on patellar mobility.  This was discussed this with the patient's family after surgery.       Subjective   Date of onset: 11/3/22  History of current condition - Jenn reports: s/p knee scope by Dr. Zaman on 11/3/22. Has had history of B meniscectomy in the past with the most recent one being ~ 2 years ago on the left and 6-8 years ago on the right. Most recently hurt hurt R knee after tripping over a rug walking into [place] and had immediate onset  of pain with + mechanical symptoms. Since surgery pain is well controlled has been icing and taking tramadol for pain. Woke up feeling good but after walking to the kitchen had increased pain/soreness. 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 Has been trying to lose weight and states she had lost ~ 20 pounds prior to the injury to her R knee.  History of B knee pain that 2/2 arthritis. Denies n/t, constitutional signs.        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    Migraine headache     Urinary tract infection     Vaginal atrophy 5/8/2018    Visit for screening mammogram 12/16/2015    Well woman exam with routine gynecological exam 12/16/2015       Surgical History:   Emily Max  has a past surgical history that includes Lipoma resection; Sinus surgery; Pelvic laparoscopy; Tonsillectomy; Oophorectomy; pr removal of ovary/tube(s); Pelvic and para-aortic lymph node dissection; Knee surgery (08/24/2017); Knee arthroscopy w/ meniscectomy (Left, 6/10/2021); Synovectomy of knee (Left, 6/10/2021); Chondroplasty of knee (Left, 6/10/2021); Breast biopsy (Right, 2002); Hysterectomy (May 2013); Colonoscopy (N/A, 7/6/2022); Knee arthroscopy w/ meniscectomy (Right, 11/3/2022); Chondroplasty of knee (Right, 11/3/2022); and Synovectomy of knee (Right, 11/3/2022).    Medications:   Emily has a current medication list which includes the  following prescription(s): albuterol, alprazolam, aspirin, atenolol, finacea, azelaic acid, diphenhydramine, duloxetine, epinephrine, estradiol, fluticasone propionate, hydroquinone, levocetirizine, levothyroxine, ondansetron, promethazine, tramadol, triamterene-hydrochlorothiazide 37.5-25 mg, and vitamin d, and the following Facility-Administered Medications: diphenhydramine.    Allergies:   Review of patient's allergies indicates:   Allergen Reactions    Hydrocodone-acetaminophen Itching and Rash     States can take - may have been formulation    Iodine Anaphylaxis    Other Anaphylaxis     mushrooms    Fluconazole Hives     Other reaction(s): Hives    Iodine and iodide containing products Hives     Iv iodine only    Mushroom combination no.1      Other reaction(s): Bronchial Constriction        Imaging, MRI studies: Impression:     Complex tear with diminutive caliber of the medial meniscus body segment.     Femorotibial chondral changes, more significant at the medial compartment as detailed.     Two subcentimeter intra-articular bodies versus centrally projecting marginal osteophytes.    Prior Therapy: Yes for B knees  Social History: Lives in raised home. Mother is available to help for the next couple days  Occupation: Works for [employer] in [employer]  Prior Level of Function: Independent, has pain 2/2 arthritis in knee  Current Level of Function: Limited with     Pain:  Current 3/10, worst 6/10, best 3/10   Location: right knee   Description: Aching, Dull, and Sharp  Aggravating Factors: Standing, Walking, and Getting out of bed/chair  Easing Factors: pain medication and ice    Pts goals: Return to baseline knee function with pain levels less than they were since may    Objective     Observation: no signs and symptoms of infection or excessive drainage    Gait: WBAT, decreased WB through RLE     Knee Active Range of Motion:   Right  Left    Flexion 85 115   Extension 0 0     Knee Passive Range of Motion:    Right  Left    Flexion 90 115   Extension 5 hyper 5 hyper       Ankle Active Range of Motion: WNL      Quad Set: fair      Joint Mobility: decreased PF and infrapatellar fat pad joint mobility       Palpation: TTP globally along joint line and incision sites      Sensation: intact throughout to light touch      Edema: unable to assess 2/2 post-op dressing      Special Tests: Not performed today due to post-op status   Strength: Not performed today due to post-op status.         CMS Impairment/Limitation/Restriction for FOTO Knee Survey    Therapist reviewed FOTO scores for Emily Max on 2022.   FOTO documents entered into EPIC - see Media section.    Limitation Score: 40/100=60%         TREATMENT   Treatment Time In: 11:40 AM  Treatment Time Out: 12:00 PM  Total Treatment time separate from Evaluation: 20 minutes    Jenn received therapeutic exercises to develop strength, endurance, ROM, and flexibility for 15 minutes including:  LLLD Heel prop 5'  Quad sets x20 5s holds  Modified SLR x10  Heel slides x20  Gait trainin'  Pt education 5'      Jenn received the following manual therapy techniques: Joint mobilizations and Soft tissue Mobilization were applied to the: R knee for 5 minutes, including:  Patella mobs  Infrapatellar fat pad mobilization    Home Exercises and Patient Education Provided    Education provided:   - HEP, POC, symptom management, recovery timeline, importance of extension ROM and quad function    Written Home Exercises Provided: yes.  Exercises were reviewed and Jenn was able to demonstrate them prior to the end of the session.  Jenn demonstrated good  understanding of the education provided.     See EMR under Patient Instructions for exercises provided 2022.    Assessment   Emily is a 47 y.o. female referred to outpatient Physical Therapy with a medical diagnosis of Acute medial meniscus tear of right knee, initial encounter [S83.241A]. Pt  presents with complaints of R knee pain and decreased function 2/2 post-operative status. Upon evaluation patient demonstrates decreased ROM, joint mobility and flexibility restrictions, decreased strength and motor control contributing to limited functional status at this time. Patient would benefit from appropriate manual therapy, mobility, flexibility, strengthening, and NM re-education in order to normalize ability to complete ADL's and improve CLOF.    Pt prognosis is Good.   Pt will benefit from skilled outpatient Physical Therapy to address the deficits stated above and in the chart below, provide pt/family education, and to maximize pt's level of independence.     Plan of care discussed with patient: Yes  Pt's spiritual, cultural and educational needs considered and patient is agreeable to the plan of care and goals as stated below:     Anticipated Barriers for therapy: none    Medical Necessity is demonstrated by the following  History  Co-morbidities and personal factors that may impact the plan of care Co-morbidities:   See medical chart    Personal Factors:   no deficits     low   Examination  Body Structures and Functions, activity limitations and participation restrictions that may impact the plan of care Body Regions:   lower extremities  trunk    Body Systems:    gross symmetry  ROM  strength  gross coordinated movement  balance  gait  transfers  transitions  motor control  motor learning  edema  scar formation    Participation Restrictions:   Occupational duties, ADL's    Activity limitations:   Learning and applying knowledge  no deficits    General Tasks and Commands  no deficits    Communication  no deficits    Mobility  lifting and carrying objects  walking  driving (bike, car, motorcycle)    Self care  washing oneself (bathing, drying, washing hands)  caring for body parts (brushing teeth, shaving, grooming)  toileting  dressing    Domestic Life  shopping  cooking  doing house work (cleaning  house, washing dishes, laundry)    Interactions/Relationships  no deficits    Life Areas  no deficits    Community and Social Life  no deficits         low   Clinical Presentation stable and uncomplicated low   Decision Making/ Complexity Score: low     Goals:  Short Term Goals ( 4 Weeks ):   1. Pt will report reduced pain by 20 to 40% or greater for improved mobility, sleep  and ambulation.   2. Patients knee edema reduced by 1/4 to 1/2 inch or greater to enhance flexion ROM and knee comfort.   3. Pt to report minimal to no pain to palpation for improved level of comfort.   4. Pt to demonstrate symmetrical weight bearing w/o increased pain for stability and functional ambulation .  5. Pts neuromuscular response will be enhanced for unilateral standing stability and balance   6. Pt to achieve 90 degrees of passive knee flexion for restoring functional knee mobility, ambulating with proper gait pattern and sit to stand ability.   7 Pt to report understanding of all instruction pertinent to patient safety , gait instruction and HEP.    8. Pt to exhibit correct return demonstration of exercises for self-management and independence with HEP    Long Term Goals (12 Weeks):  1. Pt will demonstrate increased knee flexion AROM to 120 degrees or greater for return to functional activity  2. Pt will demonstrate increased knee extension AROM to 0 degrees for standing stability   3. Pt will demonstrate increased RLE strength by 1/2 to 1 grade for improved functional stability  4. Pt to demonstrate standing stability unsupported on dynamic surfaces for functional return to ADL and recreational activities.   5.The patient will be independent amb with no assistive device on all surfaces for community distances.  6. Pt to demonstrate independence with HEP for self management  7. will improve FOTO score to </= 37% limited to decrease perceived limitation with mobility.          Plan   Plan of care Certification: 11/4/2022 to  1/27/22.    Outpatient Physical Therapy 1-2 times weekly for 12 weeks to include the following interventions: Electrical Stimulation NMES, Gait Training, Manual Therapy, Moist Heat/ Ice, Neuromuscular Re-ed, Patient Education, Self Care, Therapeutic Activities, and Therapeutic Exercise.     JIMENA LARKIN, PT

## 2022-11-07 ENCOUNTER — CLINICAL SUPPORT (OUTPATIENT)
Dept: REHABILITATION | Facility: HOSPITAL | Age: 47
End: 2022-11-07
Payer: COMMERCIAL

## 2022-11-07 DIAGNOSIS — R29.898 DECREASED STRENGTH OF LOWER EXTREMITY: ICD-10-CM

## 2022-11-07 DIAGNOSIS — M25.661 DECREASED ROM OF RIGHT KNEE: Primary | ICD-10-CM

## 2022-11-07 PROCEDURE — 97140 MANUAL THERAPY 1/> REGIONS: CPT

## 2022-11-07 PROCEDURE — 97010 HOT OR COLD PACKS THERAPY: CPT

## 2022-11-07 PROCEDURE — 97110 THERAPEUTIC EXERCISES: CPT

## 2022-11-07 NOTE — PROGRESS NOTES
[facility]   Physical Therapy Treatment Note     Name: Emily Barajas [place]  Clinic Number: [id number]    Therapy Diagnosis:   Encounter Diagnoses   Name Primary?    Decreased ROM of right knee Yes    Decreased strength of lower extremity      Physician: Aleks Kwok III, *    Visit Date: 11/7/2022    Precautions: Standard  There was chondral damage to:  Medial femoral condyle 30 x 40 mm grade 4  Medial tibial plateau 10 x 10 mm grade 4  Chondroplasty was performed using arthroscopic shaver.     There was chondral damage to:  Lateral tibial plateau 15 x 20 mm grade 2  Chondroplasty was performed using arthroscopic shaver  PROCEDURE PERFORMED:   right  1. knee arthroscopic chondroplasty (CPT 04409)  2. knee arthroscopic medial and lateral (CPT 19555) meniscectomy   3. knee arthroscopic partial synovectomy/debridement (CPT 32028).   4. knee arthroscopic plica excision(CPT 06554).    5. Knee arthroscopic lysis of adhesions (CPT 46751)     POSTOPERATIVE PLAN: We will be following the arthroscopic partial meniscectomy guidelines with emphasis on patellar mobility.  This was discussed this with the patient's family after surgery.    Time In: 7:59 am  Time Out: 9:00  Total Billable Time: 61 minutes    SUBJECTIVE     Pt reports: She has had higher levels of pain over the weekend. Notes that she feels like this is affecting her gait pattern. Pain does not seem to come down with medication. Has not been using crutches at all.   She was compliant with home exercise program.  Response to previous treatment: Felt good after last session in PT, increased pain at home with HEP.   Functional change: Regression in walking tolerance.     Pain: 6/10  Location: right knee      OBJECTIVE       Knee Active Range of Motion:    Right  Left    Flexion 94 115   Extension 0 0      Knee Passive Range of Motion:    Right  Left    Flexion 95 115   Extension 5 hyper 5 hyper      Treatment     Jenn  received the treatments listed below:      Jenn received therapeutic exercises to develop strength, endurance, ROM, and flexibility for 41 minutes including:  LLLD Heel prop 5'  Quad sets 2x20 5s holds  Ankle pumps 3x30  Modified SLR x10 w/ long strap assist  Heel slides 3x20  Gait trainin'  Pt education 10'    manual therapy techniques: Joint mobilizations were applied to the: R knee, patella for 9 minutes, including:  R Patellar glide sup, inf, med lat    neuromuscular re-education activities to improve: Balance and Proprioception for 0 minutes. The following activities were included:      therapeutic activities to improve functional performance for 0  minutes, including:      gait training to improve functional mobility and safety for 0  minutes, including:      Ice pack applied to R knee for 10 minutes to reduce inflammation and pain.       Patient Education and Home Exercises     Home Exercises Provided and Patient Education Provided     Education provided:   - Use of crutches to reduce loading on R knee at this time to prevent altered gait pattern.   -HEP  -Swelling management    Written Home Exercises Provided: Patient instructed to cont prior HEP. Exercises were reviewed and Jenn was able to demonstrate them prior to the end of the session.  Jenn demonstrated good  understanding of the education provided. See EMR under Patient Instructions for exercises provided during therapy sessions    ASSESSMENT     Increased pain noted entering today. Educated patient on using crutches to offload R knee at this time 2/2 to altered gait pattern, high levels of pain until able to ambulate in normalized gait pattern. Improved pain level following manual work. Focused on education for focus on recovering extension > flexion early in rehab to ensure patient normalizes gait pattern.     Jenn Is progressing well towards her goals.   Pt prognosis is Good.     Pt will continue to benefit from skilled  outpatient physical therapy to address the deficits listed in the problem list box on initial evaluation, provide pt/family education and to maximize pt's level of independence in the home and community environment.     Pt's spiritual, cultural and educational needs considered and pt agreeable to plan of care and goals.     Anticipated barriers to physical therapy: BMI    Goals: Goals:  Short Term Goals ( 4 Weeks ):   1. Pt will report reduced pain by 20 to 40% or greater for improved mobility, sleep  and ambulation.   2. Patients knee edema reduced by 1/4 to 1/2 inch or greater to enhance flexion ROM and knee comfort.   3. Pt to report minimal to no pain to palpation for improved level of comfort.   4. Pt to demonstrate symmetrical weight bearing w/o increased pain for stability and functional ambulation .  5. Pts neuromuscular response will be enhanced for unilateral standing stability and balance   6. Pt to achieve 90 degrees of passive knee flexion for restoring functional knee mobility, ambulating with proper gait pattern and sit to stand ability.   7 Pt to report understanding of all instruction pertinent to patient safety , gait instruction and HEP.    8. Pt to exhibit correct return demonstration of exercises for self-management and independence with HEP     Long Term Goals (12 Weeks):  1. Pt will demonstrate increased knee flexion AROM to 120 degrees or greater for return to functional activity  2. Pt will demonstrate increased knee extension AROM to 0 degrees for standing stability   3. Pt will demonstrate increased RLE strength by 1/2 to 1 grade for improved functional stability  4. Pt to demonstrate standing stability unsupported on dynamic surfaces for functional return to ADL and recreational activities.   5.The patient will be independent amb with no assistive device on all surfaces for community distances.  6. Pt to demonstrate independence with HEP for self management  7. will improve FOTO score to  </= 37% limited to decrease perceived limitation with mobility.    PLAN     Outpatient Physical Therapy 1-2 times weekly for 12 weeks to include the following interventions: Electrical Stimulation NMES, Gait Training, Manual Therapy, Moist Heat/ Ice, Neuromuscular Re-ed, Patient Education, Self Care, Therapeutic Activities, and Therapeutic Exercise.     Shubham Trejo, PT, DPT

## 2022-11-08 ENCOUNTER — PATIENT MESSAGE (OUTPATIENT)
Dept: SPORTS MEDICINE | Facility: CLINIC | Age: 47
End: 2022-11-08
Payer: COMMERCIAL

## 2022-11-14 ENCOUNTER — CLINICAL SUPPORT (OUTPATIENT)
Dept: REHABILITATION | Facility: HOSPITAL | Age: 47
End: 2022-11-14
Payer: COMMERCIAL

## 2022-11-14 ENCOUNTER — PATIENT MESSAGE (OUTPATIENT)
Dept: SPORTS MEDICINE | Facility: CLINIC | Age: 47
End: 2022-11-14
Payer: COMMERCIAL

## 2022-11-14 DIAGNOSIS — M25.661 DECREASED ROM OF RIGHT KNEE: Primary | ICD-10-CM

## 2022-11-14 DIAGNOSIS — R29.898 DECREASED STRENGTH OF LOWER EXTREMITY: ICD-10-CM

## 2022-11-14 PROCEDURE — 97110 THERAPEUTIC EXERCISES: CPT

## 2022-11-14 PROCEDURE — 97140 MANUAL THERAPY 1/> REGIONS: CPT

## 2022-11-14 NOTE — PROGRESS NOTES
[facility]   Physical Therapy Treatment Note     Name: Emily Barajas [place]  Clinic Number: [id number]    Therapy Diagnosis:   No diagnosis found.    Physician: Aleks Kwok III, *    Visit Date: 11/14/2022    Precautions: Standard  There was chondral damage to:  Medial femoral condyle 30 x 40 mm grade 4  Medial tibial plateau 10 x 10 mm grade 4  Chondroplasty was performed using arthroscopic shaver.     There was chondral damage to:  Lateral tibial plateau 15 x 20 mm grade 2  Chondroplasty was performed using arthroscopic shaver  PROCEDURE PERFORMED:   right  1. knee arthroscopic chondroplasty (CPT 13650)  2. knee arthroscopic medial and lateral (CPT 74878) meniscectomy   3. knee arthroscopic partial synovectomy/debridement (CPT 35855).   4. knee arthroscopic plica excision(CPT 39225).    5. Knee arthroscopic lysis of adhesions (CPT 70093)     POSTOPERATIVE PLAN: We will be following the arthroscopic partial meniscectomy guidelines with emphasis on patellar mobility.  This was discussed this with the patient's family after surgery.    Time In: 8:09 am  Time Out: 9:06  Total Billable Time: 57 minutes    SUBJECTIVE     Pt reports: Doing much better, used crutches for a few days and swelling went down a lot. Gait pattern has gotten better.   She was compliant with home exercise program.  Response to previous treatment: Much better than last session. Decreased swelling.   Functional change: Improved walking tolerance.     Pain: 4/10  Location: right knee      OBJECTIVE   Improved R quad activation noted in today's session. Decreased edema noted in R knee.     Knee Active Range of Motion:    Right  Left    Flexion 108 115   Extension 0 0      Knee Passive Range of Motion:    Right  Left    Flexion 108 115   Extension 5 hyper 5 hyper      Treatment     Jenn received the treatments listed below:      Jenn received therapeutic exercises to develop strength, endurance, ROM, and  flexibility for 49 minutes including:  LLLD Heel prop 5# 5'  Quad sets 2x20 5s holds  LAQ w/ 5x ankle pump at top, 3x5 B  SB Hamstring curl x30  Ankle pumps 3x30  Modified SLR 2x10 w/ long strap assist  Heel slides 3x20  Recumbent Bike 10' L2    manual therapy techniques: Joint mobilizations were applied to the: R knee, patella for 8 minutes, including:  R Patellar glide sup, inf, med lat  OM Assessment    neuromuscular re-education activities to improve: Balance and Proprioception for 0 minutes. The following activities were included:      therapeutic activities to improve functional performance for 0  minutes, including:      gait training to improve functional mobility and safety for 0  minutes, including:      Ice pack applied to R knee for 10 minutes to reduce inflammation and pain.       Patient Education and Home Exercises     Home Exercises Provided and Patient Education Provided     Education provided:   -HEP  -Swelling management    Written Home Exercises Provided: Patient instructed to cont prior HEP. Exercises were reviewed and Jenn was able to demonstrate them prior to the end of the session.  Jenn demonstrated good  understanding of the education provided. See EMR under Patient Instructions for exercises provided during therapy sessions    ASSESSMENT     Significant improvement in function and ROM displayed in today's session. Pt is progressing well with improved R quad strengthening. Performed exercises B 2/2 to reports of L knee strain from compensations. Near equal WB demonstrated with gait pattern in today's session.     Jenn Is progressing well towards her goals.   Pt prognosis is Good.     Pt will continue to benefit from skilled outpatient physical therapy to address the deficits listed in the problem list box on initial evaluation, provide pt/family education and to maximize pt's level of independence in the home and community environment.     Pt's spiritual, cultural and  educational needs considered and pt agreeable to plan of care and goals.     Anticipated barriers to physical therapy: BMI    Goals: Goals:  Short Term Goals ( 4 Weeks ):   1. Pt will report reduced pain by 20 to 40% or greater for improved mobility, sleep  and ambulation.   2. Patients knee edema reduced by 1/4 to 1/2 inch or greater to enhance flexion ROM and knee comfort.   3. Pt to report minimal to no pain to palpation for improved level of comfort.   4. Pt to demonstrate symmetrical weight bearing w/o increased pain for stability and functional ambulation .  5. Pts neuromuscular response will be enhanced for unilateral standing stability and balance   6. Pt to achieve 90 degrees of passive knee flexion for restoring functional knee mobility, ambulating with proper gait pattern and sit to stand ability.   7 Pt to report understanding of all instruction pertinent to patient safety , gait instruction and HEP.    8. Pt to exhibit correct return demonstration of exercises for self-management and independence with HEP     Long Term Goals (12 Weeks):  1. Pt will demonstrate increased knee flexion AROM to 120 degrees or greater for return to functional activity  2. Pt will demonstrate increased knee extension AROM to 0 degrees for standing stability   3. Pt will demonstrate increased RLE strength by 1/2 to 1 grade for improved functional stability  4. Pt to demonstrate standing stability unsupported on dynamic surfaces for functional return to ADL and recreational activities.   5.The patient will be independent amb with no assistive device on all surfaces for community distances.  6. Pt to demonstrate independence with HEP for self management  7. will improve FOTO score to </= 37% limited to decrease perceived limitation with mobility.    PLAN     Outpatient Physical Therapy 1-2 times weekly for 12 weeks to include the following interventions: Electrical Stimulation NMES, Gait Training, Manual Therapy, Moist Heat/  Ice, Neuromuscular Re-ed, Patient Education, Self Care, Therapeutic Activities, and Therapeutic Exercise.     Shubham Trejo, PT, DPT

## 2022-11-18 ENCOUNTER — PATIENT MESSAGE (OUTPATIENT)
Dept: GYNECOLOGIC ONCOLOGY | Facility: CLINIC | Age: 47
End: 2022-11-18
Payer: COMMERCIAL

## 2022-11-18 ENCOUNTER — OFFICE VISIT (OUTPATIENT)
Dept: SPORTS MEDICINE | Facility: CLINIC | Age: 47
End: 2022-11-18
Payer: COMMERCIAL

## 2022-11-18 VITALS
BODY MASS INDEX: 38.32 KG/M2 | HEART RATE: 97 BPM | SYSTOLIC BLOOD PRESSURE: 129 MMHG | WEIGHT: 230 LBS | DIASTOLIC BLOOD PRESSURE: 79 MMHG | HEIGHT: 65 IN

## 2022-11-18 DIAGNOSIS — S83.241A ACUTE MEDIAL MENISCUS TEAR OF RIGHT KNEE, INITIAL ENCOUNTER: ICD-10-CM

## 2022-11-18 PROCEDURE — 1160F RVW MEDS BY RX/DR IN RCRD: CPT | Mod: CPTII,S$GLB,, | Performed by: PHYSICIAN ASSISTANT

## 2022-11-18 PROCEDURE — 1160F PR REVIEW ALL MEDS BY PRESCRIBER/CLIN PHARMACIST DOCUMENTED: ICD-10-PCS | Mod: CPTII,S$GLB,, | Performed by: PHYSICIAN ASSISTANT

## 2022-11-18 PROCEDURE — 99999 PR PBB SHADOW E&M-EST. PATIENT-LVL IV: CPT | Mod: PBBFAC,,, | Performed by: PHYSICIAN ASSISTANT

## 2022-11-18 PROCEDURE — 99024 POSTOP FOLLOW-UP VISIT: CPT | Mod: S$GLB,,, | Performed by: PHYSICIAN ASSISTANT

## 2022-11-18 PROCEDURE — 99024 PR POST-OP FOLLOW-UP VISIT: ICD-10-PCS | Mod: S$GLB,,, | Performed by: PHYSICIAN ASSISTANT

## 2022-11-18 PROCEDURE — 3078F DIAST BP <80 MM HG: CPT | Mod: CPTII,S$GLB,, | Performed by: PHYSICIAN ASSISTANT

## 2022-11-18 PROCEDURE — 99999 PR PBB SHADOW E&M-EST. PATIENT-LVL IV: ICD-10-PCS | Mod: PBBFAC,,, | Performed by: PHYSICIAN ASSISTANT

## 2022-11-18 PROCEDURE — 3008F BODY MASS INDEX DOCD: CPT | Mod: CPTII,S$GLB,, | Performed by: PHYSICIAN ASSISTANT

## 2022-11-18 PROCEDURE — 3074F SYST BP LT 130 MM HG: CPT | Mod: CPTII,S$GLB,, | Performed by: PHYSICIAN ASSISTANT

## 2022-11-18 PROCEDURE — 3044F PR MOST RECENT HEMOGLOBIN A1C LEVEL <7.0%: ICD-10-PCS | Mod: CPTII,S$GLB,, | Performed by: PHYSICIAN ASSISTANT

## 2022-11-18 PROCEDURE — 1159F PR MEDICATION LIST DOCUMENTED IN MEDICAL RECORD: ICD-10-PCS | Mod: CPTII,S$GLB,, | Performed by: PHYSICIAN ASSISTANT

## 2022-11-18 PROCEDURE — 3008F PR BODY MASS INDEX (BMI) DOCUMENTED: ICD-10-PCS | Mod: CPTII,S$GLB,, | Performed by: PHYSICIAN ASSISTANT

## 2022-11-18 PROCEDURE — 3078F PR MOST RECENT DIASTOLIC BLOOD PRESSURE < 80 MM HG: ICD-10-PCS | Mod: CPTII,S$GLB,, | Performed by: PHYSICIAN ASSISTANT

## 2022-11-18 PROCEDURE — 3074F PR MOST RECENT SYSTOLIC BLOOD PRESSURE < 130 MM HG: ICD-10-PCS | Mod: CPTII,S$GLB,, | Performed by: PHYSICIAN ASSISTANT

## 2022-11-18 PROCEDURE — 1159F MED LIST DOCD IN RCRD: CPT | Mod: CPTII,S$GLB,, | Performed by: PHYSICIAN ASSISTANT

## 2022-11-18 PROCEDURE — 3044F HG A1C LEVEL LT 7.0%: CPT | Mod: CPTII,S$GLB,, | Performed by: PHYSICIAN ASSISTANT

## 2022-11-18 RX ORDER — TRAMADOL HYDROCHLORIDE 50 MG/1
50-100 TABLET ORAL EVERY 6 HOURS PRN
Qty: 30 TABLET | Refills: 0 | Status: SHIPPED | OUTPATIENT
Start: 2022-11-18

## 2022-11-18 NOTE — PROGRESS NOTES
CC: right knee pain    History of present illness: Pt is here today for post-operative followup of knee arthroscopy.  she is doing well.  We have reviewed her findings and discussed plan of care and future treatment options.      She is doing well. Still having some pain with increased activity.   No issues reported.      DATE OF PROCEDURE: 11/03/2022  SURGEON:  Kelly Zaman M.D   PROCEDURE PERFORMED:   right  1. knee arthroscopic chondroplasty (CPT 02240)  2. knee arthroscopic medial and lateral (CPT 17751) meniscectomy   3. knee arthroscopic partial synovectomy/debridement (CPT 05778).   4. knee arthroscopic plica excision(CPT 87464).    5. Knee arthroscopic lysis of adhesions (CPT 93303)    In the patellofemoral compartment, there was chondral damage to:  Patella 15 x 20 mm grade 2  Trochlea 20 x 20 mm grade 2  Chondroplasty was performed using arthroscopic shaver.     There was chondral damage to:  Medial femoral condyle 30 x 40 mm grade 4  Medial tibial plateau 10 x 10 mm grade 4  Chondroplasty was performed using arthroscopic shaver.     There was chondral damage to:  Lateral tibial plateau 15 x 20 mm grade 2  Chondroplasty was performed using arthroscopic shaver.                                                                              PHYSICAL EXAMINATION:     Incision sites healed well  No evidence of any erythema, infection or induration  Range of motion 0-125 degrees  Minimal effusion  2+ DP pulse  No swelling, no calf tenderness  - Jackie's sign  Negative medial joint line tenderness  Moderate quad atrophy                                                                                 ASSESSMENT:                                                                                                                                               1. Status post above, doing well.                                                                                                                               PLAN:                                                                                                                                                      1. Continue with PT. Refilled tramadol.   Continue icing if knee swelling occurs.   Continue to use a cane or 1 crutch in evening if knee begins hurting.   2. Emphasized quad function.  3. I have discussed return to activity in detail.  4.Patient will see us back at 6 week post-op zuhair.                                    5. All questions were answered, surgical technique was reviewed and interpreted, and patient should contact us with any questions or concerns in the interim.

## 2022-11-21 ENCOUNTER — TELEPHONE (OUTPATIENT)
Dept: GYNECOLOGIC ONCOLOGY | Facility: CLINIC | Age: 47
End: 2022-11-21
Payer: COMMERCIAL

## 2022-11-21 ENCOUNTER — PATIENT MESSAGE (OUTPATIENT)
Dept: ADMINISTRATIVE | Facility: OTHER | Age: 47
End: 2022-11-21
Payer: COMMERCIAL

## 2022-11-21 DIAGNOSIS — Z12.31 SCREENING MAMMOGRAM, ENCOUNTER FOR: Primary | ICD-10-CM

## 2022-11-28 ENCOUNTER — CLINICAL SUPPORT (OUTPATIENT)
Dept: REHABILITATION | Facility: HOSPITAL | Age: 47
End: 2022-11-28
Payer: COMMERCIAL

## 2022-11-28 DIAGNOSIS — M25.661 DECREASED ROM OF RIGHT KNEE: Primary | ICD-10-CM

## 2022-11-28 DIAGNOSIS — R29.898 DECREASED STRENGTH OF LOWER EXTREMITY: ICD-10-CM

## 2022-11-28 PROCEDURE — 97110 THERAPEUTIC EXERCISES: CPT

## 2022-11-28 NOTE — PROGRESS NOTES
[facility]   Physical Therapy Treatment Note     Name: Emily Barajas [place]  Clinic Number: [id number]    Therapy Diagnosis:   No diagnosis found.    Physician: Aleks Kwok III, *    Visit Date: 11/28/2022    Precautions: Standard  There was chondral damage to:  Medial femoral condyle 30 x 40 mm grade 4  Medial tibial plateau 10 x 10 mm grade 4  Chondroplasty was performed using arthroscopic shaver.     There was chondral damage to:  Lateral tibial plateau 15 x 20 mm grade 2  Chondroplasty was performed using arthroscopic shaver  PROCEDURE PERFORMED:   right  1. knee arthroscopic chondroplasty (CPT 73091)  2. knee arthroscopic medial and lateral (CPT 48153) meniscectomy   3. knee arthroscopic partial synovectomy/debridement (CPT 04457).   4. knee arthroscopic plica excision(CPT 92507).    5. Knee arthroscopic lysis of adhesions (CPT 52580)     POSTOPERATIVE PLAN: We will be following the arthroscopic partial meniscectomy guidelines with emphasis on patellar mobility.  This was discussed this with the patient's family after surgery.    Time In: 8:10 am  Time Out: 9:05 am  Total Billable Time: 55 minutes    SUBJECTIVE     Pt reports:R knee has been doing well at work. Had a little pain after standing for a while at [facility].   She was compliant with home exercise program.  Response to previous treatment: Much better than last session. Decreased swelling.   Functional change: Improved walking tolerance.     Pain: 0/10  Location: right knee      OBJECTIVE   Improved R quad activation noted in today's session. Decreased edema noted in R knee.   Knee Active Range of Motion:    Right  Left    Flexion 115 115   Extension 0 0      Knee Passive Range of Motion:    Right  Left    Flexion 115 115   Extension 5 hyper 5 hyper      Treatment     Jenn received the treatments listed below:      Jenn received therapeutic exercises to develop strength, endurance, ROM, and flexibility  "for 55 minutes including:  Recumbent Bike 6' L3  SLR 3x10 3"  Supine bridge 3x10 3"  S/L hip abd 3x10 B 2"  LAQ 3x10 3" B  Clam 3x10 GTB B  Wall squat (mini) 3x10 10"    NP:  LLLD Heel prop 5# 5'  Quad sets 2x20 5s holds  LAQ w/ 5x ankle pump at top, 3x5 B  SB Hamstring curl x30  Ankle pumps 3x30  Modified SLR 2x10 w/ long strap assist  Heel slides 3x20      manual therapy techniques: Joint mobilizations were applied to the: R knee, patella for 00 minutes, including:  R Patellar glide sup, inf, med lat  OM Assessment    neuromuscular re-education activities to improve: Balance and Proprioception for 0 minutes. The following activities were included:      therapeutic activities to improve functional performance for 0  minutes, including:      gait training to improve functional mobility and safety for 0  minutes, including:      Ice pack applied to R knee for 10 minutes to reduce inflammation and pain.       Patient Education and Home Exercises     Home Exercises Provided and Patient Education Provided     Education provided:   -HEP  -Swelling management    Written Home Exercises Provided: Patient instructed to cont prior HEP. Exercises were reviewed and Jenn was able to demonstrate them prior to the end of the session.  Jenn demonstrated good  understanding of the education provided. See EMR under Patient Instructions for exercises provided during therapy sessions    ASSESSMENT   Pt has achieved full R knee AROM at this time. Continues to demonstrate some WB avoidance with squatting but improved with cuing and repetition. Overall progressing well, continues to present with strength deficits in R hip abd, knee ext, flex.    Jenn Is progressing well towards her goals.   Pt prognosis is Good.     Pt will continue to benefit from skilled outpatient physical therapy to address the deficits listed in the problem list box on initial evaluation, provide pt/family education and to maximize pt's level of " independence in the home and community environment.     Pt's spiritual, cultural and educational needs considered and pt agreeable to plan of care and goals.     Anticipated barriers to physical therapy: BMI    Goals: Goals:  Short Term Goals ( 4 Weeks ):   1. Pt will report reduced pain by 20 to 40% or greater for improved mobility, sleep  and ambulation.   2. Patients knee edema reduced by 1/4 to 1/2 inch or greater to enhance flexion ROM and knee comfort.   3. Pt to report minimal to no pain to palpation for improved level of comfort.   4. Pt to demonstrate symmetrical weight bearing w/o increased pain for stability and functional ambulation .  5. Pts neuromuscular response will be enhanced for unilateral standing stability and balance   6. Pt to achieve 90 degrees of passive knee flexion for restoring functional knee mobility, ambulating with proper gait pattern and sit to stand ability.   7 Pt to report understanding of all instruction pertinent to patient safety , gait instruction and HEP.    8. Pt to exhibit correct return demonstration of exercises for self-management and independence with HEP     Long Term Goals (12 Weeks):  1. Pt will demonstrate increased knee flexion AROM to 120 degrees or greater for return to functional activity  2. Pt will demonstrate increased knee extension AROM to 0 degrees for standing stability   3. Pt will demonstrate increased RLE strength by 1/2 to 1 grade for improved functional stability  4. Pt to demonstrate standing stability unsupported on dynamic surfaces for functional return to ADL and recreational activities.   5.The patient will be independent amb with no assistive device on all surfaces for community distances.  6. Pt to demonstrate independence with HEP for self management  7. will improve FOTO score to </= 37% limited to decrease perceived limitation with mobility.    PLAN     Outpatient Physical Therapy 1-2 times weekly for 12 weeks to include the following  interventions: Electrical Stimulation NMES, Gait Training, Manual Therapy, Moist Heat/ Ice, Neuromuscular Re-ed, Patient Education, Self Care, Therapeutic Activities, and Therapeutic Exercise.     Shubham Trejo, PT, DPT

## 2022-12-08 ENCOUNTER — CLINICAL SUPPORT (OUTPATIENT)
Dept: REHABILITATION | Facility: HOSPITAL | Age: 47
End: 2022-12-08
Payer: COMMERCIAL

## 2022-12-08 DIAGNOSIS — M25.661 DECREASED ROM OF RIGHT KNEE: Primary | ICD-10-CM

## 2022-12-08 DIAGNOSIS — R29.898 DECREASED STRENGTH OF LOWER EXTREMITY: ICD-10-CM

## 2022-12-08 PROCEDURE — 97110 THERAPEUTIC EXERCISES: CPT

## 2022-12-08 NOTE — PROGRESS NOTES
[facility]   Physical Therapy Treatment Note     Name: Emily Barajas [place]  Clinic Number: [id number]    Therapy Diagnosis:   No diagnosis found.    Physician: Aleks Kwok III, *    Visit Date: 12/8/2022    Precautions: Standard  There was chondral damage to:  Medial femoral condyle 30 x 40 mm grade 4  Medial tibial plateau 10 x 10 mm grade 4  Chondroplasty was performed using arthroscopic shaver.     There was chondral damage to:  Lateral tibial plateau 15 x 20 mm grade 2  Chondroplasty was performed using arthroscopic shaver  PROCEDURE PERFORMED:   right  1. knee arthroscopic chondroplasty (CPT 57611)  2. knee arthroscopic medial and lateral (CPT 99497) meniscectomy   3. knee arthroscopic partial synovectomy/debridement (CPT 29005).   4. knee arthroscopic plica excision(CPT 65633).    5. Knee arthroscopic lysis of adhesions (CPT 36666)     POSTOPERATIVE PLAN: We will be following the arthroscopic partial meniscectomy guidelines with emphasis on patellar mobility.  This was discussed this with the patient's family after surgery.    Time In: 8:10 am  Time Out: 9:05 am  Total Billable Time: 55 minutes    SUBJECTIVE     Pt reports: R knee gets a little stiff when she stands for too long. Has a hard time squatting still. Was sick last week and feels R knee has regressed since being sick.   She was compliant with home exercise program.  Response to previous treatment: Increased soreness  Functional change: Improved walking tolerance.     Pain: 5/10  Location: right knee      OBJECTIVE   Improved R quad activation noted in today's session. Decreased edema noted in R knee.   Knee Active Range of Motion:    Right  Left    Flexion 115 115   Extension 0 0      Knee Passive Range of Motion:    Right  Left    Flexion 115 115   Extension 5 hyper 5 hyper      Treatment     Jenn received the treatments listed below:      Jenn received therapeutic exercises to develop strength,  "endurance, ROM, and flexibility for 55 minutes including:  Recumbent Bike 10' L3  SLR 3x10 3"  Mini squats at table 3x10  Supine bridge 3x10 3"  LAQ 3x10 3# 3" B  S/L hip abd 3x10 B 2"  SAQ over full FR 2x30 3"    NP:  Clam 3x10 GTB B  Wall squat (mini) 3x10 10"  LLLD Heel prop 5# 5'  Quad sets 2x20 5s holds  LAQ w/ 5x ankle pump at top, 3x5 B  SB Hamstring curl x30  Ankle pumps 3x30  Modified SLR 2x10 w/ long strap assist  Heel slides 3x20      manual therapy techniques: Joint mobilizations were applied to the: R knee, patella for 00 minutes, including:  R Patellar glide sup, inf, med lat  OM Assessment    neuromuscular re-education activities to improve: Balance and Proprioception for 0 minutes. The following activities were included:      therapeutic activities to improve functional performance for 0  minutes, including:      gait training to improve functional mobility and safety for 0  minutes, including:      Ice pack applied to R knee for 10 minutes to reduce inflammation and pain.       Patient Education and Home Exercises     Home Exercises Provided and Patient Education Provided     Education provided:   -HEP  -Swelling management    Written Home Exercises Provided: Patient instructed to cont prior HEP. Exercises were reviewed and Jenn was able to demonstrate them prior to the end of the session.  Jenn demonstrated good  understanding of the education provided. See EMR under Patient Instructions for exercises provided during therapy sessions    ASSESSMENT   Pt demonstrates poor R quad activation in today's session, increased popping, pain with small squats today. Pt reports her compliance with HEP has been inconsistent and her R knee has felt less stable since getting sick last week. Updated HEP and stressed importance of consistency. Reports understanding.    Jenn Is progressing well towards her goals.   Pt prognosis is Good.     Pt will continue to benefit from skilled outpatient physical " therapy to address the deficits listed in the problem list box on initial evaluation, provide pt/family education and to maximize pt's level of independence in the home and community environment.     Pt's spiritual, cultural and educational needs considered and pt agreeable to plan of care and goals.     Anticipated barriers to physical therapy: BMI    Goals: Goals:  Short Term Goals ( 4 Weeks ):   1. Pt will report reduced pain by 20 to 40% or greater for improved mobility, sleep  and ambulation.   2. Patients knee edema reduced by 1/4 to 1/2 inch or greater to enhance flexion ROM and knee comfort.   3. Pt to report minimal to no pain to palpation for improved level of comfort.   4. Pt to demonstrate symmetrical weight bearing w/o increased pain for stability and functional ambulation .  5. Pts neuromuscular response will be enhanced for unilateral standing stability and balance   6. Pt to achieve 90 degrees of passive knee flexion for restoring functional knee mobility, ambulating with proper gait pattern and sit to stand ability.   7 Pt to report understanding of all instruction pertinent to patient safety , gait instruction and HEP.    8. Pt to exhibit correct return demonstration of exercises for self-management and independence with HEP     Long Term Goals (12 Weeks):  1. Pt will demonstrate increased knee flexion AROM to 120 degrees or greater for return to functional activity  2. Pt will demonstrate increased knee extension AROM to 0 degrees for standing stability   3. Pt will demonstrate increased RLE strength by 1/2 to 1 grade for improved functional stability  4. Pt to demonstrate standing stability unsupported on dynamic surfaces for functional return to ADL and recreational activities.   5.The patient will be independent amb with no assistive device on all surfaces for community distances.  6. Pt to demonstrate independence with HEP for self management  7. will improve FOTO score to </= 37% limited to  decrease perceived limitation with mobility.    PLAN     Outpatient Physical Therapy 1-2 times weekly for 12 weeks to include the following interventions: Electrical Stimulation NMES, Gait Training, Manual Therapy, Moist Heat/ Ice, Neuromuscular Re-ed, Patient Education, Self Care, Therapeutic Activities, and Therapeutic Exercise.     Shubham Trejo, PT, DPT

## 2022-12-16 ENCOUNTER — OFFICE VISIT (OUTPATIENT)
Dept: SPORTS MEDICINE | Facility: CLINIC | Age: 47
End: 2022-12-16
Payer: COMMERCIAL

## 2022-12-16 VITALS
WEIGHT: 230 LBS | HEIGHT: 65 IN | DIASTOLIC BLOOD PRESSURE: 79 MMHG | HEART RATE: 86 BPM | BODY MASS INDEX: 38.32 KG/M2 | SYSTOLIC BLOOD PRESSURE: 126 MMHG

## 2022-12-16 DIAGNOSIS — S83.241A ACUTE MEDIAL MENISCUS TEAR OF RIGHT KNEE, INITIAL ENCOUNTER: Primary | ICD-10-CM

## 2022-12-16 PROCEDURE — 3044F PR MOST RECENT HEMOGLOBIN A1C LEVEL <7.0%: ICD-10-PCS | Mod: CPTII,S$GLB,, | Performed by: ORTHOPAEDIC SURGERY

## 2022-12-16 PROCEDURE — 99024 POSTOP FOLLOW-UP VISIT: CPT | Mod: S$GLB,,, | Performed by: ORTHOPAEDIC SURGERY

## 2022-12-16 PROCEDURE — 3008F BODY MASS INDEX DOCD: CPT | Mod: CPTII,S$GLB,, | Performed by: ORTHOPAEDIC SURGERY

## 2022-12-16 PROCEDURE — 3078F DIAST BP <80 MM HG: CPT | Mod: CPTII,S$GLB,, | Performed by: ORTHOPAEDIC SURGERY

## 2022-12-16 PROCEDURE — 99999 PR PBB SHADOW E&M-EST. PATIENT-LVL IV: ICD-10-PCS | Mod: PBBFAC,,, | Performed by: ORTHOPAEDIC SURGERY

## 2022-12-16 PROCEDURE — 3044F HG A1C LEVEL LT 7.0%: CPT | Mod: CPTII,S$GLB,, | Performed by: ORTHOPAEDIC SURGERY

## 2022-12-16 PROCEDURE — 3008F PR BODY MASS INDEX (BMI) DOCUMENTED: ICD-10-PCS | Mod: CPTII,S$GLB,, | Performed by: ORTHOPAEDIC SURGERY

## 2022-12-16 PROCEDURE — 1159F PR MEDICATION LIST DOCUMENTED IN MEDICAL RECORD: ICD-10-PCS | Mod: CPTII,S$GLB,, | Performed by: ORTHOPAEDIC SURGERY

## 2022-12-16 PROCEDURE — 3074F PR MOST RECENT SYSTOLIC BLOOD PRESSURE < 130 MM HG: ICD-10-PCS | Mod: CPTII,S$GLB,, | Performed by: ORTHOPAEDIC SURGERY

## 2022-12-16 PROCEDURE — 99999 PR PBB SHADOW E&M-EST. PATIENT-LVL IV: CPT | Mod: PBBFAC,,, | Performed by: ORTHOPAEDIC SURGERY

## 2022-12-16 PROCEDURE — 99024 PR POST-OP FOLLOW-UP VISIT: ICD-10-PCS | Mod: S$GLB,,, | Performed by: ORTHOPAEDIC SURGERY

## 2022-12-16 PROCEDURE — 3074F SYST BP LT 130 MM HG: CPT | Mod: CPTII,S$GLB,, | Performed by: ORTHOPAEDIC SURGERY

## 2022-12-16 PROCEDURE — 1159F MED LIST DOCD IN RCRD: CPT | Mod: CPTII,S$GLB,, | Performed by: ORTHOPAEDIC SURGERY

## 2022-12-16 PROCEDURE — 3078F PR MOST RECENT DIASTOLIC BLOOD PRESSURE < 80 MM HG: ICD-10-PCS | Mod: CPTII,S$GLB,, | Performed by: ORTHOPAEDIC SURGERY

## 2022-12-16 NOTE — PROGRESS NOTES
CC: right knee pain    History of present illness: Pt is here today for post-operative followup of knee arthroscopy.  she is continuing to have pain with ambulation, but no pain at rest. Pain with ambulation is 6/10 and achy. She has missed the past few PT sessions and would like to re-initiate her sessions. We have reviewed her findings and discussed plan of care and future treatment options.      She is doing well. Still having some pain with increased activity.   No issues reported.      DATE OF PROCEDURE: 11/03/2022  SURGEON:  Kelly Zaman M.D   PROCEDURE PERFORMED:   right  1. knee arthroscopic chondroplasty (CPT 57325)  2. knee arthroscopic medial and lateral (CPT 26372) meniscectomy   3. knee arthroscopic partial synovectomy/debridement (CPT 59274).   4. knee arthroscopic plica excision(CPT 01580).    5. Knee arthroscopic lysis of adhesions (CPT 10803)    In the patellofemoral compartment, there was chondral damage to:  Patella 15 x 20 mm grade 2  Trochlea 20 x 20 mm grade 2  Chondroplasty was performed using arthroscopic shaver.     There was chondral damage to:  Medial femoral condyle 30 x 40 mm grade 4  Medial tibial plateau 10 x 10 mm grade 4  Chondroplasty was performed using arthroscopic shaver.     There was chondral damage to:  Lateral tibial plateau 15 x 20 mm grade 2  Chondroplasty was performed using arthroscopic shaver.                                                                              PHYSICAL EXAMINATION:     Incision sites healed well  No evidence of any erythema, infection or induration  Range of motion 0-125 degrees  Minimal effusion  2+ DP pulse  No swelling, no calf tenderness  - Jackie's sign  Negative medial joint line tenderness  Moderate quad atrophy                                                                                 ASSESSMENT:                                                                                                                                                1. Status post above, doing well but needs to continue with quad strengthening.                                                                                                                               PLAN:                                                                                                                                                     1. Continue with PT at [place], new referral placed today.  2. Emphasized quad function.  3.Patient will see us back at 12 week post-op zuhair.                                    4. All questions were answered, surgical technique was reviewed and interpreted, and patient should contact us with any questions or concerns in the interim.

## 2022-12-27 ENCOUNTER — CLINICAL SUPPORT (OUTPATIENT)
Dept: REHABILITATION | Facility: HOSPITAL | Age: 47
End: 2022-12-27
Payer: COMMERCIAL

## 2022-12-27 DIAGNOSIS — M25.661 DECREASED ROM OF RIGHT KNEE: Primary | ICD-10-CM

## 2022-12-27 DIAGNOSIS — R29.898 DECREASED STRENGTH OF LOWER EXTREMITY: ICD-10-CM

## 2022-12-27 DIAGNOSIS — S83.241A ACUTE MEDIAL MENISCUS TEAR OF RIGHT KNEE, INITIAL ENCOUNTER: ICD-10-CM

## 2022-12-27 PROCEDURE — 97110 THERAPEUTIC EXERCISES: CPT

## 2022-12-27 NOTE — PROGRESS NOTES
[facility]   Physical Therapy Treatment Note     Name: Emily Aggarwalod  Clinic Number: [id number]    Therapy Diagnosis:   Encounter Diagnoses   Name Primary?    Acute medial meniscus tear of right knee, initial encounter     Decreased ROM of right knee Yes    Decreased strength of lower extremity        Physician: Aleks Kwok III, *    Visit Date: 12/27/2022    Precautions: Standard  There was chondral damage to:  Medial femoral condyle 30 x 40 mm grade 4  Medial tibial plateau 10 x 10 mm grade 4  Chondroplasty was performed using arthroscopic shaver.     There was chondral damage to:  Lateral tibial plateau 15 x 20 mm grade 2  Chondroplasty was performed using arthroscopic shaver  PROCEDURE PERFORMED:   right  1. knee arthroscopic chondroplasty (CPT 27139)  2. knee arthroscopic medial and lateral (CPT 23438) meniscectomy   3. knee arthroscopic partial synovectomy/debridement (CPT 54594).   4. knee arthroscopic plica excision(CPT 03146).    5. Knee arthroscopic lysis of adhesions (CPT 10463)     POSTOPERATIVE PLAN: We will be following the arthroscopic partial meniscectomy guidelines with emphasis on patellar mobility.  This was discussed this with the patient's family after surgery.    Time In: 7:58 am  Time Out: 8:55 am  Total Billable Time: 57 minutes    SUBJECTIVE     Pt reports: Feels like squatting is getting better but stairs are still painful and difficult on R LE at this time. Patient has been sick which has limited attendance recently.   She was compliant with home exercise program.  Response to previous treatment: Reduced pain levels  Functional change: Improved walking tolerance.     Pain: 5/10  Location: right knee      OBJECTIVE   Improved R quad activation noted in today's session. Decreased edema noted in R knee.   Knee Active Range of Motion:    Right  Left    Flexion 115 115   Extension 0 0      Knee Passive Range of Motion:    Right  Left    Flexion 115  "115   Extension 5 hyper 5 hyper      Treatment     Jenn received the treatments listed below:      Jenn received therapeutic exercises to develop strength, endurance, ROM, and flexibility for 57 minutes including:  Recumbent Bike 5' L3  LAQ 3x10 5# 3" B  Supine bridge 3x10 3"  SLR 3x10   DL Shuttle press 125# 3x10  Clam 3x10 GTB B   3" step down eccentric 3x10  3" step up 3x10      NP:  Wall squat (mini) 3x10 10"  Mini squats at table 3x10- NP  S/L hip abd 3x10 B 2"  SAQ over full FR 2x30 3"  LLLD Heel prop 5# 5'  Quad sets 2x20 5s holds  LAQ w/ 5x ankle pump at top, 3x5 B  SB Hamstring curl x30  Ankle pumps 3x30  Modified SLR 2x10 w/ long strap assist  Heel slides 3x20      manual therapy techniques: Joint mobilizations were applied to the: R knee, patella for 00 minutes, including:  R Patellar glide sup, inf, med lat  OM Assessment    neuromuscular re-education activities to improve: Balance and Proprioception for 0 minutes. The following activities were included:  SL balance at table 30s x3 B    therapeutic activities to improve functional performance for 0  minutes, including:      gait training to improve functional mobility and safety for 0  minutes, including:      Ice pack applied to R knee for 10 minutes to reduce inflammation and pain.       Patient Education and Home Exercises     Home Exercises Provided and Patient Education Provided     Education provided:   -HEP  -Swelling management    Written Home Exercises Provided: Patient instructed to cont prior HEP. Exercises were reviewed and Jenn was able to demonstrate them prior to the end of the session.  Jenn demonstrated good  understanding of the education provided. See EMR under Patient Instructions for exercises provided during therapy sessions    ASSESSMENT   R quad strength with CKC activity continues to be limiting factor at this time. Significant hip compensations with 6" step ups, modified to 3" at this time. Pt reports " inconsistent compliance with HEP, educated on need for consistent HEP performance to improve functional performance. Alternated between quad strengthening and hip strengthening work to prevent excessive fatigue in quad. Overall performed well but endurance/strength in R quad is very limited.     Jenn Is progressing well towards her goals.   Pt prognosis is Good.     Pt will continue to benefit from skilled outpatient physical therapy to address the deficits listed in the problem list box on initial evaluation, provide pt/family education and to maximize pt's level of independence in the home and community environment.     Pt's spiritual, cultural and educational needs considered and pt agreeable to plan of care and goals.     Anticipated barriers to physical therapy: BMI    Goals: Goals:  Short Term Goals ( 4 Weeks ):   1. Pt will report reduced pain by 20 to 40% or greater for improved mobility, sleep  and ambulation.   2. Patients knee edema reduced by 1/4 to 1/2 inch or greater to enhance flexion ROM and knee comfort.   3. Pt to report minimal to no pain to palpation for improved level of comfort.   4. Pt to demonstrate symmetrical weight bearing w/o increased pain for stability and functional ambulation .  5. Pts neuromuscular response will be enhanced for unilateral standing stability and balance   6. Pt to achieve 90 degrees of passive knee flexion for restoring functional knee mobility, ambulating with proper gait pattern and sit to stand ability.   7 Pt to report understanding of all instruction pertinent to patient safety , gait instruction and HEP.    8. Pt to exhibit correct return demonstration of exercises for self-management and independence with HEP     Long Term Goals (12 Weeks):  1. Pt will demonstrate increased knee flexion AROM to 120 degrees or greater for return to functional activity  2. Pt will demonstrate increased knee extension AROM to 0 degrees for standing stability   3. Pt will  demonstrate increased RLE strength by 1/2 to 1 grade for improved functional stability  4. Pt to demonstrate standing stability unsupported on dynamic surfaces for functional return to ADL and recreational activities.   5.The patient will be independent amb with no assistive device on all surfaces for community distances.  6. Pt to demonstrate independence with HEP for self management  7. will improve FOTO score to </= 37% limited to decrease perceived limitation with mobility.    PLAN     Outpatient Physical Therapy 1-2 times weekly for 12 weeks to include the following interventions: Electrical Stimulation NMES, Gait Training, Manual Therapy, Moist Heat/ Ice, Neuromuscular Re-ed, Patient Education, Self Care, Therapeutic Activities, and Therapeutic Exercise.     Shubham Trejo, PT, DPT

## 2023-01-01 ENCOUNTER — PATIENT MESSAGE (OUTPATIENT)
Dept: INTERNAL MEDICINE | Facility: CLINIC | Age: 48
End: 2023-01-01
Payer: COMMERCIAL

## 2023-01-10 ENCOUNTER — PATIENT MESSAGE (OUTPATIENT)
Dept: SPORTS MEDICINE | Facility: CLINIC | Age: 48
End: 2023-01-10
Payer: COMMERCIAL

## 2023-01-27 ENCOUNTER — CLINICAL SUPPORT (OUTPATIENT)
Dept: REHABILITATION | Facility: HOSPITAL | Age: 48
End: 2023-01-27
Payer: COMMERCIAL

## 2023-01-27 DIAGNOSIS — M25.661 DECREASED ROM OF RIGHT KNEE: Primary | ICD-10-CM

## 2023-01-27 DIAGNOSIS — R29.898 DECREASED STRENGTH OF LOWER EXTREMITY: ICD-10-CM

## 2023-01-27 PROCEDURE — 97110 THERAPEUTIC EXERCISES: CPT

## 2023-01-27 PROCEDURE — 97112 NEUROMUSCULAR REEDUCATION: CPT

## 2023-01-27 NOTE — PROGRESS NOTES
[facility]   Physical Therapy Treatment Note     Name: Emily Barajas [place]  Clinic Number: [id number]    Therapy Diagnosis:   Encounter Diagnoses   Name Primary?    Decreased ROM of right knee Yes    Decreased strength of lower extremity        Physician: Aleks Kwok III, *    Visit Date: 1/27/2023    Precautions: Standard  There was chondral damage to:  Medial femoral condyle 30 x 40 mm grade 4  Medial tibial plateau 10 x 10 mm grade 4  Chondroplasty was performed using arthroscopic shaver.     There was chondral damage to:  Lateral tibial plateau 15 x 20 mm grade 2  Chondroplasty was performed using arthroscopic shaver  PROCEDURE PERFORMED:   right  1. knee arthroscopic chondroplasty (CPT 81451)  2. knee arthroscopic medial and lateral (CPT 15564) meniscectomy   3. knee arthroscopic partial synovectomy/debridement (CPT 66566).   4. knee arthroscopic plica excision(CPT 99151).    5. Knee arthroscopic lysis of adhesions (CPT 34212)     POSTOPERATIVE PLAN: We will be following the arthroscopic partial meniscectomy guidelines with emphasis on patellar mobility.  This was discussed this with the patient's family after surgery.    Time In: 9:00 am  Time Out: 10:00 am  Total Billable Time: 60 minutes    SUBJECTIVE     Pt reports: Feels like knee is getting much better. Doing the bike at home which has helped a lot. Lower pain levels with walking. Squatting is improving.   She was compliant with home exercise program.  Response to previous treatment: Reduced pain levels  Functional change: Improved walking tolerance.     Pain: 2/10  Location: right knee      OBJECTIVE   Improved R quad activation noted in today's session. Decreased edema noted in R knee.   Knee Active Range of Motion:    Right  Left    Flexion 115 115   Extension 0 0      Knee Passive Range of Motion:    Right  Left    Flexion 115 115   Extension 5 hyper 5 hyper      Treatment     Jenn received the treatments  "listed below:      Jenn received therapeutic exercises to develop strength, endurance, ROM, and flexibility for 52 minutes including:  Recumbent Bike 5' L3  SL Clam 3x10 GTB B   Supine bridge 3x10 3"  Lateral walk GTB at knees 3x25"  SLR 3x10   DL Shuttle press 75# 3x10  SL Shuttle press 37.5# 3x8  Wall mini squat 3x10 3"      NP:  3" step down eccentric 3x10  3" step up 3x10  Wall squat (mini) 3x10 10"  Mini squats at table 3x10- NP  S/L hip abd 3x10 B 2"  SAQ over full FR 2x30 3"  LLLD Heel prop 5# 5'  Quad sets 2x20 5s holds  LAQ w/ 5x ankle pump at top, 3x5 B  SB Hamstring curl x30  Ankle pumps 3x30  Modified SLR 2x10 w/ long strap assist  Heel slides 3x20      manual therapy techniques: Joint mobilizations were applied to the: R knee, patella for 00 minutes, including:  R Patellar glide sup, inf, med lat  OM Assessment    neuromuscular re-education activities to improve: Balance and Proprioception for 08 minutes. The following activities were included:  SL balance at table 30s x4 B    therapeutic activities to improve functional performance for 0  minutes, including:      gait training to improve functional mobility and safety for 0  minutes, including:      Ice pack applied to R knee for 00 minutes to reduce inflammation and pain.       Patient Education and Home Exercises     Home Exercises Provided and Patient Education Provided     Education provided:   -HEP  -Swelling management    Written Home Exercises Provided: Patient instructed to cont prior HEP. Exercises were reviewed and Jenn was able to demonstrate them prior to the end of the session.  Jenn demonstrated good  understanding of the education provided. See EMR under Patient Instructions for exercises provided during therapy sessions    ASSESSMENT   Pt has improved significantly in R knee ext, flex, squatting form tolerance. Pt demonstrates improved gait pattern and responds well to all interventions in session. Progress has been slow " 2/2 to inconsistent attendance.     Jenn Is progressing well towards her goals.   Pt prognosis is Good.     Pt will continue to benefit from skilled outpatient physical therapy to address the deficits listed in the problem list box on initial evaluation, provide pt/family education and to maximize pt's level of independence in the home and community environment.     Pt's spiritual, cultural and educational needs considered and pt agreeable to plan of care and goals.     Anticipated barriers to physical therapy: BMI    Goals: Goals:  Short Term Goals ( 4 Weeks ): Goals Met.  1. Pt will report reduced pain by 20 to 40% or greater for improved mobility, sleep  and ambulation.   2. Patients knee edema reduced by 1/4 to 1/2 inch or greater to enhance flexion ROM and knee comfort.   3. Pt to report minimal to no pain to palpation for improved level of comfort.   4. Pt to demonstrate symmetrical weight bearing w/o increased pain for stability and functional ambulation .  5. Pts neuromuscular response will be enhanced for unilateral standing stability and balance   6. Pt to achieve 90 degrees of passive knee flexion for restoring functional knee mobility, ambulating with proper gait pattern and sit to stand ability.   7 Pt to report understanding of all instruction pertinent to patient safety , gait instruction and HEP.    8. Pt to exhibit correct return demonstration of exercises for self-management and independence with HEP     Long Term Goals (12 Weeks):  1. Pt will demonstrate increased knee flexion AROM to 120 degrees or greater for return to functional activity  2. Pt will demonstrate increased knee extension AROM to 0 degrees for standing stability   3. Pt will demonstrate increased RLE strength by 1/2 to 1 grade for improved functional stability  4. Pt to demonstrate standing stability unsupported on dynamic surfaces for functional return to ADL and recreational activities.   5.The patient will be independent  amb with no assistive device on all surfaces for community distances.  6. Pt to demonstrate independence with HEP for self management  7. will improve FOTO score to </= 37% limited to decrease perceived limitation with mobility.    PLAN     Outpatient Physical Therapy 1-2 times weekly for 12 weeks to include the following interventions: Electrical Stimulation NMES, Gait Training, Manual Therapy, Moist Heat/ Ice, Neuromuscular Re-ed, Patient Education, Self Care, Therapeutic Activities, and Therapeutic Exercise.     Shubham Trejo, PT, DPT

## 2023-02-03 ENCOUNTER — TELEPHONE (OUTPATIENT)
Dept: DERMATOLOGY | Facility: CLINIC | Age: 48
End: 2023-02-03
Payer: COMMERCIAL

## 2023-02-03 NOTE — TELEPHONE ENCOUNTER
----- Message from Avelina Santoyo RN sent at 1/30/2023  4:58 PM CST -----  Regarding: FW: appt  Contact: self  [phone]    ----- Message -----  From: Matilde Morales  Sent: 1/30/2023  12:33 PM CST  To: [facility]  Subject: appt                                             Pt requesting a call back  regarding a appt needed for a mole on her breast  I attempted to schedule with no availability . Please call to discuss further

## 2023-03-15 ENCOUNTER — PATIENT MESSAGE (OUTPATIENT)
Dept: INTERNAL MEDICINE | Facility: CLINIC | Age: 48
End: 2023-03-15
Payer: COMMERCIAL

## 2023-03-30 ENCOUNTER — OFFICE VISIT (OUTPATIENT)
Dept: DERMATOLOGY | Facility: CLINIC | Age: 48
End: 2023-03-30
Payer: COMMERCIAL

## 2023-03-30 DIAGNOSIS — L70.0 ACNE VULGARIS: ICD-10-CM

## 2023-03-30 DIAGNOSIS — L82.1 SK (SEBORRHEIC KERATOSIS): Primary | ICD-10-CM

## 2023-03-30 PROCEDURE — 1160F PR REVIEW ALL MEDS BY PRESCRIBER/CLIN PHARMACIST DOCUMENTED: ICD-10-PCS | Mod: CPTII,95,, | Performed by: DERMATOLOGY

## 2023-03-30 PROCEDURE — 1159F MED LIST DOCD IN RCRD: CPT | Mod: CPTII,95,, | Performed by: DERMATOLOGY

## 2023-03-30 PROCEDURE — 1159F PR MEDICATION LIST DOCUMENTED IN MEDICAL RECORD: ICD-10-PCS | Mod: CPTII,95,, | Performed by: DERMATOLOGY

## 2023-03-30 PROCEDURE — 99214 PR OFFICE/OUTPT VISIT, EST, LEVL IV, 30-39 MIN: ICD-10-PCS | Mod: 95,,, | Performed by: DERMATOLOGY

## 2023-03-30 PROCEDURE — 99214 OFFICE O/P EST MOD 30 MIN: CPT | Mod: 95,,, | Performed by: DERMATOLOGY

## 2023-03-30 PROCEDURE — 1160F RVW MEDS BY RX/DR IN RCRD: CPT | Mod: CPTII,95,, | Performed by: DERMATOLOGY

## 2023-03-30 RX ORDER — CLINDAMYCIN PHOSPHATE 10 UG/ML
LOTION TOPICAL
Qty: 60 ML | Refills: 1 | Status: SHIPPED | OUTPATIENT
Start: 2023-03-30

## 2023-03-30 NOTE — PROGRESS NOTES
Subjective:      Patient ID:  Emily Max is a 48 y.o. female who presents for No chief complaint on file.    The patient location is: [place]  The chief complaint leading to consultation is: lesion on right breast     Visit type: audiovisual    Face to Face time with patient: 6 minutes  8 minutes of total time spent on the encounter, which includes face to face time and non-face to face time preparing to see the patient (eg, review of tests), Obtaining and/or reviewing separately obtained history, Documenting clinical information in the electronic or other health record, Independently interpreting results (not separately reported) and communicating results to the patient/family/caregiver, or Care coordination (not separately reported).         Each patient to whom he or she provides medical services by telemedicine is:  (1) informed of the relationship between the physician and patient and the respective role of any other health care provider with respect to management of the patient; and (2) notified that he or she may decline to receive medical services by telemedicine and may withdraw from such care at any time.    Notes:   Pt has lesion on right breast. Present for over 1.5 years.   Color has changed in center about 6 months ago.  Was removed x 2 6-7 years ago.  And then 3 years ago. Continues to recur. Does not recall trauma to this area but bra does rub on this area  Pt also has a parch on acne on right cheek.  Started 1 1/2 years ago pt had MRSA on the face so she is concerned. Pt used clindamycin lotion which helped.  Also has washed area with chlorhexadine. Pt feels it is healing very slowly .       Review of Systems    Objective:   Physical Exam   Constitutional: She appears well-developed and well-nourished. No distress.   Neurological: She is alert and oriented to person, place, and time. She is not disoriented.   Psychiatric: She has a normal mood and affect.   Skin:   Areas Examined  (abnormalities noted in diagram):   Head / Face Inspection Performed  Chest / Axilla Inspection Performed               Diagram Legend     Erythematous scaling macule/papule c/w actinic keratosis       Vascular papule c/w angioma      Pigmented verrucoid papule/plaque c/w seborrheic keratosis      Yellow umbilicated papule c/w sebaceous hyperplasia      Irregularly shaped tan macule c/w lentigo     1-2 mm smooth white papules consistent with Milia      Movable subcutaneous cyst with punctum c/w epidermal inclusion cyst      Subcutaneous movable cyst c/w pilar cyst      Firm pink to brown papule c/w dermatofibroma      Pedunculated fleshy papule(s) c/w skin tag(s)      Evenly pigmented macule c/w junctional nevus     Mildly variegated pigmented, slightly irregular-bordered macule c/w mildly atypical nevus      Flesh colored to evenly pigmented papule c/w intradermal nevus       Pink pearly papule/plaque c/w basal cell carcinoma      Erythematous hyperkeratotic cursted plaque c/w SCC      Surgical scar with no sign of skin cancer recurrence      Open and closed comedones      Inflammatory papules and pustules      Verrucoid papule consistent consistent with wart     Erythematous eczematous patches and plaques     Dystrophic onycholytic nail with subungual debris c/w onychomycosis     Umbilicated papule    Erythematous-base heme-crusted tan verrucoid plaque consistent with inflamed seborrheic keratosis     Erythematous Silvery Scaling Plaque c/w Psoriasis     See annotation            Assessment / Plan:        SK (seborrheic keratosis)- suspected  These are benign inherited growths without a malignant potential. Reassurance given to patient. No treatment is necessary.     Notes Recorded by Aishwarya Elizondo MD on 11/18/2015 at 1:50 PM  Please let pt know pathology report indicates a benign lesion, as noted above. Thank you.   FINAL PATHOLOGIC DIAGNOSIS  1. Skin, right breast, shave biopsy:  - SEBORRHEIC  KERATOSIS.      ACNE VULGARIS  Clindamycin 1% lotion bid            Follow up in about 3 months (around 6/30/2023) for TBSE.

## 2023-04-03 ENCOUNTER — TELEPHONE (OUTPATIENT)
Dept: DERMATOLOGY | Facility: CLINIC | Age: 48
End: 2023-04-03
Payer: COMMERCIAL

## 2023-04-05 ENCOUNTER — PATIENT MESSAGE (OUTPATIENT)
Dept: SPORTS MEDICINE | Facility: CLINIC | Age: 48
End: 2023-04-05
Payer: COMMERCIAL

## 2023-04-11 ENCOUNTER — OFFICE VISIT (OUTPATIENT)
Dept: INTERNAL MEDICINE | Facility: CLINIC | Age: 48
End: 2023-04-11
Payer: COMMERCIAL

## 2023-04-11 VITALS
OXYGEN SATURATION: 95 % | SYSTOLIC BLOOD PRESSURE: 122 MMHG | HEIGHT: 65 IN | DIASTOLIC BLOOD PRESSURE: 84 MMHG | WEIGHT: 243.5 LBS | HEART RATE: 86 BPM | BODY MASS INDEX: 40.57 KG/M2

## 2023-04-11 DIAGNOSIS — G43.009 MIGRAINE WITHOUT AURA AND WITHOUT STATUS MIGRAINOSUS, NOT INTRACTABLE: Primary | ICD-10-CM

## 2023-04-11 DIAGNOSIS — Z00.00 HEALTH CARE MAINTENANCE: ICD-10-CM

## 2023-04-11 PROCEDURE — 3079F DIAST BP 80-89 MM HG: CPT | Mod: CPTII,S$GLB,, | Performed by: INTERNAL MEDICINE

## 2023-04-11 PROCEDURE — 99999 PR PBB SHADOW E&M-EST. PATIENT-LVL V: ICD-10-PCS | Mod: PBBFAC,,, | Performed by: INTERNAL MEDICINE

## 2023-04-11 PROCEDURE — 1159F MED LIST DOCD IN RCRD: CPT | Mod: CPTII,S$GLB,, | Performed by: INTERNAL MEDICINE

## 2023-04-11 PROCEDURE — 99999 PR PBB SHADOW E&M-EST. PATIENT-LVL V: CPT | Mod: PBBFAC,,, | Performed by: INTERNAL MEDICINE

## 2023-04-11 PROCEDURE — 3008F BODY MASS INDEX DOCD: CPT | Mod: CPTII,S$GLB,, | Performed by: INTERNAL MEDICINE

## 2023-04-11 PROCEDURE — 3079F PR MOST RECENT DIASTOLIC BLOOD PRESSURE 80-89 MM HG: ICD-10-PCS | Mod: CPTII,S$GLB,, | Performed by: INTERNAL MEDICINE

## 2023-04-11 PROCEDURE — 3008F PR BODY MASS INDEX (BMI) DOCUMENTED: ICD-10-PCS | Mod: CPTII,S$GLB,, | Performed by: INTERNAL MEDICINE

## 2023-04-11 PROCEDURE — 3074F SYST BP LT 130 MM HG: CPT | Mod: CPTII,S$GLB,, | Performed by: INTERNAL MEDICINE

## 2023-04-11 PROCEDURE — 3074F PR MOST RECENT SYSTOLIC BLOOD PRESSURE < 130 MM HG: ICD-10-PCS | Mod: CPTII,S$GLB,, | Performed by: INTERNAL MEDICINE

## 2023-04-11 PROCEDURE — 99214 PR OFFICE/OUTPT VISIT, EST, LEVL IV, 30-39 MIN: ICD-10-PCS | Mod: S$GLB,,, | Performed by: INTERNAL MEDICINE

## 2023-04-11 PROCEDURE — 1160F PR REVIEW ALL MEDS BY PRESCRIBER/CLIN PHARMACIST DOCUMENTED: ICD-10-PCS | Mod: CPTII,S$GLB,, | Performed by: INTERNAL MEDICINE

## 2023-04-11 PROCEDURE — 1160F RVW MEDS BY RX/DR IN RCRD: CPT | Mod: CPTII,S$GLB,, | Performed by: INTERNAL MEDICINE

## 2023-04-11 PROCEDURE — 99214 OFFICE O/P EST MOD 30 MIN: CPT | Mod: S$GLB,,, | Performed by: INTERNAL MEDICINE

## 2023-04-11 PROCEDURE — 1159F PR MEDICATION LIST DOCUMENTED IN MEDICAL RECORD: ICD-10-PCS | Mod: CPTII,S$GLB,, | Performed by: INTERNAL MEDICINE

## 2023-04-11 NOTE — PROGRESS NOTES
"Subjective:       Patient ID: Emily Max is a 48 y.o. female.    Chief Complaint: FMLA paperwork    HPI  48 y.o. female here for evaluation of migraines, request for FMLA for flares.    I have a history of debilitating migraines prior to my hysterectomy in 2013. They came back about 2 years ago. This last rolling calendar year, I've had 4 that I've had to take sick days from work. They are becoming increasingly more painful.     Is getting sick a lot. A month ago had a flu/covid like illness. Tested negative for covid.  Getting frequent cold like sx with lots of sinus congestion. Productive cough. Copious nasal discharge. Last illness w temp 104. Lots of body aches. Lasted 10 days. Longer than usual to recover. BM less formed than usual.  Sick in jan and march. She does take xyzal.    Started nutrisense. BG monitor was helping with weight loss. Tried metformin previously and major gi s/e. Eating 4881-4584 calorie diet but not seeing progress. Exercise limited by knees. Will go back to keto-lite diet.  Review of Systems   Constitutional:  Negative for fever.   Respiratory:  Negative for shortness of breath.    Cardiovascular:  Negative for chest pain.   Musculoskeletal: Negative.    Skin: Negative.      Objective:   /84 (BP Location: Right arm, Patient Position: Sitting, BP Method: Large (Manual))   Pulse 86   Ht 5' 5" (1.651 m)   Wt 110.5 kg (243 lb 8 oz)   SpO2 95%   BMI 40.52 kg/m²      Physical Exam  Constitutional:       General: She is not in acute distress.     Appearance: She is well-developed. She is not diaphoretic.   HENT:      Head: Normocephalic and atraumatic.   Cardiovascular:      Rate and Rhythm: Normal rate.   Pulmonary:      Effort: Pulmonary effort is normal. No respiratory distress.   Skin:     General: Skin is warm and dry.   Neurological:      Mental Status: She is alert and oriented to person, place, and time.   Psychiatric:         Behavior: Behavior normal.     "   Assessment:       1. Migraine without aura and without status migrainosus, not intractable    2. Health care maintenance        Plan:       Emily was seen today for fmla paperwork.    Diagnoses and all orders for this visit:    Migraine without aura and without status migrainosus, not intractable  -     Ambulatory referral/consult to Neurology; Future   Fmla paperwork completed  Up to 4 hours/mo for appt  Up to 3 days/mo for flare    Health care maintenance  -     Comprehensive Metabolic Panel; Future  -     Hemoglobin A1C; Future  -     Lipid Panel; Future  -     CBC Auto Differential; Future  -     TSH; Future          Up to 4 hours/mo for appt  Up to 3 days/mo for flare

## 2023-04-12 ENCOUNTER — PATIENT MESSAGE (OUTPATIENT)
Dept: NEUROLOGY | Facility: CLINIC | Age: 48
End: 2023-04-12
Payer: COMMERCIAL

## 2023-04-13 ENCOUNTER — PATIENT MESSAGE (OUTPATIENT)
Dept: INTERNAL MEDICINE | Facility: CLINIC | Age: 48
End: 2023-04-13
Payer: COMMERCIAL

## 2023-04-17 ENCOUNTER — PATIENT MESSAGE (OUTPATIENT)
Dept: INTERNAL MEDICINE | Facility: CLINIC | Age: 48
End: 2023-04-17
Payer: COMMERCIAL

## 2023-04-17 ENCOUNTER — OFFICE VISIT (OUTPATIENT)
Dept: NEUROLOGY | Facility: CLINIC | Age: 48
End: 2023-04-17
Payer: COMMERCIAL

## 2023-04-17 DIAGNOSIS — G43.109 MIGRAINE WITH AURA AND WITHOUT STATUS MIGRAINOSUS, NOT INTRACTABLE: ICD-10-CM

## 2023-04-17 PROCEDURE — 99204 OFFICE O/P NEW MOD 45 MIN: CPT | Mod: 95,,, | Performed by: PHYSICIAN ASSISTANT

## 2023-04-17 PROCEDURE — 1159F MED LIST DOCD IN RCRD: CPT | Mod: CPTII,95,, | Performed by: PHYSICIAN ASSISTANT

## 2023-04-17 PROCEDURE — 99204 PR OFFICE/OUTPT VISIT, NEW, LEVL IV, 45-59 MIN: ICD-10-PCS | Mod: 95,,, | Performed by: PHYSICIAN ASSISTANT

## 2023-04-17 PROCEDURE — 1160F RVW MEDS BY RX/DR IN RCRD: CPT | Mod: CPTII,95,, | Performed by: PHYSICIAN ASSISTANT

## 2023-04-17 PROCEDURE — 1160F PR REVIEW ALL MEDS BY PRESCRIBER/CLIN PHARMACIST DOCUMENTED: ICD-10-PCS | Mod: CPTII,95,, | Performed by: PHYSICIAN ASSISTANT

## 2023-04-17 PROCEDURE — 1159F PR MEDICATION LIST DOCUMENTED IN MEDICAL RECORD: ICD-10-PCS | Mod: CPTII,95,, | Performed by: PHYSICIAN ASSISTANT

## 2023-04-17 RX ORDER — UBROGEPANT 100 MG/1
TABLET ORAL
Qty: 16 TABLET | Refills: 5 | Status: SHIPPED | OUTPATIENT
Start: 2023-04-17

## 2023-04-17 RX ORDER — DICLOFENAC SODIUM 75 MG/1
75 TABLET, DELAYED RELEASE ORAL 2 TIMES DAILY PRN
Qty: 20 TABLET | Refills: 3 | Status: SHIPPED | OUTPATIENT
Start: 2023-04-17

## 2023-04-17 NOTE — PATIENT INSTRUCTIONS
Supplements for Migraine:  1. Magnesium Oxide - 400mg by mouth daily  2. Riboflavin (Vitamin B2) - 400mg by mouth daily  3. Coenzyme Q10 - 200mg tablet by mouth daily    - Please download Migraine Richard rosa isela on phone and begin tracking your headaches

## 2023-04-17 NOTE — PROGRESS NOTES
"New Patient     The patient location is: [place]  The chief complaint leading to consultation is: headache     Visit type: audiovisual    Face to Face time with patient: 18 min  30 minutes of total time spent on the encounter, which includes face to face time and non-face to face time preparing to see the patient (eg, review of tests), Obtaining and/or reviewing separately obtained history, Documenting clinical information in the electronic or other health record, Independently interpreting results (not separately reported) and communicating results to the patient/family/caregiver, or Care coordination (not separately reported).     Each patient to whom he or she provides medical services by telemedicine is:  (1) informed of the relationship between the physician and patient and the respective role of any other health care provider with respect to management of the patient; and (2) notified that he or she may decline to receive medical services by telemedicine and may withdraw from such care at any time.    Notes:       SUBJECTIVE:  Patient ID: Emily Max   MRN: [id number]  Referred By: Dr. Elif Lew  Chief Complaint: Headache      History of Present Illness:   48 y.o. female with migraines, h/o cervical cancer s/p hysterectomy w/ surgical menopause, depression, endometriosis, HTN, hypothryoidism, h/o covid infection, knee pain, preDM, venous insufficiency of lower extremities, obesity, vit d def, anxiety, who presents to virtual visit alone for evaluation of headaches.   PMHx negative for TBI, Meningitis, Aneurysms, Kidney Stones, asthma, GI bleed, osteoporosis, CAD/MI, CVA/TIA  Family Hx + for Migraines mother    Pt has h/o migraines for "most of my  life". She was previously seen by Dr. Carlisle, new to me. They improved after hysterectomy in 2013, worsened again 3-4 yrs ago.   Location/Radiation - right hemisphere, temple, temporalis  Quality - heavy, dull ache, stabbing, pulsating  Duration - " 1-2 days  Intensity (range) - moderate - severe  Frequency - 1-2/30 ha days per month  Triggers - hormone, stress, hunger, weather changes, smell of smoke  Aggravating Factors - light, sounds  Alleviating Factors - dark room, ice, cold room,  Recent Changes - no  Prodrome/Aura - loss of peripheral vision, grainy vision peripherally, irritability, fatigue, diarrhea  Associated symptoms with the headache: n/v, photophobia, phonophobia, osmophobia, cognitive slowing    Treatments Tried   Cymbalta 60mg/d  Bb- avoid, h/o depression  Imitrex inj  Imitrex tab  Imitrex NS - works best out of all imitrex forms  Triptans - avoid 2/2 previous failure of imitrex and h/o venous insufficiency  Ketoprofen  Advil  naproxen    Social History  Alcohol - 1 glass, once a week  Smoke - denies  Recreational Drug Use- denies  Occupation - RN in [employer]    Current Medications:    Current Outpatient Medications:     albuterol 90 mcg/actuation inhaler, Inhale 2 puffs into the lungs every 4 (four) hours as needed for Wheezing., Disp: 6.7 g, Rfl: 3    ALPRAZolam (XANAX) 0.5 MG tablet, Take 1 tablet (0.5 mg total) by mouth daily as needed for Anxiety., Disp: 30 tablet, Rfl: 0    aspirin (ECOTRIN) 81 MG EC tablet, Take 1 tablet (81 mg total) by mouth once daily. For 4 weeks starting the day after surgery., Disp: 28 tablet, Rfl: 0    atenoloL (TENORMIN) 50 MG tablet, TAKE 1 TABLET (50 MG TOTAL) BY MOUTH 2 (TWO) TIMES DAILY., Disp: 180 tablet, Rfl: 3    atenoloL (TENORMIN) 50 MG tablet, Take 1 tablet (50 mg total) by mouth 2 (two) times a day., Disp: 180 tablet, Rfl: 2    azelaic acid (FINACEA) 15 % Foam, Apply thin film to face qhs, Disp: 50 g, Rfl: 6    clindamycin (CLEOCIN T) 1 % lotion, aaa on face qd- bid prn flare, Disp: 60 mL, Rfl: 1    diclofenac (VOLTAREN) 75 MG EC tablet, Take 1 tablet (75 mg total) by mouth 2 (two) times daily as needed (for migraines)., Disp: 20 tablet, Rfl: 3    diphenhydrAMINE (BENADRYL) 25 mg capsule, Take 25 mg  by mouth as needed for Itching., Disp: , Rfl:     DULoxetine (CYMBALTA) 30 MG capsule, Take 1 capsule (30 mg total) by mouth 2 (two) times daily., Disp: 90 capsule, Rfl: 3    DULoxetine (CYMBALTA) 30 MG capsule, Take 1 capsule (30 mg total) by mouth 2 (two) times a day., Disp: 90 capsule, Rfl: 1    EPINEPHrine (EPIPEN) 0.3 mg/0.3 mL AtIn, Inject 0.3 mLs (0.3 mg total) into the muscle once. for 1 dose, Disp: 2 each, Rfl: 2    estradioL (VAGIFEM) 10 mcg Tab, Place 1 tablet (10 mcg total) vaginally twice a week., Disp: 8 tablet, Rfl: 12    fluticasone (FLONASE) 50 mcg/actuation nasal spray, 1 spray by Each Nare route daily as needed for Rhinitis., Disp: , Rfl:     hydroquinone 4 % Crea, Use hs for dark spots, Disp: 28.35 g, Rfl: 3    levocetirizine (XYZAL) 5 MG tablet, Take 1 tablet (5 mg total) by mouth once daily., Disp: 90 tablet, Rfl: 3    levothyroxine (SYNTHROID) 50 MCG tablet, Take 1 tablet (50 mcg total) by mouth once daily., Disp: 90 tablet, Rfl: 3    levothyroxine (SYNTHROID) 50 MCG tablet, Take 1 tablet (50 mcg total) by mouth once daily., Disp: 90 tablet, Rfl: 2    ondansetron (ZOFRAN-ODT) 4 MG TbDL, Take 1 tablet (4 mg total) by mouth 2 (two) times daily as needed (nausea)., Disp: 10 tablet, Rfl: 3    promethazine (PHENERGAN) 25 MG tablet, Take 1 tablet (25 mg total) by mouth every 6 (six) hours as needed for Nausea., Disp: 8 tablet, Rfl: 0    traMADoL (ULTRAM) 50 mg tablet, Take 1-2 tablets ( mg total) by mouth every 6 (six) hours as needed for Pain., Disp: 30 tablet, Rfl: 0    triamterene-hydrochlorothiazide 37.5-25 mg (DYAZIDE) 37.5-25 mg per capsule, Take 1 capsule by mouth once daily., Disp: 90 capsule, Rfl: 3    triamterene-hydrochlorothiazide 37.5-25 mg (DYAZIDE) 37.5-25 mg per capsule, Take 1 capsule by mouth once daily., Disp: 90 capsule, Rfl: 2    ubrogepant (UBRELVY) 100 mg tablet, Take 1 tablet by mouth at the onset of a headache. May repeat based on response and tolerability after more  than 2 hours if needed. Do not take more than 200mg in a 24 hour span., Disp: 16 tablet, Rfl: 5    vitamin D (VITAMIN D3) 1000 units Tab, Take 5 tablets (5,000 Units total) by mouth once daily., Disp: 30 tablet, Rfl: prn    Current Facility-Administered Medications:     diphenhydrAMINE injection 50 mg, 50 mg, Intravenous, PRN, Aleks Kwok III, PA-C    Review of Systems - as per HPI, otherwise a balanced 10 systems review is negative.    OBJECTIVE:  Vitals:  There were no vitals taken for this visit.    Physical Exam: certain limitations due to video visit platform, able to perform the following with the patient's assistance:  Constitutional: she appears well-developed and well-nourished. she is well groomed. NAD  HENT:    Head: Normocephalic and atraumatic,  Frontalis was NTTP, temporalis was NTTP   Eyes: Conjunctivae and EOM are normal. Pupils are equal and round  Neck: Occiput and trapezius TTP   Musculoskeletal: Normal range of motion.   Psychiatric: Normal mood and affect.     Neuro exam:    Mental status:  The patient is alert and oriented to person, place and time.  Language is intact and fluent  Remote and recent memory are intact  Normal attention and concentration  Mood is stable    Cranial Nerves:  Extraocular movements are intact and without nystagmus.    Facial movement is symmetric.  Facial sensation is intact.    Tongue in midline without fasciculation.   FROM of neck in all (6) directions without pain  Shoulder shrug symmetrical.    Coordination:     Finger to nose - normal and symmetric bilaterally     Motor:  Normal muscle bulk and symmetry. No fasciculations were noted.   Tremor not apparent   Pronator drift not apparent.                          Sensory:  RUE  intact light touch  LUE intact light touch  RLE intact light touch  LLE intact light touch    Gait:   Normal gait    Review of Data:   Notes from neuro reviewed   Labs:  No visits with results within 3 Month(s) from this visit.   Latest  known visit with results is:   Office Visit on 08/29/2022   Component Date Value Ref Range Status    Specimen UA 08/29/2022 Urine, Clean Catch   Final    Color, UA 08/29/2022 Yellow  Yellow, Straw, Audrey Final    Appearance, UA 08/29/2022 Clear  Clear Final    pH, UA 08/29/2022 7.0  5.0 - 8.0 Final    Specific Gravity, UA 08/29/2022 1.020  1.005 - 1.030 Final    Protein, UA 08/29/2022 Negative  Negative Final    Glucose, UA 08/29/2022 Negative  Negative Final    Ketones, UA 08/29/2022 Trace (A)  Negative Final    Bilirubin (UA) 08/29/2022 Negative  Negative Final    Occult Blood UA 08/29/2022 Negative  Negative Final    Nitrite, UA 08/29/2022 Negative  Negative Final    Leukocytes, UA 08/29/2022 Negative  Negative Final     Imaging:  No results found. However, due to the size of the patient record, not all encounters were searched. Please check Results Review for a complete set of results.  Note: I have independently reviewed any/all imaging/labs/tests and agree with the report (s) as documented.  Any discrepancies will be as noted/demarcated by free text.  NIKKO CHAND 4/17/2023    ASSESSMENT:  1. Migraine with aura and without status migrainosus, not intractable          PLAN:  - Discussed symptoms appear to be consistent with migraines. Discussed this with patient along with treatment options and patient agreed with the following plan  - start supplements  - abortive - try ubrelvy (will place diclofenac while awaiting ubrelvy approval)  - nausea - continue zofran vs phenergan  - risks, benefits, and potential side effects of ubrelvy, nurtec, triptans discussed   - alternative treatment options offered   - importance of healthy diet, regular exercise and sleep hygiene in the treatment of headaches    - Start tracking headaches via Migraine Richard rosa isela on phone   - RTC prn     Orders Placed This Encounter    ubrogepant (UBRELVY) 100 mg tablet    diclofenac (VOLTAREN) 75 MG EC tablet       I have discussed realistic  goals of care with patient at length as well as medication options, and need for lifestyle adjustment. I have explained that treatment will take time. We have agreed that the goal will be to reduce frequency/intensity/quantity of HA, not to be completely HA free. I have explained my non narcotic policy regarding headache treatment.    Patient agreeable to work on lifestyle adjustments.    Questions and concerns were sought and answered to the patient's stated verbal satisfaction.  The patient verbalizes understanding and agreement with the above stated treatment plan.     CC: MD Ragini Davis PA-C  [facility]  [phone]    Dr. Chaparro was available during today's encounter.

## 2023-04-28 ENCOUNTER — OFFICE VISIT (OUTPATIENT)
Dept: SPORTS MEDICINE | Facility: CLINIC | Age: 48
End: 2023-04-28
Payer: COMMERCIAL

## 2023-04-28 VITALS
HEART RATE: 72 BPM | HEIGHT: 65 IN | SYSTOLIC BLOOD PRESSURE: 114 MMHG | DIASTOLIC BLOOD PRESSURE: 81 MMHG | BODY MASS INDEX: 40.32 KG/M2 | WEIGHT: 242 LBS

## 2023-04-28 DIAGNOSIS — M94.261 CHONDROMALACIA OF RIGHT KNEE: Primary | ICD-10-CM

## 2023-04-28 PROCEDURE — 99213 OFFICE O/P EST LOW 20 MIN: CPT | Mod: 25,S$GLB,, | Performed by: ORTHOPAEDIC SURGERY

## 2023-04-28 PROCEDURE — 99999 PR PBB SHADOW E&M-EST. PATIENT-LVL IV: ICD-10-PCS | Mod: PBBFAC,,, | Performed by: ORTHOPAEDIC SURGERY

## 2023-04-28 PROCEDURE — 97110 PR THERAPEUTIC EXERCISES: ICD-10-PCS | Mod: RT,S$GLB,, | Performed by: ORTHOPAEDIC SURGERY

## 2023-04-28 PROCEDURE — 99213 PR OFFICE/OUTPT VISIT, EST, LEVL III, 20-29 MIN: ICD-10-PCS | Mod: 25,S$GLB,, | Performed by: ORTHOPAEDIC SURGERY

## 2023-04-28 PROCEDURE — 97110 THERAPEUTIC EXERCISES: CPT | Mod: RT,S$GLB,, | Performed by: ORTHOPAEDIC SURGERY

## 2023-04-28 PROCEDURE — 99999 PR PBB SHADOW E&M-EST. PATIENT-LVL IV: CPT | Mod: PBBFAC,,, | Performed by: ORTHOPAEDIC SURGERY

## 2023-04-28 NOTE — PROGRESS NOTES
CC: right knee pain    History of present illness: Pt is here today for 6 month post-operative followup of knee arthroscopy.   Pain with ambulation is 2/10 and achy. She is doing much better than previously and is overall happy with her progress thus far. Admits to not being compliant to physical therapy     No issues reported.      DATE OF PROCEDURE: 11/03/2022  SURGEON:  Kelly Zaman M.D   PROCEDURE PERFORMED:   right  1. knee arthroscopic chondroplasty (CPT 80097)  2. knee arthroscopic medial and lateral (CPT 91095) meniscectomy   3. knee arthroscopic partial synovectomy/debridement (CPT 49739).   4. knee arthroscopic plica excision(CPT 73068).    5. Knee arthroscopic lysis of adhesions (CPT 61889)    In the patellofemoral compartment, there was chondral damage to:  Patella 15 x 20 mm grade 2  Trochlea 20 x 20 mm grade 2  Chondroplasty was performed using arthroscopic shaver.     There was chondral damage to:  Medial femoral condyle 30 x 40 mm grade 4  Medial tibial plateau 10 x 10 mm grade 4  Chondroplasty was performed using arthroscopic shaver.     There was chondral damage to:  Lateral tibial plateau 15 x 20 mm grade 2  Chondroplasty was performed using arthroscopic shaver.                                                                              PHYSICAL EXAMINATION:     Incision sites healed well  No evidence of any erythema, infection or induration  Range of motion 0-125 degrees  Minimal effusion  2+ DP pulse  No swelling, no calf tenderness  - Jackie's sign  Negative medial joint line tenderness  Moderate quad atrophy                                                                                 ASSESSMENT:                                                                                                                                               1. Status post above, doing well but needs to continue with quad strengthening.                                                                                                                                PLAN:                                                                                                                                                     Begin with CORE HEP 2-3x weekly    Return to clinic as needed                              3. All questions were answered, surgical technique was reviewed and interpreted, and patient should contact us with any questions or concerns in the interim.                                                       HEP 72360 - I, instructed and demonstrated a core HEP. The patient then demonstrated understanding of exercises and proper technique. This program was performed for 16 minutes.

## 2023-05-11 ENCOUNTER — TELEPHONE (OUTPATIENT)
Dept: PHARMACY | Facility: CLINIC | Age: 48
End: 2023-05-11
Payer: COMMERCIAL

## 2023-05-15 ENCOUNTER — PATIENT MESSAGE (OUTPATIENT)
Dept: INTERNAL MEDICINE | Facility: CLINIC | Age: 48
End: 2023-05-15
Payer: COMMERCIAL

## 2023-06-15 ENCOUNTER — TELEPHONE (OUTPATIENT)
Dept: SPORTS MEDICINE | Facility: CLINIC | Age: 48
End: 2023-06-15
Payer: COMMERCIAL

## 2023-06-15 NOTE — TELEPHONE ENCOUNTER
I called the patient and left a voicemail asking her to return the call if she feels that she needs restrictions at this time, otherwise I replied to the message below to let them know that the patient was released to full duty back in November

## 2023-06-15 NOTE — TELEPHONE ENCOUNTER
----- Message from Pelon Cortes LPN sent at 6/14/2023  8:45 AM CDT -----  The  is asking if this patient should have any duty restrictions following the 4/28 office visit.    Thank You    Jean

## 2023-07-13 ENCOUNTER — OFFICE VISIT (OUTPATIENT)
Dept: DERMATOLOGY | Facility: CLINIC | Age: 48
End: 2023-07-13
Payer: COMMERCIAL

## 2023-07-13 ENCOUNTER — PATIENT MESSAGE (OUTPATIENT)
Dept: DERMATOLOGY | Facility: CLINIC | Age: 48
End: 2023-07-13

## 2023-07-13 DIAGNOSIS — L81.4 LENTIGO: ICD-10-CM

## 2023-07-13 DIAGNOSIS — F33.1 MODERATE EPISODE OF RECURRENT MAJOR DEPRESSIVE DISORDER: ICD-10-CM

## 2023-07-13 DIAGNOSIS — L71.9 ROSACEA: Primary | ICD-10-CM

## 2023-07-13 DIAGNOSIS — D22.9 NEVUS: ICD-10-CM

## 2023-07-13 DIAGNOSIS — F41.1 GAD (GENERALIZED ANXIETY DISORDER): ICD-10-CM

## 2023-07-13 DIAGNOSIS — L82.1 SK (SEBORRHEIC KERATOSIS): ICD-10-CM

## 2023-07-13 DIAGNOSIS — L71.9 ROSACEA: ICD-10-CM

## 2023-07-13 DIAGNOSIS — L91.8 SKIN TAG: ICD-10-CM

## 2023-07-13 PROCEDURE — 1159F PR MEDICATION LIST DOCUMENTED IN MEDICAL RECORD: ICD-10-PCS | Mod: CPTII,S$GLB,, | Performed by: DERMATOLOGY

## 2023-07-13 PROCEDURE — 1160F RVW MEDS BY RX/DR IN RCRD: CPT | Mod: CPTII,S$GLB,, | Performed by: DERMATOLOGY

## 2023-07-13 PROCEDURE — 99999 PR PBB SHADOW E&M-EST. PATIENT-LVL IV: CPT | Mod: PBBFAC,,, | Performed by: DERMATOLOGY

## 2023-07-13 PROCEDURE — 99214 PR OFFICE/OUTPT VISIT, EST, LEVL IV, 30-39 MIN: ICD-10-PCS | Mod: S$GLB,,, | Performed by: DERMATOLOGY

## 2023-07-13 PROCEDURE — 99999 PR PBB SHADOW E&M-EST. PATIENT-LVL IV: ICD-10-PCS | Mod: PBBFAC,,, | Performed by: DERMATOLOGY

## 2023-07-13 PROCEDURE — 1160F PR REVIEW ALL MEDS BY PRESCRIBER/CLIN PHARMACIST DOCUMENTED: ICD-10-PCS | Mod: CPTII,S$GLB,, | Performed by: DERMATOLOGY

## 2023-07-13 PROCEDURE — 99214 OFFICE O/P EST MOD 30 MIN: CPT | Mod: S$GLB,,, | Performed by: DERMATOLOGY

## 2023-07-13 PROCEDURE — 1159F MED LIST DOCD IN RCRD: CPT | Mod: CPTII,S$GLB,, | Performed by: DERMATOLOGY

## 2023-07-13 RX ORDER — SODIUM SULFACETAMIDE AND SULFUR 80; 40 MG/ML; MG/ML
SOLUTION TOPICAL
Qty: 473 ML | Refills: 3 | Status: SHIPPED | OUTPATIENT
Start: 2023-07-13 | End: 2023-07-13 | Stop reason: SDUPTHER

## 2023-07-13 NOTE — PATIENT INSTRUCTIONS
PM:  Wash with sulfacleanser  Thin film of rosacea triple cream medication all over every other to every night   Moisturize with cerave pm or vanicream daily moisturizer to minimize irritation    AM:  Wash with cerave hydrating cleanser  3. Moisturizer with spf 30+- cerave am , tinted    A tint is recommended too- such as makeup, tinted sunscreen, BB/CC creams. The iron oxide in the tint of products protects against agents other than UV light that damage our skin such as blue light from screens, infrared heat, visible light, and other other environmental pollutants.  These other factors can contribute significantly to irregular pigment, especially in skin of color and tint helps protect from these factors.

## 2023-07-13 NOTE — PROGRESS NOTES
Subjective:      Patient ID:  Emily Max is a 48 y.o. female who presents for   Chief Complaint   Patient presents with    Skin Check     tbse    Lesion     R breast      Pt here today for TBSE    Patient is here today for a skin check.   Pt has a history of  average sun exposure in the past.   Pt recalls several blistering sunburns in the past- yes  Pt has history of tanning bed use- no  Pt has  had moles removed in the past- yes, benign per pt  Pt has history of melanoma in first degree relatives-  mother    Pt also here today for recheck of lesion to R breast  Pt would also like to discuss a skin care routine; had had rosacea for many years. No current tx and feels skin is flaring. Occ burns and is sensitive      Lesion    Review of Systems   Skin:  Negative for daily sunscreen use, activity-related sunscreen use and tendency to form keloidal scars.   Hematologic/Lymphatic: Bruises/bleeds easily (bleed).     Objective:   Physical Exam   Constitutional: She appears well-developed and well-nourished. No distress.   Neurological: She is alert and oriented to person, place, and time. She is not disoriented.   Psychiatric: She has a normal mood and affect.   Skin:   Areas Examined (abnormalities noted in diagram):   Scalp / Hair Palpated and Inspected  Head / Face Inspection Performed  Neck Inspection Performed  Chest / Axilla Inspection Performed  Abdomen Inspection Performed  Genitals / Buttocks / Groin Inspection Performed  Back Inspection Performed  RUE Inspected  LUE Inspection Performed  RLE Inspected  LLE Inspection Performed  Nails and Digits Inspection Performed               Diagram Legend     Erythematous scaling macule/papule c/w actinic keratosis       Vascular papule c/w angioma      Pigmented verrucoid papule/plaque c/w seborrheic keratosis      Yellow umbilicated papule c/w sebaceous hyperplasia      Irregularly shaped tan macule c/w lentigo     1-2 mm smooth white papules consistent  with Milia      Movable subcutaneous cyst with punctum c/w epidermal inclusion cyst      Subcutaneous movable cyst c/w pilar cyst      Firm pink to brown papule c/w dermatofibroma      Pedunculated fleshy papule(s) c/w skin tag(s)      Evenly pigmented macule c/w junctional nevus     Mildly variegated pigmented, slightly irregular-bordered macule c/w mildly atypical nevus      Flesh colored to evenly pigmented papule c/w intradermal nevus       Pink pearly papule/plaque c/w basal cell carcinoma      Erythematous hyperkeratotic cursted plaque c/w SCC      Surgical scar with no sign of skin cancer recurrence      Open and closed comedones      Inflammatory papules and pustules      Verrucoid papule consistent consistent with wart     Erythematous eczematous patches and plaques     Dystrophic onycholytic nail with subungual debris c/w onychomycosis     Umbilicated papule    Erythematous-base heme-crusted tan verrucoid plaque consistent with inflamed seborrheic keratosis     Erythematous Silvery Scaling Plaque c/w Psoriasis     See annotation      Assessment / Plan:        Rosacea  -     sulfacetamide sodium-sulfur (SULFACLEANSE 8-4) 8-4 % Susp; Wash face qhs  Dispense: 473 mL; Refill: 3  -     metroNIDAZOLE (NORITATE) 1 % cream; Compound azelaic acid 15% + ivermectin 1% + metronidazole 1% cream. Apply to face qhs.  Dispense: 30 g; Refill: 5  PM:  Wash with sulfacleanser  Thin film of rosacea triple cream medication all over every other to every night   Moisturize with cerave pm or vanicream daily moisturizer to minimize irritation    AM:  Wash with cerave hydrating cleanser  3. Moisturizer with spf 30+- cerave am , tinted    A tint is recommended too- such as makeup, tinted sunscreen, BB/CC creams. The iron oxide in the tint of products protects against agents other than UV light that damage our skin such as blue light from screens, infrared heat, visible light, and other other environmental pollutants.  These other  factors can contribute significantly to irregular pigment, especially in skin of color and tint helps protect from these factors.         Lentigo  This is a benign hyperpigmented sun induced lesion. Recommend daily sun protection/avoidance and use of at least SPF 30, broad spectrum sunscreen (OTC drug) will reduce the number of new lesions. Treatment of these benign lesions are considered cosmetic.  The nature of sun-induced photo-aging and skin cancers is discussed.  Sun avoidance, protective clothing, and the use of 30-SPF sunscreens is advised. Observe closely for skin damage/changes, and call if such occurs.    Skin tag  Reassurance given to patient. No treatment is necessary.   Treatment of benign, asymptomatic lesions may be considered cosmetic.    Nevus  Discussed ABCDE's of nevi.  Monitor for new mole or moles that are becoming bigger, darker, irritated, or developing irregular borders. Brochure provided. Instructed patient to observe lesion(s) for changes and follow up in clinic if changes are noted. Patient to monitor skin at home for new or changing lesions.     SK (seborrheic keratosis)  These are benign inherited growths without a malignant potential. Reassurance given to patient. No treatment is necessary.       Total body skin examination performed today including at least 12 points as noted in physical examination. No lesions suspicious for malignancy noted.    Recommend daily sun protection/avoidance, use of at least SPF 30, broad spectrum sunscreen (OTC drug), skin self examinations, and routine physician surveillance to optimize early detection             Follow up in about 1 year (around 7/13/2024) for TBSE.

## 2023-07-14 RX ORDER — DULOXETIN HYDROCHLORIDE 30 MG/1
30 CAPSULE, DELAYED RELEASE ORAL 2 TIMES DAILY
Qty: 90 CAPSULE | Refills: 3 | Status: SHIPPED | OUTPATIENT
Start: 2023-07-14

## 2023-07-14 NOTE — TELEPHONE ENCOUNTER
Care Due:                  Date            Visit Type   Department     Provider  --------------------------------------------------------------------------------                                EP -                              PRIMARY      [facility] INTERNAL  Last Visit: 04-      CARE ([facility])   PETE RAMÍREZ                               -                              PRIMARY      [facility] INTERNAL  Next Visit: 10-      CARE ([facility])   PETE RAMÍREZ                                                            Last  Test          Frequency    Reason                     Performed    Due Date  --------------------------------------------------------------------------------    CMP.........  12 months..  DULoxetine,                08- 08-                             triamterene-hydrochloroth                             iazide...................    TSH.........  12 months..  levothyroxine............  08- 08-    [facility] Embedded Care Due Messages. Reference number: [id number].   7/13/2023 9:35:18 PM CDT

## 2023-07-17 RX ORDER — SODIUM SULFACETAMIDE AND SULFUR 80; 40 MG/ML; MG/ML
SOLUTION TOPICAL
Qty: 473 ML | Refills: 3 | Status: SHIPPED | OUTPATIENT
Start: 2023-07-17

## 2023-07-19 DIAGNOSIS — L71.9 ROSACEA: ICD-10-CM

## 2023-07-21 ENCOUNTER — HOSPITAL ENCOUNTER (OUTPATIENT)
Dept: RADIOLOGY | Facility: HOSPITAL | Age: 48
Discharge: HOME | End: 2023-07-21
Attending: OBSTETRICS & GYNECOLOGY
Payer: COMMERCIAL

## 2023-07-21 VITALS — BODY MASS INDEX: 40.32 KG/M2 | HEIGHT: 65 IN | WEIGHT: 242 LBS

## 2023-07-21 DIAGNOSIS — Z12.31 SCREENING MAMMOGRAM, ENCOUNTER FOR: ICD-10-CM

## 2023-07-21 PROCEDURE — 77067 SCR MAMMO BI INCL CAD: CPT | Mod: 26,,, | Performed by: RADIOLOGY

## 2023-07-21 PROCEDURE — 77067 SCR MAMMO BI INCL CAD: CPT | Mod: TC

## 2023-07-21 PROCEDURE — 77063 MAMMO DIGITAL SCREENING BILAT WITH TOMO: ICD-10-PCS | Mod: 26,,, | Performed by: RADIOLOGY

## 2023-07-21 PROCEDURE — 77063 BREAST TOMOSYNTHESIS BI: CPT | Mod: 26,,, | Performed by: RADIOLOGY

## 2023-07-21 PROCEDURE — 77067 MAMMO DIGITAL SCREENING BILAT WITH TOMO: ICD-10-PCS | Mod: 26,,, | Performed by: RADIOLOGY

## 2023-07-27 ENCOUNTER — PATIENT MESSAGE (OUTPATIENT)
Dept: GYNECOLOGIC ONCOLOGY | Facility: CLINIC | Age: 48
End: 2023-07-27
Payer: COMMERCIAL

## 2023-09-09 ENCOUNTER — PATIENT MESSAGE (OUTPATIENT)
Dept: DERMATOLOGY | Facility: CLINIC | Age: 48
End: 2023-09-09
Payer: COMMERCIAL

## 2023-09-15 ENCOUNTER — PATIENT MESSAGE (OUTPATIENT)
Dept: INTERNAL MEDICINE | Facility: CLINIC | Age: 48
End: 2023-09-15
Payer: COMMERCIAL

## 2023-09-18 DIAGNOSIS — E11.9 CONTROLLED TYPE 2 DIABETES MELLITUS WITHOUT COMPLICATION, WITHOUT LONG-TERM CURRENT USE OF INSULIN: ICD-10-CM

## 2023-09-18 DIAGNOSIS — R73.9 ELEVATED BLOOD SUGAR: ICD-10-CM

## 2023-09-18 DIAGNOSIS — R74.8 ELEVATED LIVER ENZYMES: Primary | ICD-10-CM

## 2023-09-18 DIAGNOSIS — Z00.00 HEALTH CARE MAINTENANCE: ICD-10-CM

## 2023-10-06 ENCOUNTER — PATIENT MESSAGE (OUTPATIENT)
Dept: ADMINISTRATIVE | Facility: OTHER | Age: 48
End: 2023-10-06
Payer: COMMERCIAL

## 2023-10-06 ENCOUNTER — LAB VISIT (OUTPATIENT)
Dept: LAB | Facility: HOSPITAL | Age: 48
End: 2023-10-06
Payer: COMMERCIAL

## 2023-10-06 DIAGNOSIS — Z00.00 HEALTH CARE MAINTENANCE: ICD-10-CM

## 2023-10-06 DIAGNOSIS — R73.9 ELEVATED BLOOD SUGAR: ICD-10-CM

## 2023-10-06 DIAGNOSIS — R74.8 ELEVATED LIVER ENZYMES: ICD-10-CM

## 2023-10-06 LAB
ALBUMIN SERPL BCP-MCNC: 3.6 G/DL (ref 3.5–5.2)
ALBUMIN SERPL BCP-MCNC: 3.6 G/DL (ref 3.5–5.2)
ALP SERPL-CCNC: 67 U/L (ref 55–135)
ALP SERPL-CCNC: 67 U/L (ref 55–135)
ALT SERPL W/O P-5'-P-CCNC: 59 U/L (ref 10–44)
ALT SERPL W/O P-5'-P-CCNC: 59 U/L (ref 10–44)
ANION GAP SERPL CALC-SCNC: 11 MMOL/L (ref 8–16)
ANION GAP SERPL CALC-SCNC: 11 MMOL/L (ref 8–16)
AST SERPL-CCNC: 45 U/L (ref 10–40)
AST SERPL-CCNC: 45 U/L (ref 10–40)
BASOPHILS # BLD AUTO: 0.07 K/UL (ref 0–0.2)
BASOPHILS # BLD AUTO: 0.07 K/UL (ref 0–0.2)
BASOPHILS NFR BLD: 1.3 % (ref 0–1.9)
BASOPHILS NFR BLD: 1.3 % (ref 0–1.9)
BILIRUB DIRECT SERPL-MCNC: 0.3 MG/DL (ref 0.1–0.3)
BILIRUB SERPL-MCNC: 0.7 MG/DL (ref 0.1–1)
BILIRUB SERPL-MCNC: 0.7 MG/DL (ref 0.1–1)
BUN SERPL-MCNC: 11 MG/DL (ref 6–20)
BUN SERPL-MCNC: 11 MG/DL (ref 6–20)
CALCIUM SERPL-MCNC: 9.4 MG/DL (ref 8.7–10.5)
CALCIUM SERPL-MCNC: 9.4 MG/DL (ref 8.7–10.5)
CHLORIDE SERPL-SCNC: 105 MMOL/L (ref 95–110)
CHLORIDE SERPL-SCNC: 105 MMOL/L (ref 95–110)
CHOLEST SERPL-MCNC: 158 MG/DL (ref 120–199)
CHOLEST SERPL-MCNC: 158 MG/DL (ref 120–199)
CHOLEST/HDLC SERPL: 3.3 {RATIO} (ref 2–5)
CHOLEST/HDLC SERPL: 3.3 {RATIO} (ref 2–5)
CO2 SERPL-SCNC: 24 MMOL/L (ref 23–29)
CO2 SERPL-SCNC: 24 MMOL/L (ref 23–29)
CREAT SERPL-MCNC: 0.8 MG/DL (ref 0.5–1.4)
CREAT SERPL-MCNC: 0.8 MG/DL (ref 0.5–1.4)
DIFFERENTIAL METHOD: ABNORMAL
DIFFERENTIAL METHOD: ABNORMAL
EOSINOPHIL # BLD AUTO: 0.4 K/UL (ref 0–0.5)
EOSINOPHIL # BLD AUTO: 0.4 K/UL (ref 0–0.5)
EOSINOPHIL NFR BLD: 6.6 % (ref 0–8)
EOSINOPHIL NFR BLD: 6.6 % (ref 0–8)
ERYTHROCYTE [DISTWIDTH] IN BLOOD BY AUTOMATED COUNT: 12.5 % (ref 11.5–14.5)
ERYTHROCYTE [DISTWIDTH] IN BLOOD BY AUTOMATED COUNT: 12.5 % (ref 11.5–14.5)
EST. GFR  (NO RACE VARIABLE): >60 ML/MIN/1.73 M^2
EST. GFR  (NO RACE VARIABLE): >60 ML/MIN/1.73 M^2
ESTIMATED AVG GLUCOSE: 140 MG/DL (ref 68–131)
ESTIMATED AVG GLUCOSE: 140 MG/DL (ref 68–131)
GLUCOSE SERPL-MCNC: 104 MG/DL (ref 70–110)
GLUCOSE SERPL-MCNC: 104 MG/DL (ref 70–110)
HAV IGM SERPL QL IA: NORMAL
HBA1C MFR BLD: 6.5 % (ref 4–5.6)
HBA1C MFR BLD: 6.5 % (ref 4–5.6)
HBV CORE IGM SERPL QL IA: NORMAL
HBV SURFACE AG SERPL QL IA: NORMAL
HCT VFR BLD AUTO: 43.2 % (ref 37–48.5)
HCT VFR BLD AUTO: 43.2 % (ref 37–48.5)
HCV AB SERPL QL IA: NORMAL
HDLC SERPL-MCNC: 48 MG/DL (ref 40–75)
HDLC SERPL-MCNC: 48 MG/DL (ref 40–75)
HDLC SERPL: 30.4 % (ref 20–50)
HDLC SERPL: 30.4 % (ref 20–50)
HGB BLD-MCNC: 14.5 G/DL (ref 12–16)
HGB BLD-MCNC: 14.5 G/DL (ref 12–16)
IMM GRANULOCYTES # BLD AUTO: 0.01 K/UL (ref 0–0.04)
IMM GRANULOCYTES # BLD AUTO: 0.01 K/UL (ref 0–0.04)
IMM GRANULOCYTES NFR BLD AUTO: 0.2 % (ref 0–0.5)
IMM GRANULOCYTES NFR BLD AUTO: 0.2 % (ref 0–0.5)
LDLC SERPL CALC-MCNC: 95.2 MG/DL (ref 63–159)
LDLC SERPL CALC-MCNC: 95.2 MG/DL (ref 63–159)
LYMPHOCYTES # BLD AUTO: 2.4 K/UL (ref 1–4.8)
LYMPHOCYTES # BLD AUTO: 2.4 K/UL (ref 1–4.8)
LYMPHOCYTES NFR BLD: 42.3 % (ref 18–48)
LYMPHOCYTES NFR BLD: 42.3 % (ref 18–48)
MCH RBC QN AUTO: 30.4 PG (ref 27–31)
MCH RBC QN AUTO: 30.4 PG (ref 27–31)
MCHC RBC AUTO-ENTMCNC: 33.6 G/DL (ref 32–36)
MCHC RBC AUTO-ENTMCNC: 33.6 G/DL (ref 32–36)
MCV RBC AUTO: 91 FL (ref 82–98)
MCV RBC AUTO: 91 FL (ref 82–98)
MONOCYTES # BLD AUTO: 0.8 K/UL (ref 0.3–1)
MONOCYTES # BLD AUTO: 0.8 K/UL (ref 0.3–1)
MONOCYTES NFR BLD: 14.2 % (ref 4–15)
MONOCYTES NFR BLD: 14.2 % (ref 4–15)
NEUTROPHILS # BLD AUTO: 2 K/UL (ref 1.8–7.7)
NEUTROPHILS # BLD AUTO: 2 K/UL (ref 1.8–7.7)
NEUTROPHILS NFR BLD: 35.4 % (ref 38–73)
NEUTROPHILS NFR BLD: 35.4 % (ref 38–73)
NONHDLC SERPL-MCNC: 110 MG/DL
NONHDLC SERPL-MCNC: 110 MG/DL
NRBC BLD-RTO: 0 /100 WBC
NRBC BLD-RTO: 0 /100 WBC
PLATELET # BLD AUTO: 243 K/UL (ref 150–450)
PLATELET # BLD AUTO: 243 K/UL (ref 150–450)
PMV BLD AUTO: 9.7 FL (ref 9.2–12.9)
PMV BLD AUTO: 9.7 FL (ref 9.2–12.9)
POTASSIUM SERPL-SCNC: 4.3 MMOL/L (ref 3.5–5.1)
POTASSIUM SERPL-SCNC: 4.3 MMOL/L (ref 3.5–5.1)
PROT SERPL-MCNC: 7.4 G/DL (ref 6–8.4)
PROT SERPL-MCNC: 7.4 G/DL (ref 6–8.4)
RBC # BLD AUTO: 4.77 M/UL (ref 4–5.4)
RBC # BLD AUTO: 4.77 M/UL (ref 4–5.4)
SODIUM SERPL-SCNC: 140 MMOL/L (ref 136–145)
SODIUM SERPL-SCNC: 140 MMOL/L (ref 136–145)
TRIGL SERPL-MCNC: 74 MG/DL (ref 30–150)
TRIGL SERPL-MCNC: 74 MG/DL (ref 30–150)
TSH SERPL DL<=0.005 MIU/L-ACNC: 1.46 UIU/ML (ref 0.4–4)
WBC # BLD AUTO: 5.58 K/UL (ref 3.9–12.7)
WBC # BLD AUTO: 5.58 K/UL (ref 3.9–12.7)

## 2023-10-06 PROCEDURE — 80061 LIPID PANEL: CPT | Performed by: INTERNAL MEDICINE

## 2023-10-06 PROCEDURE — 36415 COLL VENOUS BLD VENIPUNCTURE: CPT | Performed by: INTERNAL MEDICINE

## 2023-10-06 PROCEDURE — 85025 COMPLETE CBC W/AUTO DIFF WBC: CPT | Performed by: INTERNAL MEDICINE

## 2023-10-06 PROCEDURE — 80053 COMPREHEN METABOLIC PANEL: CPT | Performed by: INTERNAL MEDICINE

## 2023-10-06 PROCEDURE — 80074 ACUTE HEPATITIS PANEL: CPT | Performed by: INTERNAL MEDICINE

## 2023-10-06 PROCEDURE — 83036 HEMOGLOBIN GLYCOSYLATED A1C: CPT | Performed by: INTERNAL MEDICINE

## 2023-10-06 PROCEDURE — 84443 ASSAY THYROID STIM HORMONE: CPT | Performed by: INTERNAL MEDICINE

## 2023-10-09 RX ORDER — TIRZEPATIDE 2.5 MG/.5ML
2.5 INJECTION, SOLUTION SUBCUTANEOUS
Status: CANCELLED | OUTPATIENT
Start: 2023-10-09

## 2023-10-10 RX ORDER — TIRZEPATIDE 5 MG/.5ML
5 INJECTION, SOLUTION SUBCUTANEOUS
Qty: 4 PEN | Refills: 11 | Status: SHIPPED | OUTPATIENT
Start: 2023-10-10 | End: 2023-10-11 | Stop reason: SDUPTHER

## 2023-10-11 RX ORDER — TIRZEPATIDE 5 MG/.5ML
5 INJECTION, SOLUTION SUBCUTANEOUS
Qty: 4 PEN | Refills: 11 | Status: SHIPPED | OUTPATIENT
Start: 2023-10-11 | End: 2023-12-04 | Stop reason: DRUGHIGH

## 2023-10-11 NOTE — TELEPHONE ENCOUNTER
No care due was identified.  [facility] Embedded Care Due Messages. Reference number: [id number].   10/11/2023 11:03:33 AM CDT

## 2023-10-12 ENCOUNTER — PATIENT MESSAGE (OUTPATIENT)
Dept: INTERNAL MEDICINE | Facility: CLINIC | Age: 48
End: 2023-10-12

## 2023-10-12 ENCOUNTER — PATIENT OUTREACH (OUTPATIENT)
Dept: ADMINISTRATIVE | Facility: HOSPITAL | Age: 48
End: 2023-10-12
Payer: COMMERCIAL

## 2023-10-12 ENCOUNTER — OFFICE VISIT (OUTPATIENT)
Dept: INTERNAL MEDICINE | Facility: CLINIC | Age: 48
End: 2023-10-12
Payer: COMMERCIAL

## 2023-10-12 VITALS
DIASTOLIC BLOOD PRESSURE: 82 MMHG | HEIGHT: 65 IN | BODY MASS INDEX: 41.27 KG/M2 | SYSTOLIC BLOOD PRESSURE: 115 MMHG | OXYGEN SATURATION: 96 % | WEIGHT: 247.69 LBS | HEART RATE: 80 BPM

## 2023-10-12 DIAGNOSIS — F41.1 GAD (GENERALIZED ANXIETY DISORDER): ICD-10-CM

## 2023-10-12 DIAGNOSIS — N30.90 CYSTITIS: Primary | ICD-10-CM

## 2023-10-12 DIAGNOSIS — I10 PRIMARY HYPERTENSION: ICD-10-CM

## 2023-10-12 DIAGNOSIS — T88.7XXA MEDICATION SIDE EFFECT: ICD-10-CM

## 2023-10-12 DIAGNOSIS — Z85.41 HISTORY OF CERVICAL CANCER: ICD-10-CM

## 2023-10-12 DIAGNOSIS — R74.8 ELEVATED LIVER ENZYMES: ICD-10-CM

## 2023-10-12 DIAGNOSIS — E11.9 CONTROLLED TYPE 2 DIABETES MELLITUS WITHOUT COMPLICATION, WITHOUT LONG-TERM CURRENT USE OF INSULIN: Primary | ICD-10-CM

## 2023-10-12 DIAGNOSIS — R82.90 FOUL SMELLING URINE: ICD-10-CM

## 2023-10-12 LAB
BACTERIA #/AREA URNS AUTO: ABNORMAL /HPF
BILIRUB UR QL STRIP: NEGATIVE
CLARITY UR REFRACT.AUTO: ABNORMAL
COLOR UR AUTO: YELLOW
GLUCOSE UR QL STRIP: NEGATIVE
HGB UR QL STRIP: NEGATIVE
KETONES UR QL STRIP: NEGATIVE
LEUKOCYTE ESTERASE UR QL STRIP: ABNORMAL
MICROSCOPIC COMMENT: ABNORMAL
NITRITE UR QL STRIP: NEGATIVE
PH UR STRIP: 5 [PH] (ref 5–8)
PROT UR QL STRIP: NEGATIVE
RBC #/AREA URNS AUTO: 2 /HPF (ref 0–4)
SP GR UR STRIP: 1.01 (ref 1–1.03)
SQUAMOUS #/AREA URNS AUTO: 2 /HPF
URN SPEC COLLECT METH UR: ABNORMAL
WBC #/AREA URNS AUTO: 22 /HPF (ref 0–5)

## 2023-10-12 PROCEDURE — 3079F PR MOST RECENT DIASTOLIC BLOOD PRESSURE 80-89 MM HG: ICD-10-PCS | Mod: CPTII,S$GLB,, | Performed by: INTERNAL MEDICINE

## 2023-10-12 PROCEDURE — 81001 URINALYSIS AUTO W/SCOPE: CPT | Performed by: INTERNAL MEDICINE

## 2023-10-12 PROCEDURE — 3008F PR BODY MASS INDEX (BMI) DOCUMENTED: ICD-10-PCS | Mod: CPTII,S$GLB,, | Performed by: INTERNAL MEDICINE

## 2023-10-12 PROCEDURE — 3079F DIAST BP 80-89 MM HG: CPT | Mod: CPTII,S$GLB,, | Performed by: INTERNAL MEDICINE

## 2023-10-12 PROCEDURE — 99999 PR PBB SHADOW E&M-EST. PATIENT-LVL V: CPT | Mod: PBBFAC,,, | Performed by: INTERNAL MEDICINE

## 2023-10-12 PROCEDURE — 3044F HG A1C LEVEL LT 7.0%: CPT | Mod: CPTII,S$GLB,, | Performed by: INTERNAL MEDICINE

## 2023-10-12 PROCEDURE — 1160F RVW MEDS BY RX/DR IN RCRD: CPT | Mod: CPTII,S$GLB,, | Performed by: INTERNAL MEDICINE

## 2023-10-12 PROCEDURE — 1160F PR REVIEW ALL MEDS BY PRESCRIBER/CLIN PHARMACIST DOCUMENTED: ICD-10-PCS | Mod: CPTII,S$GLB,, | Performed by: INTERNAL MEDICINE

## 2023-10-12 PROCEDURE — 1159F MED LIST DOCD IN RCRD: CPT | Mod: CPTII,S$GLB,, | Performed by: INTERNAL MEDICINE

## 2023-10-12 PROCEDURE — 99396 PREV VISIT EST AGE 40-64: CPT | Mod: S$GLB,,, | Performed by: INTERNAL MEDICINE

## 2023-10-12 PROCEDURE — 87086 URINE CULTURE/COLONY COUNT: CPT | Performed by: INTERNAL MEDICINE

## 2023-10-12 PROCEDURE — 3044F PR MOST RECENT HEMOGLOBIN A1C LEVEL <7.0%: ICD-10-PCS | Mod: CPTII,S$GLB,, | Performed by: INTERNAL MEDICINE

## 2023-10-12 PROCEDURE — 1159F PR MEDICATION LIST DOCUMENTED IN MEDICAL RECORD: ICD-10-PCS | Mod: CPTII,S$GLB,, | Performed by: INTERNAL MEDICINE

## 2023-10-12 PROCEDURE — 99999 PR PBB SHADOW E&M-EST. PATIENT-LVL V: ICD-10-PCS | Mod: PBBFAC,,, | Performed by: INTERNAL MEDICINE

## 2023-10-12 PROCEDURE — 3074F PR MOST RECENT SYSTOLIC BLOOD PRESSURE < 130 MM HG: ICD-10-PCS | Mod: CPTII,S$GLB,, | Performed by: INTERNAL MEDICINE

## 2023-10-12 PROCEDURE — 3008F BODY MASS INDEX DOCD: CPT | Mod: CPTII,S$GLB,, | Performed by: INTERNAL MEDICINE

## 2023-10-12 PROCEDURE — 99396 PR PREVENTIVE VISIT,EST,40-64: ICD-10-PCS | Mod: S$GLB,,, | Performed by: INTERNAL MEDICINE

## 2023-10-12 PROCEDURE — 3074F SYST BP LT 130 MM HG: CPT | Mod: CPTII,S$GLB,, | Performed by: INTERNAL MEDICINE

## 2023-10-12 RX ORDER — NITROFURANTOIN 25; 75 MG/1; MG/1
100 CAPSULE ORAL 2 TIMES DAILY
Qty: 10 CAPSULE | Refills: 0 | Status: SHIPPED | OUTPATIENT
Start: 2023-10-12 | End: 2023-10-18

## 2023-10-12 RX ORDER — ONDANSETRON 4 MG/1
4 TABLET, ORALLY DISINTEGRATING ORAL 2 TIMES DAILY PRN
Qty: 10 TABLET | Refills: 3 | Status: SHIPPED | OUTPATIENT
Start: 2023-10-12

## 2023-10-12 RX ORDER — LEVOTHYROXINE SODIUM 50 UG/1
50 TABLET ORAL DAILY
Qty: 90 TABLET | Refills: 3 | Status: SHIPPED | OUTPATIENT
Start: 2023-10-12

## 2023-10-12 RX ORDER — ATENOLOL 50 MG/1
50 TABLET ORAL 2 TIMES DAILY
Qty: 180 TABLET | Refills: 3 | Status: SHIPPED | OUTPATIENT
Start: 2023-10-12

## 2023-10-12 RX ORDER — ALPRAZOLAM 0.5 MG/1
0.5 TABLET ORAL DAILY PRN
Qty: 30 TABLET | Refills: 0 | Status: SHIPPED | OUTPATIENT
Start: 2023-10-12

## 2023-10-12 RX ORDER — TRIAMTERENE AND HYDROCHLOROTHIAZIDE 37.5; 25 MG/1; MG/1
1 CAPSULE ORAL DAILY
Qty: 90 CAPSULE | Refills: 3 | Status: SHIPPED | OUTPATIENT
Start: 2023-10-12

## 2023-10-12 NOTE — Clinical Note
New diabetes dx - can you please add to registry? Foot exam done today. 
"All of a sudden  I have bruised on my left thigh "

## 2023-10-12 NOTE — TELEPHONE ENCOUNTER
No care due was identified.  [facility] Embedded Care Due Messages. Reference number: [id number].   10/12/2023 4:08:43 PM CDT

## 2023-10-12 NOTE — PROGRESS NOTES
"Subjective:       Patient ID: Emily Max is a 48 y.o. female.    Chief Complaint: Follow-up    HPI  Emily Max is a 48 y.o. female here for a yearly preventative healthcare visit.       Diabetes - new onset a1c 6.5%  Elevated liver enzymes  9/13/23 - pre Mounjaro  Ast 43  Alt 54  No abdominal pain. Does have gallbladder.  Urine smells like chicken broth (pre Mounjaro)  She has some nausea, constipation, and reflux from mounjaro but is manageable. Occasional tums or pepcid (about once every 5 days).    Hot flashes  Hysterectomy w bilateral oopherectomy in 2013  Was on hormones for 3 years then stopped.    Doing therapy through [facility] for last year and has been very helpful.  Xanax rarely, 30 has lasted her all year.   Review of Systems   Constitutional:  Negative for fever.   HENT: Negative.  Negative for hearing loss.    Eyes: Negative.  Negative for visual disturbance.   Respiratory:  Negative for shortness of breath.    Cardiovascular:  Negative for chest pain and leg swelling.   Gastrointestinal:  Positive for constipation and nausea. Negative for abdominal pain, diarrhea and vomiting.   Genitourinary:  Negative for dysuria, frequency and urgency.   Musculoskeletal: Negative.  Negative for arthralgias and myalgias.   Skin: Negative.  Negative for rash.   Neurological:  Negative for weakness and numbness.   Psychiatric/Behavioral: Negative.         Objective:   /82 (BP Location: Left arm, Patient Position: Sitting, BP Method: Medium (Manual))   Pulse 80   Ht 5' 5" (1.651 m)   Wt 112.4 kg (247 lb 11 oz)   SpO2 96%   BMI 41.22 kg/m²      Physical Exam  Constitutional:       General: She is not in acute distress.     Appearance: She is well-developed. She is not diaphoretic.   HENT:      Head: Normocephalic and atraumatic.   Cardiovascular:      Rate and Rhythm: Normal rate and regular rhythm.   Pulmonary:      Effort: Pulmonary effort is normal. No respiratory " distress.      Breath sounds: No wheezing or rales.   Abdominal:      General: There is no distension.      Palpations: Abdomen is soft. There is no mass.      Tenderness: There is no abdominal tenderness.   Skin:     General: Skin is warm and dry.   Neurological:      Mental Status: She is alert and oriented to person, place, and time.   Psychiatric:         Behavior: Behavior normal.         Protective Sensation (w/ 10 gram monofilament):  Right: Intact  Left: Intact    Visual Inspection:  Normal -  Bilateral    Pedal Pulses:   Right: Present  Left: Present    Posterior tibialis:   Right:Present  Left: Present    Assessment:       1. Controlled type 2 diabetes mellitus without complication, without long-term current use of insulin    2. Foul smelling urine    3. Primary hypertension    4. History of cervical cancer    5. Elevated liver enzymes    6. Medication side effect    7. REGINA (generalized anxiety disorder)        Plan:       HCM  P20 at earliest convenience  Foot exam done today  Eye exam scheduled     1. Controlled type 2 diabetes mellitus without complication, without long-term current use of insulin  Foot exam today wnl  Mounjaro  A1c at goal  Discussed diet  Eye appt scheduled    2. Foul smelling urine  -     Urinalysis, Reflex to Urine Culture Urine, Clean Catch; Future; Expected date: 10/12/2023    3. Primary hypertension  - stable and controlled  - continue current regimen    4. History of cervical cancer  -     CANCER ANTIGEN 125; Future; Expected date: 10/12/2023    5. Elevated liver enzymes  -     Hepatic Function Panel; Future; Expected date: 10/12/2023  -     US Abdomen Limited; Future; Expected date: 10/12/2023    6. Medication s/e - mounjaro  -     ondansetron (ZOFRAN-ODT) 4 MG TbDL; Take 1 tablet (4 mg total) by mouth 2 (two) times daily as needed (nausea).  Dispense: 10 tablet; Refill: 3    7. REGINA (generalized anxiety disorder)  -     ALPRAZolam (XANAX) 0.5 MG tablet; Take 1 tablet (0.5 mg  total) by mouth daily as needed for Anxiety.  Dispense: 30 tablet; Refill: 0           You are up to date for your primary preventive health care, and there are no reminders at this time.     Lab 1 mo  US liver  Urine    P20 card

## 2023-10-12 NOTE — PROGRESS NOTES
Health Maintenance Due   Topic Date Due    TETANUS VACCINE  Never done    Shingles Vaccine (1 of 2) Never done    Pneumococcal Vaccines (Age 0-64) (2 - PCV) 03/12/2015    Influenza Vaccine (1) 09/01/2023    COVID-19 Vaccine (4 - 2023-24 season) 09/01/2023       HM updated. Triggered LINKS and Care everywhere. Chart reviewed.     Tasha Cao LPN   Clinical Care Coordinator  Primary Care and Wellness

## 2023-10-13 LAB — BACTERIA UR CULT: NORMAL

## 2023-10-13 NOTE — TELEPHONE ENCOUNTER
Refill Decision Note   Emily Winter  is requesting a refill authorization.  Brief Assessment and Rationale for Refill:  Approve     Medication Therapy Plan:         Comments:     Note composed:9:29 PM 10/12/2023               FYI: tried to call patient but mail box is full.

## 2023-10-30 ENCOUNTER — HOSPITAL ENCOUNTER (OUTPATIENT)
Dept: RADIOLOGY | Facility: HOSPITAL | Age: 48
Discharge: HOME | End: 2023-10-30
Attending: INTERNAL MEDICINE
Payer: COMMERCIAL

## 2023-10-30 ENCOUNTER — PATIENT MESSAGE (OUTPATIENT)
Dept: INTERNAL MEDICINE | Facility: CLINIC | Age: 48
End: 2023-10-30
Payer: COMMERCIAL

## 2023-10-30 DIAGNOSIS — K76.0 FATTY LIVER: ICD-10-CM

## 2023-10-30 DIAGNOSIS — R74.8 ELEVATED LIVER ENZYMES: ICD-10-CM

## 2023-10-30 DIAGNOSIS — D73.89 LESION OF SPLEEN: Primary | ICD-10-CM

## 2023-10-30 PROCEDURE — 76705 ECHO EXAM OF ABDOMEN: CPT | Mod: TC

## 2023-10-30 PROCEDURE — 76705 US ABDOMEN LIMITED: ICD-10-PCS | Mod: 26,,, | Performed by: RADIOLOGY

## 2023-10-30 PROCEDURE — 76705 ECHO EXAM OF ABDOMEN: CPT | Mod: 26,,, | Performed by: RADIOLOGY

## 2023-11-08 ENCOUNTER — HOSPITAL ENCOUNTER (OUTPATIENT)
Dept: RADIOLOGY | Facility: HOSPITAL | Age: 48
Discharge: HOME | End: 2023-11-08
Attending: INTERNAL MEDICINE
Payer: COMMERCIAL

## 2023-11-08 DIAGNOSIS — D73.89 LESION OF SPLEEN: ICD-10-CM

## 2023-11-08 PROCEDURE — 25500020 PHARM REV CODE 255: Performed by: INTERNAL MEDICINE

## 2023-11-08 PROCEDURE — 74160 CT ABDOMEN W/CONTRAST: CPT | Mod: 26,,, | Performed by: RADIOLOGY

## 2023-11-08 PROCEDURE — 74160 CT ABDOMEN WITH IV CONTRAST: ICD-10-PCS | Mod: 26,,, | Performed by: RADIOLOGY

## 2023-11-08 PROCEDURE — 74160 CT ABDOMEN W/CONTRAST: CPT | Mod: TC

## 2023-11-08 RX ADMIN — IOHEXOL 100 ML: 350 INJECTION, SOLUTION INTRAVENOUS at 04:11

## 2023-11-09 ENCOUNTER — PATIENT MESSAGE (OUTPATIENT)
Dept: INTERNAL MEDICINE | Facility: CLINIC | Age: 48
End: 2023-11-09
Payer: COMMERCIAL

## 2023-11-09 DIAGNOSIS — R16.1 SPLENOMEGALY, NOT ELSEWHERE CLASSIFIED: Primary | ICD-10-CM

## 2023-11-09 DIAGNOSIS — D73.89 LESION OF SPLEEN: ICD-10-CM

## 2023-11-09 NOTE — TELEPHONE ENCOUNTER
Indeterminate hypodense 1.2 cm splenic lesion.  Of note, in the absence of significant risk factors or known malignancy, overwhelming number of splenic lesions are benign.  If further characterization is warranted, consider abdominal MRI.  However, follow-up ultrasound at 6 months is a reasonable option.

## 2023-11-15 ENCOUNTER — PATIENT MESSAGE (OUTPATIENT)
Dept: INTERNAL MEDICINE | Facility: CLINIC | Age: 48
End: 2023-11-15
Payer: COMMERCIAL

## 2023-11-17 ENCOUNTER — PATIENT MESSAGE (OUTPATIENT)
Dept: OPTOMETRY | Facility: CLINIC | Age: 48
End: 2023-11-17
Payer: COMMERCIAL

## 2023-11-20 ENCOUNTER — TELEPHONE (OUTPATIENT)
Dept: HEPATOLOGY | Facility: CLINIC | Age: 48
End: 2023-11-20
Payer: COMMERCIAL

## 2023-11-20 NOTE — TELEPHONE ENCOUNTER
----- Message from Dena Bradshaw sent at 11/20/2023 12:26 PM CST -----  Regarding: Questions      Name Of Caller:    Katey      Contact Preference:    [phone]       Nature of call:   Pt received a test message today informing her to reschedule her appt. She has some questions.

## 2023-11-21 ENCOUNTER — OFFICE VISIT (OUTPATIENT)
Dept: HEPATOLOGY | Facility: CLINIC | Age: 48
End: 2023-11-21
Payer: COMMERCIAL

## 2023-11-21 VITALS — BODY MASS INDEX: 39.89 KG/M2 | HEIGHT: 65 IN | WEIGHT: 239.44 LBS

## 2023-11-21 DIAGNOSIS — I10 PRIMARY HYPERTENSION: ICD-10-CM

## 2023-11-21 DIAGNOSIS — E66.9 OBESITY (BMI 30-39.9): ICD-10-CM

## 2023-11-21 DIAGNOSIS — R74.8 ELEVATED LIVER ENZYMES: ICD-10-CM

## 2023-11-21 DIAGNOSIS — E11.9 CONTROLLED TYPE 2 DIABETES MELLITUS WITHOUT COMPLICATION, WITHOUT LONG-TERM CURRENT USE OF INSULIN: ICD-10-CM

## 2023-11-21 DIAGNOSIS — K76.0 FATTY LIVER: Primary | ICD-10-CM

## 2023-11-21 PROBLEM — R60.0 PERIPHERAL EDEMA: Status: ACTIVE | Noted: 2019-06-03

## 2023-11-21 PROBLEM — L03.211 FACIAL CELLULITIS: Status: ACTIVE | Noted: 2020-10-03

## 2023-11-21 PROBLEM — R09.82 POST-NASAL DRIP: Status: ACTIVE | Noted: 2019-05-14

## 2023-11-21 PROCEDURE — 99999 PR PBB SHADOW E&M-EST. PATIENT-LVL V: CPT | Mod: PBBFAC,,, | Performed by: NURSE PRACTITIONER

## 2023-11-21 PROCEDURE — 1159F MED LIST DOCD IN RCRD: CPT | Mod: CPTII,S$GLB,, | Performed by: NURSE PRACTITIONER

## 2023-11-21 PROCEDURE — 3044F HG A1C LEVEL LT 7.0%: CPT | Mod: CPTII,S$GLB,, | Performed by: NURSE PRACTITIONER

## 2023-11-21 PROCEDURE — 99999 PR PBB SHADOW E&M-EST. PATIENT-LVL V: ICD-10-PCS | Mod: PBBFAC,,, | Performed by: NURSE PRACTITIONER

## 2023-11-21 PROCEDURE — 1160F RVW MEDS BY RX/DR IN RCRD: CPT | Mod: CPTII,S$GLB,, | Performed by: NURSE PRACTITIONER

## 2023-11-21 PROCEDURE — 1159F PR MEDICATION LIST DOCUMENTED IN MEDICAL RECORD: ICD-10-PCS | Mod: CPTII,S$GLB,, | Performed by: NURSE PRACTITIONER

## 2023-11-21 PROCEDURE — 99214 OFFICE O/P EST MOD 30 MIN: CPT | Mod: S$GLB,,, | Performed by: NURSE PRACTITIONER

## 2023-11-21 PROCEDURE — 1160F PR REVIEW ALL MEDS BY PRESCRIBER/CLIN PHARMACIST DOCUMENTED: ICD-10-PCS | Mod: CPTII,S$GLB,, | Performed by: NURSE PRACTITIONER

## 2023-11-21 PROCEDURE — 3044F PR MOST RECENT HEMOGLOBIN A1C LEVEL <7.0%: ICD-10-PCS | Mod: CPTII,S$GLB,, | Performed by: NURSE PRACTITIONER

## 2023-11-21 PROCEDURE — 99214 PR OFFICE/OUTPT VISIT, EST, LEVL IV, 30-39 MIN: ICD-10-PCS | Mod: S$GLB,,, | Performed by: NURSE PRACTITIONER

## 2023-11-21 PROCEDURE — 3008F PR BODY MASS INDEX (BMI) DOCUMENTED: ICD-10-PCS | Mod: CPTII,S$GLB,, | Performed by: NURSE PRACTITIONER

## 2023-11-21 PROCEDURE — 3008F BODY MASS INDEX DOCD: CPT | Mod: CPTII,S$GLB,, | Performed by: NURSE PRACTITIONER

## 2023-11-21 NOTE — PROGRESS NOTES
[facility] New Patient Visit    Reason for Visit: Fatty Liver    PCP: Elif Lew    HPI:  This is a 48 y.o. female with PMH noted below, here for evaluation of fatty liver.    The patient's risk factors for fatty liver disease include:     Obesity                                        Yes; BMI 39.87 - On Mounjaro with noted weight loss of 8 lbs since 10/2023.  Dyslipidemia                                No; Refer to most recent lipid panel results below:   Latest Reference Range & Units 10/06/23 09:43   Cholesterol Total 120 - 199 mg/dL  120 - 199 mg/dL 158  158   HDL 40 - 75 mg/dL  40 - 75 mg/dL 48  48   HDL/Cholesterol Ratio 20.0 - 50.0 %  20.0 - 50.0 % 30.4  30.4   Non-HDL Cholesterol mg/dL  mg/dL 110  110   Total Cholesterol/HDL Ratio 2.0 - 5.0   2.0 - 5.0  3.3  3.3   Triglycerides 30 - 150 mg/dL  30 - 150 mg/dL 74  74   LDL Cholesterol 63.0 - 159.0 mg/dL  63.0 - 159.0 mg/dL 95.2  95.2     Insulin resistance/Diabetes         Yes; Last HgbA1c was 6.5% (10/2023)    She has had elevated liver enzymes in a hepatocellular pattern since at least 10/2023, refer to most recent lab results as below:     Latest Reference Range & Units 10/06/23 09:43   ALP 55 - 135 U/L  55 - 135 U/L 67  67   PROTEIN TOTAL 6.0 - 8.4 g/dL  6.0 - 8.4 g/dL 7.4  7.4   Albumin 3.5 - 5.2 g/dL  3.5 - 5.2 g/dL 3.6  3.6   BILIRUBIN TOTAL 0.1 - 1.0 mg/dL  0.1 - 1.0 mg/dL 0.7  0.7   Bilirubin Direct 0.1 - 0.3 mg/dL 0.3   AST 10 - 40 U/L  10 - 40 U/L 45 (H)  45 (H)   ALT 10 - 44 U/L  10 - 44 U/L 59 (H)  59 (H)     Prior LFT's on file were normal. Most recent CT of the abdomen showed:     CT Abdomen With IV Contrast  Narrative: EXAMINATION:  CT ABDOMEN WITH IV CONTRAST    CLINICAL HISTORY:  spleen lesions; Other diseases of spleen    TECHNIQUE:  Axial images of the abdomen were acquired  after the use of 100 cc Omnipaque 350 IV contrast. No oral contrast was administered.  Coronal and sagittal reconstructions were also  obtained.    COMPARISON:  Ultrasound 10/30/2023, CT 10/21/2013, MRI 04/17/2013    FINDINGS:  LUNG BASES: Unremarkable.    HEPATOBILIARY: Liver within the upper limits of normal in size.  Diffusely low attenuation of the hepatic parenchyma suggestive for steatosis.  No focal hepatic lesions.  Normal gallbladder.  No biliary ductal dilatation.    SPLEEN: No splenomegaly.  Hypodense 1.2 cm splenic lesion (axial series 2, image 53), not visualized on 2013 CT or MRI    .    PANCREAS: No focal masses or ductal dilatation.    ADRENALS: No adrenal nodules.    KIDNEYS/URETERS: Kidneys are normal in size and enhance symmetrically.  No hydronephrosis, stones, or solid mass lesions.    PERITONEUM / RETROPERITONEUM: No free air or fluid.    LYMPH NODES: No lymphadenopathy.  Prominent nodes about the angela hepatis though similar to 2013.    VESSELS: No significant calcified atherosclerosis.  Portal veins are patent.    GI TRACT: Normal stomach.  No bowel distention or wall thickening.    SOFT TISSUES: Unremarkable.    BONES: No fractures or focal osseous lesions.  Impression: Indeterminate hypodense 1.2 cm splenic lesion.  Of note, in the absence of significant risk factors or known malignancy, overwhelming number of splenic lesions are benign.  If further characterization is warranted, consider abdominal MRI.  However, follow-up ultrasound at 6 months is a reasonable option.    Findings suggestive for hepatic steatosis.    Electronically signed by resident: George Taveras  Date:    11/09/2023  Time:    11:04    Electronically signed by: Roland De La Rosa MD  Date:    11/09/2023  Time:    12:11    She is scheduled for a MRI of the abdomen next week for further evaluation of indeterminate splenic lesion. She has never undergone a liver biopsy or non-invasive staging exam.     FIB-4 Calculation: 1.21 at 11/21/2023  2:50 PM   Calculated from:  Last SGOT/AST : 45 at 11/21/2023  2:50 PM  Last SGPT/ALT: 59 at 11/21/2023  2:50 PM   Platelets:  243 at 11/21/2023  2:50 PM   Age: 48 y.o.     FIB-4 below 1.30 is considered as low-risk for advanced fibrosis  FIB-4 over 2.67 is considered as high-risk for advanced fibrosis  FIB-4 values between 1.30 and 2.67 are considered as intermediate-risk of advanced fibrosis for ages 36-64.     For ages > 64 the cut-off for low-risk goes to < 2.  This is a screening tool and clinical judgement should be used in the interpretation of these results.    She has no known family history of liver disease. She denies any recent history of heavy alcohol use, and does not use illicit drugs. Viral hepatitis testing was negative on recent labs. She is well appearing, and has no signs or symptoms of hepatic decompensation including jaundice, dark urine, pruritus, abdominal distention, lower extremity edema, hematemesis, melena, or periods of confusion suggestive of hepatic encephalopathy.      PMHX:  has a past medical history of Abnormal Pap smear, Cervical cancer (March 2013), Colon cancer screening (5/4/2021), Controlled type 2 diabetes mellitus without complication, without long-term current use of insulin (10/12/2023), Depressive disorder, not elsewhere classified, Endometriosis of uterus, FH: colonic polyps (3/24/2021), Foot fracture, right, Headache(784.0), Hypertension, Hypothyroidism, Low grade squamous intraepith lesion on cytologic smear vagina (lgsil) (10/14/2019), Malignant neoplasm of exocervix (10/10/2019), Migraine headache, Urinary tract infection, Vaginal atrophy (5/8/2018), Visit for screening mammogram (12/16/2015), and Well woman exam with routine gynecological exam (12/16/2015).    PSHX:  has a past surgical history that includes Lipoma resection; Sinus surgery; Pelvic laparoscopy; Tonsillectomy; Oophorectomy; pr removal of ovary/tube(s); Pelvic and para-aortic lymph node dissection; Knee surgery (08/24/2017); Knee arthroscopy w/ meniscectomy (Left, 6/10/2021); Synovectomy of knee (Left, 6/10/2021);  Chondroplasty of knee (Left, 6/10/2021); Breast biopsy (Right, 2002); Hysterectomy (May 2013); Colonoscopy (N/A, 7/6/2022); Knee arthroscopy w/ meniscectomy (Right, 11/3/2022); Chondroplasty of knee (Right, 11/3/2022); and Synovectomy of knee (Right, 11/3/2022).    The patient's social and family histories were reviewed by me and updated in the appropriate section of the electronic medical record.    Review of patient's allergies indicates:   Allergen Reactions    Hydrocodone-acetaminophen Itching and Rash     States can take - may have been formulation    Iodine Anaphylaxis    Other Anaphylaxis     mushrooms    Fluconazole Hives     Other reaction(s): Hives    Iodine and iodide containing products Hives     Iv iodine only    Mushroom combination no.1      Other reaction(s): Bronchial Constriction     Current Outpatient Medications on File Prior to Visit   Medication Sig Dispense Refill    ALPRAZolam (XANAX) 0.5 MG tablet Take 1 tablet (0.5 mg total) by mouth daily as needed for Anxiety. 30 tablet 0    atenoloL (TENORMIN) 50 MG tablet TAKE 1 TABLET (50 MG TOTAL) BY MOUTH 2 (TWO) TIMES DAILY. 180 tablet 3    atenoloL (TENORMIN) 50 MG tablet Take 1 tablet (50 mg total) by mouth 2 (two) times a day. 180 tablet 3    azelaic acid (FINACEA) 15 % Foam Apply thin film to face qhs 50 g 6    clindamycin (CLEOCIN T) 1 % lotion aaa on face qd- bid prn flare 60 mL 1    diclofenac (VOLTAREN) 75 MG EC tablet Take 1 tablet (75 mg total) by mouth 2 (two) times daily as needed (for migraines). 20 tablet 3    DULoxetine (CYMBALTA) 30 MG capsule Take 1 capsule (30 mg total) by mouth 2 (two) times a day. 90 capsule 1    DULoxetine (CYMBALTA) 30 MG capsule Take 1 capsule (30 mg total) by mouth 2 (two) times daily. 90 capsule 3    fluticasone (FLONASE) 50 mcg/actuation nasal spray 1 spray by Each Nare route daily as needed for Rhinitis.      hydroquinone 4 % Crea Use hs for dark spots 28.35 g 3    levocetirizine (XYZAL) 5 MG tablet Take 1  tablet (5 mg total) by mouth once daily. 90 tablet 3    levothyroxine (SYNTHROID) 50 MCG tablet Take 1 tablet (50 mcg total) by mouth once daily. 90 tablet 3    levothyroxine (SYNTHROID) 50 MCG tablet Take 1 tablet (50 mcg total) by mouth once daily. 90 tablet 3    metroNIDAZOLE (NORITATE) 1 % cream Compound azelaic acid 15% + ivermectin 1% + metronidazole 1% cream with 2% niacinamide and  Apply to face qhs. 30 g 5    ondansetron (ZOFRAN-ODT) 4 MG TbDL Take 1 tablet (4 mg total) by mouth 2 (two) times daily as needed (nausea). 10 tablet 3    sulfacetamide sodium-sulfur (SULFACLEANSE 8-4) 8-4 % Susp Wash face every evening 473 mL 3    tirzepatide (MOUNJARO) 5 mg/0.5 mL PnIj Inject 5 mg into the skin every 7 days. 4 pen 11    traMADoL (ULTRAM) 50 mg tablet Take 1-2 tablets ( mg total) by mouth every 6 (six) hours as needed for Pain. 30 tablet 0    triamterene-hydrochlorothiazide 37.5-25 mg (DYAZIDE) 37.5-25 mg per capsule Take 1 capsule by mouth once daily. 90 capsule 3    triamterene-hydrochlorothiazide 37.5-25 mg (DYAZIDE) 37.5-25 mg per capsule Take 1 capsule by mouth once daily. 90 capsule 3    ubrogepant (UBRELVY) 100 mg tablet Take 1 tablet by mouth at the onset of a headache. May repeat based on response and tolerability after more than 2 hours if needed. Do not take more than 200mg in a 24 hour span. 16 tablet 5    vitamin D (VITAMIN D3) 1000 units Tab Take 5 tablets (5,000 Units total) by mouth once daily. 30 tablet prn    albuterol 90 mcg/actuation inhaler Inhale 2 puffs into the lungs every 4 (four) hours as needed for Wheezing. 6.7 g 3    aspirin (ECOTRIN) 81 MG EC tablet Take 1 tablet (81 mg total) by mouth once daily. For 4 weeks starting the day after surgery. 28 tablet 0    EPINEPHrine (EPIPEN) 0.3 mg/0.3 mL AtIn Inject 0.3 mLs (0.3 mg total) into the muscle once. for 1 dose 2 each 2    estradioL (VAGIFEM) 10 mcg Tab Place 1 tablet (10 mcg total) vaginally twice a week. 8 tablet 12     Current  "Facility-Administered Medications on File Prior to Visit   Medication Dose Route Frequency Provider Last Rate Last Admin    diphenhydrAMINE injection 50 mg  50 mg Intravenous PRN Aleks Kwok III, PA-C         SOCIAL HISTORY:   Social History     Tobacco Use   Smoking Status Never   Smokeless Tobacco Never       Social History     Substance and Sexual Activity   Alcohol Use Yes    Alcohol/week: 1.0 standard drink of alcohol    Types: 1 Glasses of wine per week    Comment: socially       Social History     Substance and Sexual Activity   Drug Use Never       ROS:   GENERAL: Denies fever, chills, weight loss/gain, fatigue  HEENT: Denies headaches, dizziness, vision/hearing changes  CARDIOVASCULAR: Denies chest pain, palpitations, or edema  RESPIRATORY: Denies dyspnea, cough  GI: Denies abdominal pain, rectal bleeding, nausea, vomiting. No change in bowel pattern or color  : Denies dysuria, hematuria   SKIN: Denies rash, itching   NEURO: Denies confusion, memory loss, or mood changes  PSYCH: Denies depression or anxiety  HEME/LYMPH: Denies easy bruising or bleeding    Objective Findings:    PHYSICAL EXAM:   Friendly White female, in no acute distress; alert and oriented to person, place and time.  VITALS: Ht 5' 5" (1.651 m)   Wt 108.6 kg (239 lb 6.7 oz)   BMI 39.84 kg/m²   HENT: Normocephalic, without obvious abnormality.   EYES: Sclerae anicteric.   NECK: No obvious masses.  CARDIOVASCULAR: No peripheral edema.  RESPIRATORY: Normal respiratory effort  GI: Soft, obese abdomen.  EXTREMITIES:  No clubbing, cyanosis or edema.  SKIN: Warm and dry. No jaundice. No rashes noted to exposed skin.  NEURO:  Normal gait. No asterixis.  PSYCH:  Memory intact. Thought and speech pattern appropriate. Behavior normal. No depression or anxiety noted.    DIAGNOSTIC STUDIES:    CT Abdomen With IV Contrast  Narrative: EXAMINATION:  CT ABDOMEN WITH IV CONTRAST    CLINICAL HISTORY:  spleen lesions; Other diseases of " spleen    TECHNIQUE:  Axial images of the abdomen were acquired  after the use of 100 cc Omnipaque 350 IV contrast. No oral contrast was administered.  Coronal and sagittal reconstructions were also obtained.    COMPARISON:  Ultrasound 10/30/2023, CT 10/21/2013, MRI 04/17/2013    FINDINGS:  LUNG BASES: Unremarkable.    HEPATOBILIARY: Liver within the upper limits of normal in size.  Diffusely low attenuation of the hepatic parenchyma suggestive for steatosis.  No focal hepatic lesions.  Normal gallbladder.  No biliary ductal dilatation.    SPLEEN: No splenomegaly.  Hypodense 1.2 cm splenic lesion (axial series 2, image 53), not visualized on 2013 CT or MRI    .    PANCREAS: No focal masses or ductal dilatation.    ADRENALS: No adrenal nodules.    KIDNEYS/URETERS: Kidneys are normal in size and enhance symmetrically.  No hydronephrosis, stones, or solid mass lesions.    PERITONEUM / RETROPERITONEUM: No free air or fluid.    LYMPH NODES: No lymphadenopathy.  Prominent nodes about the angela hepatis though similar to 2013.    VESSELS: No significant calcified atherosclerosis.  Portal veins are patent.    GI TRACT: Normal stomach.  No bowel distention or wall thickening.    SOFT TISSUES: Unremarkable.    BONES: No fractures or focal osseous lesions.  Impression: Indeterminate hypodense 1.2 cm splenic lesion.  Of note, in the absence of significant risk factors or known malignancy, overwhelming number of splenic lesions are benign.  If further characterization is warranted, consider abdominal MRI.  However, follow-up ultrasound at 6 months is a reasonable option.    Findings suggestive for hepatic steatosis.    Electronically signed by resident: George Taveras  Date:    11/09/2023  Time:    11:04    Electronically signed by: Roland De La Rosa MD  Date:    11/09/2023  Time:    12:11    FIBROSCAN - Ordered at visit.    EDUCATION:  Per AVS.    ASSESSMENT & PLAN:  48 y.o. White female with:    1. Fatty liver  Ambulatory  referral/consult to Hepatology    Hepatic Function Panel    FibroScan [place] (Vibration Controlled Transient Elastography)      2. Elevated liver enzymes        3. Obesity (BMI 30-39.9)        4. Controlled type 2 diabetes mellitus without complication, without long-term current use of insulin        5. Primary hypertension          - Schedule Fibroscan to non-invasivey stage liver disease.  - Proceed with MRI of the abdomen, as scheduled for further evaluation of indeterminate splenic lesion.  - Repeat liver function tests in 1 week.   - Continue with weight loss efforts - recommend additional weight loss of 20-25 lbs, through diet and exercise.  - Recommend good control of cholesterol, blood pressure, & blood sugar levels.  - Avoid herbal supplements/alternative remedies.  - Limit alcohol intake to no more than 3-5 drinks per week, ideally less.  - Return to clinic to be determined by Fibroscan and lab results.    Thank you for allowing me to participate in the care of Emily Jenn Max       Hepatology Nurse Practitioner  [facility]    CC'ed note to:   Elif Lew MD Braaten, Jennifer N., MD

## 2023-11-21 NOTE — PATIENT INSTRUCTIONS
- Schedule Fibroscan to non-invasivey stage liver disease.  - Proceed with MRI of the abdomen, as scheduled for further evaluation of indeterminate splenic lesion.  - Repeat liver function tests in 1 week.   - Continue with weight loss efforts - recommend additional weight loss of 20-25 lbs, through diet and exercise.  - Recommend good control of cholesterol, blood pressure, & blood sugar levels.  - Avoid herbal supplements/alternative remedies.  - Limit alcohol intake to no more than 3-5 drinks per week, ideally less.  - Return to clinic to be determined by Fibroscan and lab results.

## 2023-11-27 ENCOUNTER — HOSPITAL ENCOUNTER (OUTPATIENT)
Dept: RADIOLOGY | Facility: OTHER | Age: 48
Discharge: HOME | End: 2023-11-27
Attending: INTERNAL MEDICINE
Payer: COMMERCIAL

## 2023-11-27 DIAGNOSIS — D73.89 LESION OF SPLEEN: ICD-10-CM

## 2023-11-27 PROCEDURE — 74183 MRI ABD W/O CNTR FLWD CNTR: CPT | Mod: 26,,, | Performed by: RADIOLOGY

## 2023-11-27 PROCEDURE — 74183 MRI ABD W/O CNTR FLWD CNTR: CPT | Mod: TC

## 2023-11-27 PROCEDURE — A9585 GADOBUTROL INJECTION: HCPCS | Performed by: INTERNAL MEDICINE

## 2023-11-27 PROCEDURE — 25500020 PHARM REV CODE 255: Performed by: INTERNAL MEDICINE

## 2023-11-27 PROCEDURE — 74183 MRI ABDOMEN W WO CONTRAST: ICD-10-PCS | Mod: 26,,, | Performed by: RADIOLOGY

## 2023-11-27 RX ORDER — GADOBUTROL 604.72 MG/ML
10 INJECTION INTRAVENOUS
Status: COMPLETED | OUTPATIENT
Start: 2023-11-27 | End: 2023-11-27

## 2023-11-27 RX ADMIN — GADOBUTROL 10 ML: 604.72 INJECTION INTRAVENOUS at 11:11

## 2023-11-28 ENCOUNTER — PATIENT MESSAGE (OUTPATIENT)
Dept: INTERNAL MEDICINE | Facility: CLINIC | Age: 48
End: 2023-11-28
Payer: COMMERCIAL

## 2023-11-28 DIAGNOSIS — D73.89 LESION OF SPLEEN: Primary | ICD-10-CM

## 2023-12-02 ENCOUNTER — PATIENT MESSAGE (OUTPATIENT)
Dept: HEPATOLOGY | Facility: CLINIC | Age: 48
End: 2023-12-02
Payer: COMMERCIAL

## 2023-12-02 ENCOUNTER — PATIENT MESSAGE (OUTPATIENT)
Dept: INTERNAL MEDICINE | Facility: CLINIC | Age: 48
End: 2023-12-02
Payer: COMMERCIAL

## 2023-12-02 DIAGNOSIS — E11.9 CONTROLLED TYPE 2 DIABETES MELLITUS WITHOUT COMPLICATION, WITHOUT LONG-TERM CURRENT USE OF INSULIN: ICD-10-CM

## 2023-12-02 NOTE — TELEPHONE ENCOUNTER
No care due was identified.  [facility] Embedded Care Due Messages. Reference number: [id number].   12/02/2023 8:36:06 AM CST

## 2023-12-11 ENCOUNTER — PATIENT MESSAGE (OUTPATIENT)
Dept: HEPATOLOGY | Facility: CLINIC | Age: 48
End: 2023-12-11
Payer: COMMERCIAL

## 2023-12-13 ENCOUNTER — PATIENT MESSAGE (OUTPATIENT)
Dept: ADMINISTRATIVE | Facility: HOSPITAL | Age: 48
End: 2023-12-13
Payer: COMMERCIAL

## 2023-12-16 ENCOUNTER — PATIENT MESSAGE (OUTPATIENT)
Dept: DERMATOLOGY | Facility: CLINIC | Age: 48
End: 2023-12-16
Payer: COMMERCIAL

## 2023-12-21 ENCOUNTER — PATIENT OUTREACH (OUTPATIENT)
Dept: ADMINISTRATIVE | Facility: HOSPITAL | Age: 48
End: 2023-12-21
Payer: COMMERCIAL

## 2023-12-21 ENCOUNTER — PATIENT MESSAGE (OUTPATIENT)
Dept: ADMINISTRATIVE | Facility: HOSPITAL | Age: 48
End: 2023-12-21
Payer: COMMERCIAL

## 2023-12-21 DIAGNOSIS — E11.9 CONTROLLED TYPE 2 DIABETES MELLITUS WITHOUT COMPLICATION, WITHOUT LONG-TERM CURRENT USE OF INSULIN: Primary | ICD-10-CM

## 2023-12-21 NOTE — PROGRESS NOTES

## 2024-01-03 ENCOUNTER — TELEPHONE (OUTPATIENT)
Dept: HEPATOLOGY | Facility: CLINIC | Age: 49
End: 2024-01-03
Payer: COMMERCIAL

## 2024-01-03 NOTE — TELEPHONE ENCOUNTER
"----- Message from Christian Palmer sent at 1/3/2024 10:26 AM CST -----  Reschedule Existing Appointment      Appt Date: 1/26    Type of appt: Fibroscan    Physician: Dianne    Reason for rescheduling? Appt date no longer works; Requesting to r/s for soonest available    Caller: Self    Contact Preference: [phone]      Additional Information:  "Thank you for all that you do for our patients"      "

## 2024-01-12 ENCOUNTER — PATIENT MESSAGE (OUTPATIENT)
Dept: INTERNAL MEDICINE | Facility: CLINIC | Age: 49
End: 2024-01-12
Payer: COMMERCIAL

## 2024-01-12 DIAGNOSIS — R74.8 ELEVATED LIVER ENZYMES: ICD-10-CM

## 2024-01-12 DIAGNOSIS — I10 PRIMARY HYPERTENSION: Primary | ICD-10-CM

## 2024-01-12 DIAGNOSIS — E11.9 CONTROLLED TYPE 2 DIABETES MELLITUS WITHOUT COMPLICATION, WITHOUT LONG-TERM CURRENT USE OF INSULIN: ICD-10-CM

## 2024-01-17 ENCOUNTER — PATIENT MESSAGE (OUTPATIENT)
Dept: OPTOMETRY | Facility: CLINIC | Age: 49
End: 2024-01-17
Payer: COMMERCIAL

## 2024-01-29 ENCOUNTER — PROCEDURE VISIT (OUTPATIENT)
Dept: HEPATOLOGY | Facility: CLINIC | Age: 49
End: 2024-01-29
Payer: COMMERCIAL

## 2024-01-29 DIAGNOSIS — K76.0 FATTY LIVER: ICD-10-CM

## 2024-01-29 PROCEDURE — 76981 USE PARENCHYMA: CPT | Mod: S$GLB,,, | Performed by: NURSE PRACTITIONER

## 2024-01-29 NOTE — PROGRESS NOTES
Subjective:      Patient ID: Emily Max is a 48 y.o. female.    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      Pap in 6/2022 normal  Vaginal estrogen.      Mammogram: 7/2023: normal.       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 She required no postoperative therapy  Review of Systems   Constitutional:  Negative for appetite change, chills, fatigue and fever.   HENT:  Negative for mouth sores.    Respiratory:  Negative for cough and shortness of breath.    Cardiovascular:  Negative for leg swelling.   Gastrointestinal:  Negative for abdominal pain, blood in stool, constipation and diarrhea.   Endocrine: Negative for cold intolerance.   Genitourinary:  Negative for dysuria and vaginal bleeding.   Musculoskeletal:  Negative for myalgias.   Skin:  Negative for rash.   Allergic/Immunologic: Negative.    Neurological:  Negative for weakness and numbness.   Hematological:  Negative for adenopathy. Does not bruise/bleed easily.   Psychiatric/Behavioral:  Negative for confusion.        Objective:   Physical Exam:   Constitutional: She is oriented to person, place, and time. She appears well-developed and well-nourished.    HENT:   Head: Normocephalic and atraumatic.    Eyes: Pupils are equal, round, and reactive to light. EOM are normal.    Neck: No thyromegaly present.    Cardiovascular:  Normal rate, regular rhythm and intact distal pulses.             Pulmonary/Chest: Effort normal and breath sounds normal. No respiratory distress. She has no wheezes.        Abdominal: Soft. Bowel sounds are normal. She exhibits no distension and no mass. There is no abdominal tenderness.     Genitourinary:    Vagina and rectum  normal.      Pelvic exam was performed with patient supine.   There is no lesion on the right labia. There is no lesion on the left labia. Right adnexum displays no mass. Left adnexum displays no mass. Cervix is absent.   pap smear completedUterus is absent.           Musculoskeletal: Normal range of motion and moves all extremeties.      Lymphadenopathy:     She has no cervical adenopathy. No inguinal adenopathy noted on the right or left side.        Right: No supraclavicular adenopathy present.        Left: No supraclavicular adenopathy present.    Neurological: She is alert and oriented to person, place, and time.    Skin: Skin is warm and dry. No rash noted.    Psychiatric: She has a normal mood and affect.       Assessment:     1. Well woman exam with routine gynecological exam    2. Screening mammogram, encounter for    3. Hx of cervical cancer        Plan:     Orders Placed This Encounter   Procedures    Mammo Digital Screening Bilat w/ [facility]     No evidence of disease on today's exam.   Surveillance pap smear collected.  Feels well, no new symptoms.   MMG     Recommend continued surveillance. RTC annually or sooner if needed.

## 2024-01-30 ENCOUNTER — PATIENT MESSAGE (OUTPATIENT)
Dept: INTERNAL MEDICINE | Facility: CLINIC | Age: 49
End: 2024-01-30
Payer: COMMERCIAL

## 2024-01-30 ENCOUNTER — PATIENT MESSAGE (OUTPATIENT)
Dept: HEPATOLOGY | Facility: CLINIC | Age: 49
End: 2024-01-30
Payer: COMMERCIAL

## 2024-01-30 ENCOUNTER — OFFICE VISIT (OUTPATIENT)
Dept: GYNECOLOGIC ONCOLOGY | Facility: CLINIC | Age: 49
End: 2024-01-30
Payer: COMMERCIAL

## 2024-01-30 VITALS
HEIGHT: 64 IN | DIASTOLIC BLOOD PRESSURE: 87 MMHG | SYSTOLIC BLOOD PRESSURE: 122 MMHG | BODY MASS INDEX: 38.76 KG/M2 | WEIGHT: 227.06 LBS | HEART RATE: 99 BPM

## 2024-01-30 DIAGNOSIS — E11.9 CONTROLLED TYPE 2 DIABETES MELLITUS WITHOUT COMPLICATION, WITHOUT LONG-TERM CURRENT USE OF INSULIN: ICD-10-CM

## 2024-01-30 DIAGNOSIS — I10 PRIMARY HYPERTENSION: ICD-10-CM

## 2024-01-30 DIAGNOSIS — Z85.41 HX OF CERVICAL CANCER: ICD-10-CM

## 2024-01-30 DIAGNOSIS — E66.9 OBESITY (BMI 30-39.9): ICD-10-CM

## 2024-01-30 DIAGNOSIS — E11.9 CONTROLLED TYPE 2 DIABETES MELLITUS WITHOUT COMPLICATION, WITHOUT LONG-TERM CURRENT USE OF INSULIN: Primary | ICD-10-CM

## 2024-01-30 DIAGNOSIS — Z12.31 SCREENING MAMMOGRAM, ENCOUNTER FOR: ICD-10-CM

## 2024-01-30 DIAGNOSIS — K76.0 FATTY LIVER: Primary | ICD-10-CM

## 2024-01-30 DIAGNOSIS — Z01.419 WELL WOMAN EXAM WITH ROUTINE GYNECOLOGICAL EXAM: Primary | ICD-10-CM

## 2024-01-30 DIAGNOSIS — R74.8 ELEVATED LIVER ENZYMES: ICD-10-CM

## 2024-01-30 PROCEDURE — 3079F DIAST BP 80-89 MM HG: CPT | Mod: CPTII,S$GLB,, | Performed by: OBSTETRICS & GYNECOLOGY

## 2024-01-30 PROCEDURE — 3008F BODY MASS INDEX DOCD: CPT | Mod: CPTII,S$GLB,, | Performed by: OBSTETRICS & GYNECOLOGY

## 2024-01-30 PROCEDURE — 3074F SYST BP LT 130 MM HG: CPT | Mod: CPTII,S$GLB,, | Performed by: OBSTETRICS & GYNECOLOGY

## 2024-01-30 PROCEDURE — 88175 CYTOPATH C/V AUTO FLUID REDO: CPT | Performed by: OBSTETRICS & GYNECOLOGY

## 2024-01-30 PROCEDURE — 1159F MED LIST DOCD IN RCRD: CPT | Mod: CPTII,S$GLB,, | Performed by: OBSTETRICS & GYNECOLOGY

## 2024-01-30 PROCEDURE — 99396 PREV VISIT EST AGE 40-64: CPT | Mod: S$GLB,,, | Performed by: OBSTETRICS & GYNECOLOGY

## 2024-01-30 PROCEDURE — 99999 PR PBB SHADOW E&M-EST. PATIENT-LVL V: CPT | Mod: PBBFAC,,, | Performed by: OBSTETRICS & GYNECOLOGY

## 2024-01-30 NOTE — PROCEDURES
FibroScan [place] (Vibration Controlled Transient Elastography)    Date/Time: 1/29/2024 9:00 AM    Performed by: Beth Dean NP  Authorized by: Beth Dean NP    Diagnosis:  NAFLD    Probe:  XL    Universal Protocol: Patient's identity, procedure and site were verified, confirmatory pause was performed.  Discussed procedure including risks and potential complications.  Questions answered.  Patient verbalizes understanding and wishes to proceed with VCTE.     Procedure: After providing explanations of the procedure, patient was placed in the supine position with right arm in maximum abduction to allow optimal exposure of right lateral abdomen.  Patient was briefly assessed, Testing was performed in the mid-axillary location, 50Hz Shear Wave pulses were applied and the resulting Shear Wave and Propagation Speed detected with a 3.5 MHz ultrasonic signal, using the FibroScan probe, Skin to liver capsule distance and liver parenchyma were accessed during the entire examination with the FibroScan probe, Patient was instructed to breathe normally and to abstain from sudden movements during the procedure, allowing for random measurements of liver stiffness. At least 10 Shear Waves were produced, Individual measurements of each Shear Wave were calculated.  Patient tolerated the procedure well with no complications.  Meets discharge criteria as was dismissed.  Rates pain 0 out of 10.  Patient will follow up with ordering provider to review results.    Findings  Median liver stiffness score:  7.3  CAP Reading: dB/m:  350    IQR/med %:  10  Interpretation  Fibrosis interpretation is based on medial liver stiffness - Kilopascal (kPa).    Fibrosis Stage:  F 0-1  Steatosis interpretation is based on controlled attenuation parameter - (dB/m).    Steatosis Grade:  S3

## 2024-02-02 LAB
FINAL PATHOLOGIC DIAGNOSIS: NORMAL
Lab: NORMAL

## 2024-02-06 ENCOUNTER — PATIENT MESSAGE (OUTPATIENT)
Dept: INTERNAL MEDICINE | Facility: CLINIC | Age: 49
End: 2024-02-06
Payer: COMMERCIAL

## 2024-02-26 ENCOUNTER — PATIENT MESSAGE (OUTPATIENT)
Dept: INTERNAL MEDICINE | Facility: CLINIC | Age: 49
End: 2024-02-26
Payer: COMMERCIAL

## 2024-02-26 DIAGNOSIS — E11.9 CONTROLLED TYPE 2 DIABETES MELLITUS WITHOUT COMPLICATION, WITHOUT LONG-TERM CURRENT USE OF INSULIN: Primary | ICD-10-CM

## 2024-02-28 ENCOUNTER — OFFICE VISIT (OUTPATIENT)
Dept: OTOLARYNGOLOGY | Facility: CLINIC | Age: 49
End: 2024-02-28
Payer: COMMERCIAL

## 2024-02-28 VITALS
BODY MASS INDEX: 39.36 KG/M2 | SYSTOLIC BLOOD PRESSURE: 128 MMHG | HEART RATE: 80 BPM | DIASTOLIC BLOOD PRESSURE: 80 MMHG | WEIGHT: 229.25 LBS

## 2024-02-28 DIAGNOSIS — J30.1 SEASONAL ALLERGIC RHINITIS DUE TO POLLEN: ICD-10-CM

## 2024-02-28 DIAGNOSIS — R04.0 RIGHT-SIDED EPISTAXIS: Primary | ICD-10-CM

## 2024-02-28 DIAGNOSIS — Z79.01 CHRONIC ANTICOAGULATION: ICD-10-CM

## 2024-02-28 PROCEDURE — 3074F SYST BP LT 130 MM HG: CPT | Mod: CPTII,S$GLB,, | Performed by: PHYSICIAN ASSISTANT

## 2024-02-28 PROCEDURE — 99999 PR PBB SHADOW E&M-EST. PATIENT-LVL III: CPT | Mod: PBBFAC,,, | Performed by: PHYSICIAN ASSISTANT

## 2024-02-28 PROCEDURE — 99204 OFFICE O/P NEW MOD 45 MIN: CPT | Mod: S$GLB,,, | Performed by: PHYSICIAN ASSISTANT

## 2024-02-28 PROCEDURE — 3079F DIAST BP 80-89 MM HG: CPT | Mod: CPTII,S$GLB,, | Performed by: PHYSICIAN ASSISTANT

## 2024-02-28 PROCEDURE — 3008F BODY MASS INDEX DOCD: CPT | Mod: CPTII,S$GLB,, | Performed by: PHYSICIAN ASSISTANT

## 2024-02-28 PROCEDURE — 1159F MED LIST DOCD IN RCRD: CPT | Mod: CPTII,S$GLB,, | Performed by: PHYSICIAN ASSISTANT

## 2024-02-28 NOTE — PATIENT INSTRUCTIONS
Do not blow nose for 1 week.  Sneeze with mouth open.  Use nasal saline twice daily (at the least) to keep mucous membranes hydrated.   Use Vaseline twice daily for 7 days and then aquaphor or vaseline ointment at least once daily in your nostril as demonstrated in clinic today.  Do not take ibuprofen or drink alcohol for 1 week. Do NOT stop scheduled blood thinners unless directed by your provider.   If bleeding recurs, use oxymetazoline (Afrin) spray in the nostril and pinch your nose and lean forward.  If bleeding persists after holding your nose for 10 minutes, call for follow-up appointment.  Go to ER if bleeding:  - persists for greater than 15 minutes (despite above measures)  - severe bleeding or lightheadedness  - bleeding episodes become frequent    Other measures to reduce recurrence:  - Avoid heavy lifting and straining for at 2 least weeks  - Do not pick your nose or put anything into it. These actions may dislodge any blood clots.  - For the next few days, elevate your head on several pillows when lying down.  - Do not use a straw to drink as the sucking motion may cause bleeding.

## 2024-02-28 NOTE — PROGRESS NOTES
Subjective:     Emily Max is a 49 y.o. female migraine headaches, GERD, ARwho was self-referred for nosebleeds.    Patient reports right-sided epistaxis that started about 6 months ago.  Patient reports nosebleeds in the past which certain nasal sprays and she had been using Flonase at that time.  Patient stopped using Flonase but nosebleeds persisted.  Patient reports bleeding so severe sometimes taking up to 30 minutes to stop the bleeding.  Patient has been using saline spray intermittently over the last couple of months.  Last nosebleed was about 3 days ago and not as severe as normal.     She has not previously had nasal cauterization.  The bleeding has not required packing for control.  The last episode was 3 days ago, and is not currently active.  There is a prior history of nasal surgery x2 (FESS, SP/turbs)  There is not a prior history of nasal trauma.  There is a history of environmental allergies.  She does not currently use nasal cannula.  She does not currently use a nasal spray.  There is not a family history to suggest a clotting disorder.  She does currently take a blood-thinning agent ASA 81 mg    Past Medical History  She has a past medical history of Abnormal Pap smear, Cervical cancer, Colon cancer screening, Controlled type 2 diabetes mellitus without complication, without long-term current use of insulin, Depressive disorder, not elsewhere classified, Endometriosis of uterus, FH: colonic polyps, Foot fracture, right, Headache(784.0), Hypertension, Hypothyroidism, Low grade squamous intraepith lesion on cytologic smear vagina (lgsil), Malignant neoplasm of exocervix, Migraine headache, Urinary tract infection, Vaginal atrophy, Visit for screening mammogram, and Well woman exam with routine gynecological exam.    Past Surgical History  She has a past surgical history that includes Lipoma resection; Sinus surgery; Pelvic laparoscopy; Tonsillectomy; Oophorectomy; pr removal of  ovary/tube(s); Pelvic and para-aortic lymph node dissection; Knee surgery (08/24/2017); Knee arthroscopy w/ meniscectomy (Left, 6/10/2021); Synovectomy of knee (Left, 6/10/2021); Chondroplasty of knee (Left, 6/10/2021); Breast biopsy (Right, 2002); Hysterectomy (May 2013); Colonoscopy (N/A, 7/6/2022); Knee arthroscopy w/ meniscectomy (Right, 11/3/2022); Chondroplasty of knee (Right, 11/3/2022); and Synovectomy of knee (Right, 11/3/2022).    Family History  Her family history includes Aneurysm in her maternal grandmother; Breast cancer in her maternal aunt; Cancer in her maternal aunt, mother, and paternal grandfather; Colon cancer in an other family member; Diabetes in her father; Heart disease in her mother; Hyperlipidemia in her mother; Hypertension in her mother; Hypothyroidism in her mother; Migraines in her maternal grandmother and mother; Rheum arthritis in her maternal aunt; Rheum arthritis (age of onset: 80) in her paternal grandmother; Skin cancer in an other family member; Stroke in her maternal grandmother.    Social History  She reports that she has never smoked. She has never used smokeless tobacco. She reports current alcohol use of about 1.0 standard drink of alcohol per week. She reports that she does not use drugs.    Allergies  She is allergic to hydrocodone-acetaminophen, iodine, other, fluconazole, iodine and iodide containing products, and mushroom combination no.1.    Medications  She has a current medication list which includes the following prescription(s): albuterol, alprazolam, aspirin, atenolol, atenolol, finacea, clindamycin, diclofenac, duloxetine, duloxetine, epinephrine, estradiol, fluticasone propionate, hydroquinone, levocetirizine, levothyroxine, levothyroxine, metronidazole, ondansetron, sulfacetamide sodium-sulfur, tirzepatide, tirzepatide, tramadol, triamterene-hydrochlorothiazide 37.5-25 mg, triamterene-hydrochlorothiazide 37.5-25 mg, ubrelvy, and vitamin d, and the following  Facility-Administered Medications: diphenhydramine.    Review of Systems   HENT: Positive for nosebleeds and postnasal drip.               Objective:     /80 (BP Location: Right arm, Patient Position: Sitting, BP Method: Large (Automatic))   Pulse 80   Wt 104 kg (229 lb 4.5 oz)   BMI 39.36 kg/m²      Constitutional:   She appears well-developed and well-nourished. She is active. Normal speech.      Head:  Normocephalic and atraumatic.     Nose:  Mucosal edema (R MT) and septal deviation (R anterior) present. No rhinorrhea. Turbinates normal, no turbinate masses and no turbinate hypertrophy.    Clustered pinpoint foci of recent bleeding on R anterior septum  No active bleeding; no eschar    Pulmonary/Chest:   Effort normal.     Psychiatric:   She has a normal mood and affect. Her speech is normal and behavior is normal.       Procedure    None    Data Reviewed    Hemoglobin (g/dL)   Date Value   10/06/2023 14.5   10/06/2023 14.5     Hematocrit (%)   Date Value   10/06/2023 43.2   10/06/2023 43.2     Platelets (K/uL)   Date Value   10/06/2023 243   10/06/2023 243     Prothrombin Time (sec)   Date Value   02/03/2012 10.3     aPTT (sec)   Date Value   02/03/2012 24.5     INR (no units)   Date Value   02/03/2012 1.0     Laboratory 5/8/2012:  ImmunoCAP:  Class IV: Bahia, Tereso.  Class I: Dog.  IgE:131.     Assessment and Plan:     1. Right-sided epistaxis  2. Chronic anticoagulation  3. Seasonal allergic rhinitis due to pollen  - multiple factors likely contributing to epistaxis.  These include allergic rhinitis, medication side effects, anterior septal deviation, and antiplatelet medication.  - Use aquaphor or vaseline ointment at least once daily in nasal vestibule  - Nasal saline spray/Ayr gel to nose at least twice daily  - Consider room humidifier  - Had a lengthy conversation with the patient regarding the etiology of epistaxis and management strategies.    - Patient provided detailed instructions for the  management and prevention of nosebleeds and written instructions were given in their AVS.    - She was also advised about when to seek emergency care.        She will Follow up in about 3 weeks (around 3/20/2024) for re-evaluate post treatment.   I had a discussion with the patient regarding her condition and the further workup and management options.    All questions were answered, and the patient is in agreement with the above.

## 2024-03-04 DIAGNOSIS — E11.9 CONTROLLED TYPE 2 DIABETES MELLITUS WITHOUT COMPLICATION, WITHOUT LONG-TERM CURRENT USE OF INSULIN: ICD-10-CM

## 2024-03-05 NOTE — TELEPHONE ENCOUNTER
Care Due:                  Date            Visit Type   Department     Provider  --------------------------------------------------------------------------------                                EP -                              PRIMARY      [facility]  Last Visit: 10-      [facility] ([facility])   MEDICINE       JORDEN RAMÍREZ  Next Visit: None Scheduled  None         None Found                                                            Last  Test          Frequency    Reason                     Performed    Due Date  --------------------------------------------------------------------------------    HBA1C.......  6 months...  tirzepatide..............  10-   04-    [facility] Embedded Care Due Messages. Reference number: [id number].   3/04/2024 10:46:07 PM CST

## 2024-03-06 ENCOUNTER — OFFICE VISIT (OUTPATIENT)
Dept: DERMATOLOGY | Facility: CLINIC | Age: 49
End: 2024-03-06
Payer: COMMERCIAL

## 2024-03-06 DIAGNOSIS — L82.0 SEBORRHEIC KERATOSES, INFLAMED: ICD-10-CM

## 2024-03-06 DIAGNOSIS — L71.9 ROSACEA: ICD-10-CM

## 2024-03-06 DIAGNOSIS — D48.5 NEOPLASM OF UNCERTAIN BEHAVIOR OF SKIN: Primary | ICD-10-CM

## 2024-03-06 PROCEDURE — 99999 PR PBB SHADOW E&M-EST. PATIENT-LVL IV: CPT | Mod: PBBFAC,,, | Performed by: DERMATOLOGY

## 2024-03-06 PROCEDURE — 1159F MED LIST DOCD IN RCRD: CPT | Mod: CPTII,S$GLB,, | Performed by: DERMATOLOGY

## 2024-03-06 PROCEDURE — 11102 TANGNTL BX SKIN SINGLE LES: CPT | Mod: S$GLB,,, | Performed by: DERMATOLOGY

## 2024-03-06 PROCEDURE — 88305 TISSUE EXAM BY PATHOLOGIST: CPT | Mod: 26,,, | Performed by: PATHOLOGY

## 2024-03-06 PROCEDURE — 88305 TISSUE EXAM BY PATHOLOGIST: CPT | Performed by: PATHOLOGY

## 2024-03-06 PROCEDURE — 99214 OFFICE O/P EST MOD 30 MIN: CPT | Mod: 25,S$GLB,, | Performed by: DERMATOLOGY

## 2024-03-06 PROCEDURE — 1160F RVW MEDS BY RX/DR IN RCRD: CPT | Mod: CPTII,S$GLB,, | Performed by: DERMATOLOGY

## 2024-03-06 RX ORDER — AZELAIC ACID 0.15 G/G
GEL TOPICAL
Qty: 30 G | Refills: 5 | Status: SHIPPED | OUTPATIENT
Start: 2024-03-06

## 2024-03-06 NOTE — PROGRESS NOTES
Subjective:      Patient ID:  Emily Max is a 49 y.o. female who presents for   Chief Complaint   Patient presents with    Lesion     R breast      Patient with new complaint of lesion(s)  Location: R breast   Duration: 1 yr -grows back  after has been removed   Symptoms: dry , flaky   Relieving factors/Previous treatments: removed once before      She reports that the lesion gets irritated, and she would like it removed today.    Pt also has acne/red bumps on face for years. Comes and goes. Used clindamycin in the past. Pt feels all moisturizers flare the condition      Lesion      Review of Systems   Skin:  Negative for itching, rash, dry skin, daily sunscreen use, activity-related sunscreen use and recent sunburn.   Hematologic/Lymphatic: Bruises/bleeds easily.       Objective:   Physical Exam   Constitutional: She appears well-developed and well-nourished. No distress.   Neurological: She is alert and oriented to person, place, and time. She is not disoriented.   Psychiatric: She has a normal mood and affect.   Skin:   Areas Examined (abnormalities noted in diagram):   Head / Face Inspection Performed  Chest / Axilla Inspection Performed                       Diagram Legend     Erythematous scaling macule/papule c/w actinic keratosis       Vascular papule c/w angioma      Pigmented verrucoid papule/plaque c/w seborrheic keratosis      Yellow umbilicated papule c/w sebaceous hyperplasia      Irregularly shaped tan macule c/w lentigo     1-2 mm smooth white papules consistent with Milia      Movable subcutaneous cyst with punctum c/w epidermal inclusion cyst      Subcutaneous movable cyst c/w pilar cyst      Firm pink to brown papule c/w dermatofibroma      Pedunculated fleshy papule(s) c/w skin tag(s)      Evenly pigmented macule c/w junctional nevus     Mildly variegated pigmented, slightly irregular-bordered macule c/w mildly atypical nevus      Flesh colored to evenly pigmented papule c/w  intradermal nevus       Pink pearly papule/plaque c/w basal cell carcinoma      Erythematous hyperkeratotic cursted plaque c/w SCC      Surgical scar with no sign of skin cancer recurrence      Open and closed comedones      Inflammatory papules and pustules      Verrucoid papule consistent consistent with wart     Erythematous eczematous patches and plaques     Dystrophic onycholytic nail with subungual debris c/w onychomycosis     Umbilicated papule    Erythematous-base heme-crusted tan verrucoid plaque consistent with inflamed seborrheic keratosis     Erythematous Silvery Scaling Plaque c/w Psoriasis     See annotation      Assessment / Plan:      Pathology Orders:       Normal Orders This Visit    Specimen to Pathology, Dermatology     Questions:    Procedure Type: Dermatology and skin neoplasms    Number of Specimens: 1    ------------------------: -------------------------    Spec 1 Procedure: Biopsy    Spec 1 Clinical Impression: R/o isk    Spec 1 Source: Right lateral breast    Release to patient:           Neoplasm of uncertain behavior of skin   Shave biopsy procedure note:    Shave biopsy performed after verbal consent including risk of infection, scar, recurrence, need for additional treatment of site. Area prepped with alcohol, anesthetized with approximately 1.0cc of 1% lidocaine with epinephrine. Lesional tissue shaved with razor blade. Hemostasis achieved with application of aluminum chloride followed by hyfrecation.       Areas curetted to remove gross lesion. Hemostasis achieved with aluminum chloride application. No complications.   Areas dressed with Vaseline jelly and bandage. Wound care discussed.        Rosacea  -     azelaic acid (AZELEX) 15 % gel; Compound azelaic acid 15% / niacinamide 2% cream. Apply to affected areas of face nightly and then moisturize after.  Dispense: 30 g; Refill: 5    Morning acne regimen:  ?  1. Wash face with gentle cleanser.  ?  2. Apply a broad-spectrum tinted  sunscreen with SPF 30 or higher.    Evening acne regimen:  ?  1. Wash face with gentle cleanser.   ?  2. Apply a thin film of Azelaic acid/niacinamide prescription cream to face.  ?  3. Apply a fragrance-free moisturizer, such as CeraVe moisturizing cream.           Follow up for PRN.

## 2024-03-06 NOTE — PATIENT INSTRUCTIONS
Morning acne regimen:  ?  1. Wash face with  gentle  cleanser.  ?  2. Apply a broad-spectrum tinted sunscreen with SPF 30 or higher.    Evening acne regimen:  ?  1. Wash face with gentle cleanser.   ?  2. Apply a thin film of  Azelaic acid/niacinamide prescription cream  to face.  ?  3. Apply a fragrance-free moisturizer, such as CeraVe moisturizing cream.           Shave Biopsy Wound Care    Your doctor has performed a shave biopsy today.  A band aid and vaseline ointment has been placed over the site.  This should remain in place for NO LONGER THAN 48 hours.  It is fine to remove the bandaid after 24 hours, if the area is no longer bleeding. It is recommended that you keep the area dry (do not wet)) for the first 24 hours.  After 24 hours, wash the area with warm soap and water and apply Vaseline jelly.  Many patients prefer to use Neosporin or Bacitracin ointment.  This is acceptable; however, know that you can develop an allergy to this medication even if you have used it safely for years.  It is important to keep the area moist.  Letting it dry out and get air slows healing time, and will worsen the scar.        If you notice increasing redness, tenderness, pain, or yellow drainage at the biopsy site, please notify your doctor.  These are signs of an infection.    If your biopsy site is bleeding, apply firm pressure for 15 minutes straight.  Repeat for another 15 minutes, if it is still bleeding.   If the surgical site continues to bleed, then please contact your doctor.      For [facility] users:   You will receive your biopsy results in [facility] as soon as they are available. Please be assured that your physician/provider will review your results and will then determine what further treatment, evaluation, or planning is required. You should be contacted by your physician's/provider's office within 5 business days of receiving your results; If not, please reach out to directly. This is one more way  [facility] is putting you first.     [address] [place] [address]/ [phone] FAX/ [email or web address]

## 2024-03-11 LAB
FINAL PATHOLOGIC DIAGNOSIS: NORMAL
GROSS: NORMAL
Lab: NORMAL
MICROSCOPIC EXAM: NORMAL

## 2024-03-11 NOTE — PROGRESS NOTES
Jenn, your pathology report indicates a benign, non cancerous lesion. No further treatment is needed at this time. . Thank you for allowing me to care for you and take care, Dr. Elizondo    Skin, right lateral breast, shave biopsy:   -SEBORRHEIC KERATOSIS, RETICULATED AND PIGMENTED     This lesion is benign.

## 2024-03-18 ENCOUNTER — PATIENT MESSAGE (OUTPATIENT)
Dept: ADMINISTRATIVE | Facility: HOSPITAL | Age: 49
End: 2024-03-18
Payer: COMMERCIAL

## 2024-03-21 ENCOUNTER — PATIENT MESSAGE (OUTPATIENT)
Dept: INTERNAL MEDICINE | Facility: CLINIC | Age: 49
End: 2024-03-21
Payer: COMMERCIAL

## 2024-03-21 DIAGNOSIS — E11.9 CONTROLLED TYPE 2 DIABETES MELLITUS WITHOUT COMPLICATION, WITHOUT LONG-TERM CURRENT USE OF INSULIN: Primary | ICD-10-CM

## 2024-03-21 RX ORDER — SEMAGLUTIDE 0.68 MG/ML
0.5 INJECTION, SOLUTION SUBCUTANEOUS
Qty: 3 ML | Refills: 2 | Status: SHIPPED | OUTPATIENT
Start: 2024-03-21 | End: 2024-06-10

## 2024-04-13 ENCOUNTER — PATIENT MESSAGE (OUTPATIENT)
Dept: INTERNAL MEDICINE | Facility: CLINIC | Age: 49
End: 2024-04-13
Payer: COMMERCIAL

## 2024-04-15 ENCOUNTER — HOSPITAL ENCOUNTER (OUTPATIENT)
Dept: RADIOLOGY | Facility: HOSPITAL | Age: 49
Discharge: HOME | End: 2024-04-15
Attending: INTERNAL MEDICINE
Payer: COMMERCIAL

## 2024-04-15 DIAGNOSIS — D73.89 LESION OF SPLEEN: ICD-10-CM

## 2024-04-15 PROCEDURE — 76705 ECHO EXAM OF ABDOMEN: CPT | Mod: 26,,, | Performed by: RADIOLOGY

## 2024-04-15 PROCEDURE — 76705 ECHO EXAM OF ABDOMEN: CPT | Mod: TC

## 2024-04-15 NOTE — TELEPHONE ENCOUNTER
Pt has CBC, BMP, hepatic panel and A1C ordered. Pt asking if you want to order any other labs. Pt also asking if you would like her to set up f/u apt to review US and labs.

## 2024-04-17 ENCOUNTER — OFFICE VISIT (OUTPATIENT)
Dept: INTERNAL MEDICINE | Facility: CLINIC | Age: 49
End: 2024-04-17
Payer: COMMERCIAL

## 2024-04-17 ENCOUNTER — LAB VISIT (OUTPATIENT)
Dept: LAB | Facility: HOSPITAL | Age: 49
End: 2024-04-17
Attending: INTERNAL MEDICINE
Payer: COMMERCIAL

## 2024-04-17 DIAGNOSIS — I10 PRIMARY HYPERTENSION: ICD-10-CM

## 2024-04-17 DIAGNOSIS — K76.0 FATTY LIVER: ICD-10-CM

## 2024-04-17 DIAGNOSIS — R74.8 ELEVATED LIVER ENZYMES: ICD-10-CM

## 2024-04-17 DIAGNOSIS — D18.03 HEMANGIOMA OF SPLEEN: Primary | ICD-10-CM

## 2024-04-17 DIAGNOSIS — E11.9 CONTROLLED TYPE 2 DIABETES MELLITUS WITHOUT COMPLICATION, WITHOUT LONG-TERM CURRENT USE OF INSULIN: ICD-10-CM

## 2024-04-17 LAB
ALBUMIN SERPL BCP-MCNC: 3.9 G/DL (ref 3.5–5.2)
ALP SERPL-CCNC: 74 U/L (ref 55–135)
ALT SERPL W/O P-5'-P-CCNC: 23 U/L (ref 10–44)
ANION GAP SERPL CALC-SCNC: 9 MMOL/L (ref 8–16)
AST SERPL-CCNC: 22 U/L (ref 10–40)
BASOPHILS # BLD AUTO: 0.07 K/UL (ref 0–0.2)
BASOPHILS NFR BLD: 1 % (ref 0–1.9)
BILIRUB DIRECT SERPL-MCNC: 0.2 MG/DL (ref 0.1–0.3)
BILIRUB SERPL-MCNC: 0.6 MG/DL (ref 0.1–1)
BUN SERPL-MCNC: 19 MG/DL (ref 6–20)
CALCIUM SERPL-MCNC: 10.1 MG/DL (ref 8.7–10.5)
CHLORIDE SERPL-SCNC: 103 MMOL/L (ref 95–110)
CO2 SERPL-SCNC: 26 MMOL/L (ref 23–29)
CREAT SERPL-MCNC: 0.9 MG/DL (ref 0.5–1.4)
DIFFERENTIAL METHOD BLD: ABNORMAL
EOSINOPHIL # BLD AUTO: 0.3 K/UL (ref 0–0.5)
EOSINOPHIL NFR BLD: 4.7 % (ref 0–8)
ERYTHROCYTE [DISTWIDTH] IN BLOOD BY AUTOMATED COUNT: 12.8 % (ref 11.5–14.5)
EST. GFR  (NO RACE VARIABLE): >60 ML/MIN/1.73 M^2
ESTIMATED AVG GLUCOSE: 108 MG/DL (ref 68–131)
GLUCOSE SERPL-MCNC: 100 MG/DL (ref 70–110)
HBA1C MFR BLD: 5.4 % (ref 4–5.6)
HCT VFR BLD AUTO: 43.6 % (ref 37–48.5)
HGB BLD-MCNC: 15 G/DL (ref 12–16)
IMM GRANULOCYTES # BLD AUTO: 0.02 K/UL (ref 0–0.04)
IMM GRANULOCYTES NFR BLD AUTO: 0.3 % (ref 0–0.5)
LYMPHOCYTES # BLD AUTO: 2.7 K/UL (ref 1–4.8)
LYMPHOCYTES NFR BLD: 39.6 % (ref 18–48)
MCH RBC QN AUTO: 30.9 PG (ref 27–31)
MCHC RBC AUTO-ENTMCNC: 34.4 G/DL (ref 32–36)
MCV RBC AUTO: 90 FL (ref 82–98)
MONOCYTES # BLD AUTO: 1.1 K/UL (ref 0.3–1)
MONOCYTES NFR BLD: 16.6 % (ref 4–15)
NEUTROPHILS # BLD AUTO: 2.6 K/UL (ref 1.8–7.7)
NEUTROPHILS NFR BLD: 37.8 % (ref 38–73)
NRBC BLD-RTO: 0 /100 WBC
PLATELET # BLD AUTO: 249 K/UL (ref 150–450)
PMV BLD AUTO: 10 FL (ref 9.2–12.9)
POTASSIUM SERPL-SCNC: 3.5 MMOL/L (ref 3.5–5.1)
PROT SERPL-MCNC: 8.1 G/DL (ref 6–8.4)
RBC # BLD AUTO: 4.86 M/UL (ref 4–5.4)
SODIUM SERPL-SCNC: 138 MMOL/L (ref 136–145)
WBC # BLD AUTO: 6.76 K/UL (ref 3.9–12.7)

## 2024-04-17 PROCEDURE — 80048 BASIC METABOLIC PNL TOTAL CA: CPT | Performed by: INTERNAL MEDICINE

## 2024-04-17 PROCEDURE — 1159F MED LIST DOCD IN RCRD: CPT | Mod: CPTII,95,, | Performed by: INTERNAL MEDICINE

## 2024-04-17 PROCEDURE — 36415 COLL VENOUS BLD VENIPUNCTURE: CPT | Mod: PO | Performed by: INTERNAL MEDICINE

## 2024-04-17 PROCEDURE — 80076 HEPATIC FUNCTION PANEL: CPT | Performed by: INTERNAL MEDICINE

## 2024-04-17 PROCEDURE — 83036 HEMOGLOBIN GLYCOSYLATED A1C: CPT | Performed by: INTERNAL MEDICINE

## 2024-04-17 PROCEDURE — 85025 COMPLETE CBC W/AUTO DIFF WBC: CPT | Performed by: INTERNAL MEDICINE

## 2024-04-17 PROCEDURE — 99214 OFFICE O/P EST MOD 30 MIN: CPT | Mod: 95,,, | Performed by: INTERNAL MEDICINE

## 2024-04-17 PROCEDURE — 1160F RVW MEDS BY RX/DR IN RCRD: CPT | Mod: CPTII,95,, | Performed by: INTERNAL MEDICINE

## 2024-04-17 PROCEDURE — 3044F HG A1C LEVEL LT 7.0%: CPT | Mod: CPTII,95,, | Performed by: INTERNAL MEDICINE

## 2024-04-17 NOTE — PROGRESS NOTES
Subjective:       Patient ID: Emily Max is a 49 y.o. female.    Chief Complaint:   Emily Max is a 49 y.o. female presenting via video visit for evaluation/follow up of the following issues.     The patient location is: [place]  The chief complaint leading to consultation is: results, DM2, fatty liver, spleen lesions  Visit type: Virtual visit with synchronous audio and video  Total time spent with patient: 15 minutes  Each patient to whom he or she provides medical services by telemedicine is:  (1) informed of the relationship between the physician and patient and the respective role of any other health care provider with respect to management of the patient; and (2) notified that he or she may decline to receive medical services by telemedicine and may withdraw from such care at any time.    Results review    US: Spleen: Normal in size with a homogeneous echotexture, measuring 11.7 x 3.9 cm. Stable hypoechoic lesions up to measuring 1.0 x 0.9 x 1.4 cm, similar in appearance to prior ultrasound.     (MRI abdomen 11/2023 - lesions not seen on MRI, maybe due to timing of contrast.)    Lab Results   Component Value Date    HGBA1C 5.4 04/17/2024    HGBA1C 6.5 (H) 10/06/2023    HGBA1C 6.5 (H) 10/06/2023     Liver enzymes back to normal!  Cbc, BMP both normal.     Weight down to 220 lbs.   Back on mounjaro 10mg now.     Wt Readings from Last 4 Encounters:   02/28/24 104 kg (229 lb 4.5 oz)   01/30/24 103 kg (227 lb 1.2 oz)   11/21/23 108.6 kg (239 lb 6.7 oz)   10/12/23 112.4 kg (247 lb 11 oz)     Some constipation but able to manage with drinking water and lots of fiber.   Review of Systems   Constitutional:  Negative for activity change and unexpected weight change.   HENT:  Negative for hearing loss, rhinorrhea and trouble swallowing.    Eyes:  Negative for discharge and visual disturbance.   Respiratory:  Negative for chest tightness and wheezing.    Cardiovascular:  Negative for  chest pain and palpitations.   Gastrointestinal:  Negative for blood in stool, constipation, diarrhea and vomiting.   Endocrine: Negative for polydipsia and polyuria.   Genitourinary:  Negative for difficulty urinating, dysuria, hematuria and menstrual problem.   Musculoskeletal:  Positive for arthralgias. Negative for joint swelling and neck pain.   Neurological:  Negative for weakness and headaches.   Psychiatric/Behavioral:  Negative for confusion and dysphoric mood.        Objective:   Physical Exam  Constitutional:       General: The patient is not in acute distress.     Appearance:  Patient is well-developed, not diaphoretic.   HENT:      Head: Atraumatic.   Pulmonary:      Effort: Pulmonary effort is normal. No respiratory distress.   Neurological:      Mental Status: Patient is alert and oriented to person, place, and time.   Psychiatric:         Behavior: Behavior normal.     Assessment:       1. Hemangioma of spleen    2. Controlled type 2 diabetes mellitus without complication, without long-term current use of insulin    3. Fatty liver        Plan:       Hemangioma of spleen  Unchnaged from previous US; benign appearing    Controlled type 2 diabetes mellitus without complication, without long-term current use of insulin  A1c looks great on mounjaro    Fatty liver  Liver enzymes have returned to normal with weight loss

## 2024-06-03 ENCOUNTER — TELEPHONE (OUTPATIENT)
Dept: OPHTHALMOLOGY | Facility: CLINIC | Age: 49
End: 2024-06-03
Payer: COMMERCIAL

## 2024-06-03 NOTE — TELEPHONE ENCOUNTER
----- Message from Anna Garcia MA sent at 6/3/2024 11:17 AM CDT -----  Regarding: FW: appointment access  Contact: [phone]    ----- Message -----  From: Eder Olivares  Sent: 6/3/2024  10:53 AM CDT  To: [facility] Staff  Subject: appointment access                               Pt is calling to schedule appointment please contact pt.

## 2024-06-07 ENCOUNTER — PATIENT MESSAGE (OUTPATIENT)
Dept: INTERNAL MEDICINE | Facility: CLINIC | Age: 49
End: 2024-06-07
Payer: COMMERCIAL

## 2024-06-07 DIAGNOSIS — E11.9 CONTROLLED TYPE 2 DIABETES MELLITUS WITHOUT COMPLICATION, WITHOUT LONG-TERM CURRENT USE OF INSULIN: Primary | ICD-10-CM

## 2024-06-10 ENCOUNTER — PATIENT MESSAGE (OUTPATIENT)
Dept: INTERNAL MEDICINE | Facility: CLINIC | Age: 49
End: 2024-06-10
Payer: COMMERCIAL

## 2024-06-10 NOTE — TELEPHONE ENCOUNTER
No care due was identified.  [facility] Embedded Care Due Messages. Reference number: [id number].   6/10/2024 9:06:05 AM CDT

## 2024-06-10 NOTE — TELEPHONE ENCOUNTER
Patient requesting Mounjaro 12.5mg because usual 10mg is on back order at pharmacy    Med pended for your review

## 2024-06-19 ENCOUNTER — PATIENT MESSAGE (OUTPATIENT)
Dept: ADMINISTRATIVE | Facility: HOSPITAL | Age: 49
End: 2024-06-19
Payer: COMMERCIAL

## 2024-06-19 DIAGNOSIS — E11.9 CONTROLLED TYPE 2 DIABETES MELLITUS WITHOUT COMPLICATION, WITHOUT LONG-TERM CURRENT USE OF INSULIN: ICD-10-CM

## 2024-06-20 NOTE — TELEPHONE ENCOUNTER
No care due was identified.  [facility] Embedded Care Due Messages. Reference number: [id number].   6/19/2024 11:43:37 PM CDT

## 2024-06-25 ENCOUNTER — PATIENT MESSAGE (OUTPATIENT)
Dept: ADMINISTRATIVE | Facility: OTHER | Age: 49
End: 2024-06-25
Payer: COMMERCIAL

## 2024-06-26 DIAGNOSIS — L70.0 ACNE VULGARIS: ICD-10-CM

## 2024-06-26 RX ORDER — CLINDAMYCIN PHOSPHATE 10 UG/ML
LOTION TOPICAL
Qty: 60 ML | Refills: 1 | Status: SHIPPED | OUTPATIENT
Start: 2024-06-26

## 2024-06-26 NOTE — TELEPHONE ENCOUNTER
Please see the attached refill request.    Last seen 03/2024    Assessment / Plan:      Pathology Orders:         Normal Orders This Visit     Specimen to Pathology, Dermatology      Questions:     Procedure Type: Dermatology and skin neoplasms     Number of Specimens: 1     ------------------------: -------------------------     Spec 1 Procedure: Biopsy     Spec 1 Clinical Impression: R/o isk     Spec 1 Source: Right lateral breast     Release to patient:              Neoplasm of uncertain behavior of skin   Shave biopsy procedure note:     Shave biopsy performed after verbal consent including risk of infection, scar, recurrence, need for additional treatment of site. Area prepped with alcohol, anesthetized with approximately 1.0cc of 1% lidocaine with epinephrine. Lesional tissue shaved with razor blade. Hemostasis achieved with application of aluminum chloride followed by hyfrecation.         Areas curetted to remove gross lesion. Hemostasis achieved with aluminum chloride application. No complications.   Areas dressed with Vaseline jelly and bandage. Wound care discussed.           Rosacea  -     azelaic acid (AZELEX) 15 % gel; Compound azelaic acid 15% / niacinamide 2% cream. Apply to affected areas of face nightly and then moisturize after.  Dispense: 30 g; Refill: 5     Morning acne regimen:  ?  1. Wash face with gentle cleanser.  ?  2. Apply a broad-spectrum tinted sunscreen with SPF 30 or higher.     Evening acne regimen:  ?  1. Wash face with gentle cleanser.   ?  2. Apply a thin film of Azelaic acid/niacinamide prescription cream to face.  ?  3. Apply a fragrance-free moisturizer, such as CeraVe moisturizing cream.           Follow up for PRN.

## 2024-07-01 ENCOUNTER — PATIENT MESSAGE (OUTPATIENT)
Dept: INTERNAL MEDICINE | Facility: CLINIC | Age: 49
End: 2024-07-01
Payer: COMMERCIAL

## 2024-07-03 DIAGNOSIS — E11.9 CONTROLLED TYPE 2 DIABETES MELLITUS WITHOUT COMPLICATION, WITHOUT LONG-TERM CURRENT USE OF INSULIN: ICD-10-CM

## 2024-07-03 NOTE — TELEPHONE ENCOUNTER
Refill Routing Note   Medication(s) are not appropriate for processing by [facility] for the following reason(s):        New or recently adjusted medication    [facility] action(s):  Defer   Requires labs : Yes               Appointments  past 12m or future 3m with PCP    Date Provider   Last Visit   4/17/2024 Elif Lew MD   Next Visit   Visit date not found Elif Lew MD   ED visits in past 90 days: 0        Note composed:5:14 PM 07/03/2024

## 2024-07-03 NOTE — TELEPHONE ENCOUNTER
Care Due:                  Date            Visit Type   Department     Provider  --------------------------------------------------------------------------------                                ESTABLISHED                              PATIENT -    [facility] INTERNAL  Last Visit: 04-      [facility]      MEDICINE       JORDEN RAMÍREZ  Next Visit: None Scheduled  None         None Found                                                            Last  Test          Frequency    Reason                     Performed    Due Date  --------------------------------------------------------------------------------    TSH.........  12 months..  levothyroxine............  10-   09-    [facility] Embedded Care Due Messages. Reference number: [id number].   7/03/2024 1:31:05 PM CDT

## 2024-07-05 RX ORDER — TIRZEPATIDE 12.5 MG/.5ML
12.5 INJECTION, SOLUTION SUBCUTANEOUS
Qty: 12 PEN | Refills: 1 | Status: SHIPPED | OUTPATIENT
Start: 2024-07-05

## 2024-07-09 ENCOUNTER — PATIENT OUTREACH (OUTPATIENT)
Dept: ADMINISTRATIVE | Facility: HOSPITAL | Age: 49
End: 2024-07-09
Payer: COMMERCIAL

## 2024-07-09 NOTE — PROGRESS NOTES
Health Maintenance Due   Topic Date Due    Diabetes Urine Screening  Never done    Foot Exam  Never done    TETANUS VACCINE  Never done    Shingles Vaccine (1 of 2) Never done    Low Dose Statin  Never done    Pneumococcal Vaccines (Age 0-64) (2 of 2 - PCV) 03/12/2015    Eye Exam  08/22/2023    COVID-19 Vaccine (4 - 2023-24 season) 09/01/2023    Mammogram  07/21/2024       Chart reviewed and updated. Reconciled immunizations. Scheduled urine lab and eye exam from [facility].      Tasha Cao LPN   Clinical Care Coordinator  Primary Care and Wellness

## 2024-07-12 ENCOUNTER — TELEPHONE (OUTPATIENT)
Dept: OPHTHALMOLOGY | Facility: CLINIC | Age: 49
End: 2024-07-12
Payer: COMMERCIAL

## 2024-07-12 NOTE — TELEPHONE ENCOUNTER
New patient glaucoma evaluation scheduled with Dr. Rasmussen 10/01/2024 at 10:15 am. Patient referred by Dr. Ross, appointment added to wait list.

## 2024-07-28 DIAGNOSIS — Z91.018 FOOD ALLERGY: ICD-10-CM

## 2024-07-29 RX ORDER — EPINEPHRINE 0.3 MG/.3ML
1 INJECTION SUBCUTANEOUS ONCE
Qty: 2 EACH | Refills: 2 | Status: SHIPPED | OUTPATIENT
Start: 2024-07-29 | End: 2024-07-31

## 2024-07-29 NOTE — TELEPHONE ENCOUNTER
Refill Routing Note   Medication(s) are not appropriate for processing by [facility] for the following reason(s):        Outside of protocol    [facility] action(s):  Route               Appointments  past 12m or future 3m with PCP    Date Provider   Last Visit   4/17/2024 Elif Lew MD   Next Visit   Visit date not found Elif Lew MD   ED visits in past 90 days: 0        Note composed:10:19 AM 07/29/2024

## 2024-07-30 NOTE — TELEPHONE ENCOUNTER
Can you schedule a pre-op evaluation either with me or someone else for her surgery 8/24/17. Just getting this message.    No

## 2024-08-15 DIAGNOSIS — F33.1 MODERATE EPISODE OF RECURRENT MAJOR DEPRESSIVE DISORDER: ICD-10-CM

## 2024-08-15 DIAGNOSIS — F41.1 GAD (GENERALIZED ANXIETY DISORDER): ICD-10-CM

## 2024-08-15 RX ORDER — DULOXETIN HYDROCHLORIDE 30 MG/1
30 CAPSULE, DELAYED RELEASE ORAL 2 TIMES DAILY
Qty: 180 CAPSULE | Refills: 1 | Status: SHIPPED | OUTPATIENT
Start: 2024-08-15

## 2024-08-15 NOTE — TELEPHONE ENCOUNTER
Care Due:                  Date            Visit Type   Department     Provider  --------------------------------------------------------------------------------                                ESTABLISHED                              PATIENT -    [facility]  Last Visit: 04-      [facility]      MEDICINE       JORDEN RAMÍREZ  Next Visit: None Scheduled  None         None Found                                                            Last  Test          Frequency    Reason                     Performed    Due Date  --------------------------------------------------------------------------------    HBA1C.......  6 months...  tirzepatide..............  04-   10-    [facility] Embedded Care Due Messages. Reference number: [id number].   8/15/2024 12:42:27 PM CDT

## 2024-08-16 NOTE — TELEPHONE ENCOUNTER
Provider Staff:  Action required for this patient    Requires labs      Please see care gap opportunities below in Care Due Message.    Thanks!  [facility]     Appointments      Date Provider   Last Visit   4/17/2024 Elif Lew MD   Next Visit   Visit date not found Elif eLw MD     Refill Decision Note   Emily Max  is requesting a refill authorization.  Brief Assessment and Rationale for Refill:  Approve     Medication Therapy Plan:        Comments:     Note composed:8:22 PM 08/15/2024

## 2024-09-12 ENCOUNTER — PATIENT MESSAGE (OUTPATIENT)
Dept: INTERNAL MEDICINE | Facility: CLINIC | Age: 49
End: 2024-09-12
Payer: COMMERCIAL

## 2024-09-12 ENCOUNTER — E-VISIT (OUTPATIENT)
Dept: PRIMARY CARE CLINIC | Facility: CLINIC | Age: 49
End: 2024-09-12
Payer: COMMERCIAL

## 2024-09-12 DIAGNOSIS — U07.1 COVID-19: Primary | ICD-10-CM

## 2024-09-12 RX ORDER — PROMETHAZINE HYDROCHLORIDE AND DEXTROMETHORPHAN HYDROBROMIDE 6.25; 15 MG/5ML; MG/5ML
5 SYRUP ORAL EVERY 6 HOURS PRN
Qty: 180 ML | Refills: 1 | Status: SHIPPED | OUTPATIENT
Start: 2024-09-12

## 2024-09-12 NOTE — PROGRESS NOTES
Patient ID: Emily Max is a 49 y.o. female.    Chief Complaint: General Illness (Entered automatically based on patient selection in [employer].)    The patient initiated a request through [employer] on 9/12/2024 for evaluation and management with a chief complaint of General Illness (Entered automatically based on patient selection in [employer].)     I evaluated the questionnaire submission on 9/12/24.    [facility]-Cough And Cold    9/12/2024 11:49 AM CDT - Filed by Patient   What do you need help with? Covid 19   Do you agree to participate in an E-Visit? Yes   If you have any of the following symptoms, go to your local emergency room or call 911: I acknowledge   Are you pregnant, could you be pregnant, or are you breast feeding? None of the above   What is the main issue you would like addressed today? Covid   Do you think you might have COVID or the Flu? Yes COVID   Have you tested positive for COVID or Flu? Yes COVID   What symptoms do you have? Cough;  Fatigue;  Fever;  Headache;  Nasal congestion;  Runny nose   When did your symptoms first appear? 9/8/2024   List what you have done or taken to help your symptoms. Dayquil   Provide any additional information you feel is important. Tested negative on tues. tested positive wed (yesterday) from 2 different home tests   Please attach any relevant images or files    Are you able to take your vital signs? Yes   Systolic Blood Pressure: 118   Diastolic Blood Pressure: 76   Weight: 208   Height: 65   Pulse: 86   Temperature: 99.1   Respiration rate: 18   Pulse Oxygen: 97         Encounter Diagnosis   Name Primary?    COVID-19 Yes        No orders of the defined types were placed in this encounter.     Medications Ordered This Encounter   Medications    nirmatrelvir-ritonavir 300 mg (150 mg x 2)-100 mg copackaged tablets (EUA)     Sig: Take 3 tablets by mouth 2 (two) times daily for 5 days. Each dose contains 2 nirmatrelvir (pink tablets) and 1  ritonavir (white tablet). Take all 3 tablets together     Dispense:  30 tablet     Refill:  0    promethazine-dextromethorphan (PROMETHAZINE-DM) 6.25-15 mg/5 mL Syrp     Sig: Take 5 mLs by mouth every 6 (six) hours as needed (cough).     Dispense:  180 mL     Refill:  1        No follow-ups on file.      E-Visit Time Tracking:    Day 1 Time (in minutes): 7    Total Time (in minutes): 7

## 2024-09-19 ENCOUNTER — PATIENT MESSAGE (OUTPATIENT)
Dept: PRIMARY CARE CLINIC | Facility: CLINIC | Age: 49
End: 2024-09-19
Payer: COMMERCIAL

## 2024-09-28 ENCOUNTER — PATIENT MESSAGE (OUTPATIENT)
Dept: DERMATOLOGY | Facility: CLINIC | Age: 49
End: 2024-09-28
Payer: COMMERCIAL

## 2024-09-30 ENCOUNTER — OFFICE VISIT (OUTPATIENT)
Dept: DERMATOLOGY | Facility: CLINIC | Age: 49
End: 2024-09-30
Payer: COMMERCIAL

## 2024-09-30 DIAGNOSIS — L71.9 ROSACEA: Primary | ICD-10-CM

## 2024-09-30 PROCEDURE — 99214 OFFICE O/P EST MOD 30 MIN: CPT | Mod: 25,S$GLB,, | Performed by: DERMATOLOGY

## 2024-09-30 PROCEDURE — 3044F HG A1C LEVEL LT 7.0%: CPT | Mod: CPTII,S$GLB,, | Performed by: DERMATOLOGY

## 2024-09-30 PROCEDURE — 1159F MED LIST DOCD IN RCRD: CPT | Mod: CPTII,S$GLB,, | Performed by: DERMATOLOGY

## 2024-09-30 PROCEDURE — 99999 PR PBB SHADOW E&M-EST. PATIENT-LVL IV: CPT | Mod: PBBFAC,,, | Performed by: DERMATOLOGY

## 2024-09-30 PROCEDURE — 1160F RVW MEDS BY RX/DR IN RCRD: CPT | Mod: CPTII,S$GLB,, | Performed by: DERMATOLOGY

## 2024-09-30 RX ORDER — SODIUM SULFACETAMIDE AND SULFUR 80; 40 MG/ML; MG/ML
SOLUTION TOPICAL
Qty: 473 ML | Refills: 3 | Status: SHIPPED | OUTPATIENT
Start: 2024-09-30

## 2024-09-30 RX ORDER — DOXYCYCLINE HYCLATE 100 MG
TABLET ORAL
Qty: 30 TABLET | Refills: 2 | Status: SHIPPED | OUTPATIENT
Start: 2024-09-30

## 2024-09-30 NOTE — PROGRESS NOTES
"  Subjective:      Patient ID:  Emily Max is a 49 y.o. female who presents for   Chief Complaint   Patient presents with    Lesion     Nose      Patient with new area of concern:   Location: nose  Present x 3mos  "Oozing"  Previous treatments: hydrocolloid bandaid and rosacea cream and still did not resolve    She feels her rosacea is flaring a lot.  She continues to get new pimples despite other topicals.        Lesion      Review of Systems   Skin:  Positive for activity-related sunscreen use. Negative for daily sunscreen use and recent sunburn.   Hematologic/Lymphatic: Bruises/bleeds easily.       Objective:   Physical Exam   Constitutional: She appears well-developed and well-nourished. No distress.   Neurological: She is alert and oriented to person, place, and time. She is not disoriented.   Psychiatric: She has a normal mood and affect.   Skin:   Areas Examined (abnormalities noted in diagram):   Head / Face Inspection Performed            Diagram Legend     Erythematous scaling macule/papule c/w actinic keratosis       Vascular papule c/w angioma      Pigmented verrucoid papule/plaque c/w seborrheic keratosis      Yellow umbilicated papule c/w sebaceous hyperplasia      Irregularly shaped tan macule c/w lentigo     1-2 mm smooth white papules consistent with Milia      Movable subcutaneous cyst with punctum c/w epidermal inclusion cyst      Subcutaneous movable cyst c/w pilar cyst      Firm pink to brown papule c/w dermatofibroma      Pedunculated fleshy papule(s) c/w skin tag(s)      Evenly pigmented macule c/w junctional nevus     Mildly variegated pigmented, slightly irregular-bordered macule c/w mildly atypical nevus      Flesh colored to evenly pigmented papule c/w intradermal nevus       Pink pearly papule/plaque c/w basal cell carcinoma      Erythematous hyperkeratotic cursted plaque c/w SCC      Surgical scar with no sign of skin cancer recurrence      Open and closed comedones      " Inflammatory papules and pustules      Verrucoid papule consistent consistent with wart     Erythematous eczematous patches and plaques     Dystrophic onycholytic nail with subungual debris c/w onychomycosis     Umbilicated papule    Erythematous-base heme-crusted tan verrucoid plaque consistent with inflamed seborrheic keratosis     Erythematous Silvery Scaling Plaque c/w Psoriasis     See annotation      Assessment / Plan:        Rosacea  -     sulfacetamide sodium-sulfur (SULFACLEANSE 8-4) 8-4 % Susp; Wash face qhs  Dispense: 473 mL; Refill: 3  -     doxycycline (VIBRA-TABS) 100 MG tablet; Sig 1 po qd with food and not within 1 hour of bedtime  Dispense: 30 tablet; Refill: 2    Discussed benefits and risks of doxycyline therapy including but not limited to GI discomfort, esophageal irritation/ulceration, and increased sun sensitivity. Patient was counseled to take medicine with meals and at least 1 hour before lying down.       PM:  Wash with sulfacleanser  Thin film of xilzi all over every other to every night   Moisturize with cerave pm or vanicream daily moisturizer to minimize irritation    AM:  Wash with cerave  2. Spot treat with zilxi  3. Moisturizer with spf 30 +            Follow up in about 2 months (around 11/30/2024), or recheck nose.

## 2024-09-30 NOTE — PATIENT INSTRUCTIONS
Discussed benefits and risks of doxycyline therapy including but not limited to GI discomfort, esophageal irritation/ulceration, and increased sun sensitivity. Patient was counseled to take medicine with meals and at least 1 hour before lying down.       PM:  Wash with sulfacleanser  Thin film of xilzi all over every other to every night   Moisturize with cerave pm or vanicream daily moisturizer to minimize irritation    AM:  Wash with cerave  2. Spot treat with zilxi  3. Moisturizer with spf 30 +

## 2024-10-01 ENCOUNTER — OFFICE VISIT (OUTPATIENT)
Dept: OPHTHALMOLOGY | Facility: CLINIC | Age: 49
End: 2024-10-01
Payer: COMMERCIAL

## 2024-10-01 ENCOUNTER — CLINICAL SUPPORT (OUTPATIENT)
Dept: OPHTHALMOLOGY | Facility: CLINIC | Age: 49
End: 2024-10-01
Payer: COMMERCIAL

## 2024-10-01 DIAGNOSIS — H25.13 AGE-RELATED NUCLEAR CATARACT OF BOTH EYES: ICD-10-CM

## 2024-10-01 DIAGNOSIS — H40.039 ANATOMICAL NARROW ANGLE: Primary | ICD-10-CM

## 2024-10-01 PROCEDURE — 76514 ECHO EXAM OF EYE THICKNESS: CPT | Mod: S$GLB,,, | Performed by: STUDENT IN AN ORGANIZED HEALTH CARE EDUCATION/TRAINING PROGRAM

## 2024-10-01 PROCEDURE — 99204 OFFICE O/P NEW MOD 45 MIN: CPT | Mod: S$GLB,,, | Performed by: STUDENT IN AN ORGANIZED HEALTH CARE EDUCATION/TRAINING PROGRAM

## 2024-10-01 PROCEDURE — 99999 PR PBB SHADOW E&M-EST. PATIENT-LVL III: CPT | Mod: PBBFAC,,, | Performed by: STUDENT IN AN ORGANIZED HEALTH CARE EDUCATION/TRAINING PROGRAM

## 2024-10-01 PROCEDURE — 3044F HG A1C LEVEL LT 7.0%: CPT | Mod: CPTII,S$GLB,, | Performed by: STUDENT IN AN ORGANIZED HEALTH CARE EDUCATION/TRAINING PROGRAM

## 2024-10-01 PROCEDURE — 92133 CPTRZD OPH DX IMG PST SGM ON: CPT | Mod: S$GLB,,, | Performed by: STUDENT IN AN ORGANIZED HEALTH CARE EDUCATION/TRAINING PROGRAM

## 2024-10-01 PROCEDURE — 1159F MED LIST DOCD IN RCRD: CPT | Mod: CPTII,S$GLB,, | Performed by: STUDENT IN AN ORGANIZED HEALTH CARE EDUCATION/TRAINING PROGRAM

## 2024-10-01 PROCEDURE — 92020 GONIOSCOPY: CPT | Mod: S$GLB,,, | Performed by: STUDENT IN AN ORGANIZED HEALTH CARE EDUCATION/TRAINING PROGRAM

## 2024-10-01 PROCEDURE — 92083 EXTENDED VISUAL FIELD XM: CPT | Mod: S$GLB,,, | Performed by: STUDENT IN AN ORGANIZED HEALTH CARE EDUCATION/TRAINING PROGRAM

## 2024-10-01 NOTE — PROGRESS NOTES
"Subjective:  HPI     Glaucoma     Additional comments: Patient Emily Max ([place]) is a 49 year   old female.           Comments    Chief complaint: New patient glaucoma evaluation per Dr. Ross. Pt states   current 3/10 headache above OS this A.M.    Past medical history? (+)Hx of prediabetes  Past ocular history?  1. Anatomical narrow angle  2. Dry eye syndrome of both eyes  3. Hyperopia with astigmatism and presbyopia, bilateral  4. Pre-diabetes  5. Fitting and adjustment of spectacles and contact lenses  6. Wears contact lenses     Glaucoma history:  Diagnosed with glaucoma when? (+)Hx of narrow angle OU per Dr. Ross  Hx eye surgery? None  Hx eye lasers? None  History of low blood pressure? None  History of migraines? (+)Pt has been treated for migraines since in her   20's.  History of blunt trauma to eye? (+)pt of metal embedded into the iris an   her childhood at age 2-3 (pt's father was a ).  History of steroid use? (+)Hx of steroid injections for previous   infection. (+)Hx of oral prednisone pills for respiratory infection.  Family history of glaucoma? (+)mother   What is the highest your eye pressure has been? 15/14    Eye drops? AT's PRN OU for dry eyes          Last edited by Yudelka Rasmussen MD on 10/1/2024  1:20 PM.        Exam:  See encounter report for full exam    Assessment:  1. Anatomical narrow angle  - Referral from: Dr. Ross. Establishing me 10/2024.  - PMH: DM2, fatty liver  - H/o migraines  - Surg hx: none   - Laser hx: none  - Glaucoma FHx: mother  -  / 567  - Gonio: PTM, compresses to SS, no PAS ([place] 10/2024)  - Tmax: 15/14  - Target IOP: <22  - Med adverse effects: n/a    10/01/2024  IOP adequate off drops  HVF: reliable, non-specific, VFI 99 OD  reliable, non-specific, VFI 99 OS -- baseline  OCT RNFL: full, avg 100 OD  full, some ST blunting c/w OD, avg 96 OS -- baseline - watch ST OS    2. Age-related nuclear cataract of both eyes  Mild, NVS, " monitor    Plan:  IOP adequate, no PAS on gonio, not appositional/opens to SS - recommended serial monitoring vs ppx LPI. Patient opts for serial monitoring at this time.  *Watch ST OS OCT  - Observe off drops    Today's visit is associated with current and anticipated ongoing medical care related to this patient's single serious/complex condition (narrow angle/glaucoma suspect). Follow up is to be continued indefinitely to monitor the condition.    Return 6 months -- OCT, VA, IOP, Gonio    Yudelka Rasmussen MD  [facility]

## 2024-10-01 NOTE — PROGRESS NOTES
VISUAL FIELD TEST 24-2 SS-OU-DONE/AB  OU-REL-FIX-COOP-GOOD/AB    JUST ADVISED, PT HAS AN ADHESIVE ALLERGY. USED PIRATE PATCH./AB

## 2024-10-03 ENCOUNTER — PATIENT MESSAGE (OUTPATIENT)
Dept: ADMINISTRATIVE | Facility: HOSPITAL | Age: 49
End: 2024-10-03
Payer: COMMERCIAL

## 2024-10-12 ENCOUNTER — PATIENT MESSAGE (OUTPATIENT)
Dept: DERMATOLOGY | Facility: CLINIC | Age: 49
End: 2024-10-12
Payer: COMMERCIAL

## 2024-10-12 ENCOUNTER — PATIENT MESSAGE (OUTPATIENT)
Dept: INTERNAL MEDICINE | Facility: CLINIC | Age: 49
End: 2024-10-12
Payer: COMMERCIAL

## 2024-10-12 DIAGNOSIS — D73.89 SPLENIC LESION: ICD-10-CM

## 2024-10-12 DIAGNOSIS — K76.0 FATTY LIVER: ICD-10-CM

## 2024-10-12 DIAGNOSIS — Z00.00 ANNUAL PHYSICAL EXAM: Primary | ICD-10-CM

## 2024-10-12 DIAGNOSIS — L71.9 ROSACEA: Primary | ICD-10-CM

## 2024-10-12 DIAGNOSIS — E11.9 CONTROLLED TYPE 2 DIABETES MELLITUS WITHOUT COMPLICATION, WITHOUT LONG-TERM CURRENT USE OF INSULIN: ICD-10-CM

## 2024-10-12 DIAGNOSIS — Z00.00 HEALTH CARE MAINTENANCE: ICD-10-CM

## 2024-10-12 DIAGNOSIS — I10 PRIMARY HYPERTENSION: ICD-10-CM

## 2024-10-13 NOTE — TELEPHONE ENCOUNTER
Pt requesting order for annual labs, pended.    Pt also requesting to scan spleen to see if the area of question from last year has increased in size? As well as a liver scan to see if the fatty liver has improved or worsened?    Pt has annual on 12/3

## 2024-10-14 NOTE — TELEPHONE ENCOUNTER
Fasting labs and Abd ultrasound ordered.   Recommend she speak to her gyn onc doctor for ca125 and hormone levels.   Recommend she follow up with hepatology and they can do another fibroscan to best assess change in fatty liver.

## 2024-10-15 ENCOUNTER — PATIENT MESSAGE (OUTPATIENT)
Dept: HEPATOLOGY | Facility: CLINIC | Age: 49
End: 2024-10-15
Payer: COMMERCIAL

## 2024-10-15 DIAGNOSIS — E66.9 OBESITY (BMI 30-39.9): ICD-10-CM

## 2024-10-15 DIAGNOSIS — I10 PRIMARY HYPERTENSION: ICD-10-CM

## 2024-10-15 DIAGNOSIS — E03.8 OTHER SPECIFIED HYPOTHYROIDISM: ICD-10-CM

## 2024-10-15 DIAGNOSIS — E11.9 CONTROLLED TYPE 2 DIABETES MELLITUS WITHOUT COMPLICATION, WITHOUT LONG-TERM CURRENT USE OF INSULIN: ICD-10-CM

## 2024-10-15 DIAGNOSIS — K76.0 METABOLIC DYSFUNCTION-ASSOCIATED STEATOTIC LIVER DISEASE (MASLD): Primary | ICD-10-CM

## 2024-10-15 RX ORDER — MINOCYCLINE 15 MG/G
1 AEROSOL, FOAM TOPICAL DAILY
Qty: 30 G | Refills: 10 | Status: SHIPPED | OUTPATIENT
Start: 2024-10-15

## 2024-10-17 ENCOUNTER — PATIENT MESSAGE (OUTPATIENT)
Dept: INTERNAL MEDICINE | Facility: CLINIC | Age: 49
End: 2024-10-17
Payer: COMMERCIAL

## 2024-10-17 NOTE — TELEPHONE ENCOUNTER
Care Due:                  Date            Visit Type   Department     Provider  --------------------------------------------------------------------------------                                ESTABLISHED                              PATIENT -    [facility] INTERNAL  Last Visit: 04-      [facility]       JORDEN RAMÍREZ                              [facility]                              ANNUAL                              CHECKUP/PHY  [facility] INTERNAL  Next Visit: 12-      [facility]       JORDEN RAMÍREZ                                                            Last  Test          Frequency    Reason                     Performed    Due Date  --------------------------------------------------------------------------------    HBA1C.......  6 months...  tirzepatide..............  04-   10-    TSH.........  12 months..  levothyroxine............  10-   09-    [facility] Embedded Care Due Messages. Reference number: [id number].   10/17/2024 12:04:35 PM CDT

## 2024-10-18 NOTE — TELEPHONE ENCOUNTER
Refill Routing Note   Medication(s) are not appropriate for processing by [facility] for the following reason(s):        Clarification of medication (Rx) details    [facility] action(s):  Defer     Requires labs : Yes      Medication Therapy Plan: Patient may need staff assistance to satisfy inquiry request from provider.      Appointments  past 12m or future 3m with PCP    Date Provider   Last Visit   4/17/2024 Elif Lew MD   Next Visit   12/3/2024 Elif Lew MD   ED visits in past 90 days: 0        Note composed:3:06 PM 10/18/2024

## 2024-10-18 NOTE — TELEPHONE ENCOUNTER
Refill Routing Note   Medication(s) are not appropriate for processing by [facility] for the following reason(s):        No active prescription written by provider    [facility] action(s):  Defer     Requires labs : Yes      Medication Therapy Plan: Pt stated that she has plateaued and would like to increase her Mounjaro dose to 15 mg.    Medication reconciliation completed: Yes     Appointments  past 12m or future 3m with PCP    Date Provider   Last Visit   4/17/2024 Elif Lew MD   Next Visit   12/3/2024 Elif Lew MD   ED visits in past 90 days: 0        Note composed:5:42 PM 10/18/2024

## 2024-10-18 NOTE — TELEPHONE ENCOUNTER
Call Documentation    Person Called: Patient Call Time: 5:30 pm   Spoke with: Emily 10/18/2024        Reason for call: Provider wanted to know whether patient is losing at least 4 lbs per month?      Patient stated: She has not lost any weight and has been at 202 lbs since August.         Current Medication Requested:   Requested Prescriptions     Pending Prescriptions Disp Refills    tirzepatide 15 mg/0.5 mL PnIj       Sig: Inject 15 mg into the skin every 7 days.      [facility]   Note composed: 10/18/2024 5:38 PM

## 2024-10-21 DIAGNOSIS — T88.7XXA MEDICATION SIDE EFFECT: ICD-10-CM

## 2024-10-21 DIAGNOSIS — F41.1 GAD (GENERALIZED ANXIETY DISORDER): ICD-10-CM

## 2024-10-21 RX ORDER — ATENOLOL 50 MG/1
50 TABLET ORAL 2 TIMES DAILY
Qty: 180 TABLET | Refills: 1 | Status: SHIPPED | OUTPATIENT
Start: 2024-10-21

## 2024-10-21 RX ORDER — TRIAMTERENE AND HYDROCHLOROTHIAZIDE 37.5; 25 MG/1; MG/1
1 CAPSULE ORAL DAILY
Qty: 90 CAPSULE | Refills: 1 | Status: SHIPPED | OUTPATIENT
Start: 2024-10-21

## 2024-10-21 RX ORDER — ALPRAZOLAM 0.5 MG/1
0.5 TABLET ORAL DAILY PRN
Qty: 30 TABLET | Refills: 0 | Status: SHIPPED | OUTPATIENT
Start: 2024-10-21

## 2024-10-21 RX ORDER — ONDANSETRON 4 MG/1
4 TABLET, ORALLY DISINTEGRATING ORAL 2 TIMES DAILY PRN
Qty: 10 TABLET | Refills: 3 | Status: SHIPPED | OUTPATIENT
Start: 2024-10-21

## 2024-10-21 NOTE — TELEPHONE ENCOUNTER
Refill Decision Note   Emily Winter  is requesting a refill authorization.  Brief Assessment and Rationale for Refill:  Approve     Medication Therapy Plan:         Comments:     Note composed:5:40 PM 10/21/2024

## 2024-10-21 NOTE — TELEPHONE ENCOUNTER
No care due was identified.  [facility] Embedded Care Due Messages. Reference number: [id number].   10/21/2024 11:38:46 AM CDT

## 2024-10-21 NOTE — TELEPHONE ENCOUNTER
Refill Routing Note   Medication(s) are not appropriate for processing by [facility] for the following reason(s):        Required labs outdated    [facility] action(s):  Approve  Defer             Appointments  past 12m or future 3m with PCP    Date Provider   Last Visit   4/17/2024 Elif Lew MD   Next Visit   10/21/2024 Elif Lew MD   ED visits in past 90 days: 0        Note composed:4:27 PM 10/21/2024

## 2024-10-21 NOTE — TELEPHONE ENCOUNTER
No care due was identified.  [facility] Embedded Care Due Messages. Reference number: [id number].   10/21/2024 11:42:55 AM CDT

## 2024-10-21 NOTE — TELEPHONE ENCOUNTER
No care due was identified.  Health [facility] Embedded Care Due Messages. Reference number: [id number].   10/21/2024 11:41:47 AM CDT

## 2024-10-22 RX ORDER — LEVOTHYROXINE SODIUM 50 UG/1
50 TABLET ORAL DAILY
Qty: 90 TABLET | Refills: 3 | Status: SHIPPED | OUTPATIENT
Start: 2024-10-22

## 2024-10-29 ENCOUNTER — HOSPITAL ENCOUNTER (OUTPATIENT)
Dept: RADIOLOGY | Facility: HOSPITAL | Age: 49
Discharge: HOME | End: 2024-10-29
Attending: INTERNAL MEDICINE
Payer: COMMERCIAL

## 2024-10-29 DIAGNOSIS — K76.0 FATTY LIVER: ICD-10-CM

## 2024-10-29 DIAGNOSIS — D73.89 SPLENIC LESION: ICD-10-CM

## 2024-10-29 PROCEDURE — 76705 ECHO EXAM OF ABDOMEN: CPT | Mod: 26,,, | Performed by: RADIOLOGY

## 2024-10-29 PROCEDURE — 76705 ECHO EXAM OF ABDOMEN: CPT | Mod: TC

## 2024-11-06 ENCOUNTER — OFFICE VISIT (OUTPATIENT)
Dept: URGENT CARE | Facility: CLINIC | Age: 49
End: 2024-11-06
Payer: COMMERCIAL

## 2024-11-06 VITALS
OXYGEN SATURATION: 97 % | SYSTOLIC BLOOD PRESSURE: 119 MMHG | HEIGHT: 64 IN | HEART RATE: 102 BPM | DIASTOLIC BLOOD PRESSURE: 81 MMHG | RESPIRATION RATE: 18 BRPM | WEIGHT: 202 LBS | BODY MASS INDEX: 34.49 KG/M2 | TEMPERATURE: 99 F

## 2024-11-06 DIAGNOSIS — R05.9 COUGH, UNSPECIFIED TYPE: Primary | ICD-10-CM

## 2024-11-06 DIAGNOSIS — J02.9 SORE THROAT: ICD-10-CM

## 2024-11-06 DIAGNOSIS — R09.82 POSTNASAL DRIP: ICD-10-CM

## 2024-11-06 LAB
CTP QC/QA: YES
MOLECULAR STREP A: NEGATIVE
POC MOLECULAR INFLUENZA A AGN: NEGATIVE
POC MOLECULAR INFLUENZA B AGN: NEGATIVE
SARS-COV-2 AG RESP QL IA.RAPID: NEGATIVE

## 2024-11-06 PROCEDURE — 87811 SARS-COV-2 COVID19 W/OPTIC: CPT | Mod: QW,S$GLB,, | Performed by: FAMILY MEDICINE

## 2024-11-06 PROCEDURE — 99213 OFFICE O/P EST LOW 20 MIN: CPT | Mod: S$GLB,,, | Performed by: FAMILY MEDICINE

## 2024-11-06 PROCEDURE — 87502 INFLUENZA DNA AMP PROBE: CPT | Mod: QW,S$GLB,, | Performed by: FAMILY MEDICINE

## 2024-11-06 PROCEDURE — 87651 STREP A DNA AMP PROBE: CPT | Mod: QW,S$GLB,, | Performed by: FAMILY MEDICINE

## 2024-11-06 RX ORDER — BENZONATATE 200 MG/1
200 CAPSULE ORAL 3 TIMES DAILY PRN
Qty: 30 CAPSULE | Refills: 0 | Status: SHIPPED | OUTPATIENT
Start: 2024-11-06 | End: 2024-11-16

## 2024-11-06 RX ORDER — IPRATROPIUM BROMIDE 21 UG/1
2 SPRAY, METERED NASAL 2 TIMES DAILY PRN
Qty: 30 ML | Refills: 0 | Status: SHIPPED | OUTPATIENT
Start: 2024-11-06

## 2024-11-06 NOTE — PROGRESS NOTES
Subjective:      Patient ID: Emily Max is a 49 y.o. female.    Vitals:  vitals were not taken for this visit.     Chief Complaint: Cough    This is a 49 y.o. female who presents today with a chief complaint of cough onset 5 days. Cough is productive. Pt also complaining of sore throat, low grade fever (), nasal congestion. Pt notes she had a nose bleed 2 days ago. Pt took dayquil and nyquil which did not help.    Cough  This is a new problem. The current episode started in the past 7 days. The problem has been unchanged. The problem occurs every few minutes. The cough is Productive of sputum. Associated symptoms include chills, headaches, nasal congestion, postnasal drip, a sore throat and sweats. The symptoms are aggravated by other (talking). The treatment provided mild relief. Her past medical history is significant for environmental allergies. There is no history of asthma, bronchitis or pneumonia.     Constitution: Positive for chills.   HENT:  Positive for postnasal drip and sore throat.    Respiratory:  Positive for cough.    Allergic/Immunologic: Positive for environmental allergies.   Neurological:  Positive for headaches.    Objective:     Physical Exam   Constitutional: She is oriented to person, place, and time. No distress. normal  HENT:   Head: Normocephalic and atraumatic.   Ears:   Right Ear: Tympanic membrane, external ear and ear canal normal.   Left Ear: Tympanic membrane, external ear and ear canal normal.   Nose: Rhinorrhea and congestion present.   Mouth/Throat: Mucous membranes are moist. Cobblestoning present. Oropharynx is clear.   Eyes: Conjunctivae are normal. Pupils are equal, round, and reactive to light. Extraocular movement intact   Neck: Neck supple. No neck rigidity present.   Cardiovascular: Normal rate, regular rhythm, normal heart sounds and normal pulses.   No murmur heard.  Pulmonary/Chest: Effort normal and breath sounds normal. No respiratory distress.  She has no wheezes.   Abdominal: Normal appearance and bowel sounds are normal. Soft. flat abdomen   Musculoskeletal: Normal range of motion.         General: Normal range of motion.   Neurological: no focal deficit. She is alert, oriented to person, place, and time and at baseline.   Skin: Skin is warm and dry. Capillary refill takes less than 2 seconds. jaundice  Psychiatric: Her behavior is normal. Mood, judgment and thought content normal.   Nursing note and vitals reviewed.    Assessment:     Plan:   1. Cough, unspecified type  - SARS Coronavirus 2 Antigen, POCT Manual Read  - benzonatate (TESSALON) 200 MG capsule; Take 1 capsule (200 mg total) by mouth 3 (three) times daily as needed.  Dispense: 30 capsule; Refill: 0    2. Sore throat  - POCT Influenza A/B MOLECULAR  - POCT Strep A, Molecular    3. Postnasal drip  - ipratropium (ATROVENT) 21 mcg (0.03 %) nasal spray; 2 sprays by Each Nostril route 2 (two) times daily as needed.  Dispense: 30 mL; Refill: 0   All results discussed with pt prior to discharge

## 2024-12-02 ENCOUNTER — LAB VISIT (OUTPATIENT)
Dept: LAB | Facility: HOSPITAL | Age: 49
End: 2024-12-02
Attending: INTERNAL MEDICINE
Payer: COMMERCIAL

## 2024-12-02 ENCOUNTER — PATIENT OUTREACH (OUTPATIENT)
Dept: ADMINISTRATIVE | Facility: HOSPITAL | Age: 49
End: 2024-12-02
Payer: COMMERCIAL

## 2024-12-02 VITALS — SYSTOLIC BLOOD PRESSURE: 119 MMHG | DIASTOLIC BLOOD PRESSURE: 81 MMHG

## 2024-12-02 DIAGNOSIS — I10 PRIMARY HYPERTENSION: ICD-10-CM

## 2024-12-02 DIAGNOSIS — Z00.00 ANNUAL PHYSICAL EXAM: ICD-10-CM

## 2024-12-02 DIAGNOSIS — E11.9 CONTROLLED TYPE 2 DIABETES MELLITUS WITHOUT COMPLICATION, WITHOUT LONG-TERM CURRENT USE OF INSULIN: ICD-10-CM

## 2024-12-02 LAB
25(OH)D3+25(OH)D2 SERPL-MCNC: 83 NG/ML (ref 30–96)
ALBUMIN SERPL BCP-MCNC: 3.8 G/DL (ref 3.5–5.2)
ALP SERPL-CCNC: 67 U/L (ref 40–150)
ALT SERPL W/O P-5'-P-CCNC: 18 U/L (ref 10–44)
ANION GAP SERPL CALC-SCNC: 11 MMOL/L (ref 8–16)
AST SERPL-CCNC: 23 U/L (ref 10–40)
BASOPHILS # BLD AUTO: 0.06 K/UL (ref 0–0.2)
BASOPHILS NFR BLD: 0.9 % (ref 0–1.9)
BILIRUB SERPL-MCNC: 0.4 MG/DL (ref 0.1–1)
BUN SERPL-MCNC: 17 MG/DL (ref 6–20)
CALCIUM SERPL-MCNC: 9.9 MG/DL (ref 8.7–10.5)
CHLORIDE SERPL-SCNC: 102 MMOL/L (ref 95–110)
CHOLEST SERPL-MCNC: 205 MG/DL (ref 120–199)
CHOLEST/HDLC SERPL: 2.8 {RATIO} (ref 2–5)
CO2 SERPL-SCNC: 27 MMOL/L (ref 23–29)
CREAT SERPL-MCNC: 1.1 MG/DL (ref 0.5–1.4)
DIFFERENTIAL METHOD BLD: NORMAL
EOSINOPHIL # BLD AUTO: 0.4 K/UL (ref 0–0.5)
EOSINOPHIL NFR BLD: 5.9 % (ref 0–8)
ERYTHROCYTE [DISTWIDTH] IN BLOOD BY AUTOMATED COUNT: 13.2 % (ref 11.5–14.5)
EST. GFR  (NO RACE VARIABLE): >60 ML/MIN/1.73 M^2
ESTIMATED AVG GLUCOSE: 103 MG/DL (ref 68–131)
GLUCOSE SERPL-MCNC: 95 MG/DL (ref 70–110)
HBA1C MFR BLD: 5.2 % (ref 4–5.6)
HCT VFR BLD AUTO: 42.7 % (ref 37–48.5)
HDLC SERPL-MCNC: 74 MG/DL (ref 40–75)
HDLC SERPL: 36.1 % (ref 20–50)
HGB BLD-MCNC: 14.3 G/DL (ref 12–16)
IMM GRANULOCYTES # BLD AUTO: 0.03 K/UL (ref 0–0.04)
IMM GRANULOCYTES NFR BLD AUTO: 0.4 % (ref 0–0.5)
LDLC SERPL CALC-MCNC: 110.8 MG/DL (ref 63–159)
LYMPHOCYTES # BLD AUTO: 2.5 K/UL (ref 1–4.8)
LYMPHOCYTES NFR BLD: 36.7 % (ref 18–48)
MCH RBC QN AUTO: 30.4 PG (ref 27–31)
MCHC RBC AUTO-ENTMCNC: 33.5 G/DL (ref 32–36)
MCV RBC AUTO: 91 FL (ref 82–98)
MONOCYTES # BLD AUTO: 0.9 K/UL (ref 0.3–1)
MONOCYTES NFR BLD: 12.3 % (ref 4–15)
NEUTROPHILS # BLD AUTO: 3 K/UL (ref 1.8–7.7)
NEUTROPHILS NFR BLD: 43.8 % (ref 38–73)
NONHDLC SERPL-MCNC: 131 MG/DL
NRBC BLD-RTO: 0 /100 WBC
PLATELET # BLD AUTO: 253 K/UL (ref 150–450)
PMV BLD AUTO: 10 FL (ref 9.2–12.9)
POTASSIUM SERPL-SCNC: 4.3 MMOL/L (ref 3.5–5.1)
PROT SERPL-MCNC: 7.8 G/DL (ref 6–8.4)
RBC # BLD AUTO: 4.7 M/UL (ref 4–5.4)
SODIUM SERPL-SCNC: 140 MMOL/L (ref 136–145)
TRIGL SERPL-MCNC: 101 MG/DL (ref 30–150)
TSH SERPL DL<=0.005 MIU/L-ACNC: 3.6 UIU/ML (ref 0.4–4)
WBC # BLD AUTO: 6.9 K/UL (ref 3.9–12.7)

## 2024-12-02 PROCEDURE — 85025 COMPLETE CBC W/AUTO DIFF WBC: CPT | Performed by: INTERNAL MEDICINE

## 2024-12-02 PROCEDURE — 83036 HEMOGLOBIN GLYCOSYLATED A1C: CPT | Performed by: INTERNAL MEDICINE

## 2024-12-02 PROCEDURE — 36415 COLL VENOUS BLD VENIPUNCTURE: CPT | Performed by: INTERNAL MEDICINE

## 2024-12-02 PROCEDURE — 82306 VITAMIN D 25 HYDROXY: CPT | Performed by: INTERNAL MEDICINE

## 2024-12-02 PROCEDURE — 80061 LIPID PANEL: CPT | Performed by: INTERNAL MEDICINE

## 2024-12-02 PROCEDURE — 80053 COMPREHEN METABOLIC PANEL: CPT | Performed by: INTERNAL MEDICINE

## 2024-12-02 PROCEDURE — 84443 ASSAY THYROID STIM HORMONE: CPT | Performed by: INTERNAL MEDICINE

## 2024-12-03 ENCOUNTER — OFFICE VISIT (OUTPATIENT)
Dept: INTERNAL MEDICINE | Facility: CLINIC | Age: 49
End: 2024-12-03
Payer: COMMERCIAL

## 2024-12-03 VITALS
HEIGHT: 64 IN | WEIGHT: 200.75 LBS | HEART RATE: 100 BPM | SYSTOLIC BLOOD PRESSURE: 120 MMHG | BODY MASS INDEX: 34.27 KG/M2 | OXYGEN SATURATION: 95 % | DIASTOLIC BLOOD PRESSURE: 80 MMHG

## 2024-12-03 DIAGNOSIS — Z00.00 HEALTH CARE MAINTENANCE: Primary | ICD-10-CM

## 2024-12-03 DIAGNOSIS — E11.9 CONTROLLED TYPE 2 DIABETES MELLITUS WITHOUT COMPLICATION, WITHOUT LONG-TERM CURRENT USE OF INSULIN: ICD-10-CM

## 2024-12-03 DIAGNOSIS — E03.8 OTHER SPECIFIED HYPOTHYROIDISM: ICD-10-CM

## 2024-12-03 DIAGNOSIS — Z23 NEED FOR VACCINATION: ICD-10-CM

## 2024-12-03 PROBLEM — C53.1 MALIGNANT NEOPLASM OF EXOCERVIX: Status: RESOLVED | Noted: 2019-10-10 | Resolved: 2024-12-03

## 2024-12-03 LAB
ALBUMIN/CREAT UR: 3.1 UG/MG (ref 0–30)
CREAT UR-MCNC: 226 MG/DL (ref 15–325)
MICROALBUMIN UR DL<=1MG/L-MCNC: 7 UG/ML

## 2024-12-03 PROCEDURE — 82570 ASSAY OF URINE CREATININE: CPT | Performed by: INTERNAL MEDICINE

## 2024-12-03 PROCEDURE — 3074F SYST BP LT 130 MM HG: CPT | Mod: CPTII,S$GLB,, | Performed by: INTERNAL MEDICINE

## 2024-12-03 PROCEDURE — 1160F RVW MEDS BY RX/DR IN RCRD: CPT | Mod: CPTII,S$GLB,, | Performed by: INTERNAL MEDICINE

## 2024-12-03 PROCEDURE — 90471 IMMUNIZATION ADMIN: CPT | Mod: S$GLB,,, | Performed by: INTERNAL MEDICINE

## 2024-12-03 PROCEDURE — 3079F DIAST BP 80-89 MM HG: CPT | Mod: CPTII,S$GLB,, | Performed by: INTERNAL MEDICINE

## 2024-12-03 PROCEDURE — 1159F MED LIST DOCD IN RCRD: CPT | Mod: CPTII,S$GLB,, | Performed by: INTERNAL MEDICINE

## 2024-12-03 PROCEDURE — 3044F HG A1C LEVEL LT 7.0%: CPT | Mod: CPTII,S$GLB,, | Performed by: INTERNAL MEDICINE

## 2024-12-03 PROCEDURE — 99999 PR PBB SHADOW E&M-EST. PATIENT-LVL III: CPT | Mod: PBBFAC,,, | Performed by: INTERNAL MEDICINE

## 2024-12-03 PROCEDURE — 90656 IIV3 VACC NO PRSV 0.5 ML IM: CPT | Mod: S$GLB,,, | Performed by: INTERNAL MEDICINE

## 2024-12-03 PROCEDURE — 99396 PREV VISIT EST AGE 40-64: CPT | Mod: 25,S$GLB,, | Performed by: INTERNAL MEDICINE

## 2024-12-03 PROCEDURE — 3008F BODY MASS INDEX DOCD: CPT | Mod: CPTII,S$GLB,, | Performed by: INTERNAL MEDICINE

## 2024-12-03 NOTE — PROGRESS NOTES
"Subjective:       Patient ID: Emily Max is a 49 y.o. female.    Chief Complaint: Annual Exam (Liver concerns /Hair loss /)    HPI  Emily Max is a 49 y.o. female here for a yearly preventative healthcare visit.     Noticing hair thinning and loss. Possibly related to stress.   HIDA scan scheduled.     Lab Results   Component Value Date    HGBA1C 5.2 12/02/2024    HGBA1C 5.4 04/17/2024    HGBA1C 6.5 (H) 10/06/2023    HGBA1C 6.5 (H) 10/06/2023       Wt Readings from Last 4 Encounters:   12/03/24 91 kg (200 lb 11.7 oz)   11/06/24 91.6 kg (202 lb)   02/28/24 104 kg (229 lb 4.5 oz)   01/30/24 103 kg (227 lb 1.2 oz)     Gets nauseated day 3 after shot.   Review of Systems   Constitutional:  Negative for fever.   Respiratory:  Negative for shortness of breath.    Cardiovascular:  Negative for chest pain.   Musculoskeletal: Negative.    Skin: Negative.        Objective:   /80 (BP Location: Left arm, Patient Position: Sitting)   Pulse 100   Ht 5' 4" (1.626 m)   Wt 91 kg (200 lb 11.7 oz)   SpO2 95%   BMI 34.46 kg/m²      Physical Exam  Constitutional:       General: She is not in acute distress.     Appearance: She is well-developed. She is not diaphoretic.   HENT:      Head: Normocephalic and atraumatic.   Cardiovascular:      Rate and Rhythm: Normal rate and regular rhythm.   Pulmonary:      Effort: Pulmonary effort is normal. No respiratory distress.      Breath sounds: No wheezing or rales.   Skin:     General: Skin is warm and dry.   Neurological:      Mental Status: She is alert and oriented to person, place, and time.   Psychiatric:         Behavior: Behavior normal.         Protective Sensation (w/ 10 gram monofilament):  Right: Intact  Left: Intact    Visual Inspection:  Normal -  Bilateral    Pedal Pulses:   Right: Present  Left: Present    Posterior tibialis:   Right:Present  Left: Present    Assessment:       1. Health care maintenance    2. Controlled type 2 diabetes " mellitus without complication, without long-term current use of insulin    3. Other specified hypothyroidism        Plan:       Health care maintenance  Labs yearly    Controlled type 2 diabetes mellitus without complication, without long-term current use of insulin  -     Microalbumin/Creatinine Ratio, Urine    Other specified hypothyroidism  Continue levotuyroxine        Health Maintenance Due   Topic    Diabetes Urine Screening     Mammogram       Tetanus done in 2018 - will look for records and send to us  Will do flu vaccine today and pt will send to [facility]  Will do shingrx at pharmacy

## 2025-01-04 DIAGNOSIS — F41.1 GAD (GENERALIZED ANXIETY DISORDER): ICD-10-CM

## 2025-01-04 DIAGNOSIS — F33.1 MODERATE EPISODE OF RECURRENT MAJOR DEPRESSIVE DISORDER: ICD-10-CM

## 2025-01-04 NOTE — TELEPHONE ENCOUNTER
No care due was identified.  [facility] Embedded Care Due Messages. Reference number: [id number].   1/04/2025 8:19:45 AM CST

## 2025-01-05 RX ORDER — DULOXETIN HYDROCHLORIDE 30 MG/1
30 CAPSULE, DELAYED RELEASE ORAL 2 TIMES DAILY
Qty: 180 CAPSULE | Refills: 3 | Status: SHIPPED | OUTPATIENT
Start: 2025-01-05

## 2025-01-05 NOTE — TELEPHONE ENCOUNTER
Refill Decision Note   Emily Winter  is requesting a refill authorization.  Brief Assessment and Rationale for Refill:  Approve     Medication Therapy Plan:        Comments:     Note composed:5:15 PM 01/05/2025

## 2025-01-21 ENCOUNTER — PATIENT MESSAGE (OUTPATIENT)
Dept: RADIOLOGY | Facility: HOSPITAL | Age: 50
End: 2025-01-21
Payer: COMMERCIAL

## 2025-01-28 ENCOUNTER — TELEPHONE (OUTPATIENT)
Dept: GYNECOLOGIC ONCOLOGY | Facility: CLINIC | Age: 50
End: 2025-01-28
Payer: COMMERCIAL

## 2025-02-05 ENCOUNTER — PATIENT MESSAGE (OUTPATIENT)
Dept: HEPATOLOGY | Facility: CLINIC | Age: 50
End: 2025-02-05
Payer: COMMERCIAL

## 2025-02-10 PROBLEM — Z82.49 FAMILY HISTORY OF CARDIOVASCULAR DISEASE: Status: ACTIVE | Noted: 2025-02-10

## 2025-02-13 ENCOUNTER — OFFICE VISIT (OUTPATIENT)
Dept: DERMATOLOGY | Facility: CLINIC | Age: 50
End: 2025-02-13
Payer: COMMERCIAL

## 2025-02-13 DIAGNOSIS — L70.0 ACNE VULGARIS: ICD-10-CM

## 2025-02-13 DIAGNOSIS — L71.9 ROSACEA: Primary | ICD-10-CM

## 2025-02-13 PROCEDURE — 99999 PR PBB SHADOW E&M-EST. PATIENT-LVL IV: CPT | Mod: PBBFAC,,, | Performed by: DERMATOLOGY

## 2025-02-13 PROCEDURE — 1159F MED LIST DOCD IN RCRD: CPT | Mod: CPTII,S$GLB,, | Performed by: DERMATOLOGY

## 2025-02-13 PROCEDURE — 1160F RVW MEDS BY RX/DR IN RCRD: CPT | Mod: CPTII,S$GLB,, | Performed by: DERMATOLOGY

## 2025-02-13 PROCEDURE — 99214 OFFICE O/P EST MOD 30 MIN: CPT | Mod: S$GLB,,, | Performed by: DERMATOLOGY

## 2025-02-13 NOTE — PROGRESS NOTES
Subjective:      Patient ID:  Emily Max is a 49 y.o. female who presents for   Chief Complaint   Patient presents with    Lesion     nose    Rosacea     face     Pt present today for f/u nose lesion and rosacea.    Pt used xilzi and doxycycline. Pt is no longer using sulfa cleanse due to irritation   Does not use regularly . Uses azaleic acid as spot tx and uses xilxi maybe 3x per week   Does not use spf bc causes a rash     Lesion - Initial  Affected locations: nose  Duration: months.  Signs / symptoms: asymptomatic  Aggravated by: nothing  Relieving factors/Treatments tried: nothing    Rosacea      Review of Systems    Objective:   Physical Exam   Skin:   Areas Examined (abnormalities noted in diagram):   Head / Face Inspection Performed            Diagram Legend     Erythematous scaling macule/papule c/w actinic keratosis       Vascular papule c/w angioma      Pigmented verrucoid papule/plaque c/w seborrheic keratosis      Yellow umbilicated papule c/w sebaceous hyperplasia      Irregularly shaped tan macule c/w lentigo     1-2 mm smooth white papules consistent with Milia      Movable subcutaneous cyst with punctum c/w epidermal inclusion cyst      Subcutaneous movable cyst c/w pilar cyst      Firm pink to brown papule c/w dermatofibroma      Pedunculated fleshy papule(s) c/w skin tag(s)      Evenly pigmented macule c/w junctional nevus     Mildly variegated pigmented, slightly irregular-bordered macule c/w mildly atypical nevus      Flesh colored to evenly pigmented papule c/w intradermal nevus       Pink pearly papule/plaque c/w basal cell carcinoma      Erythematous hyperkeratotic cursted plaque c/w SCC      Surgical scar with no sign of skin cancer recurrence      Open and closed comedones      Inflammatory papules and pustules      Verrucoid papule consistent consistent with wart     Erythematous eczematous patches and plaques     Dystrophic onycholytic nail with subungual debris c/w  onychomycosis     Umbilicated papule    Erythematous-base heme-crusted tan verrucoid plaque consistent with inflamed seborrheic keratosis     Erythematous Silvery Scaling Plaque c/w Psoriasis     See annotation      Assessment / Plan:        Rosacea  Acne vulgaris  PM:  Wash with wash with cerave  Thin film of azaleic acid  all over  then spot tx with zilxi  Moisturize with cerave pm or vanicream daily moisturizer to minimize irritation    AM:  Wash with mild cleanser  2. Spot treat with zilxi if needed   3. Moisturizer with spf 30 + - many diff samples provided             Follow up in about 1 year (around 2/13/2026).

## 2025-02-13 NOTE — PATIENT INSTRUCTIONS
PM:  Wash with wash with cerave  Thin film of azaleic acid  all over  then spot tx with zilxi  Moisturize with cerave pm or vanicream daily moisturizer to minimize irritation    AM:  Wash with mild cleanser  2. Spot treat with zilxi if needed   3. Moisturizer with spf 30 +

## 2025-02-19 NOTE — TELEPHONE ENCOUNTER
Returned call to patient. Rescheduled appointments per patient requests. Mailed appointments reminder to patient.

## 2025-02-20 ENCOUNTER — HOSPITAL ENCOUNTER (OUTPATIENT)
Dept: CARDIOLOGY | Facility: CLINIC | Age: 50
Discharge: HOME | End: 2025-02-20
Payer: COMMERCIAL

## 2025-02-20 ENCOUNTER — HOSPITAL ENCOUNTER (OUTPATIENT)
Dept: CARDIOLOGY | Facility: HOSPITAL | Age: 50
Discharge: HOME | End: 2025-02-20
Attending: INTERNAL MEDICINE
Payer: COMMERCIAL

## 2025-02-20 VITALS — BODY MASS INDEX: 32.44 KG/M2 | WEIGHT: 190 LBS | HEIGHT: 64 IN

## 2025-02-20 DIAGNOSIS — Z13.6 ENCOUNTER FOR SCREENING FOR CARDIOVASCULAR DISORDERS: ICD-10-CM

## 2025-02-20 DIAGNOSIS — I87.2 VENOUS INSUFFICIENCY OF BOTH LOWER EXTREMITIES: ICD-10-CM

## 2025-02-20 DIAGNOSIS — Z82.49 FAMILY HISTORY OF CARDIOVASCULAR DISEASE: ICD-10-CM

## 2025-02-20 DIAGNOSIS — I10 PRIMARY HYPERTENSION: ICD-10-CM

## 2025-02-20 LAB
ASCENDING AORTA: 2.52 CM
AV LVOT MEAN GRADIENT: 3 MMHG
AV LVOT PEAK GRADIENT: 5 MMHG
BSA FOR ECHO PROCEDURE: 1.97 M2
CV ECHO LV RWT: 0.33 CM
CV STRESS BASE HR: 85 BPM
DIASTOLIC BLOOD PRESSURE: 75 MMHG
DOP CALC LVOT AREA: 2.8 CM2
DOP CALC LVOT DIAMETER: 1.9 CM
DOP CALC LVOT PEAK VEL: 1.1 M/S
DOP CALC LVOT STROKE VOLUME: 57.2 CM3
DOP CALC RVOT AREA: 2.49 CM2
DOP CALC RVOT DIAMETER: 1.78 CM
DOP CALCLVOT PEAK VEL VTI: 20.2 CM
E WAVE DECELERATION TIME: 193 MS
E/A RATIO: 0.8
E/E' RATIO: 6 M/S
ECHO EF ESTIMATED: 62 %
ECHO LV POSTERIOR WALL: 0.7 CM (ref 0.6–1.1)
FRACTIONAL SHORTENING: 42.9 % (ref 28–44)
INTERVENTRICULAR SEPTUM: 0.7 CM (ref 0.6–1.1)
IVC DIAMETER: 1.55 CM
IVRT: 100 MS
LA MAJOR: 4.6 CM
LA MINOR: 4.6 CM
LA WIDTH: 3 CM
LEFT ATRIUM SIZE: 3 CM
LEFT ATRIUM VOLUME INDEX MOD: 16 ML/M2
LEFT ATRIUM VOLUME INDEX: 18 ML/M2
LEFT ATRIUM VOLUME MOD: 31 ML
LEFT ATRIUM VOLUME: 35 CM3
LEFT INTERNAL DIMENSION IN SYSTOLE: 2.4 CM (ref 2.1–4)
LEFT VENTRICLE DIASTOLIC VOLUME INDEX: 27.63 ML/M2
LEFT VENTRICLE DIASTOLIC VOLUME: 52.77 ML
LEFT VENTRICLE MASS INDEX: 44.5 G/M2
LEFT VENTRICLE SYSTOLIC VOLUME INDEX: 10.6 ML/M2
LEFT VENTRICLE SYSTOLIC VOLUME: 20.26 ML
LEFT VENTRICULAR INTERNAL DIMENSION IN DIASTOLE: 4.2 CM (ref 3.5–6)
LEFT VENTRICULAR MASS: 85.1 G
LV LATERAL E/E' RATIO: 5.1
LV SEPTAL E/E' RATIO: 7
MV PEAK A VEL: 0.7 M/S
MV PEAK E VEL: 0.56 M/S
OHS CV CPX 1 MINUTE RECOVERY HEART RATE: 102 BPM
OHS CV CPX 85 PERCENT MAX PREDICTED HEART RATE MALE: 143
OHS CV CPX ESTIMATED METS: 8
OHS CV CPX MAX PREDICTED HEART RATE: 168
OHS CV CPX PATIENT IS FEMALE: 1
OHS CV CPX PATIENT IS MALE: 0
OHS CV CPX PEAK DIASTOLIC BLOOD PRESSURE: 75 MMHG
OHS CV CPX PEAK HEAR RATE: 143 BPM
OHS CV CPX PEAK RATE PRESSURE PRODUCT: NORMAL
OHS CV CPX PEAK SYSTOLIC BLOOD PRESSURE: 128 MMHG
OHS CV CPX PERCENT MAX PREDICTED HEART RATE ACHIEVED: 89
OHS CV CPX RATE PRESSURE PRODUCT PRESENTING: 9350
OHS CV RV/LV RATIO: 0.6 CM
PISA TR MAX VEL: 1.8 M/S
RA MAJOR: 4.56 CM
RA PRESSURE ESTIMATED: 3 MMHG
RA WIDTH: 2.84 CM
RIGHT ATRIAL AREA: 11 CM2
RIGHT VENTRICLE DIASTOLIC BASEL DIMENSION: 2.5 CM
RV TB RVSP: 5 MMHG
RV TISSUE DOPPLER FREE WALL SYSTOLIC VELOCITY 1 (APICAL 4 CHAMBER VIEW): 17.28 CM/S
SINUS: 2.89 CM
STJ: 2.55 CM
STRESS ECHO POST EXERCISE DUR MIN: 5 MINUTES
STRESS ECHO POST EXERCISE DUR SEC: 9 SECONDS
SYSTOLIC BLOOD PRESSURE: 110 MMHG
TDI LATERAL: 0.11 M/S
TDI SEPTAL: 0.08 M/S
TDI: 0.1 M/S
TR MAX PG: 13 MMHG
TRICUSPID ANNULAR PLANE SYSTOLIC EXCURSION: 1.64 CM
TV PEAK GRADIENT: 13 MMHG
TV REST PULMONARY ARTERY PRESSURE: 16 MMHG
Z-SCORE OF LEFT VENTRICULAR DIMENSION IN END DIASTOLE: -2.47
Z-SCORE OF LEFT VENTRICULAR DIMENSION IN END SYSTOLE: -2.54

## 2025-02-20 PROCEDURE — 93351 STRESS TTE COMPLETE: CPT

## 2025-02-21 LAB
OHS QRS DURATION: 86 MS
OHS QTC CALCULATION: 451 MS

## 2025-02-24 ENCOUNTER — HOSPITAL ENCOUNTER (OUTPATIENT)
Dept: RADIOLOGY | Facility: HOSPITAL | Age: 50
Discharge: HOME | End: 2025-02-24
Attending: INTERNAL MEDICINE
Payer: COMMERCIAL

## 2025-02-24 DIAGNOSIS — Z13.6 ENCOUNTER FOR SCREENING FOR CARDIOVASCULAR DISORDERS: ICD-10-CM

## 2025-02-24 DIAGNOSIS — I10 PRIMARY HYPERTENSION: ICD-10-CM

## 2025-02-24 DIAGNOSIS — Z82.49 FAMILY HISTORY OF CARDIOVASCULAR DISEASE: ICD-10-CM

## 2025-02-24 DIAGNOSIS — I87.2 VENOUS INSUFFICIENCY OF BOTH LOWER EXTREMITIES: ICD-10-CM

## 2025-02-24 PROCEDURE — 75571 CT HRT W/O DYE W/CA TEST: CPT | Mod: 26,,, | Performed by: RADIOLOGY

## 2025-02-24 PROCEDURE — 75571 CT HRT W/O DYE W/CA TEST: CPT | Mod: TC

## 2025-03-15 ENCOUNTER — PATIENT MESSAGE (OUTPATIENT)
Dept: INTERNAL MEDICINE | Facility: CLINIC | Age: 50
End: 2025-03-15
Payer: COMMERCIAL

## 2025-03-21 ENCOUNTER — OFFICE VISIT (OUTPATIENT)
Dept: OPTOMETRY | Facility: CLINIC | Age: 50
End: 2025-03-21
Payer: COMMERCIAL

## 2025-03-21 DIAGNOSIS — H25.13 NUCLEAR SCLEROSIS OF BOTH EYES: ICD-10-CM

## 2025-03-21 DIAGNOSIS — H40.039 ANATOMICAL NARROW ANGLE: Primary | ICD-10-CM

## 2025-03-21 DIAGNOSIS — H52.7 REFRACTIVE ERROR: ICD-10-CM

## 2025-03-21 DIAGNOSIS — Z97.3 WEARS CONTACT LENSES: ICD-10-CM

## 2025-03-21 DIAGNOSIS — H04.123 DRY EYE SYNDROME OF BOTH EYES: ICD-10-CM

## 2025-03-21 DIAGNOSIS — Z46.0 FITTING AND ADJUSTMENT OF SPECTACLES AND CONTACT LENSES: Primary | ICD-10-CM

## 2025-03-21 PROCEDURE — 99999 PR PBB SHADOW E&M-EST. PATIENT-LVL III: CPT | Mod: PBBFAC,,, | Performed by: OPTOMETRIST

## 2025-03-21 NOTE — PROGRESS NOTES
HPI    CC: Diabetic Exam. Pt reports VA Changes. She would also like to be fit   with CLs. She has worn them in the past.     DAVID:10/1/2024 Dr. Rasmussen    (+) Changes in vision   (-) Pain  (+) Irritation: OS  (+) Itching: Allergy related  (-) Flashes  (-) Floaters  (+) Glasses wearer  (+) CL wearer  (+) Uses eye gtts: Systane PRN OU     Does patient want a refraction today? Yes    (-) Eye injury  (-) Eye surgery   (+)POHx, narrow angle (pt of Dr Rasmussen)  (+)FOHx: Mother: glaucoma    (+)DM  Hemoglobin A1C       Date                     Value               Ref Range             Status                12/02/2024               5.2                 4.0 - 5.6 %           Final                  04/17/2024               5.4                 4.0 - 5.6 %           Final                  10/06/2023               6.5 (H)             4.0 - 5.6 %           Final                  10/06/2023               6.5 (H)             4.0 - 5.6 %           Final              Last edited by Kristen Ross, OD on 3/21/2025 11:51 AM.            Assessment /Plan     For exam results, see Encounter Report.    Anatomical narrow angle    Nuclear sclerosis of both eyes    Dry eye syndrome of both eyes    Refractive error      Pt of Dr. Rasmussen. Pt due for f/u April 2025. Will send a message to glaucoma team to get pt scheduled. Monitor.     2. Educated pt on findings. Not visually significant. No need for removal at this time. Monitor yearly.      3. Educated pt on findings. Recommended ATs TID-QID + radha/gel QHS for added lubrication and comfort. If symptoms worsen or dont improve, RTC. Monitor.      4. Updated SRx. Mild change from habitual. Monitor yearly.    Updated CL Rx. Initially good comfort and vision. Given CL trials to take home. If happy with comfort and vision of trial lenses, may order annual supply. If issues arise, RTC for CL f/u. Reviewed proper CL care and hygiene. Monitor yearly unless changes noted sooner.        RTC with Dr. Rasmussen  as directed, me yearly or prn.

## 2025-03-22 DIAGNOSIS — L71.9 ROSACEA: ICD-10-CM

## 2025-03-22 DIAGNOSIS — T88.7XXA MEDICATION SIDE EFFECT: ICD-10-CM

## 2025-03-22 NOTE — TELEPHONE ENCOUNTER
Care Due:                  Date            Visit Type   Department     Provider  --------------------------------------------------------------------------------                                MYCHART                              ANNUAL                              CHECKUP/PHY  [facility] INTERNAL  Last Visit: 12-      S            MEDICINE       JORDEN RAMÍREZ                              EP -                              PRIMARY      [facility] INTERNAL  Next Visit: 12-      CARE ([facility])   MEDICINE       JORDEN RAMÍREZ                                                            Last  Test          Frequency    Reason                     Performed    Due Date  --------------------------------------------------------------------------------    HBA1C.......  6 months...  tirzepatide..............  12- 06-    [facility] Embedded Care Due Messages. Reference number: [id number].   3/22/2025 12:23:13 PM CDT

## 2025-03-24 RX ORDER — AZELAIC ACID 0.15 G/G
GEL TOPICAL
Qty: 30 G | Refills: 5 | Status: SHIPPED | OUTPATIENT
Start: 2025-03-24

## 2025-03-24 RX ORDER — ONDANSETRON 4 MG/1
4 TABLET, ORALLY DISINTEGRATING ORAL 2 TIMES DAILY PRN
Qty: 10 TABLET | Refills: 3 | Status: SHIPPED | OUTPATIENT
Start: 2025-03-24

## 2025-03-24 NOTE — TELEPHONE ENCOUNTER
Refill Routing Note   Medication(s) are not appropriate for processing by [facility] for the following reason(s):        Outside of protocol    [facility] action(s):  Route   Requires labs : Yes             Appointments  past 12m or future 3m with PCP    Date Provider   Last Visit   12/3/2024 Elif Lew MD   Next Visit   Visit date not found Elif Lew MD   ED visits in past 90 days: 0        Note composed:8:28 AM 03/24/2025

## 2025-04-08 ENCOUNTER — TELEPHONE (OUTPATIENT)
Dept: HEPATOLOGY | Facility: CLINIC | Age: 50
End: 2025-04-08

## 2025-04-08 ENCOUNTER — LAB VISIT (OUTPATIENT)
Dept: LAB | Facility: HOSPITAL | Age: 50
End: 2025-04-08
Payer: COMMERCIAL

## 2025-04-08 ENCOUNTER — OFFICE VISIT (OUTPATIENT)
Dept: HEPATOLOGY | Facility: CLINIC | Age: 50
End: 2025-04-08
Payer: COMMERCIAL

## 2025-04-08 ENCOUNTER — PROCEDURE VISIT (OUTPATIENT)
Dept: HEPATOLOGY | Facility: CLINIC | Age: 50
End: 2025-04-08
Payer: COMMERCIAL

## 2025-04-08 ENCOUNTER — RESULTS FOLLOW-UP (OUTPATIENT)
Dept: HEPATOLOGY | Facility: CLINIC | Age: 50
End: 2025-04-08

## 2025-04-08 VITALS — HEIGHT: 64 IN | BODY MASS INDEX: 31.99 KG/M2 | WEIGHT: 187.38 LBS

## 2025-04-08 DIAGNOSIS — E11.9 CONTROLLED TYPE 2 DIABETES MELLITUS WITHOUT COMPLICATION, WITHOUT LONG-TERM CURRENT USE OF INSULIN: ICD-10-CM

## 2025-04-08 DIAGNOSIS — K76.0 METABOLIC DYSFUNCTION-ASSOCIATED STEATOTIC LIVER DISEASE (MASLD): ICD-10-CM

## 2025-04-08 DIAGNOSIS — K76.0 METABOLIC DYSFUNCTION-ASSOCIATED STEATOTIC LIVER DISEASE (MASLD): Primary | ICD-10-CM

## 2025-04-08 DIAGNOSIS — E66.9 OBESITY (BMI 30-39.9): ICD-10-CM

## 2025-04-08 DIAGNOSIS — I10 PRIMARY HYPERTENSION: ICD-10-CM

## 2025-04-08 DIAGNOSIS — D73.89 SPLENIC LESION: ICD-10-CM

## 2025-04-08 DIAGNOSIS — E03.8 OTHER SPECIFIED HYPOTHYROIDISM: ICD-10-CM

## 2025-04-08 LAB
ALBUMIN SERPL BCP-MCNC: 4 G/DL (ref 3.5–5.2)
ALP SERPL-CCNC: 83 UNIT/L (ref 40–150)
ALT SERPL W/O P-5'-P-CCNC: 10 UNIT/L (ref 10–44)
AST SERPL-CCNC: 14 UNIT/L (ref 11–45)
BILIRUB DIRECT SERPL-MCNC: 0.2 MG/DL (ref 0.1–0.3)
BILIRUB SERPL-MCNC: 0.5 MG/DL (ref 0.1–1)
PROT SERPL-MCNC: 8.4 GM/DL (ref 6–8.4)

## 2025-04-08 PROCEDURE — 36415 COLL VENOUS BLD VENIPUNCTURE: CPT

## 2025-04-08 PROCEDURE — 1159F MED LIST DOCD IN RCRD: CPT | Mod: CPTII,S$GLB,, | Performed by: NURSE PRACTITIONER

## 2025-04-08 PROCEDURE — 1160F RVW MEDS BY RX/DR IN RCRD: CPT | Mod: CPTII,S$GLB,, | Performed by: NURSE PRACTITIONER

## 2025-04-08 PROCEDURE — 3008F BODY MASS INDEX DOCD: CPT | Mod: CPTII,S$GLB,, | Performed by: NURSE PRACTITIONER

## 2025-04-08 PROCEDURE — 80076 HEPATIC FUNCTION PANEL: CPT

## 2025-04-08 PROCEDURE — 99999 PR PBB SHADOW E&M-EST. PATIENT-LVL V: CPT | Mod: PBBFAC,,, | Performed by: NURSE PRACTITIONER

## 2025-04-08 PROCEDURE — 91200 LIVER ELASTOGRAPHY: CPT | Mod: S$GLB,,, | Performed by: NURSE PRACTITIONER

## 2025-04-08 PROCEDURE — 99214 OFFICE O/P EST MOD 30 MIN: CPT | Mod: S$GLB,,, | Performed by: NURSE PRACTITIONER

## 2025-04-08 NOTE — TELEPHONE ENCOUNTER
----- Message from Beth Dean NP sent at 4/8/2025 11:59 AM CDT -----  Please set recall for RTC in 2 years with Fibroscan same day as visit. Thanks!

## 2025-04-08 NOTE — PROCEDURES
FibroScan Transplant Hepatology(Vibration Controlled Transient Elastography)    Date/Time: 4/8/2025 11:15 AM    Performed by: Beth Dean NP  Authorized by: Beth Dean NP    Diagnosis:  NAFLD    Probe:  M    Universal Protocol: Patient's identity, procedure and site were verified, confirmatory pause was performed.  Discussed procedure including risks and potential complications.  Questions answered.  Patient verbalizes understanding and wishes to proceed with VCTE.     Procedure: After providing explanations of the procedure, patient was placed in the supine position with right arm in maximum abduction to allow optimal exposure of right lateral abdomen.  Patient was briefly assessed, Testing was performed in the mid-axillary location, 50Hz Shear Wave pulses were applied and the resulting Shear Wave and Propagation Speed detected with a 3.5 MHz ultrasonic signal, using the FibroScan probe, Skin to liver capsule distance and liver parenchyma were accessed during the entire examination with the FibroScan probe, Patient was instructed to breathe normally and to abstain from sudden movements during the procedure, allowing for random measurements of liver stiffness. At least 10 Shear Waves were produced, Individual measurements of each Shear Wave were calculated.  Patient tolerated the procedure well with no complications.  Meets discharge criteria as was dismissed.  Rates pain 0 out of 10.  Patient will follow up with ordering provider to review results.    Findings  Median liver stiffness score:  5.7  CAP Reading: dB/m:  284    IQR/med %:  7  Interpretation  Fibrosis interpretation is based on medial liver stiffness - Kilopascal (kPa).    Fibrosis Stage:  F 0-1  Steatosis interpretation is based on controlled attenuation parameter - (dB/m).    Steatosis Grade:  S2

## 2025-04-08 NOTE — PROGRESS NOTES
[facility] Established Patient Visit    Reason for Visit: Fatty Liver    PCP: Elif Lew    HPI:  This is a 50 y.o. female with PMH noted below, here for follow up for fatty liver. She was last seen in clinic by myself in 11/2023.    The patient's risk factors for fatty liver disease include:     Obesity                                        Yes; BMI 32.17 - On Mounjaro with noted weight loss of 60 lbs since 10/2023.  Dyslipidemia                                No; Refer to most recent lipid panel results below:   Latest Reference Range & Units 12/02/24 07:34   Cholesterol Total 120 - 199 mg/dL 205 (H)   HDL 40 - 75 mg/dL 74   HDL/Cholesterol Ratio 20.0 - 50.0 % 36.1   Non-HDL Cholesterol mg/dL 131   Total Cholesterol/HDL Ratio 2.0 - 5.0  2.8   Triglycerides 30 - 150 mg/dL 101   LDL Cholesterol 63.0 - 159.0 mg/dL 110.8     Insulin resistance/Diabetes         Yes; Last HgbA1c was further improved at 5.2% (12/2024)    She has had elevated liver enzymes in a hepatocellular pattern since at least 10/2023. Reassuringly, her liver enzymes have normalized with significant weight loss.    CT of the abdomen in 11/2023 showed:     FINDINGS:    LUNG BASES: Unremarkable.     HEPATOBILIARY: Liver within the upper limits of normal in size.  Diffusely low attenuation of the hepatic parenchyma suggestive for steatosis.  No focal hepatic lesions.  Normal gallbladder.  No biliary ductal dilatation.     SPLEEN: No splenomegaly.  Hypodense 1.2 cm splenic lesion (axial series 2, image 53), not visualized on 2013 CT or MRI     PANCREAS: No focal masses or ductal dilatation.     ADRENALS: No adrenal nodules.     KIDNEYS/URETERS: Kidneys are normal in size and enhance symmetrically.  No hydronephrosis, stones, or solid mass lesions.     PERITONEUM / RETROPERITONEUM: No free air or fluid.     LYMPH NODES: No lymphadenopathy.  Prominent nodes about the angela hepatis though similar to 2013.     VESSELS: No  significant calcified atherosclerosis.  Portal veins are patent.     GI TRACT: Normal stomach.  No bowel distention or wall thickening.     SOFT TISSUES: Unremarkable.     BONES: No fractures or focal osseous lesions.     Impression:     Indeterminate hypodense 1.2 cm splenic lesion.  Of note, in the absence of significant risk factors or known malignancy, overwhelming number of splenic lesions are benign.  If further characterization is warranted, consider abdominal MRI.  However, follow-up ultrasound at 6 months is a reasonable option.     Findings suggestive for hepatic steatosis.    The splenic lesion was not seen on MRI of the abdomen in 11/2023. Most recent abdominal US in 10/2024 showed stable splenic lesion, and improvement in hepatomegaly with weight loss, as below:    FINDINGS:    Pancreas: Visualized portions of the pancreas appear within normal limits.     Liver: Normal in size, measuring 16 cm. Fatty liver infiltration. No focal hepatic lesions.     Gallbladder: No calculi, wall thickening, or pericholecystic fluid.  No sonographic Carroll's sign.     Biliary system: The common duct is not dilated, measuring 3 mm.  No intrahepatic ductal dilatation.     Spleen: Upper limit of normal in size measuring 12 cm.  1.2 cm hypoechoic parenchymal lesion, stable when compared to the previous CT.     Miscellaneous: No upper abdominal ascites.     Impression:     1. Hepatic steatosis.  No focal hepatic lesions.  2. 1.2 cm indeterminate splenic lesion, similar when compared to the previous CT and ultrasounds.    She has never undergone a liver biopsy.    FIB-4 Calculation: 1.21 at 11/21/2023  2:50 PM   Calculated from:  23  Last SGPT/ALT: 59 at 11/21/2023  2:50 PM   253   Age: 50 y.o.     FIB-4 below 1.30 is considered as low-risk for advanced fibrosis  FIB-4 over 2.67 is considered as high-risk for advanced fibrosis  FIB-4 values between 1.30 and 2.67 are considered as intermediate-risk of advanced fibrosis for ages  36-64.     For ages > 64 the cut-off for low-risk goes to < 2.  This is a screening tool and clinical judgement should be used in the interpretation of these results.    She has no known family history of liver disease. She denies any recent history of heavy alcohol use, and does not use illicit drugs. Viral hepatitis testing was negative. B/P is well controlled on current regimen. TSH is normal on Levothyroxine 50 mcg daily.     Fibroscan to non-invasively stage her liver disease in 1/2024 was suggestive of severe fatty infiltration of the liver (S3), with F0-F1 fibrosis, and a low likelihood of cirrhosis. Follow up Fibroscan today is improved, and is suggestive of moderate fatty infiltration of the liver (S2), again with F0-F1 fibrosis.    She is well appearing, and has no signs or symptoms of hepatic decompensation including jaundice, dark urine, pruritus, abdominal distention, lower extremity edema, hematemesis, melena, or periods of confusion suggestive of hepatic encephalopathy.      PMHX:  has a past medical history of Abnormal Pap smear, Cervical cancer (March 2013), Colon cancer screening (5/4/2021), Controlled type 2 diabetes mellitus without complication, without long-term current use of insulin (10/12/2023), Depressive disorder, not elsewhere classified, Endometriosis of uterus, FH: colonic polyps (3/24/2021), Foot fracture, right, Headache(784.0), Hypertension, Hypothyroidism, Low grade squamous intraepith lesion on cytologic smear vagina (lgsil) (10/14/2019), Malignant neoplasm of exocervix (10/10/2019), Migraine headache, Urinary tract infection, Vaginal atrophy (5/8/2018), Visit for screening mammogram (12/16/2015), and Well woman exam with routine gynecological exam (12/16/2015).    PSHX:  has a past surgical history that includes Lipoma resection; Sinus surgery; Pelvic laparoscopy; Tonsillectomy; Oophorectomy; pr removal of ovary/tube(s); Pelvic and para-aortic lymph node dissection; Knee surgery  (08/24/2017); Knee arthroscopy w/ meniscectomy (Left, 6/10/2021); Synovectomy of knee (Left, 6/10/2021); Chondroplasty of knee (Left, 6/10/2021); Breast biopsy (Right, 2002); Hysterectomy (May 2013); Colonoscopy (N/A, 7/6/2022); Knee arthroscopy w/ meniscectomy (Right, 11/3/2022); Chondroplasty of knee (Right, 11/3/2022); and Synovectomy of knee (Right, 11/3/2022).    The patient's social and family histories were reviewed by me and updated in the appropriate section of the electronic medical record.    Review of patient's allergies indicates:   Allergen Reactions    Hydrocodone-acetaminophen Itching and Rash     States can take - may have been formulation    Iodine Anaphylaxis    Other Anaphylaxis     mushrooms    Fluconazole Hives     Other reaction(s): Hives    Iodine and iodide containing products Hives     Iv iodine only    Mushroom combination no.1      Other reaction(s): Bronchial Constriction     Current Outpatient Medications on File Prior to Visit   Medication Sig Dispense Refill    ALPRAZolam (XANAX) 0.5 MG tablet Take 1 tablet (0.5 mg total) by mouth daily as needed for Anxiety. 30 tablet 0    atenoloL (TENORMIN) 50 MG tablet Take 1 tablet (50 mg total) by mouth 2 (two) times a day. 180 tablet 3    azelaic acid (AZELEX) 15 % gel Compound azelaic acid 15% / niacinamide 2% cream. Apply to affected areas of face nightly and then moisturize after. 30 g 5    benzocaine-menthoL 15-3.6 mg Lozg 1 lozenge by Mucous Membrane route every 8 (eight) hours as needed. 24 lozenge 0    clindamycin (CLEOCIN T) 1 % lotion apply to affected area on face once or twice daily as needed for flare 60 mL 1    diclofenac (VOLTAREN) 75 MG EC tablet Take 1 tablet (75 mg total) by mouth 2 (two) times daily as needed (for migraines). 20 tablet 3    doxycycline (VIBRA-TABS) 100 MG tablet Sig 1 po qd with food and not within 1 hour of bedtime 30 tablet 2    DULoxetine (CYMBALTA) 30 MG capsule Take 1 capsule (30 mg total) by mouth 2  (two) times daily. 180 capsule 3    fluticasone (FLONASE) 50 mcg/actuation nasal spray 1 spray by Each Nare route daily as needed for Rhinitis.      hydroquinone 4 % Crea Use hs for dark spots 28.35 g 3    ipratropium (ATROVENT) 21 mcg (0.03 %) nasal spray 2 sprays by Each Nostril route 2 (two) times daily as needed. 30 mL 0    levocetirizine (XYZAL) 5 MG tablet Take 1 tablet (5 mg total) by mouth once daily. 90 tablet 3    levothyroxine (SYNTHROID) 50 MCG tablet Take 1 tablet (50 mcg total) by mouth once daily. 90 tablet 3    metroNIDAZOLE (NORITATE) 1 % cream Compound azelaic acid 15% + ivermectin 1% + metronidazole 1% cream with 2% niacinamide and  Apply to face qhs. 30 g 5    minocycline (ZILXI) 1.5 % Foam Apply 1 Application topically once daily. Apply thin film to affected areas on face every day 30 g 10    ondansetron (ZOFRAN-ODT) 4 MG TbDL Dissolve 1 tablet (4 mg total) by mouth 2 (two) times daily as needed (nausea). 10 tablet 3    sulfacetamide sodium-sulfur (SULFACLEANSE 8-4) 8-4 % Susp Wash face every evening 473 mL 3    sulfacetamide sodium-sulfur (SULFACLEANSE 8-4) 8-4 % Susp Wash face qhs 473 mL 3    tirzepatide 15 mg/0.5 mL PnIj Inject 15 mg into the skin every 7 days. 2 mL 5    traMADoL (ULTRAM) 50 mg tablet Take 1-2 tablets ( mg total) by mouth every 6 (six) hours as needed for Pain. 30 tablet 0    triamterene-hydrochlorothiazide 37.5-25 mg (DYAZIDE) 37.5-25 mg per capsule Take 1 capsule by mouth once daily. 90 capsule 1    ubrogepant (UBRELVY) 100 mg tablet Take 1 tablet by mouth at the onset of a headache. May repeat based on response and tolerability after more than 2 hours if needed. Do not take more than 200mg in a 24 hour span. 16 tablet 5    vitamin D (VITAMIN D3) 1000 units Tab Take 5 tablets (5,000 Units total) by mouth once daily. 30 tablet prn    albuterol 90 mcg/actuation inhaler Inhale 2 puffs into the lungs every 4 (four) hours as needed for Wheezing. 6.7 g 3    aspirin  "(ECOTRIN) 81 MG EC tablet Take 1 tablet (81 mg total) by mouth once daily. For 4 weeks starting the day after surgery. 28 tablet 0    EPINEPHrine (EPIPEN) 0.3 mg/0.3 mL AtIn Inject 0.3 mLs (0.3 mg total) into the muscle once. for 1 dose 2 each 2     Current Facility-Administered Medications on File Prior to Visit   Medication Dose Route Frequency Provider Last Rate Last Admin    diphenhydrAMINE injection 50 mg  50 mg Intravenous PRN Aleks Kwok III, PA-C         SOCIAL HISTORY:   Social History     Tobacco Use   Smoking Status Never    Passive exposure: Never   Smokeless Tobacco Never     Social History     Substance and Sexual Activity   Alcohol Use Yes    Alcohol/week: 1.0 standard drink of alcohol    Types: 1 Glasses of wine per week    Comment: socially     Social History     Substance and Sexual Activity   Drug Use Never     ROS:   GENERAL: Denies fever, chills, + intentional weight loss, fatigue  HEENT: Denies headaches, dizziness, vision/hearing changes  CARDIOVASCULAR: Denies chest pain, palpitations, or edema  RESPIRATORY: Denies dyspnea, cough  GI: Denies abdominal pain, rectal bleeding, nausea, vomiting. No change in bowel pattern or color  : Denies dysuria, hematuria   SKIN: Denies rash, itching   NEURO: Denies confusion, memory loss, or mood changes  PSYCH: Denies depression or anxiety  HEME/LYMPH: Denies easy bruising or bleeding    Objective Findings:    PHYSICAL EXAM:   Friendly White female, in no acute distress; alert and oriented to person, place and time.  VITALS: Ht 5' 4" (1.626 m)   Wt 85 kg (187 lb 6.3 oz)   BMI 32.17 kg/m²   HENT: Normocephalic, without obvious abnormality.   EYES: Sclerae anicteric.   NECK: No obvious masses.  CARDIOVASCULAR: No peripheral edema.  RESPIRATORY: Normal respiratory effort  GI: Non-distended abdomen.  EXTREMITIES:  No clubbing, cyanosis or edema.  SKIN: Warm and dry. No jaundice. No rashes noted to exposed skin.  NEURO:  Normal gait. No " asterixis.  PSYCH:  Memory intact. Thought and speech pattern appropriate. Behavior normal. No depression or anxiety noted.    DIAGNOSTIC STUDIES:    FIBROSCAN 1/29/2024:    Findings  Median liver stiffness score:  7.3  CAP Reading: dB/m:  350     IQR/med %:  10  Interpretation  Fibrosis interpretation is based on medial liver stiffness - Kilopascal (kPa).     Fibrosis Stage:  F 0-1  Steatosis interpretation is based on controlled attenuation parameter - (dB/m).     Steatosis Grade:  S3    FIBROSCAN 4/8/2025:    Findings  Median liver stiffness score:  5.7  CAP Reading: dB/m:  284     IQR/med %:  7  Interpretation  Fibrosis interpretation is based on medial liver stiffness - Kilopascal (kPa).     Fibrosis Stage:  F 0-1  Steatosis interpretation is based on controlled attenuation parameter - (dB/m).     Steatosis Grade:  S2     EDUCATION:  Per AVS.    ASSESSMENT & PLAN:  50 y.o. White female with:    1. Metabolic dysfunction-associated steatotic liver disease (MASLD)  FibroScan Transplant Hepatology(Vibration Controlled Transient Elastography)      2. Controlled type 2 diabetes mellitus without complication, without long-term current use of insulin  FibroScan Transplant Hepatology(Vibration Controlled Transient Elastography)      3. Obesity (BMI 30-39.9)  FibroScan Transplant Hepatology(Vibration Controlled Transient Elastography)      4. Other specified hypothyroidism  FibroScan Transplant Hepatology(Vibration Controlled Transient Elastography)      5. Primary hypertension  FibroScan Transplant Hepatology(Vibration Controlled Transient Elastography)      6. Splenic lesion          - Fibroscan today to non-invasively re-stage liver disease.  - Repeat liver function tests annually through PCP.  - Continue with annual abdominal US through PCP to monitor indeterminate splenic lesion.  - Continue Mounjaro, as prescribed by PCP.   - Continue with weight loss efforts - recommend additional weight loss of 15-20 lbs, through  diet and exercise.  - Recommend good control of cholesterol, blood pressure, & blood sugar levels.  - Avoid herbal supplements/alternative remedies.  - Limit alcohol intake to no more than 3-5 drinks per week, ideally less.  - Return to clinic in 2 years with Fibroscan prior to visit.     Thank you for allowing me to participate in the care of Emily Max       Hepatology Nurse Practitioner  [facility]    CC'ed note to:   No ref. provider found  Elif Lew MD

## 2025-04-21 DIAGNOSIS — T88.7XXA MEDICATION SIDE EFFECT: ICD-10-CM

## 2025-04-21 NOTE — TELEPHONE ENCOUNTER
No care due was identified.  [facility] Embedded Care Due Messages. Reference number: [id number].   4/21/2025 6:58:37 PM CDT

## 2025-04-21 NOTE — TELEPHONE ENCOUNTER
No care due was identified.  [facility] Embedded Care Due Messages. Reference number: [id number].   4/21/2025 6:57:33 PM CDT

## 2025-04-22 RX ORDER — TRIAMTERENE AND HYDROCHLOROTHIAZIDE 37.5; 25 MG/1; MG/1
1 CAPSULE ORAL DAILY
Qty: 90 CAPSULE | Refills: 2 | Status: SHIPPED | OUTPATIENT
Start: 2025-04-22

## 2025-04-22 RX ORDER — ONDANSETRON 4 MG/1
4 TABLET, ORALLY DISINTEGRATING ORAL 2 TIMES DAILY PRN
Qty: 10 TABLET | Refills: 3 | Status: SHIPPED | OUTPATIENT
Start: 2025-04-22

## 2025-04-22 NOTE — TELEPHONE ENCOUNTER
Refill Routing Note   Medication(s) are not appropriate for processing by [facility] for the following reason(s):        Outside of protocol    [facility] action(s):  Route               Appointments  past 12m or future 3m with PCP    Date Provider   Last Visit   12/3/2024 Elif Lew MD   Next Visit   Visit date not found Elif Lew MD   ED visits in past 90 days: 0        Note composed:9:36 AM 04/22/2025

## 2025-04-22 NOTE — TELEPHONE ENCOUNTER
Refill Decision Note   Emily Winter  is requesting a refill authorization.  Brief Assessment and Rationale for Refill:  Approve     Medication Therapy Plan:         Comments:     Note composed:11:58 AM 04/22/2025

## 2025-05-07 ENCOUNTER — PATIENT MESSAGE (OUTPATIENT)
Dept: INTERNAL MEDICINE | Facility: CLINIC | Age: 50
End: 2025-05-07
Payer: COMMERCIAL

## 2025-05-08 ENCOUNTER — E-VISIT (OUTPATIENT)
Dept: INTERNAL MEDICINE | Facility: CLINIC | Age: 50
End: 2025-05-08
Payer: COMMERCIAL

## 2025-05-08 DIAGNOSIS — T88.7XXA MEDICATION SIDE EFFECT: ICD-10-CM

## 2025-05-09 RX ORDER — ONDANSETRON 4 MG/1
4 TABLET, ORALLY DISINTEGRATING ORAL 2 TIMES DAILY PRN
Qty: 90 TABLET | Refills: 3 | Status: SHIPPED | OUTPATIENT
Start: 2025-05-09

## 2025-05-09 NOTE — PROGRESS NOTES
Patient ID: Emily Max is a 50 y.o. female.    Chief Complaint: General Illness (Entered automatically based on patient selection in [facility].)    The patient initiated a request through [facility] on 5/8/2025 for evaluation and management with a chief complaint of General Illness (Entered automatically based on patient selection in [facility].)     I evaluated the questionnaire submission on 5/925.    [facility]-Medication    5/8/2025  8:31 PM CDT - Filed by Patient   What do you need help with? Medication Management   Do you agree to participate in an E-Visit? Yes   If you have any of the following symptoms, please present to your local emergency room or call 911:  I acknowledge   Medication requests for narcotics will not be addressed via an E-Visit.  Please schedule an appointment. I acknowledge   Do you have any of the following pregnancy-related conditions? None   Do you want to address a new or existing medication? I would like to address a medication I currently take   What is the main issue you would like addressed today? My refills for Zofran are only for 10 pills (5days). I experience nausea from taking Mounjaro sometimes 2-3 days a week and may take 4-6 pills total for that week, so I'm having to refill my medication frequently.   Would you like to change or continue your medication? Continue medication   What medication would you like to continue?  Zofran   Are you taking it as prescribed? Yes    What medical condition is the  medication intended to treat? Nausea   Is the medication helping your condition? Yes   Are you having any side effects from the medication? No   Provide any additional information you feel is important. Need more than 10 pills with every refill please. I am not vomitting or having ang other GI problems besides the nausea. The Zofran takes the nausea away and allows me to eat later.   Please attach any relevant images or files    Are you able to take your  vital signs? Yes   Systolic Blood Pressure: 118   Diastolic Blood Pressure: 72   Weight: 188   Height: 65   Pulse: 88   Temperature: 98.4   Respiration rate: 14   Pulse Oxygen: 98         Encounter Diagnosis   Name Primary?    Medication side effect         No orders of the defined types were placed in this encounter.     Medications Ordered This Encounter   Medications    ondansetron (ZOFRAN-ODT) 4 MG TbDL     Sig: Dissolve 1 tablet (4 mg total) by mouth 2 (two) times daily as needed (nausea).     Dispense:  90 tablet     Refill:  3        No follow-ups on file.      E-Visit Time Trackin minutes

## 2025-05-27 ENCOUNTER — TELEPHONE (OUTPATIENT)
Dept: GYNECOLOGIC ONCOLOGY | Facility: CLINIC | Age: 50
End: 2025-05-27
Payer: COMMERCIAL

## 2025-05-27 RX ORDER — TIRZEPATIDE 15 MG/.5ML
15 INJECTION, SOLUTION SUBCUTANEOUS
Qty: 6 ML | Refills: 0 | Status: SHIPPED | OUTPATIENT
Start: 2025-05-27

## 2025-05-27 RX ORDER — TIRZEPATIDE 15 MG/.5ML
15 INJECTION, SOLUTION SUBCUTANEOUS
Qty: 2 ML | Refills: 5 | Status: CANCELLED | OUTPATIENT
Start: 2025-05-27

## 2025-05-27 NOTE — TELEPHONE ENCOUNTER
Refill Routing Note   Medication(s) are not appropriate for processing by [facility] for the following reason(s):        New or recently adjusted medication    [facility] action(s):  Defer     Requires labs : Yes      Medication Therapy Plan: FOVS; A 90 DAY SUPPLY HAS NOT BEEN PRESCRIBED BY THE PCP      Appointments  past 12m or future 3m with PCP    Date Provider   Last Visit   5/8/2025 Elif Lew MD   Next Visit   Visit date not found Elif Lew MD   ED visits in past 90 days: 0        Note composed:9:45 AM 05/27/2025

## 2025-05-27 NOTE — TELEPHONE ENCOUNTER
No care due was identified.  [facility] Embedded Care Due Messages. Reference number: [id number].   5/27/2025 10:59:03 AM CDT

## 2025-05-27 NOTE — TELEPHONE ENCOUNTER
Refill Decision Note   Emily Winter  is requesting a refill authorization.  Brief Assessment and Rationale for Refill:  Approve     Medication Therapy Plan:         Comments:     Note composed:11:06 AM 05/27/2025

## 2025-05-27 NOTE — TELEPHONE ENCOUNTER
Care Due:                  Date            Visit Type   Department     Provider  --------------------------------------------------------------------------------                                MYCHART                              ANNUAL                              CHECKUP/PHY  [facility] INTERNAL  Last Visit: 12-      S            MEDICINE       Elif Lew                              EP -                              PRIMARY      [facility] INTERNAL  Next Visit: 12-      CARE ([facility])   MEDICINE       Elif Lew                                                            Last  Test          Frequency    Reason                     Performed    Due Date  --------------------------------------------------------------------------------    HBA1C.......  6 months...  tirzepatide..............  12- 06-    [employer] Embedded Care Due Messages. Reference number: [id number].   5/27/2025 9:18:54 AM CDT

## 2025-05-28 ENCOUNTER — TELEPHONE (OUTPATIENT)
Dept: GYNECOLOGIC ONCOLOGY | Facility: CLINIC | Age: 50
End: 2025-05-28
Payer: COMMERCIAL

## 2025-05-29 ENCOUNTER — TELEPHONE (OUTPATIENT)
Dept: GYNECOLOGIC ONCOLOGY | Facility: CLINIC | Age: 50
End: 2025-05-29
Payer: COMMERCIAL

## 2025-05-30 ENCOUNTER — TELEPHONE (OUTPATIENT)
Dept: GYNECOLOGIC ONCOLOGY | Facility: CLINIC | Age: 50
End: 2025-05-30
Payer: COMMERCIAL

## 2025-06-04 ENCOUNTER — TELEPHONE (OUTPATIENT)
Dept: OPHTHALMOLOGY | Facility: CLINIC | Age: 50
End: 2025-06-04
Payer: COMMERCIAL

## 2025-06-04 ENCOUNTER — PATIENT MESSAGE (OUTPATIENT)
Dept: OPHTHALMOLOGY | Facility: CLINIC | Age: 50
End: 2025-06-04
Payer: COMMERCIAL

## 2025-06-09 ENCOUNTER — LAB VISIT (OUTPATIENT)
Dept: LAB | Facility: HOSPITAL | Age: 50
End: 2025-06-09
Payer: COMMERCIAL

## 2025-06-09 ENCOUNTER — OFFICE VISIT (OUTPATIENT)
Dept: INTERNAL MEDICINE | Facility: CLINIC | Age: 50
End: 2025-06-09
Payer: COMMERCIAL

## 2025-06-09 VITALS
WEIGHT: 182.13 LBS | TEMPERATURE: 99 F | OXYGEN SATURATION: 98 % | BODY MASS INDEX: 30.34 KG/M2 | SYSTOLIC BLOOD PRESSURE: 118 MMHG | RESPIRATION RATE: 18 BRPM | HEIGHT: 65 IN | DIASTOLIC BLOOD PRESSURE: 88 MMHG | HEART RATE: 92 BPM

## 2025-06-09 DIAGNOSIS — R22.1 MASS IN NECK: Primary | ICD-10-CM

## 2025-06-09 DIAGNOSIS — R74.8 ELEVATED LIVER ENZYMES: ICD-10-CM

## 2025-06-09 DIAGNOSIS — Z85.41 HISTORY OF CERVICAL CANCER: ICD-10-CM

## 2025-06-09 DIAGNOSIS — R22.1 MASS IN NECK: ICD-10-CM

## 2025-06-09 LAB
ABSOLUTE EOSINOPHIL (OHS): 0.23 K/UL
ABSOLUTE MONOCYTE (OHS): 0.86 K/UL (ref 0.3–1)
ABSOLUTE NEUTROPHIL COUNT (OHS): 2.27 K/UL (ref 1.8–7.7)
ALBUMIN SERPL BCP-MCNC: 4.2 G/DL (ref 3.5–5.2)
ALP SERPL-CCNC: 66 UNIT/L (ref 40–150)
ALT SERPL W/O P-5'-P-CCNC: 10 UNIT/L (ref 10–44)
AST SERPL-CCNC: 15 UNIT/L (ref 11–45)
BASOPHILS # BLD AUTO: 0.06 K/UL
BASOPHILS NFR BLD AUTO: 1.2 %
BILIRUB DIRECT SERPL-MCNC: 0.2 MG/DL (ref 0.1–0.3)
BILIRUB SERPL-MCNC: 0.5 MG/DL (ref 0.1–1)
CANCER AG125 SERPL-ACNC: 8 UNIT/ML
ERYTHROCYTE [DISTWIDTH] IN BLOOD BY AUTOMATED COUNT: 12.3 % (ref 11.5–14.5)
HCT VFR BLD AUTO: 41 % (ref 37–48.5)
HGB BLD-MCNC: 14.1 GM/DL (ref 12–16)
IMM GRANULOCYTES # BLD AUTO: 0.01 K/UL (ref 0–0.04)
IMM GRANULOCYTES NFR BLD AUTO: 0.2 % (ref 0–0.5)
LYMPHOCYTES # BLD AUTO: 1.69 K/UL (ref 1–4.8)
MCH RBC QN AUTO: 30.3 PG (ref 27–31)
MCHC RBC AUTO-ENTMCNC: 34.4 G/DL (ref 32–36)
MCV RBC AUTO: 88 FL (ref 82–98)
NUCLEATED RBC (/100WBC) (OHS): 0 /100 WBC
PLATELET # BLD AUTO: 212 K/UL (ref 150–450)
PMV BLD AUTO: 10 FL (ref 9.2–12.9)
PROT SERPL-MCNC: 7.5 GM/DL (ref 6–8.4)
RBC # BLD AUTO: 4.65 M/UL (ref 4–5.4)
RELATIVE EOSINOPHIL (OHS): 4.5 %
RELATIVE LYMPHOCYTE (OHS): 33 % (ref 18–48)
RELATIVE MONOCYTE (OHS): 16.8 % (ref 4–15)
RELATIVE NEUTROPHIL (OHS): 44.3 % (ref 38–73)
TSH SERPL-ACNC: 1.39 UIU/ML (ref 0.4–4)
WBC # BLD AUTO: 5.12 K/UL (ref 3.9–12.7)

## 2025-06-09 PROCEDURE — 84443 ASSAY THYROID STIM HORMONE: CPT

## 2025-06-09 PROCEDURE — 3008F BODY MASS INDEX DOCD: CPT | Mod: CPTII,S$GLB,, | Performed by: PHYSICIAN ASSISTANT

## 2025-06-09 PROCEDURE — 85025 COMPLETE CBC W/AUTO DIFF WBC: CPT

## 2025-06-09 PROCEDURE — 3079F DIAST BP 80-89 MM HG: CPT | Mod: CPTII,S$GLB,, | Performed by: PHYSICIAN ASSISTANT

## 2025-06-09 PROCEDURE — 3074F SYST BP LT 130 MM HG: CPT | Mod: CPTII,S$GLB,, | Performed by: PHYSICIAN ASSISTANT

## 2025-06-09 PROCEDURE — 1159F MED LIST DOCD IN RCRD: CPT | Mod: CPTII,S$GLB,, | Performed by: PHYSICIAN ASSISTANT

## 2025-06-09 PROCEDURE — 1160F RVW MEDS BY RX/DR IN RCRD: CPT | Mod: CPTII,S$GLB,, | Performed by: PHYSICIAN ASSISTANT

## 2025-06-09 PROCEDURE — 99213 OFFICE O/P EST LOW 20 MIN: CPT | Mod: S$GLB,,, | Performed by: PHYSICIAN ASSISTANT

## 2025-06-09 PROCEDURE — 36415 COLL VENOUS BLD VENIPUNCTURE: CPT

## 2025-06-09 PROCEDURE — 99999 PR PBB SHADOW E&M-EST. PATIENT-LVL V: CPT | Mod: PBBFAC,,, | Performed by: PHYSICIAN ASSISTANT

## 2025-06-09 PROCEDURE — 82040 ASSAY OF SERUM ALBUMIN: CPT

## 2025-06-09 PROCEDURE — 86304 IMMUNOASSAY TUMOR CA 125: CPT

## 2025-06-09 NOTE — PROGRESS NOTES
Subjective:       Patient ID: Emily Max is a 50 y.o. female.        Chief Complaint: Mass    Emily Max is an established patient of Elif Lew MD here today for urgent care visit.    Nodule neck area, around thyroid, central neck and anterior  Carson sized  Noticed 1 month, possibly longer  Losing weight due to mounjaro and may now just be noticing             Review of Systems   Constitutional:  Negative for chills, diaphoresis, fatigue and fever.   HENT:  Negative for congestion and sore throat.    Eyes:  Negative for visual disturbance.   Respiratory:  Negative for cough, chest tightness and shortness of breath.    Cardiovascular:  Negative for chest pain, palpitations and leg swelling.   Gastrointestinal:  Negative for abdominal pain, blood in stool, constipation, diarrhea, nausea and vomiting.   Genitourinary:  Negative for dysuria, frequency, hematuria and urgency.   Musculoskeletal:  Negative for arthralgias and back pain.   Skin:  Negative for rash.   Neurological:  Negative for dizziness, syncope, weakness and headaches.   Psychiatric/Behavioral:  Negative for dysphoric mood and sleep disturbance. The patient is not nervous/anxious.        Objective:      Physical Exam  Vitals and nursing note reviewed.   Constitutional:       General: She is not in acute distress.     Appearance: Normal appearance. She is well-developed. She is not diaphoretic.   HENT:      Head: Normocephalic and atraumatic.      Right Ear: Tympanic membrane and external ear normal.      Left Ear: Tympanic membrane and external ear normal.      Nose: No mucosal edema or rhinorrhea.      Mouth/Throat:      Pharynx: Oropharynx is clear.   Eyes:      Pupils: Pupils are equal, round, and reactive to light.   Cardiovascular:      Rate and Rhythm: Normal rate and regular rhythm.      Heart sounds: Normal heart sounds. No murmur heard.     No friction rub. No gallop.   Pulmonary:      Effort:  "Pulmonary effort is normal. No respiratory distress.      Breath sounds: Normal breath sounds.   Abdominal:      Palpations: Abdomen is soft.      Tenderness: There is no abdominal tenderness.   Musculoskeletal:      Cervical back: Neck supple.   Skin:     General: Skin is warm and dry.   Neurological:      Mental Status: She is alert.           Assessment:       1 cm x 1 cm mobile mass in neck at area demarcated as above  1. Mass in neck        Plan:       Emily Barba" was seen today for mass.    Diagnoses and all orders for this visit:    Mass in neck  -     US Soft Tissue Head Neck; Future  -     CBC Auto Differential; Future  -     TSH; Future    Check labs and US for further evaluation    Pt has been given instructions populated from patient instructions database and has verbalized understanding of the after visit summary and information contained wherein.    Follow up with a primary care provider. May go to ER for acute shortness of breath, lightheadedness, fever, or any other emergent complaints or changes in condition.    "This note will be shared with the patient"    Future Appointments   Date Time Provider Department Center   6/10/2025  7:15 AM [facility] OIC-US1 MASTER [facility] ULTR [facility]   6/14/2025 10:30 AM [facility]  MAMMO1 [facility] MAMMO [place]   7/15/2025  8:00 AM Marylou Abdul MD [facility] GYN ONC [facility]   7/24/2025  2:15 PM Jian Peterson MD [facility] GYN ONC [facility]   12/3/2025  1:00 PM Elif Lew MD [facility] IM [facility] PCW   2/23/2026 11:00 AM Sixto Guzman MD [facility]                 "

## 2025-06-10 ENCOUNTER — HOSPITAL ENCOUNTER (OUTPATIENT)
Dept: RADIOLOGY | Facility: HOSPITAL | Age: 50
Discharge: HOME | End: 2025-06-10
Attending: PHYSICIAN ASSISTANT
Payer: COMMERCIAL

## 2025-06-10 ENCOUNTER — RESULTS FOLLOW-UP (OUTPATIENT)
Dept: INTERNAL MEDICINE | Facility: CLINIC | Age: 50
End: 2025-06-10

## 2025-06-10 DIAGNOSIS — E04.2 MULTINODULAR THYROID: Primary | ICD-10-CM

## 2025-06-10 DIAGNOSIS — R22.1 MASS IN NECK: ICD-10-CM

## 2025-06-10 PROCEDURE — 76536 US EXAM OF HEAD AND NECK: CPT | Mod: 26,,, | Performed by: STUDENT IN AN ORGANIZED HEALTH CARE EDUCATION/TRAINING PROGRAM

## 2025-06-10 PROCEDURE — 76536 US EXAM OF HEAD AND NECK: CPT | Mod: TC

## 2025-06-11 PROBLEM — E04.2 MULTINODULAR THYROID: Status: ACTIVE | Noted: 2025-06-11

## 2025-06-12 ENCOUNTER — TELEPHONE (OUTPATIENT)
Dept: GYNECOLOGIC ONCOLOGY | Facility: CLINIC | Age: 50
End: 2025-06-12
Payer: COMMERCIAL

## 2025-06-17 ENCOUNTER — OFFICE VISIT (OUTPATIENT)
Dept: ENDOCRINOLOGY | Facility: CLINIC | Age: 50
End: 2025-06-17
Payer: COMMERCIAL

## 2025-06-17 VITALS
BODY MASS INDEX: 30.56 KG/M2 | OXYGEN SATURATION: 96 % | WEIGHT: 183.44 LBS | HEIGHT: 65 IN | SYSTOLIC BLOOD PRESSURE: 116 MMHG | DIASTOLIC BLOOD PRESSURE: 78 MMHG | HEART RATE: 86 BPM

## 2025-06-17 DIAGNOSIS — E11.9 CONTROLLED TYPE 2 DIABETES MELLITUS WITHOUT COMPLICATION, WITHOUT LONG-TERM CURRENT USE OF INSULIN: ICD-10-CM

## 2025-06-17 DIAGNOSIS — E03.8 OTHER SPECIFIED HYPOTHYROIDISM: Primary | ICD-10-CM

## 2025-06-17 DIAGNOSIS — E04.2 MULTINODULAR THYROID: ICD-10-CM

## 2025-06-17 PROCEDURE — 1159F MED LIST DOCD IN RCRD: CPT | Mod: CPTII,S$GLB,, | Performed by: STUDENT IN AN ORGANIZED HEALTH CARE EDUCATION/TRAINING PROGRAM

## 2025-06-17 PROCEDURE — 99999 PR PBB SHADOW E&M-EST. PATIENT-LVL V: CPT | Mod: PBBFAC,,, | Performed by: STUDENT IN AN ORGANIZED HEALTH CARE EDUCATION/TRAINING PROGRAM

## 2025-06-17 PROCEDURE — 1160F RVW MEDS BY RX/DR IN RCRD: CPT | Mod: CPTII,S$GLB,, | Performed by: STUDENT IN AN ORGANIZED HEALTH CARE EDUCATION/TRAINING PROGRAM

## 2025-06-17 PROCEDURE — 3074F SYST BP LT 130 MM HG: CPT | Mod: CPTII,S$GLB,, | Performed by: STUDENT IN AN ORGANIZED HEALTH CARE EDUCATION/TRAINING PROGRAM

## 2025-06-17 PROCEDURE — 3078F DIAST BP <80 MM HG: CPT | Mod: CPTII,S$GLB,, | Performed by: STUDENT IN AN ORGANIZED HEALTH CARE EDUCATION/TRAINING PROGRAM

## 2025-06-17 PROCEDURE — 3008F BODY MASS INDEX DOCD: CPT | Mod: CPTII,S$GLB,, | Performed by: STUDENT IN AN ORGANIZED HEALTH CARE EDUCATION/TRAINING PROGRAM

## 2025-06-17 PROCEDURE — 99204 OFFICE O/P NEW MOD 45 MIN: CPT | Mod: S$GLB,,, | Performed by: STUDENT IN AN ORGANIZED HEALTH CARE EDUCATION/TRAINING PROGRAM

## 2025-06-17 PROCEDURE — G2211 COMPLEX E/M VISIT ADD ON: HCPCS | Mod: S$GLB,,, | Performed by: STUDENT IN AN ORGANIZED HEALTH CARE EDUCATION/TRAINING PROGRAM

## 2025-06-17 NOTE — PATIENT INSTRUCTIONS
Continue levothyroxine 50 mcg daily     As a review, it is best to take the levothyroxine in the morning on an empty stomach, and not eat, drink or take medications for at least ~30 minutes after taking it to maximize its absorption.  Please avoid taking any multi-vitamins (anything with iron) or calcium supplements for at least 4 hours after taking the medication.  If you miss the dose on one day, take two doses the next morning to make up for the missed dose.      I recommend we biopsy the largest nodule of the thyroid.     After this we can refer you to the endocrine surgeon either way if that is what you would prefer.         Follow up with me in 1 yr. Our schedules open 4 months in advance. This means you should call us in early Feb 2026 to schedule an appointment in June 2026.   Our clinic contact information is listed below.    Kristyn Larson MD  [facility], 6th Floor  [address] [place], [address]  Office: [phone]

## 2025-06-17 NOTE — PROGRESS NOTES
Endocrinology New Visit   06/17/2025      Subjective:      Chief Complaint:  Thyroid Nodule      History of Present Illness  Emily Max is a 50 y.o. female with T2DM, hypothyroidism, obesity, MASLD, HTN, depression/anxiety referred by Jenn Danielle for evaluation of thyroid nodules.      Thyroid nodule(s)  Found in 6/2025 US ordered due to palpable swelling/lump in the neck.      Thyroid US 6/2025  FINDINGS:  The thyroid is normal in size.  Background thyroid parenchyma echotexture is homogenous.  Vascularity is within normal limits.     Right lobe of the thyroid measures 4.8 x 1.0 x 1.9 cm.     Isthmus measures 0.3 cm.     Left lobe of the thyroid measures 3.5 x 1.0 x 1.4 cm.     Nodule in the right aspect of isthmus measuring 1.8 x 0.6 x 1.4 cm.  Nodule is solid, isoechoic, with smooth margins; TIRADS 3.  Additional subcentimeter cystic nodule in the right thyroid lobe does not meet criteria for FNA or further follow-up.     Cervical lymph nodes demonstrate normal morphology and size.     Impression:     Multinodular thyroid, as above.  TI-RADS 3 nodule meets criteria for follow-up at 1, 3 and 5 years from the date of discovery.      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   Family Hx of Thyroid Cancer: no; does have hypothyroidism  and hyperthyroidism in the family.    Denies rapid neck enlargement, difficulty swallowing, SOB, or hoarseness.       Anticoagulation: bASA for primary prevention only.      Regarding hypothyroidism:  Has hypothyroidism diagnosed in her 20s    Etiology determined to be: Hashimoto's, postsurgical, post ablative     Currently taking levothyroxine 50 mcg daily  Takes appropriately without food or other medications or supplements, first thing in the morning   Recent dose adjustments: no    Lab Results   Component Value Date    TSH 1.394  "06/09/2025         Weight change: weight loss 70 lbs over 1.5 yrs on Mounjaro  Fatigue: yes  Cold intolerance: yes, more recently - was more sensitive to hear before   Bowel movements: yes constipation  Tremor: denies  Palpitations: yes - on BB with cardiologist   Dry skin: yes  Hair loss: yes    Menses: s/p hysterectomy in 2013 (also BSO)    Was on HRT for the first 3 yrs posotop and then stopped.      T2DM managed by PCP  On Mounjaro 15  mg weekly.  Lab Results   Component Value Date    HGBA1C 5.2 12/02/2024         ROS:   As above    Objective:     /78 (BP Location: Left arm, Patient Position: Sitting)   Pulse 86   Ht 5' 5" (1.651 m)   Wt 83.2 kg (183 lb 6.8 oz)   SpO2 96%   BMI 30.52 kg/m²   BP Readings from Last 3 Encounters:   06/17/25 116/78   06/09/25 118/88   02/10/25 118/88     Wt Readings from Last 1 Encounters:   06/17/25 0927 83.2 kg (183 lb 6.8 oz)     Body mass index is 30.52 kg/m².    Physical Exam  Vitals reviewed.   Constitutional:       General: not in acute distress.     Appearance: not ill-appearing.   HENT:      Head: Normocephalic.      Mouth/Throat:      Mouth: Mucous membranes are moist.   Neck:      Thyroid: +palpable nodule on right side of the isthmus, non-tender, no palpable LAD.  Eyes:      Conjunctiva/sclera: Conjunctivae normal.   Cardiovascular:      Rate and Rhythm: Normal rate.   Pulmonary:      Effort: Pulmonary effort is normal.   Neurological:      Mental Status: alert and oriented to person, place, and time.   Psychiatric:         Mood and Affect: Mood normal.         Behavior: Behavior normal.       Lab Review:   Lab Results   Component Value Date    HGBA1C 5.2 12/02/2024     Lab Results   Component Value Date    CHOL 205 (H) 12/02/2024    HDL 74 12/02/2024    LDLCALC 110.8 12/02/2024    TRIG 101 12/02/2024    CHOLHDL 36.1 12/02/2024     Lab Results   Component Value Date     12/02/2024    K 4.3 12/02/2024     12/02/2024    CO2 27 12/02/2024    GLU 95 " 12/02/2024    BUN 17 12/02/2024    CREATININE 1.1 12/02/2024    CALCIUM 9.9 12/02/2024    PROT 7.5 06/09/2025    ALBUMIN 4.2 06/09/2025    BILITOT 0.5 06/09/2025    ALKPHOS 66 06/09/2025    AST 15 06/09/2025    ALT 10 06/09/2025    ANIONGAP 11 12/02/2024    ESTGFRAFRICA >60.0 05/28/2021    EGFRNONAA >60.0 05/28/2021    TSH 1.394 06/09/2025     Vit D, 25-Hydroxy   Date Value Ref Range Status   12/02/2024 83 30 - 96 ng/mL Final     Comment:     Vitamin D deficiency.........<10 ng/mL                              Vitamin D insufficiency......10-29 ng/mL       Vitamin D sufficiency........> or equal to 30 ng/mL  Vitamin D toxicity............>100 ng/mL           All relevant labs and imaging reviewed.    Assessment and Plan     1. Other specified hypothyroidism    2. Multinodular thyroid  -     Ambulatory referral/consult to Endocrinology  -     US FNA Thyroid, 1st Lesion; Future; Expected date: 06/17/2025    3. Controlled type 2 diabetes mellitus without complication, without long-term current use of insulin      THYROID NODULES:  - Reviewed most recent thyroid US images with the pt.   - Although no nodules meet FNA criteria by TiRADS criteria, the isthmus nodule does meet criteria for FNA based on [place] guidelines.  - Explained thyroid nodule biopsy procedure: ultrasound-guided, 4 small needles, numbing spray used.  - Discussed possible biopsy results: benign, malignant, indeterminate (FLUS/AUS), or unsat.  - Clarified that molecular marker testing is typically done for indeterminate results.  - We do sometimes need to repeat a biopsy - often for unsat.  - Ordered fine needle aspiration biopsy of 1.8 cm thyroid nodule.  - Patient to discontinue aspirin 1 day before and on the day of the thyroid biopsy.  - No compressive sx.  - Clinically and biochemically euthyroid on low dose LT4.  - she has a hc of cervical cancer x2 and is quite anxious of anything with any possibility of cancer. She is agreeable to FNA to help guide  surgical planning, but regardless of outcome she thinks she will likely be interested in a thyroidectomy to decrease risk and alleviate her anxiety over this. We discussed FNA could help to guide surgical procedures indicated - such as neck dissection, etc which would be indicated for biopsy proven malignancy preop.  - did not yet refer to endo surgery but likely will after we get the FNA result.    HYPOTHYROIDISM:  - Continue Levothyroxine 50 mcg daily. Take on an empty stomach, separate from foods and other medications by 30 minutes to 1 hour. Take calcium, iron, multivitamins, and GERD medications 4 hours apart from Levothyroxine.  - Dose unchanged for years.  - Managed by PCP.  - Clinically and biochemically euthyroid.  - Goal normal TSH. TSH lab checked annually by pcp or prn new symptoms concerning for thyroid dysfunction.     HISTORY OF CERVICAL CANCER:  - Considered history of cancer and anxiety about potential malignancy.  - Explained thyroid cancer management typically involves surgery and possibly radioactive iodine, rarely requiring chemotherapy or traditional radiation.  - Explained rarity of medullary thyroid cancer and its relation to multiple endocrine neoplasia.    TYPE 2 DIABETES MELLITUS:  - Addressed concerns about Mounjaro and thyroid cancer, explaining the initial rat studies and lack of evidence in humans, as well as only applies if pt has a personal or family history of medullary thyroid cancer specifically (does not apply to other types of thyroid cancer).  - Well controlled on Mounjaro.  - Continued management by PCP.        Thyroid nodule FNA in our dept  RTC 1yr       Kristyn Larson MD  [facility]    Visit today included increased complexity associated with the care of the problems addressed and managing the longitudinal care of the patient due to the serious and/or complex managed problems    This note was generated with the assistance of ambient listening technology. Verbal  consent was obtained by the patient and accompanying visitor(s) for the recording of patient appointment to facilitate this note. I attest to having reviewed and edited the generated note for accuracy, though some syntax or spelling errors may persist. Please contact the author of this note for any clarification.

## 2025-06-27 ENCOUNTER — HOSPITAL ENCOUNTER (OUTPATIENT)
Dept: ENDOCRINOLOGY | Facility: CLINIC | Age: 50
Discharge: HOME | End: 2025-06-27
Attending: STUDENT IN AN ORGANIZED HEALTH CARE EDUCATION/TRAINING PROGRAM
Payer: COMMERCIAL

## 2025-06-27 DIAGNOSIS — E04.2 MULTINODULAR THYROID: ICD-10-CM

## 2025-06-27 PROCEDURE — 88173 CYTOPATH EVAL FNA REPORT: CPT | Mod: TC

## 2025-06-27 PROCEDURE — 10005 FNA BX W/US GDN 1ST LES: CPT | Mod: S$GLB,,, | Performed by: INTERNAL MEDICINE

## 2025-07-02 ENCOUNTER — RESULTS FOLLOW-UP (OUTPATIENT)
Dept: ENDOCRINOLOGY | Facility: CLINIC | Age: 50
End: 2025-07-02

## 2025-07-02 LAB
ESTROGEN SERPL-MCNC: NORMAL PG/ML
INSULIN SERPL-ACNC: NORMAL U[IU]/ML
LAB AP CLINICAL INFORMATION: NORMAL
LAB AP GROSS DESCRIPTION: NORMAL
LAB AP NON-GYN INTERPRETATION SPECIMEN 1: NORMAL
LAB AP PERFORMING LOCATION(S): NORMAL

## 2025-07-05 DIAGNOSIS — L71.9 ROSACEA: ICD-10-CM

## 2025-07-06 ENCOUNTER — PATIENT MESSAGE (OUTPATIENT)
Dept: DERMATOLOGY | Facility: CLINIC | Age: 50
End: 2025-07-06
Payer: COMMERCIAL

## 2025-07-07 DIAGNOSIS — E04.2 MULTINODULAR THYROID: Primary | ICD-10-CM

## 2025-07-07 NOTE — TELEPHONE ENCOUNTER
Please see the attached refill request.      Last seen 02/2025      Assessment / Plan:         Rosacea  Acne vulgaris  PM:  Wash with wash with cerave  Thin film of azaleic acid  all over  then spot tx with zilxi  Moisturize with cerave pm or vanicream daily moisturizer to minimize irritation     AM:  Wash with mild cleanser  2. Spot treat with zilxi if needed   3. Moisturizer with spf 30 + - many diff samples provided              Follow up in about 1 year (around 2/13/2026).

## 2025-07-09 ENCOUNTER — TELEPHONE (OUTPATIENT)
Dept: SURGERY | Facility: CLINIC | Age: 50
End: 2025-07-09
Payer: COMMERCIAL

## 2025-07-09 NOTE — TELEPHONE ENCOUNTER
Attempted to call pt to schedule appointment with endocrine surgeon. No answer and pt VM full, unable to leave message.

## 2025-07-21 ENCOUNTER — PATIENT MESSAGE (OUTPATIENT)
Dept: ADMINISTRATIVE | Facility: HOSPITAL | Age: 50
End: 2025-07-21
Payer: COMMERCIAL

## 2025-07-22 DIAGNOSIS — L71.9 ROSACEA: ICD-10-CM

## 2025-07-22 DIAGNOSIS — F41.1 GAD (GENERALIZED ANXIETY DISORDER): ICD-10-CM

## 2025-07-23 RX ORDER — ALPRAZOLAM 0.5 MG/1
0.5 TABLET ORAL DAILY PRN
Qty: 30 TABLET | Refills: 0 | Status: SHIPPED | OUTPATIENT
Start: 2025-07-23

## 2025-07-23 RX ORDER — TIRZEPATIDE 15 MG/.5ML
15 INJECTION, SOLUTION SUBCUTANEOUS
Qty: 6 ML | Refills: 0 | Status: SHIPPED | OUTPATIENT
Start: 2025-07-23

## 2025-07-23 NOTE — TELEPHONE ENCOUNTER
No care due was identified.  [facility] Embedded Care Due Messages. Reference number: [id number].   7/22/2025 9:26:17 PM CDT

## 2025-07-23 NOTE — PROGRESS NOTES
Endocrine Surgery History & Physical     REFERRING PROVIDER: Kristyn Larson MD    REASON FOR VISIT: Isthmus nodule    HPI: Emily Max is a 50 y.o. female patient with a history notable for type 2 diabetes mellitus, hypothyroidism, obesity, hypertension, depression/anxiety, cervical cancer, and metabolic dysfunction-associated steatotic liver disease who presents in consultation for management of a thyroid isthmus nodule.     Details of the workup are as follows:     Recent laboratory studies:  TSH: 1.394  Thyroid antibodies: n/e  Vitamin D: 83 in 12/2024    Thyroid and Neck Imaging:  Most recent thyroid ultrasound: 06/10/2025  Dominant thyroid nodules:   Right isthmus 1.8 x 0.6 x 1.4 cm nodule, TR 3  Sub-centimeter cystic nodules  Interval changes from prior ultrasound: n/a    Cytology:  FNA biopsy of 1.8 cm right isthmus nodule, 06/27/25  Cytology: [place] II, benign  Molecular testing: n/a    Medications:  Levothyroxine: 50 mcg  Vitamin D supplementation: 5000 IU vitamin D3 daily  Lithium, biotin, amiodarone, iodinated contrast: none  Calcium supplements or calcimimetics: no regular supplementation, Tums occasionally    Symptoms:   Thyroid symptoms: denies overt hyperthyroid or hypothyroid symptoms - has had some hair loss   Parathyroid screening: calcium levels normal, has had brain fog and fatigue  Compressive symptoms: denies dysphagia, globus sensation, compression symptoms, or anterior neck pain.    Voice symptoms: denies hoarseness, voice changes or increased need to clear the throat. Does have intermittent symptoms, more so after the biopsy.    Surgical risk factors:  Risk of concurrent parathyroid disease low  History of neck radiation: occupational exposure in [employer] [place] for 7 years, usually wore thyroid shield  Prior neck surgery: none  Prior gastric or bypass surgery: none  Inflammatory bowel disease or malabsorption: none, taking fiber  Activity level and functional status: has  orthopaedic limitations, can ride a bike for >30 minutes, can tolerate >4 METS  Cardiovascular risk: recent stress test for dyspnea workup - negative with no echocardiographic evidence of stress induced ischemia   Antiplatelet therapy and anticoagulation: aspirin    Bone Health:  DEXA scan on: 2015, bone mineral density normal    Family history:  Maternal history of hypothyroidism.  No other family history of endocrinopathies or endocrine cancers including thyroid disease, thyroid cancer, parathyroid disease, hypercalcemia.      LABORATORY STUDIES:  I personally and independently reviewed relevant lab test results, including the following:    TSH   Date Value Ref Range Status   06/09/2025 1.394 0.400 - 4.000 uIU/mL Final   12/02/2024 3.597 0.400 - 4.000 uIU/mL Final   10/06/2023 1.462 0.400 - 4.000 uIU/mL Final     Free T4   Date Value Ref Range Status   05/28/2021 1.02 0.71 - 1.51 ng/dL Final   08/13/2010 0.98 0.71 - 1.51 ng/dl Final     Vit D, 25-Hydroxy   Date Value Ref Range Status   12/02/2024 83 30 - 96 ng/mL Final     Comment:     Vitamin D deficiency.........<10 ng/mL                              Vitamin D insufficiency......10-29 ng/mL       Vitamin D sufficiency........> or equal to 30 ng/mL  Vitamin D toxicity............>100 ng/mL     08/29/2022 87 30 - 96 ng/mL Final     Comment:     Vitamin D deficiency.........<10 ng/mL                              Vitamin D insufficiency......10-29 ng/mL       Vitamin D sufficiency........> or equal to 30 ng/mL  Vitamin D toxicity............>100 ng/mL     05/15/2018 60 30 - 96 ng/mL Final     Comment:     Vitamin D deficiency.........<10 ng/mL                              Vitamin D insufficiency......10-29 ng/mL       Vitamin D sufficiency........> or equal to 30 ng/mL  Vitamin D toxicity............>100 ng/mL       Calcium   Date Value Ref Range Status   12/02/2024 9.9 8.7 - 10.5 mg/dL Final   04/17/2024 10.1 8.7 - 10.5 mg/dL Final   10/06/2023 9.4 8.7 - 10.5 mg/dL  Final   10/06/2023 9.4 8.7 - 10.5 mg/dL Final     Albumin   Date Value Ref Range Status   06/09/2025 4.2 3.5 - 5.2 g/dL Final   04/08/2025 4.0 3.5 - 5.2 g/dL Final   12/02/2024 3.8 3.5 - 5.2 g/dL Final        PAST MEDICAL HISTORY:  Problem List[1]     PAST SURGICAL HISTORY:  Past Surgical History:   Procedure Laterality Date    BREAST BIOPSY Right 2002    Core bx, benign    CHONDROPLASTY OF KNEE Left 6/10/2021    Procedure: CHONDROPLASTY, KNEE;  Surgeon: Kelly Zaman MD;  Location: [facility] OR;  Service: Orthopedics;  Laterality: Left;    CHONDROPLASTY OF KNEE Right 11/3/2022    Procedure: CHONDROPLASTY, KNEE;  Surgeon: Kelly Zaman MD;  Location: [facility] OR;  Service: Orthopedics;  Laterality: Right;    COLONOSCOPY N/A 7/6/2022    Procedure: COLONOSCOPY;  Surgeon: Issac Delcid MD;  Location: [address];  Service: Endoscopy;  Laterality: N/A;  Fully vaccinated, prep instr portal -ml    HYSTERECTOMY  May 2013    Robotic radical hyst, bso, blplnd    KNEE ARTHROSCOPY W/ MENISCECTOMY Left 6/10/2021    Procedure: ARTHROSCOPY, KNEE, WITH MENISCECTOMY MEDIAL;  Surgeon: Kelly Zaman MD;  Location: [facility] OR;  Service: Orthopedics;  Laterality: Left;    KNEE ARTHROSCOPY W/ MENISCECTOMY Right 11/3/2022    Procedure: ARTHROSCOPY, KNEE, WITH MENISCECTOMY;  Surgeon: Kelly Zaman MD;  Location: [facility] OR;  Service: Orthopedics;  Laterality: Right;    KNEE SURGERY  08/24/2017    right knee    LIPOMA RESECTION      OOPHORECTOMY      bilateral    PELVIC AND PARA-AORTIC LYMPH NODE DISSECTION      PELVIC LAPAROSCOPY      endometriosis    LA REMOVAL OF OVARY/TUBE(S)      SINUS SURGERY      x2    SYNOVECTOMY OF KNEE Left 6/10/2021    Procedure: SYNOVECTOMY, KNEE;  Surgeon: Kelly Zaman MD;  Location: [facility] OR;  Service: Orthopedics;  Laterality: Left;    SYNOVECTOMY OF KNEE Right 11/3/2022    Procedure: SYNOVECTOMY, KNEE;  Surgeon: Kelly Zaman MD;  Location: [facility] OR;  Service: Orthopedics;  Laterality: Right;    TONSILLECTOMY       "    MEDICATIONS:  Current Medications[2]    ALLERGIES:  Review of patient's allergies indicates:   Allergen Reactions    Hydrocodone-acetaminophen Itching and Rash     States can take - may have been formulation    Iodine Anaphylaxis    Other Anaphylaxis     mushrooms    Fluconazole Hives     Other reaction(s): Hives    Iodine and iodide containing products Hives     Iv iodine only    Mushroom combination no.1      Other reaction(s): Bronchial Constriction       SOCIAL HISTORY:  Social History[3]     FAMILY HISTORY:  Family History   Problem Relation Name Age of Onset    Migraines Mother      Cancer Mother          endometrial cancer     Hypertension Mother      Hypothyroidism Mother      Heart disease Mother      Hyperlipidemia Mother      Diabetes Father      Breast cancer Maternal Aunt      Cancer Maternal Aunt      Rheum arthritis Maternal Aunt      Migraines Maternal Grandmother      Aneurysm Maternal Grandmother          intracranial aneurysm    Stroke Maternal Grandmother      Rheum arthritis Paternal Grandmother  80    Cancer Paternal Grandfather          lymphoma non hodgkins     Colon cancer Other mat great aunt     Skin cancer Other      Ovarian cancer Neg Hx      Uterine cancer Neg Hx           REVIEW OF SYSTEMS:  A detailed review of systems was reviewed with the patient, pertinent positives and negatives are presented in the note and is otherwise negative.    PHYSICAL EXAMINATION:  Vital Signs: /68   Pulse 76   Ht 5' 5" (1.651 m)   Wt 81.9 kg (180 lb 10.7 oz)   SpO2 99%   BMI 30.06 kg/m²     Constitutional: no acute distress, comfortable, well appearing  HENT: no lid lag, no exophthalmos, no scleral icterus, moist mucous membranes, good dentition  Neck: supple, trachea in midline, thyroid is soft and moves well with swallowing, small palpable nodule in the right isthmus, adequate neck extension  Heme/Lymph: no cervical or supraclavicular lymphadenopathy  Respiratory: normal respiratory " effort, no wheezes or stridor  Cardiovascular: regular rate and rhythm  Extremities: no edema  Skin: warm and dry, no rashes  Neurologic: voice strong  Vascular: radial pulses palpable bilaterally  Psychiatric: affect normal    IMAGING STUDIES:  I personally and independently reviewed, visualized and interpreted the images of the below listed radiology studies (including the ultrasound from June 2025) and my findings are notable for an isoechoic solid nodule situated at the junction of the right thyroid lobe and isthmus, it has regular smooth boarders, no echogenic foci, does not have any suspicious features.  Reports below for reference.    US Soft Tissue Head Neck 06/10/2025  CLINICAL HISTORY:.  Localized swelling, mass and lump, neck  TECHNIQUE:Ultrasound of the thyroid and cervical lymph nodes was performed.  COMPARISON:None    FINDINGS:  The thyroid is normal in size.  Background thyroid parenchyma echotexture is homogenous.  Vascularity is within normal limits.    Right lobe of the thyroid measures 4.8 x 1.0 x 1.9 cm.  Isthmus measures 0.3 cm.  Left lobe of the thyroid measures 3.5 x 1.0 x 1.4 cm.    Nodule in the right aspect of isthmus measuring 1.8 x 0.6 x 1.4 cm.  Nodule is solid, isoechoic, with smooth margins; TIRADS 3.  Additional subcentimeter cystic nodule in the right thyroid lobe does not meet criteria for FNA or further follow-up.    Cervical lymph nodes demonstrate normal morphology and size.    Impression  Multinodular thyroid, as above.  TI-RADS 3 nodule meets criteria for follow-up at 1, 3 and 5 years from the date of discovery.    Electronically signed by resident: Kory Lees  Date:    06/10/2025  Time:    08:20    Electronically signed by: Justyn Chapman  Date:    06/10/2025  Time:    09:48      CYTOLOGY:   Fine Needle Aspiration, Cytology: [id number]  Order: [id number]   Status: Final result           Component  Ref Range & Units (hover)    Case Report   Husam michael - [facility] FNA                                 Case: [id number]                                 Authorizing Provider:  Joel Calles MD        Collected:           06/27/2025 02:38 PM           Ordering Location:     [facility] Received:            06/28/2025 04:53 AM                                  [place]                                                                           Pathologist:           Maria Teresa Petersen MD                                                           Specimen:    Thyroid Isthmus                                                                            Clinical Information    Thyroid nodule   Final Diagnosis   1: Thyroid, isthmus (aspiration):              Satisfactory for evaluation             [facility] Thyroid Cytology Category II: Benign                 Leslie follicular cells and scattered Hurtheloid cell change.              Colloid, macrophages and lymphocytes.               Electronically signed by Maria Teresa Petersen MD on 7/2/2025 at 1039            IMPRESSION AND PLAN:  I had the pleasure of seeing Ms. Max in endocrine surgical consultation regarding her thyroid nodule.  I have discussed with Ms. Max at length regarding the current surgical and non-surgical treatment options.  Based on the current clinical findings and a thorough discussion about the risks, benefits, and alternatives, the patient elected to proceed with active surveillance.     I think the risk of malignancy overall is quite low, likely less than 3%.  The patient is relatively risk averse and therefore I would recommend that since the risk of malignancy is quite low, we proceed with a repeat ultrasound in 6 months to 1 year, the nodule will be kept under ultrasound surveillance for growth or alterations in ultrasonographic appearance.      1. Multinodular thyroid - seen on US 6/2025  Assessment & Plan:  1.8 cm right isthmus nodule, benign cytology, no compressive symptoms.      - Repeat ultrasound with examination  in 6 months (around December 2025) to evaluate for interval growth with me  - Will follow up with endocrinology at 1 year interval (around June 2026)    Orders:  -     Ambulatory referral/consult to Endocrine Surgery  -     US Soft Tissue Head Neck; Future; Expected date: 01/24/2026    2. Hypothyroidism, unspecified type  Assessment & Plan:  - Continue levothyroxine      3. Multinodular thyroid  Assessment & Plan:  1.8 cm right isthmus nodule, benign cytology, no compressive symptoms.      - Repeat ultrasound with examination in 6 months (around December 2025) to evaluate for interval growth with me  - Will follow up with endocrinology at 1 year interval (around June 2026)    Orders:  -     Ambulatory referral/consult to Endocrine Surgery  -     US Soft Tissue Head Neck; Future; Expected date: 01/24/2026    4. Mild vitamin D deficiency  Assessment & Plan:  Now in normal range, on 5000 IU D3 daily.    - Cut back to 5000 IU every three days or 1000 IU daily      Jody Olivia MD, FACS  Staff Surgeon  Endocrine Surgery  7/24/25          [1]   Patient Active Problem List  Diagnosis    Hypothyroidism    HTN (hypertension)    Mild vitamin D deficiency    Anxiety    Depression    Varicose vein of leg    Urinary, incontinence, stress female    Obesity (BMI 30-39.9)    Venous insufficiency of both lower extremities    History of cervical cancer    Pre-diabetes    Surgical menopause    Periodic headache syndrome without status migrainosus, not intractable    Knee internal derangement    Vaginal atrophy    Low grade squamous intraepith lesion on cytologic smear vagina (lgsil)    FH: colonic polyps    Colon cancer screening    Acute medial meniscus tear of left knee    Acute pain of left knee    Decreased range of motion of left knee    Impaired functional mobility, balance, gait, and endurance    Wears contact lenses    Acute medial meniscus tear of right knee    Decreased ROM of right knee    Decreased strength of lower  extremity    Controlled type 2 diabetes mellitus without complication, without long-term current use of insulin    Facial cellulitis    Peripheral edema    Postnasal drip    Elevated liver enzymes    Metabolic dysfunction-associated steatotic liver disease (MASLD)    Sore throat    Cough    Family history of cardiovascular disease    Splenic lesion    Multinodular thyroid - seen on US 6/2025   [2]   Current Outpatient Medications   Medication Sig Dispense Refill    ALPRAZolam (XANAX) 0.5 MG tablet Take 1 tablet (0.5 mg total) by mouth daily as needed for Anxiety. 30 tablet 0    atenoloL (TENORMIN) 50 MG tablet Take 1 tablet (50 mg total) by mouth 2 (two) times a day. 180 tablet 3    azelaic acid (AZELEX) 15 % gel Compound azelaic acid 15% / niacinamide 2% cream. Apply to affected areas of face nightly and then moisturize after. 30 g 5    clindamycin (CLEOCIN T) 1 % lotion apply to affected area on face once or twice daily as needed for flare 60 mL 1    diclofenac (VOLTAREN) 75 MG EC tablet Take 1 tablet (75 mg total) by mouth 2 (two) times daily as needed (for migraines). 20 tablet 3    doxycycline (VIBRA-TABS) 100 MG tablet Sig 1 po qd with food and not within 1 hour of bedtime 30 tablet 2    DULoxetine (CYMBALTA) 30 MG capsule Take 1 capsule (30 mg total) by mouth 2 (two) times daily. 180 capsule 3    fluticasone (FLONASE) 50 mcg/actuation nasal spray 1 spray by Each Nare route daily as needed for Rhinitis.      ipratropium (ATROVENT) 21 mcg (0.03 %) nasal spray 2 sprays by Each Nostril route 2 (two) times daily as needed. 30 mL 0    levocetirizine (XYZAL) 5 MG tablet Take 1 tablet (5 mg total) by mouth once daily. 90 tablet 3    levothyroxine (SYNTHROID) 50 MCG tablet Take 1 tablet (50 mcg total) by mouth once daily. 90 tablet 3    metroNIDAZOLE (NORITATE) 1 % cream Compound azelaic acid 15% + ivermectin 1% + metronidazole 1% cream with 2% niacinamide and  Apply to face qhs. 30 g 5    minocycline (ZILXI) 1.5 %  Foam Apply 1 Application topically once daily. Apply thin film to affected areas on face every day 30 g 10    ondansetron (ZOFRAN-ODT) 4 MG TbDL Dissolve 1 tablet (4 mg total) by mouth 2 (two) times daily as needed (nausea). 90 tablet 3    tirzepatide (MOUNJARO) 15 mg/0.5 mL PnIj Inject 15 mg into the skin every 7 days. 6 mL 0    traMADoL (ULTRAM) 50 mg tablet Take 1-2 tablets ( mg total) by mouth every 6 (six) hours as needed for Pain. 30 tablet 0    triamterene-hydrochlorothiazide 37.5-25 mg (DYAZIDE) 37.5-25 mg per capsule Take 1 capsule by mouth once daily. 90 capsule 2    ubrogepant (UBRELVY) 100 mg tablet Take 1 tablet by mouth at the onset of a headache. May repeat based on response and tolerability after more than 2 hours if needed. Do not take more than 200mg in a 24 hour span. 16 tablet 5    vitamin D (VITAMIN D3) 1000 units Tab Take 5 tablets (5,000 Units total) by mouth once daily. 30 tablet prn    albuterol 90 mcg/actuation inhaler Inhale 2 puffs into the lungs every 4 (four) hours as needed for Wheezing. 6.7 g 3    aspirin (ECOTRIN) 81 MG EC tablet Take 1 tablet (81 mg total) by mouth once daily. For 4 weeks starting the day after surgery. 28 tablet 0    EPINEPHrine (EPIPEN) 0.3 mg/0.3 mL AtIn Inject 0.3 mLs (0.3 mg total) into the muscle once. for 1 dose 2 each 2     Current Facility-Administered Medications   Medication Dose Route Frequency Provider Last Rate Last Admin    diphenhydrAMINE injection 50 mg  50 mg Intravenous PRN Aleks Kwok III, PA-C       [3]   Social History  Tobacco Use    Smoking status: Never     Passive exposure: Never    Smokeless tobacco: Never   Substance Use Topics    Alcohol use: Yes     Alcohol/week: 1.0 standard drink of alcohol     Types: 1 Glasses of wine per week     Comment: socially    Drug use: Never

## 2025-07-23 NOTE — TELEPHONE ENCOUNTER
No care due was identified.  [facility] Embedded Care Due Messages. Reference number: [id number].   7/22/2025 9:26:47 PM CDT

## 2025-07-23 NOTE — TELEPHONE ENCOUNTER
Refill Routing Note   Medication(s) are not appropriate for processing by [facility] for the following reason(s):        Required labs outdated    [facility] action(s):  Defer               Appointments  past 12m or future 3m with PCP    Date Provider   Last Visit   5/8/2025 Elif Lew MD   Next Visit   12/3/2025 Elif Lew MD   ED visits in past 90 days: 0        Note composed:2:16 AM 07/23/2025

## 2025-07-24 ENCOUNTER — OFFICE VISIT (OUTPATIENT)
Dept: SURGERY | Facility: CLINIC | Age: 50
End: 2025-07-24
Payer: COMMERCIAL

## 2025-07-24 VITALS
HEART RATE: 76 BPM | OXYGEN SATURATION: 99 % | HEIGHT: 65 IN | BODY MASS INDEX: 30.1 KG/M2 | SYSTOLIC BLOOD PRESSURE: 104 MMHG | DIASTOLIC BLOOD PRESSURE: 68 MMHG | WEIGHT: 180.69 LBS

## 2025-07-24 DIAGNOSIS — E03.9 HYPOTHYROIDISM, UNSPECIFIED TYPE: ICD-10-CM

## 2025-07-24 DIAGNOSIS — E55.9 MILD VITAMIN D DEFICIENCY: ICD-10-CM

## 2025-07-24 DIAGNOSIS — E04.2 MULTINODULAR THYROID: Primary | ICD-10-CM

## 2025-07-24 DIAGNOSIS — E04.2 MULTINODULAR THYROID: ICD-10-CM

## 2025-07-24 PROCEDURE — 3078F DIAST BP <80 MM HG: CPT | Mod: CPTII,S$GLB,, | Performed by: STUDENT IN AN ORGANIZED HEALTH CARE EDUCATION/TRAINING PROGRAM

## 2025-07-24 PROCEDURE — 99999 PR PBB SHADOW E&M-EST. PATIENT-LVL V: CPT | Mod: PBBFAC,,, | Performed by: STUDENT IN AN ORGANIZED HEALTH CARE EDUCATION/TRAINING PROGRAM

## 2025-07-24 PROCEDURE — 3074F SYST BP LT 130 MM HG: CPT | Mod: CPTII,S$GLB,, | Performed by: STUDENT IN AN ORGANIZED HEALTH CARE EDUCATION/TRAINING PROGRAM

## 2025-07-24 PROCEDURE — 99203 OFFICE O/P NEW LOW 30 MIN: CPT | Mod: S$GLB,,, | Performed by: STUDENT IN AN ORGANIZED HEALTH CARE EDUCATION/TRAINING PROGRAM

## 2025-07-24 PROCEDURE — 3008F BODY MASS INDEX DOCD: CPT | Mod: CPTII,S$GLB,, | Performed by: STUDENT IN AN ORGANIZED HEALTH CARE EDUCATION/TRAINING PROGRAM

## 2025-07-24 PROCEDURE — 1159F MED LIST DOCD IN RCRD: CPT | Mod: CPTII,S$GLB,, | Performed by: STUDENT IN AN ORGANIZED HEALTH CARE EDUCATION/TRAINING PROGRAM

## 2025-07-24 NOTE — ASSESSMENT & PLAN NOTE
Now in normal range, on 5000 IU D3 daily.    - Cut back to 5000 IU every three days or 1000 IU daily

## 2025-07-24 NOTE — ASSESSMENT & PLAN NOTE
1.8 cm right isthmus nodule, benign cytology, no compressive symptoms.      - Repeat ultrasound with examination in 6 months (around December 2025) to evaluate for interval growth with me  - Will follow up with endocrinology at 1 year interval (around June 2026)

## 2025-08-18 ENCOUNTER — PATIENT MESSAGE (OUTPATIENT)
Dept: INTERNAL MEDICINE | Facility: CLINIC | Age: 50
End: 2025-08-18
Payer: COMMERCIAL

## 2025-08-19 ENCOUNTER — E-VISIT (OUTPATIENT)
Dept: INTERNAL MEDICINE | Facility: CLINIC | Age: 50
End: 2025-08-19
Payer: COMMERCIAL

## 2025-08-19 DIAGNOSIS — E11.9 CONTROLLED TYPE 2 DIABETES MELLITUS WITHOUT COMPLICATION, WITHOUT LONG-TERM CURRENT USE OF INSULIN: Primary | ICD-10-CM

## 2025-08-20 RX ORDER — TIRZEPATIDE 12.5 MG/.5ML
12.5 INJECTION, SOLUTION SUBCUTANEOUS
Qty: 2 ML | Refills: 5 | Status: SHIPPED | OUTPATIENT
Start: 2025-08-20

## (undated) DEVICE — PAD ELECTRODE STER 1.5X3

## (undated) DEVICE — NDL HYPO REG 25G X 1 1/2

## (undated) DEVICE — UNDERGLOVES BIOGEL PI SIZE 7.5

## (undated) DEVICE — SOL 9P NACL IRR PIC IL

## (undated) DEVICE — SOL IRR NACL .9% 3000ML

## (undated) DEVICE — DRESSING XEROFORM FOIL PK 1X8

## (undated) DEVICE — Device

## (undated) DEVICE — GLOVE ORTHO PF SZ 8.5

## (undated) DEVICE — GLOVE SURGEON SYN PF SZ 9

## (undated) DEVICE — COVER MAYO STAND REINFRCD 30

## (undated) DEVICE — ADHESIVE MASTISOL VIAL 48/BX

## (undated) DEVICE — PAD ABD 8X10 STERILE

## (undated) DEVICE — SEE MEDLINE ITEM 157150

## (undated) DEVICE — APPLICATOR CHLORAPREP ORN 26ML

## (undated) DEVICE — PUMP COLD THERAPY

## (undated) DEVICE — DRAPE ARTHSCP FLD CTRL POUCH

## (undated) DEVICE — GOWN SMART IMP BREATHABLE XXLG

## (undated) DEVICE — CLOSURE SKIN STERI STRIP 1/2X4

## (undated) DEVICE — SYR B-D DISP CONTROL 10CC100/C

## (undated) DEVICE — WRAP KNEE ACCU THERM GEL PACK

## (undated) DEVICE — TUBE SET INFLOW/OUTFLOW

## (undated) DEVICE — PAD COLD THERAPY KNEE WRAP ON

## (undated) DEVICE — SEE MEDLINE ITEM 152530

## (undated) DEVICE — GAUZE SPONGE 4X4 12PLY

## (undated) DEVICE — SEE MEDLINE ITEM 157169

## (undated) DEVICE — SYR 10CC LUER LOCK

## (undated) DEVICE — GOWN ECLIPSE REINF LV4 XLNG XL

## (undated) DEVICE — PAD KNEE POLAR XL

## (undated) DEVICE — SLEEVE PROTECTIVE 6X9 NON STER

## (undated) DEVICE — GLOVE BIOGEL SKINSENSE PI 7.0

## (undated) DEVICE — NDL 18GA X1 1/2 REG BEVEL

## (undated) DEVICE — SEE MEDLINE ITEM 157131

## (undated) DEVICE — PAD CAST SPECIALIST STRL 6

## (undated) DEVICE — BLADE 4.2MM PREBENT ULTRACUT

## (undated) DEVICE — SUT MCRYL PLUS 4-0 PS2 27IN

## (undated) DEVICE — SEE MEDLINE ITEM 157117

## (undated) DEVICE — GOWN SMARTGOWN LVL4 X-LONG XL